# Patient Record
Sex: MALE | Race: WHITE | NOT HISPANIC OR LATINO | Employment: PART TIME | URBAN - METROPOLITAN AREA
[De-identification: names, ages, dates, MRNs, and addresses within clinical notes are randomized per-mention and may not be internally consistent; named-entity substitution may affect disease eponyms.]

---

## 2017-06-20 ENCOUNTER — HOSPITAL ENCOUNTER (OUTPATIENT)
Dept: OTHER | Facility: HOSPITAL | Age: 62
Setting detail: SPECIMEN
Discharge: HOME | End: 2017-06-20
Attending: FAMILY MEDICINE | Admitting: FAMILY MEDICINE

## 2017-06-20 LAB
ALBUMIN SERPL-MCNC: 3.7 G/DL (ref 3.5–4.8)
ALBUMIN/GLOB SERPL: 0.9 {RATIO} (ref 1–1.7)
ALP SERPL-CCNC: 59 IU/L (ref 32–91)
ALT SERPL-CCNC: 29 IU/L (ref 17–63)
ANION GAP SERPL CALC-SCNC: 15.7 MMOL/L (ref 10–20)
AST SERPL-CCNC: 48 IU/L (ref 15–41)
BASOPHILS # BLD AUTO: 0.1 10*3/UL (ref 0–0.2)
BASOPHILS NFR BLD AUTO: 1 % (ref 0–2)
BILIRUB SERPL-MCNC: 0.6 MG/DL (ref 0.3–1.2)
BUN SERPL-MCNC: 11 MG/DL (ref 8–20)
BUN/CREAT SERPL: 9.2 (ref 6.2–20.3)
CALCIUM SERPL-MCNC: 9.4 MG/DL (ref 8.9–10.3)
CHLORIDE SERPL-SCNC: 109 MMOL/L (ref 101–111)
CHOLEST SERPL-MCNC: 179 MG/DL
CHOLEST/HDLC SERPL: 5.3 {RATIO}
CONV CO2: 21 MMOL/L (ref 22–32)
CONV LDL CHOLESTEROL DIRECT: 114 MG/DL (ref 0–100)
CONV TOTAL PROTEIN: 7.8 G/DL (ref 6.1–7.9)
CREAT UR-MCNC: 1.2 MG/DL (ref 0.7–1.2)
DIFFERENTIAL METHOD BLD: (no result)
EOSINOPHIL # BLD AUTO: 0.2 10*3/UL (ref 0–0.3)
EOSINOPHIL # BLD AUTO: 2 % (ref 0–3)
ERYTHROCYTE [DISTWIDTH] IN BLOOD BY AUTOMATED COUNT: 14.5 % (ref 11.5–14.5)
GLOBULIN UR ELPH-MCNC: 4.1 G/DL (ref 2.5–3.8)
GLUCOSE SERPL-MCNC: 113 MG/DL (ref 65–99)
HCT VFR BLD AUTO: 41.9 % (ref 40–54)
HDLC SERPL-MCNC: 34 MG/DL
HGB BLD-MCNC: 14.2 G/DL (ref 14–18)
LDLC/HDLC SERPL: 3.4 {RATIO}
LIPID INTERPRETATION: ABNORMAL
LYMPHOCYTES # BLD AUTO: 1.3 10*3/UL (ref 0.8–4.8)
LYMPHOCYTES NFR BLD AUTO: 13 % (ref 18–42)
MCH RBC QN AUTO: 32.6 PG (ref 26–32)
MCHC RBC AUTO-ENTMCNC: 33.8 G/DL (ref 32–36)
MCV RBC AUTO: 96.4 FL (ref 80–94)
MONOCYTES # BLD AUTO: 1.2 10*3/UL (ref 0.1–1.3)
MONOCYTES NFR BLD AUTO: 12 % (ref 2–11)
NEUTROPHILS # BLD AUTO: 7.1 10*3/UL (ref 2.3–8.6)
NEUTROPHILS NFR BLD AUTO: 72 % (ref 50–75)
NRBC BLD AUTO-RTO: 0 /100{WBCS}
NRBC/RBC NFR BLD MANUAL: 0 10*3/UL
PLATELET # BLD AUTO: 164 10*3/UL (ref 150–450)
PMV BLD AUTO: 9.2 FL (ref 7.4–10.4)
POTASSIUM SERPL-SCNC: 4.7 MMOL/L (ref 3.6–5.1)
PSA SERPL-MCNC: 0.26 NG/ML (ref 0–4)
RBC # BLD AUTO: 4.34 10*6/UL (ref 4.6–6)
SODIUM SERPL-SCNC: 141 MMOL/L (ref 136–144)
T3 SERPL-MCNC: 0.9 NG/ML (ref 0.87–1.78)
T4 SERPL-MCNC: 7.19 UG/DL (ref 6.1–12.2)
TRIGL SERPL-MCNC: 184 MG/DL
TSH SERPL-ACNC: 2.88 UIU/ML (ref 0.34–5.6)
VLDLC SERPL CALC-MCNC: 31.8 MG/DL
WBC # BLD AUTO: 10 10*3/UL (ref 4.5–11.5)

## 2017-07-06 ENCOUNTER — HOSPITAL ENCOUNTER (OUTPATIENT)
Dept: CARDIOLOGY | Facility: HOSPITAL | Age: 62
Discharge: HOME | End: 2017-07-06
Attending: INTERNAL MEDICINE | Admitting: INTERNAL MEDICINE

## 2018-01-23 ENCOUNTER — HOSPITAL ENCOUNTER (OUTPATIENT)
Dept: OTHER | Facility: HOSPITAL | Age: 63
Setting detail: SPECIMEN
Discharge: HOME | End: 2018-01-23
Attending: FAMILY MEDICINE | Admitting: FAMILY MEDICINE

## 2018-01-23 LAB
ANION GAP SERPL CALC-SCNC: 13.9 MMOL/L (ref 10–20)
BASOPHILS # BLD AUTO: 0.1 10*3/UL (ref 0–0.2)
BASOPHILS NFR BLD AUTO: 1 % (ref 0–2)
BUN SERPL-MCNC: 15 MG/DL (ref 8–20)
BUN/CREAT SERPL: 13.6 (ref 6.2–20.3)
CALCIUM SERPL-MCNC: 10.4 MG/DL (ref 8.9–10.3)
CHLORIDE SERPL-SCNC: 106 MMOL/L (ref 101–111)
CONV CO2: 22 MMOL/L (ref 22–32)
CREAT UR-MCNC: 1.1 MG/DL (ref 0.7–1.2)
CRP SERPL-MCNC: 1.79 MG/DL (ref 0–0.7)
DIFFERENTIAL METHOD BLD: (no result)
EOSINOPHIL # BLD AUTO: 0.3 10*3/UL (ref 0–0.3)
EOSINOPHIL # BLD AUTO: 4 % (ref 0–3)
ERYTHROCYTE [DISTWIDTH] IN BLOOD BY AUTOMATED COUNT: 13.8 % (ref 11.5–14.5)
GLUCOSE SERPL-MCNC: 103 MG/DL (ref 65–99)
HCT VFR BLD AUTO: 42.6 % (ref 40–54)
HGB BLD-MCNC: 14.2 G/DL (ref 14–18)
LYMPHOCYTES # BLD AUTO: 1.5 10*3/UL (ref 0.8–4.8)
LYMPHOCYTES NFR BLD AUTO: 18 % (ref 18–42)
MCH RBC QN AUTO: 32 PG (ref 26–32)
MCHC RBC AUTO-ENTMCNC: 33.4 G/DL (ref 32–36)
MCV RBC AUTO: 95.9 FL (ref 80–94)
MONOCYTES # BLD AUTO: 1.4 10*3/UL (ref 0.1–1.3)
MONOCYTES NFR BLD AUTO: 17 % (ref 2–11)
NEUTROPHILS # BLD AUTO: 5.3 10*3/UL (ref 2.3–8.6)
NEUTROPHILS NFR BLD AUTO: 60 % (ref 50–75)
NRBC BLD AUTO-RTO: 0 /100{WBCS}
NRBC/RBC NFR BLD MANUAL: 0 10*3/UL
PLATELET # BLD AUTO: 306 10*3/UL (ref 150–450)
PMV BLD AUTO: 8.1 FL (ref 7.4–10.4)
POTASSIUM SERPL-SCNC: 4.9 MMOL/L (ref 3.6–5.1)
RBC # BLD AUTO: 4.44 10*6/UL (ref 4.6–6)
SODIUM SERPL-SCNC: 137 MMOL/L (ref 136–144)
URATE SERPL-MCNC: 9.5 MG/DL (ref 4.8–8.7)
WBC # BLD AUTO: 8.7 10*3/UL (ref 4.5–11.5)

## 2019-04-03 ENCOUNTER — INPATIENT HOSPITAL (OUTPATIENT)
Dept: URBAN - METROPOLITAN AREA HOSPITAL 84 | Facility: HOSPITAL | Age: 64
End: 2019-04-03

## 2019-04-03 DIAGNOSIS — K74.60 UNSPECIFIED CIRRHOSIS OF LIVER: ICD-10-CM

## 2019-04-03 DIAGNOSIS — I50.9 HEART FAILURE, UNSPECIFIED: ICD-10-CM

## 2019-04-03 DIAGNOSIS — D72.829 ELEVATED WHITE BLOOD CELL COUNT, UNSPECIFIED: ICD-10-CM

## 2019-04-03 DIAGNOSIS — K85.20 ALCOHOL INDUCED ACUTE PANCREATITIS WITHOUT NECROSIS OR INFEC: ICD-10-CM

## 2019-04-03 DIAGNOSIS — I10 ESSENTIAL (PRIMARY) HYPERTENSION: ICD-10-CM

## 2019-04-03 DIAGNOSIS — D69.6 THROMBOCYTOPENIA, UNSPECIFIED: ICD-10-CM

## 2019-04-03 DIAGNOSIS — R10.9 UNSPECIFIED ABDOMINAL PAIN: ICD-10-CM

## 2019-04-03 DIAGNOSIS — F10.10 ALCOHOL ABUSE, UNCOMPLICATED: ICD-10-CM

## 2019-04-03 DIAGNOSIS — R93.3 ABNORMAL FINDINGS ON DIAGNOSTIC IMAGING OF OTHER PARTS OF DI: ICD-10-CM

## 2019-04-03 DIAGNOSIS — R79.89 OTHER SPECIFIED ABNORMAL FINDINGS OF BLOOD CHEMISTRY: ICD-10-CM

## 2019-04-03 PROCEDURE — 99254 IP/OBS CNSLTJ NEW/EST MOD 60: CPT | Performed by: INTERNAL MEDICINE

## 2019-04-04 PROCEDURE — 99232 SBSQ HOSP IP/OBS MODERATE 35: CPT | Performed by: INTERNAL MEDICINE

## 2019-07-10 ENCOUNTER — OFFICE VISIT (OUTPATIENT)
Dept: FAMILY MEDICINE CLINIC | Facility: CLINIC | Age: 64
End: 2019-07-10

## 2019-07-10 VITALS
HEIGHT: 74 IN | WEIGHT: 276 LBS | DIASTOLIC BLOOD PRESSURE: 60 MMHG | HEART RATE: 57 BPM | SYSTOLIC BLOOD PRESSURE: 109 MMHG | BODY MASS INDEX: 35.42 KG/M2 | OXYGEN SATURATION: 95 %

## 2019-07-10 DIAGNOSIS — M24.811 INTERNAL DERANGEMENT OF RIGHT SHOULDER: ICD-10-CM

## 2019-07-10 DIAGNOSIS — D50.0 ANEMIA DUE TO BLOOD LOSS: ICD-10-CM

## 2019-07-10 DIAGNOSIS — I10 ESSENTIAL HYPERTENSION: Primary | ICD-10-CM

## 2019-07-10 DIAGNOSIS — M1A.39X0 CHRONIC GOUT DUE TO RENAL IMPAIRMENT OF MULTIPLE SITES WITHOUT TOPHUS: ICD-10-CM

## 2019-07-10 PROBLEM — R01.1 SYSTOLIC MURMUR: Status: ACTIVE | Noted: 2017-06-20

## 2019-07-10 PROBLEM — R22.0 TONGUE SWELLING: Status: ACTIVE | Noted: 2018-01-23

## 2019-07-10 PROBLEM — Z00.00 ENCOUNTER FOR GENERAL ADULT MEDICAL EXAMINATION WITHOUT ABNORMAL FINDINGS: Status: ACTIVE | Noted: 2017-06-20

## 2019-07-10 PROBLEM — J01.90 ACUTE SINUSITIS: Status: ACTIVE | Noted: 2018-05-07

## 2019-07-10 PROBLEM — K76.9 HEPATIC DISEASE: Status: ACTIVE | Noted: 2019-04-18

## 2019-07-10 PROBLEM — Z87.19 HX OF PANCREATITIS: Status: ACTIVE | Noted: 2019-04-18

## 2019-07-10 PROBLEM — I50.9 CONGESTIVE HEART FAILURE (HCC): Status: ACTIVE | Noted: 2019-02-04

## 2019-07-10 PROBLEM — K83.9 BILIARY DISEASE: Status: ACTIVE | Noted: 2019-04-18

## 2019-07-10 PROBLEM — F10.11 HISTORY OF ALCOHOL ABUSE: Status: ACTIVE | Noted: 2019-04-18

## 2019-07-10 PROBLEM — N40.0 BENIGN PROSTATIC HYPERPLASIA: Status: ACTIVE | Noted: 2017-06-20

## 2019-07-10 LAB
ALBUMIN SERPL-MCNC: 3.1 G/DL (ref 3.5–4.8)
ALBUMIN/GLOB SERPL: 0.7 G/DL (ref 1–1.7)
ALP SERPL-CCNC: 84 U/L (ref 32–91)
ALT SERPL W P-5'-P-CCNC: 19 U/L (ref 17–63)
ANION GAP SERPL CALCULATED.3IONS-SCNC: 12.5 MMOL/L (ref 5–15)
AST SERPL-CCNC: 39 U/L (ref 15–41)
BASOPHILS # BLD AUTO: 0.1 10*3/MM3 (ref 0–0.2)
BASOPHILS NFR BLD AUTO: 0.8 % (ref 0–1.5)
BILIRUB SERPL-MCNC: 1.8 MG/DL (ref 0.3–1.2)
BUN BLD-MCNC: 21 MG/DL (ref 8–20)
BUN/CREAT SERPL: 26.3 (ref 6.2–20.3)
CALCIUM SPEC-SCNC: 9.4 MG/DL (ref 8.9–10.3)
CHLORIDE SERPL-SCNC: 110 MMOL/L (ref 101–111)
CO2 SERPL-SCNC: 21 MMOL/L (ref 22–32)
CREAT BLD-MCNC: 0.8 MG/DL (ref 0.7–1.2)
DEPRECATED RDW RBC AUTO: 49.9 FL (ref 37–54)
EOSINOPHIL # BLD AUTO: 0.4 10*3/MM3 (ref 0–0.4)
EOSINOPHIL NFR BLD AUTO: 4.6 % (ref 0.3–6.2)
ERYTHROCYTE [DISTWIDTH] IN BLOOD BY AUTOMATED COUNT: 14 % (ref 12.3–15.4)
GFR SERPL CREATININE-BSD FRML MDRD: 98 ML/MIN/1.73
GLOBULIN UR ELPH-MCNC: 4.7 GM/DL (ref 2.5–3.8)
GLUCOSE BLD-MCNC: 102 MG/DL (ref 65–99)
HCT VFR BLD AUTO: 38 % (ref 37.5–51)
HGB BLD-MCNC: 12.8 G/DL (ref 13–17.7)
LYMPHOCYTES # BLD AUTO: 1.2 10*3/MM3 (ref 0.7–3.1)
LYMPHOCYTES NFR BLD AUTO: 15 % (ref 19.6–45.3)
MCH RBC QN AUTO: 34.1 PG (ref 26.6–33)
MCHC RBC AUTO-ENTMCNC: 33.8 G/DL (ref 31.5–35.7)
MCV RBC AUTO: 100.9 FL (ref 79–97)
MONOCYTES # BLD AUTO: 1 10*3/MM3 (ref 0.1–0.9)
MONOCYTES NFR BLD AUTO: 12.5 % (ref 5–12)
NEUTROPHILS # BLD AUTO: 5.4 10*3/MM3 (ref 1.7–7)
NEUTROPHILS NFR BLD AUTO: 67.1 % (ref 42.7–76)
NRBC BLD AUTO-RTO: 0.1 /100 WBC (ref 0–0.2)
PLATELET # BLD AUTO: 130 10*3/MM3 (ref 140–450)
PMV BLD AUTO: 9.9 FL (ref 6–12)
POTASSIUM BLD-SCNC: 4.5 MMOL/L (ref 3.6–5.1)
PROT SERPL-MCNC: 7.8 G/DL (ref 6.1–7.9)
RBC # BLD AUTO: 3.76 10*6/MM3 (ref 4.14–5.8)
SODIUM BLD-SCNC: 139 MMOL/L (ref 136–144)
WBC NRBC COR # BLD: 8.1 10*3/MM3 (ref 3.4–10.8)

## 2019-07-10 PROCEDURE — 80053 COMPREHEN METABOLIC PANEL: CPT | Performed by: FAMILY MEDICINE

## 2019-07-10 PROCEDURE — 85025 COMPLETE CBC W/AUTO DIFF WBC: CPT | Performed by: FAMILY MEDICINE

## 2019-07-10 PROCEDURE — 99213 OFFICE O/P EST LOW 20 MIN: CPT | Performed by: FAMILY MEDICINE

## 2019-07-10 RX ORDER — PANTOPRAZOLE SODIUM 40 MG/1
40 TABLET, DELAYED RELEASE ORAL DAILY
COMMUNITY
Start: 2019-04-29 | End: 2019-10-14

## 2019-07-10 RX ORDER — ZINC GLUCONATE 50 MG
50 TABLET ORAL DAILY
Refills: 0 | COMMUNITY
Start: 2019-04-05 | End: 2019-11-25 | Stop reason: SDUPTHER

## 2019-07-10 RX ORDER — CARVEDILOL 3.12 MG/1
TABLET ORAL
COMMUNITY
Start: 2019-07-08 | End: 2019-10-07 | Stop reason: SDUPTHER

## 2019-07-10 RX ORDER — ALLOPURINOL 300 MG/1
TABLET ORAL
COMMUNITY
Start: 2019-05-08 | End: 2019-09-16 | Stop reason: SDUPTHER

## 2019-07-10 RX ORDER — ALBUTEROL SULFATE 2.5 MG/3ML
SOLUTION RESPIRATORY (INHALATION)
COMMUNITY
Start: 2018-12-24 | End: 2019-10-14

## 2019-07-10 RX ORDER — LISINOPRIL AND HYDROCHLOROTHIAZIDE 12.5; 1 MG/1; MG/1
0.5 TABLET ORAL DAILY
COMMUNITY
Start: 2019-04-20 | End: 2020-05-07 | Stop reason: SDUPTHER

## 2019-07-10 RX ORDER — LISINOPRIL 10 MG/1
10 TABLET ORAL DAILY
Refills: 0 | COMMUNITY
Start: 2019-04-05 | End: 2019-10-14

## 2019-07-10 RX ORDER — BUDESONIDE AND FORMOTEROL FUMARATE DIHYDRATE 160; 4.5 UG/1; UG/1
2 AEROSOL RESPIRATORY (INHALATION)
Qty: 1 INHALER | Refills: 5 | Status: SHIPPED | OUTPATIENT
Start: 2019-07-10 | End: 2020-05-28 | Stop reason: SDUPTHER

## 2019-07-10 RX ORDER — BUDESONIDE AND FORMOTEROL FUMARATE DIHYDRATE 160; 4.5 UG/1; UG/1
AEROSOL RESPIRATORY (INHALATION)
COMMUNITY
Start: 2019-04-16 | End: 2019-07-10 | Stop reason: SDUPTHER

## 2019-07-10 RX ORDER — ALBUTEROL SULFATE 90 UG/1
AEROSOL, METERED RESPIRATORY (INHALATION)
COMMUNITY
Start: 2019-04-20 | End: 2019-07-10 | Stop reason: SDUPTHER

## 2019-07-10 RX ORDER — ALBUTEROL SULFATE 90 UG/1
2 AEROSOL, METERED RESPIRATORY (INHALATION) EVERY 6 HOURS PRN
Qty: 1 INHALER | Refills: 5 | Status: SHIPPED | OUTPATIENT
Start: 2019-07-10 | End: 2020-04-27 | Stop reason: SDUPTHER

## 2019-07-10 RX ORDER — HYDROCHLOROTHIAZIDE 25 MG/1
TABLET ORAL
COMMUNITY
Start: 2019-04-29 | End: 2019-10-14

## 2019-07-10 RX ORDER — FUROSEMIDE 20 MG/1
10 TABLET ORAL DAILY
COMMUNITY
Start: 2019-02-04 | End: 2020-05-21 | Stop reason: SDUPTHER

## 2019-07-10 RX ORDER — URSODIOL 500 MG/1
500 TABLET, FILM COATED ORAL 2 TIMES DAILY
COMMUNITY
Start: 2019-07-08 | End: 2019-11-18 | Stop reason: SDUPTHER

## 2019-07-10 NOTE — PATIENT INSTRUCTIONS
Ck labs as noted w F/U as indicated.  Meds reviewed w Adjust prn.  Healthy Heart Diet w Exercise and Wt Mgmt as noted.  Will schedule MRI of R Shoulder w Ortho F/U as indicated.

## 2019-07-10 NOTE — PROGRESS NOTES
"Subjective   Leonid Diehl is a 64 y.o. male.     Pt in for F/U HBP/HLD w Asthma/COPD.  Also recent Hosp w severe Anemia and Transfusion w GI and [place] Blood Loss.  Feeling gradually stronger.  Also having increasing problems w R Shoulder pain and stiffness.  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    /60   Pulse 57   Ht 188 cm (74.02\")   Wt 125 kg (276 lb)   SpO2 95%   BMI 35.42 kg/m²      The following portions of the patient's history were reviewed and updated as appropriate: allergies, current medications, past medical history, past social history and problem list.    Review of Systems   Constitutional: Negative.    Respiratory: Positive for cough, shortness of breath and wheezing.         Improved w Symbicort.   Cardiovascular: Negative.  Negative for chest pain and palpitations.   Gastrointestinal: Negative.    Endocrine: Negative.    Allergic/Immunologic: Positive for environmental allergies.   Neurological: Negative.    Hematological:        Hx of Anemia.   Psychiatric/Behavioral: Negative.         Hx of Depression.       Objective   Physical Exam   Constitutional: He is oriented to person, place, and time. He appears well-developed and well-nourished. No distress.   Neck: No thyromegaly present.   Cardiovascular: Normal rate, regular rhythm, normal heart sounds and intact distal pulses. Exam reveals no gallop.   No murmur heard.  Pulmonary/Chest: Effort normal. He has wheezes. He has no rales.   Scattered wheezes.   Abdominal: Soft. There is no tenderness.   Musculoskeletal:   R Shoulder w ROM severely limited by pain.  Mild-mod clicking present. Neurovascular intact.   Lymphadenopathy:     He has no cervical adenopathy.   Neurological: He is alert and oriented to person, place, and time. No cranial nerve deficit or sensory deficit.   Skin: Skin is warm and dry. No rash noted.   Psychiatric: He has a normal mood and affect. His behavior is normal. Judgment and thought content normal. "       Assessment/Plan   Leonid was seen today for hypertension and shoulder pain.    Diagnoses and all orders for this visit:    Essential hypertension  -     CBC & Differential  -     Comprehensive Metabolic Panel  -     CBC Auto Differential    Anemia due to blood loss  -     CBC & Differential  -     Cancel: Iron and TIBC; Future  -     CBC Auto Differential  -     Iron and TIBC    Chronic gout due to renal impairment of multiple sites without tophus  -     Cancel: Uric acid; Future  -     Uric acid    Internal derangement of right shoulder  -     MRI Shoulder Right Without Contrast; Future    Other orders  -     albuterol sulfate  (90 Base) MCG/ACT inhaler; Inhale 2 puffs Every 6 (Six) Hours As Needed for Wheezing.  -     SYMBICORT 160-4.5 MCG/ACT inhaler; Inhale 2 puffs 2 (Two) Times a Day.

## 2019-07-10 NOTE — PROGRESS NOTES
Subjective   Leonid Diehl is a 64 y.o. male.     Pt in for F/U HBP and HLD w recent Anemia from GI and Donor Blood Loss.         {Common H&P Review Areas:16431}    Review of Systems    Objective   Physical Exam    Assessment/Plan   {Assess/PlanSmartLinks:92530}

## 2019-08-22 DIAGNOSIS — M24.811 INTERNAL DERANGEMENT OF RIGHT SHOULDER: ICD-10-CM

## 2019-08-29 ENCOUNTER — TELEPHONE (OUTPATIENT)
Dept: FAMILY MEDICINE CLINIC | Facility: CLINIC | Age: 64
End: 2019-08-29

## 2019-08-29 DIAGNOSIS — M24.9 DERANGEMENT OF RIGHT SHOULDER JOINT: Primary | ICD-10-CM

## 2019-08-29 NOTE — TELEPHONE ENCOUNTER
MRI reviewed and discussed w Pt. Consultation w Ortho ([facility]) recommended. Order entered. Thanks.

## 2019-09-17 RX ORDER — ALLOPURINOL 300 MG/1
TABLET ORAL
Qty: 30 TABLET | Refills: 1 | Status: SHIPPED | OUTPATIENT
Start: 2019-09-17 | End: 2019-10-14

## 2019-10-02 ENCOUNTER — OFFICE VISIT (OUTPATIENT)
Dept: ORTHOPEDIC SURGERY | Facility: CLINIC | Age: 64
End: 2019-10-02

## 2019-10-02 VITALS
BODY MASS INDEX: 35.29 KG/M2 | SYSTOLIC BLOOD PRESSURE: 148 MMHG | WEIGHT: 275 LBS | HEART RATE: 65 BPM | HEIGHT: 74 IN | DIASTOLIC BLOOD PRESSURE: 69 MMHG

## 2019-10-02 DIAGNOSIS — M19.011 OSTEOARTHRITIS OF RIGHT GLENOHUMERAL JOINT: ICD-10-CM

## 2019-10-02 DIAGNOSIS — M25.511 ACUTE PAIN OF RIGHT SHOULDER: Primary | ICD-10-CM

## 2019-10-02 PROCEDURE — 99243 OFF/OP CNSLTJ NEW/EST LOW 30: CPT | Performed by: ORTHOPAEDIC SURGERY

## 2019-10-02 NOTE — PROGRESS NOTES
"     Patient ID: Leonid Diehl is a 64 y.o. male.    Chief Complaint:    Chief Complaint   Patient presents with   • Right Shoulder - Consult   • Consult     right shoulder pain       HPI:  Leonid is a 64-year-old right-hand-dominant gentleman here in consultation for long-standing right shoulder pain from Dr. Case.  There is no injury.  He has significant grinding and pain deep in the shoulder.  He cannot lift his arm overhead.  Pain is sharp and a 7/10 and no better with activity modification, ibuprofen, or rest.  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  Past Medical History:   Diagnosis Date   • Asthma    • Hypertension        History reviewed. No pertinent surgical history.    Family History   Problem Relation Age of Onset   • Cancer Father    • Heart disease Sister    • Hypertension Brother           Social History     Occupational History   • Not on file   Tobacco Use   • Smoking status: Never Smoker   Substance and Sexual Activity   • Alcohol use: No     Frequency: Never   • Drug use: Not on file   • Sexual activity: Not on file      Review of Systems   Cardiovascular: Negative for chest pain.   Musculoskeletal: Positive for arthralgias.       Objective:    /69   Pulse 65   Ht 188 cm (74.02\")   Wt 125 kg (275 lb)   BMI 35.29 kg/m²     Physical Examination:  He is a pleasant male in no distress. He is alert and oriented x3 and appears his stated age.  Shoulder demonstrates no scars with supraspinatus atrophy.  There is pain over the bicep groove.  Passive elevation is 120 degrees abduction 110 degrees external rotation 20 degrees internal rotation is S1.  He has pain weakness on Speed, [place], and supraspinatus testing.  Belly press and liftoff are 4/5.Sensory and motor exam are intact all distributions. Radial pulse is palpable and capillary refill is less than two seconds to all digits    Imaging:  X-rays demonstrate end-stage degenerative joint disease with " slight subluxation and mild biconcave wear, MRI demonstrates posterior subluxation with mild posterior retroversion and widespread partial cuff tearing    Assessment:  Right shoulder degenerative joint disease    Plan: Treatment options discussed.  At this point he is leaning towards sitting with reverse shoulder arthroplasty.Discussed the risks including bleeding, scar, infection, stiffness, nerve artery vein tendon damage, instability, fracture dvt, loss of life or limb. Issues of scapular notching and component revision were discussed. Questions were answered and addressed

## 2019-10-02 NOTE — H&P (VIEW-ONLY)
"     Patient ID: Leonid Diehl is a 64 y.o. male.    Chief Complaint:    Chief Complaint   Patient presents with   • Right Shoulder - Consult   • Consult     right shoulder pain       HPI:  Leonid is a 64-year-old right-hand-dominant gentleman here in consultation for long-standing right shoulder pain from Dr. Case.  There is no injury.  He has significant grinding and pain deep in the shoulder.  He cannot lift his arm overhead.  Pain is sharp and a 7/10 and no better with activity modification, ibuprofen, or rest.  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  Past Medical History:   Diagnosis Date   • Asthma    • Hypertension        History reviewed. No pertinent surgical history.    Family History   Problem Relation Age of Onset   • Cancer Father    • Heart disease Sister    • Hypertension Brother           Social History     Occupational History   • Not on file   Tobacco Use   • Smoking status: Never Smoker   Substance and Sexual Activity   • Alcohol use: No     Frequency: Never   • Drug use: Not on file   • Sexual activity: Not on file      Review of Systems   Cardiovascular: Negative for chest pain.   Musculoskeletal: Positive for arthralgias.       Objective:    /69   Pulse 65   Ht 188 cm (74.02\")   Wt 125 kg (275 lb)   BMI 35.29 kg/m²     Physical Examination:  He is a pleasant male in no distress. He is alert and oriented x3 and appears his stated age.  Shoulder demonstrates no scars with supraspinatus atrophy.  There is pain over the bicep groove.  Passive elevation is 120 degrees abduction 110 degrees external rotation 20 degrees internal rotation is S1.  He has pain weakness on Speed, [place], and supraspinatus testing.  Belly press and liftoff are 4/5.Sensory and motor exam are intact all distributions. Radial pulse is palpable and capillary refill is less than two seconds to all digits    Imaging:  X-rays demonstrate end-stage degenerative joint disease with " slight subluxation and mild biconcave wear, MRI demonstrates posterior subluxation with mild posterior retroversion and widespread partial cuff tearing    Assessment:  Right shoulder degenerative joint disease    Plan: Treatment options discussed.  At this point he is leaning towards sitting with reverse shoulder arthroplasty.Discussed the risks including bleeding, scar, infection, stiffness, nerve artery vein tendon damage, instability, fracture dvt, loss of life or limb. Issues of scapular notching and component revision were discussed. Questions were answered and addressed

## 2019-10-07 RX ORDER — CARVEDILOL 3.12 MG/1
TABLET ORAL
Qty: 30 TABLET | Refills: 4 | Status: SHIPPED | OUTPATIENT
Start: 2019-10-07 | End: 2019-10-14

## 2019-10-09 ENCOUNTER — PREP FOR SURGERY (OUTPATIENT)
Dept: OTHER | Facility: HOSPITAL | Age: 64
End: 2019-10-09

## 2019-10-09 DIAGNOSIS — M19.011 OSTEOARTHRITIS OF RIGHT GLENOHUMERAL JOINT: Primary | ICD-10-CM

## 2019-10-09 RX ORDER — OXYCODONE HCL 10 MG/1
10 TABLET, FILM COATED, EXTENDED RELEASE ORAL ONCE
Status: CANCELLED | OUTPATIENT
Start: 2019-10-09 | End: 2019-10-09

## 2019-10-09 RX ORDER — PREGABALIN 75 MG/1
150 CAPSULE ORAL ONCE
Status: CANCELLED | OUTPATIENT
Start: 2019-10-09 | End: 2019-10-09

## 2019-10-09 RX ORDER — MELOXICAM 15 MG/1
15 TABLET ORAL ONCE
Status: CANCELLED | OUTPATIENT
Start: 2019-10-09 | End: 2019-10-09

## 2019-10-14 ENCOUNTER — APPOINTMENT (OUTPATIENT)
Dept: PREADMISSION TESTING | Facility: HOSPITAL | Age: 64
End: 2019-10-14

## 2019-10-14 ENCOUNTER — HOSPITAL ENCOUNTER (OUTPATIENT)
Dept: GENERAL RADIOLOGY | Facility: HOSPITAL | Age: 64
Discharge: HOME | End: 2019-10-14
Admitting: ORTHOPAEDIC SURGERY

## 2019-10-14 VITALS
HEART RATE: 61 BPM | DIASTOLIC BLOOD PRESSURE: 66 MMHG | BODY MASS INDEX: 35.78 KG/M2 | WEIGHT: 278.8 LBS | SYSTOLIC BLOOD PRESSURE: 126 MMHG | HEIGHT: 74 IN | OXYGEN SATURATION: 98 %

## 2019-10-14 DIAGNOSIS — M19.011 OSTEOARTHRITIS OF RIGHT GLENOHUMERAL JOINT: ICD-10-CM

## 2019-10-14 LAB
ABO GROUP BLD: NORMAL
ANION GAP SERPL CALCULATED.3IONS-SCNC: 9.3 MMOL/L (ref 5–15)
APTT PPP: 28.4 SECONDS (ref 24–31)
BASOPHILS # BLD AUTO: 0.1 10*3/MM3 (ref 0–0.2)
BASOPHILS NFR BLD AUTO: 0.7 % (ref 0–1.5)
BLD GP AB SCN SERPL QL: NEGATIVE
BUN BLD-MCNC: 20 MG/DL (ref 8–20)
BUN/CREAT SERPL: 20 (ref 6.2–20.3)
CALCIUM SPEC-SCNC: 9.3 MG/DL (ref 8.9–10.3)
CHLORIDE SERPL-SCNC: 108 MMOL/L (ref 101–111)
CO2 SERPL-SCNC: 24 MMOL/L (ref 22–32)
CREAT BLD-MCNC: 1 MG/DL (ref 0.7–1.2)
DEPRECATED RDW RBC AUTO: 50.8 FL (ref 37–54)
EOSINOPHIL # BLD AUTO: 0.5 10*3/MM3 (ref 0–0.4)
EOSINOPHIL NFR BLD AUTO: 5.8 % (ref 0.3–6.2)
ERYTHROCYTE [DISTWIDTH] IN BLOOD BY AUTOMATED COUNT: 14.5 % (ref 12.3–15.4)
GFR SERPL CREATININE-BSD FRML MDRD: 75 ML/MIN/1.73
GLUCOSE BLD-MCNC: 104 MG/DL (ref 65–99)
HBA1C MFR BLD: 4.9 % (ref 3.5–5.6)
HCT VFR BLD AUTO: 37.4 % (ref 37.5–51)
HGB BLD-MCNC: 12.9 G/DL (ref 13–17.7)
INR PPP: 1.16 (ref 0.9–1.1)
LYMPHOCYTES # BLD AUTO: 1.7 10*3/MM3 (ref 0.7–3.1)
LYMPHOCYTES NFR BLD AUTO: 19.6 % (ref 19.6–45.3)
MCH RBC QN AUTO: 34.6 PG (ref 26.6–33)
MCHC RBC AUTO-ENTMCNC: 34.5 G/DL (ref 31.5–35.7)
MCV RBC AUTO: 100.4 FL (ref 79–97)
MONOCYTES # BLD AUTO: 1.1 10*3/MM3 (ref 0.1–0.9)
MONOCYTES NFR BLD AUTO: 13.3 % (ref 5–12)
NEUTROPHILS # BLD AUTO: 5.2 10*3/MM3 (ref 1.7–7)
NEUTROPHILS NFR BLD AUTO: 60.6 % (ref 42.7–76)
NRBC BLD AUTO-RTO: 0.1 /100 WBC (ref 0–0.2)
PLATELET # BLD AUTO: 121 10*3/MM3 (ref 140–450)
PMV BLD AUTO: 8.7 FL (ref 6–12)
POTASSIUM BLD-SCNC: 4.3 MMOL/L (ref 3.6–5.1)
PROTHROMBIN TIME: 11.9 SECONDS (ref 9.6–11.7)
RBC # BLD AUTO: 3.73 10*6/MM3 (ref 4.14–5.8)
RH BLD: POSITIVE
SODIUM BLD-SCNC: 137 MMOL/L (ref 136–144)
T&S EXPIRATION DATE: NORMAL
WBC NRBC COR # BLD: 8.6 10*3/MM3 (ref 3.4–10.8)

## 2019-10-14 PROCEDURE — 85025 COMPLETE CBC W/AUTO DIFF WBC: CPT | Performed by: ORTHOPAEDIC SURGERY

## 2019-10-14 PROCEDURE — 86850 RBC ANTIBODY SCREEN: CPT | Performed by: ORTHOPAEDIC SURGERY

## 2019-10-14 PROCEDURE — 80048 BASIC METABOLIC PNL TOTAL CA: CPT | Performed by: ORTHOPAEDIC SURGERY

## 2019-10-14 PROCEDURE — 71046 X-RAY EXAM CHEST 2 VIEWS: CPT

## 2019-10-14 PROCEDURE — 86901 BLOOD TYPING SEROLOGIC RH(D): CPT

## 2019-10-14 PROCEDURE — 83036 HEMOGLOBIN GLYCOSYLATED A1C: CPT | Performed by: ORTHOPAEDIC SURGERY

## 2019-10-14 PROCEDURE — 36415 COLL VENOUS BLD VENIPUNCTURE: CPT

## 2019-10-14 PROCEDURE — 93005 ELECTROCARDIOGRAM TRACING: CPT

## 2019-10-14 PROCEDURE — 86901 BLOOD TYPING SEROLOGIC RH(D): CPT | Performed by: ORTHOPAEDIC SURGERY

## 2019-10-14 PROCEDURE — 86900 BLOOD TYPING SEROLOGIC ABO: CPT

## 2019-10-14 PROCEDURE — 85610 PROTHROMBIN TIME: CPT | Performed by: ORTHOPAEDIC SURGERY

## 2019-10-14 PROCEDURE — 87081 CULTURE SCREEN ONLY: CPT | Performed by: ORTHOPAEDIC SURGERY

## 2019-10-14 PROCEDURE — 86900 BLOOD TYPING SEROLOGIC ABO: CPT | Performed by: ORTHOPAEDIC SURGERY

## 2019-10-14 PROCEDURE — 85730 THROMBOPLASTIN TIME PARTIAL: CPT | Performed by: ORTHOPAEDIC SURGERY

## 2019-10-14 RX ORDER — FEXOFENADINE HCL AND PSEUDOEPHEDRINE HCI 180; 240 MG/1; MG/1
1 TABLET, EXTENDED RELEASE ORAL DAILY
COMMUNITY
End: 2022-08-22

## 2019-10-14 RX ORDER — ALLOPURINOL 300 MG/1
300 TABLET ORAL DAILY
COMMUNITY
End: 2020-05-28

## 2019-10-14 RX ORDER — CARVEDILOL 3.12 MG/1
3.12 TABLET ORAL DAILY
COMMUNITY
End: 2020-03-10

## 2019-10-14 RX ORDER — GUAIFENESIN 600 MG/1
1200 TABLET, EXTENDED RELEASE ORAL 2 TIMES DAILY
COMMUNITY
End: 2023-04-04 | Stop reason: ALTCHOICE

## 2019-10-15 LAB — MRSA SPEC QL CULT: NORMAL

## 2019-10-15 PROCEDURE — 93010 ELECTROCARDIOGRAM REPORT: CPT | Performed by: INTERNAL MEDICINE

## 2019-10-16 ENCOUNTER — APPOINTMENT (OUTPATIENT)
Dept: PREADMISSION TESTING | Facility: HOSPITAL | Age: 64
End: 2019-10-16

## 2019-10-21 ENCOUNTER — ANESTHESIA EVENT (OUTPATIENT)
Dept: PERIOP | Facility: HOSPITAL | Age: 64
End: 2019-10-21

## 2019-10-22 ENCOUNTER — APPOINTMENT (OUTPATIENT)
Dept: GENERAL RADIOLOGY | Facility: HOSPITAL | Age: 64
End: 2019-10-22

## 2019-10-22 ENCOUNTER — HOSPITAL ENCOUNTER (INPATIENT)
Facility: HOSPITAL | Age: 64
LOS: 1 days | Discharge: HOME | End: 2019-10-23
Attending: ORTHOPAEDIC SURGERY | Admitting: ORTHOPAEDIC SURGERY

## 2019-10-22 ENCOUNTER — ANESTHESIA (OUTPATIENT)
Dept: PERIOP | Facility: HOSPITAL | Age: 64
End: 2019-10-22

## 2019-10-22 DIAGNOSIS — M19.011 OSTEOARTHRITIS OF RIGHT GLENOHUMERAL JOINT: ICD-10-CM

## 2019-10-22 PROBLEM — M19.019 DJD OF SHOULDER: Status: ACTIVE | Noted: 2019-10-22

## 2019-10-22 PROCEDURE — L3670 SO ACRO/CLAV CAN WEB PRE OTS: HCPCS | Performed by: ORTHOPAEDIC SURGERY

## 2019-10-22 PROCEDURE — C1776 JOINT DEVICE (IMPLANTABLE): HCPCS | Performed by: ORTHOPAEDIC SURGERY

## 2019-10-22 PROCEDURE — 23430 REPAIR BICEPS TENDON: CPT | Performed by: ORTHOPAEDIC SURGERY

## 2019-10-22 PROCEDURE — 25010000002 PROPOFOL 10 MG/ML EMULSION: Performed by: ANESTHESIOLOGY

## 2019-10-22 PROCEDURE — 25010000002 DEXAMETHASONE PER 1 MG: Performed by: ANESTHESIOLOGY

## 2019-10-22 PROCEDURE — 25010000002 VANCOMYCIN 1 G RECONSTITUTED SOLUTION 1 EACH VIAL: Performed by: ORTHOPAEDIC SURGERY

## 2019-10-22 PROCEDURE — 0RRJ00Z REPLACEMENT OF RIGHT SHOULDER JOINT WITH REVERSE BALL AND SOCKET SYNTHETIC SUBSTITUTE, OPEN APPROACH: ICD-10-PCS | Performed by: ORTHOPAEDIC SURGERY

## 2019-10-22 PROCEDURE — 25010000003 LIDOCAINE 1 % SOLUTION 50 ML VIAL: Performed by: ORTHOPAEDIC SURGERY

## 2019-10-22 PROCEDURE — 0LS30ZZ REPOSITION RIGHT UPPER ARM TENDON, OPEN APPROACH: ICD-10-PCS | Performed by: ORTHOPAEDIC SURGERY

## 2019-10-22 PROCEDURE — 25010000002 CEFAZOLIN PER 500 MG: Performed by: ORTHOPAEDIC SURGERY

## 2019-10-22 PROCEDURE — 25010000003 BUPIVACAINE LIPOSOME 1.3 % SUSPENSION: Performed by: ANESTHESIOLOGY

## 2019-10-22 PROCEDURE — 73030 X-RAY EXAM OF SHOULDER: CPT

## 2019-10-22 PROCEDURE — 25010000002 ONDANSETRON PER 1 MG: Performed by: ANESTHESIOLOGY

## 2019-10-22 PROCEDURE — C1713 ANCHOR/SCREW BN/BN,TIS/BN: HCPCS | Performed by: ORTHOPAEDIC SURGERY

## 2019-10-22 PROCEDURE — 25010000002 MIDAZOLAM PER 1 MG: Performed by: ANESTHESIOLOGY

## 2019-10-22 PROCEDURE — 25010000002 KETOROLAC TROMETHAMINE PER 15 MG: Performed by: ANESTHESIOLOGY

## 2019-10-22 PROCEDURE — C9290 INJ, BUPIVACAINE LIPOSOME: HCPCS | Performed by: ANESTHESIOLOGY

## 2019-10-22 PROCEDURE — 23472 RECONSTRUCT SHOULDER JOINT: CPT | Performed by: ORTHOPAEDIC SURGERY

## 2019-10-22 PROCEDURE — 25010000002 CEFTRIAXONE PER 250 MG: Performed by: ORTHOPAEDIC SURGERY

## 2019-10-22 DEVICE — INVERSE/REVERSE SCREW SYSTEM, 4.5-33
Type: IMPLANTABLE DEVICE | Site: SHOULDER | Status: FUNCTIONAL
Brand: INVERSE/REVERSE

## 2019-10-22 DEVICE — SPACR HUM TRABECULAR REV PLS12MM: Type: IMPLANTABLE DEVICE | Site: SHOULDER | Status: FUNCTIONAL

## 2019-10-22 DEVICE — LINER HUM/SHLDR TRABECULARMETAL REV POLY 60DEG 40MM PLS3: Type: IMPLANTABLE DEVICE | Site: SHOULDER | Status: FUNCTIONAL

## 2019-10-22 DEVICE — KT SHLDR DRL PIN SPG: Type: IMPLANTABLE DEVICE | Site: SHOULDER | Status: FUNCTIONAL

## 2019-10-22 DEVICE — GLENSPHR TRABECULARMETAL REV 40MM: Type: IMPLANTABLE DEVICE | Site: SHOULDER | Status: FUNCTIONAL

## 2019-10-22 DEVICE — TOTL SHLDER REV: Type: IMPLANTABLE DEVICE | Site: SHOULDER | Status: FUNCTIONAL

## 2019-10-22 DEVICE — STEM HUM/SHLDR TRABECULARMETAL REV 16X130MM: Type: IMPLANTABLE DEVICE | Site: SHOULDER | Status: FUNCTIONAL

## 2019-10-22 DEVICE — BASEPLT GLEN TRABECULARMETAL REV 15MM: Type: IMPLANTABLE DEVICE | Site: SHOULDER | Status: FUNCTIONAL

## 2019-10-22 DEVICE — SUT NONABS MAXBRAID/PE NMBR2 HC5 38IN BLU 900334: Type: IMPLANTABLE DEVICE | Site: SHOULDER | Status: FUNCTIONAL

## 2019-10-22 RX ORDER — PROMETHAZINE HYDROCHLORIDE 25 MG/ML
12.5 INJECTION, SOLUTION INTRAMUSCULAR; INTRAVENOUS ONCE AS NEEDED
Status: DISCONTINUED | OUTPATIENT
Start: 2019-10-22 | End: 2019-10-22 | Stop reason: HOSPADM

## 2019-10-22 RX ORDER — DIPHENHYDRAMINE HCL 25 MG
25 TABLET ORAL NIGHTLY PRN
Status: DISCONTINUED | OUTPATIENT
Start: 2019-10-22 | End: 2019-10-23 | Stop reason: HOSPADM

## 2019-10-22 RX ORDER — BISACODYL 10 MG
10 SUPPOSITORY, RECTAL RECTAL DAILY PRN
Status: DISCONTINUED | OUTPATIENT
Start: 2019-10-22 | End: 2019-10-23 | Stop reason: HOSPADM

## 2019-10-22 RX ORDER — PROMETHAZINE HYDROCHLORIDE 25 MG/1
25 SUPPOSITORY RECTAL ONCE AS NEEDED
Status: DISCONTINUED | OUTPATIENT
Start: 2019-10-22 | End: 2019-10-22 | Stop reason: HOSPADM

## 2019-10-22 RX ORDER — HYDROMORPHONE HCL 110MG/55ML
0.25 PATIENT CONTROLLED ANALGESIA SYRINGE INTRAVENOUS
Status: DISCONTINUED | OUTPATIENT
Start: 2019-10-22 | End: 2019-10-22 | Stop reason: HOSPADM

## 2019-10-22 RX ORDER — FERROUS SULFATE TAB EC 324 MG (65 MG FE EQUIVALENT) 324 (65 FE) MG
324 TABLET DELAYED RESPONSE ORAL
Status: DISCONTINUED | OUTPATIENT
Start: 2019-10-22 | End: 2019-10-23 | Stop reason: HOSPADM

## 2019-10-22 RX ORDER — ALBUTEROL SULFATE 90 UG/1
2 AEROSOL, METERED RESPIRATORY (INHALATION) EVERY 6 HOURS PRN
Status: DISCONTINUED | OUTPATIENT
Start: 2019-10-22 | End: 2019-10-23 | Stop reason: HOSPADM

## 2019-10-22 RX ORDER — ROCURONIUM BROMIDE 10 MG/ML
INJECTION, SOLUTION INTRAVENOUS AS NEEDED
Status: DISCONTINUED | OUTPATIENT
Start: 2019-10-22 | End: 2019-10-22 | Stop reason: SURG

## 2019-10-22 RX ORDER — NALOXONE HCL 0.4 MG/ML
0.4 VIAL (ML) INJECTION AS NEEDED
Status: DISCONTINUED | OUTPATIENT
Start: 2019-10-22 | End: 2019-10-22 | Stop reason: HOSPADM

## 2019-10-22 RX ORDER — EPHEDRINE SULFATE/0.9% NACL/PF 25 MG/5 ML
SYRINGE (ML) INTRAVENOUS AS NEEDED
Status: DISCONTINUED | OUTPATIENT
Start: 2019-10-22 | End: 2019-10-22 | Stop reason: SURG

## 2019-10-22 RX ORDER — ACETAMINOPHEN 325 MG/1
650 TABLET ORAL EVERY 4 HOURS PRN
Status: DISCONTINUED | OUTPATIENT
Start: 2019-10-22 | End: 2019-10-23 | Stop reason: HOSPADM

## 2019-10-22 RX ORDER — PROPOFOL 10 MG/ML
VIAL (ML) INTRAVENOUS AS NEEDED
Status: DISCONTINUED | OUTPATIENT
Start: 2019-10-22 | End: 2019-10-22 | Stop reason: SURG

## 2019-10-22 RX ORDER — PREGABALIN 75 MG/1
150 CAPSULE ORAL ONCE
Status: COMPLETED | OUTPATIENT
Start: 2019-10-22 | End: 2019-10-22

## 2019-10-22 RX ORDER — ASPIRIN 325 MG
325 TABLET, DELAYED RELEASE (ENTERIC COATED) ORAL EVERY 12 HOURS SCHEDULED
Status: DISCONTINUED | OUTPATIENT
Start: 2019-10-22 | End: 2019-10-23 | Stop reason: HOSPADM

## 2019-10-22 RX ORDER — OXYCODONE HCL 10 MG/1
10 TABLET, FILM COATED, EXTENDED RELEASE ORAL ONCE
Status: COMPLETED | OUTPATIENT
Start: 2019-10-22 | End: 2019-10-22

## 2019-10-22 RX ORDER — DIPHENHYDRAMINE HYDROCHLORIDE 50 MG/ML
25 INJECTION INTRAMUSCULAR; INTRAVENOUS NIGHTLY PRN
Status: DISCONTINUED | OUTPATIENT
Start: 2019-10-22 | End: 2019-10-23 | Stop reason: HOSPADM

## 2019-10-22 RX ORDER — ACETAMINOPHEN 500 MG
TABLET ORAL AS NEEDED
Status: DISCONTINUED | OUTPATIENT
Start: 2019-10-22 | End: 2019-10-22 | Stop reason: SURG

## 2019-10-22 RX ORDER — TRAMADOL HYDROCHLORIDE 50 MG/1
50 TABLET ORAL EVERY 6 HOURS PRN
Status: DISCONTINUED | OUTPATIENT
Start: 2019-10-22 | End: 2019-10-23 | Stop reason: HOSPADM

## 2019-10-22 RX ORDER — ONDANSETRON 2 MG/ML
INJECTION INTRAMUSCULAR; INTRAVENOUS AS NEEDED
Status: DISCONTINUED | OUTPATIENT
Start: 2019-10-22 | End: 2019-10-22 | Stop reason: SURG

## 2019-10-22 RX ORDER — SODIUM CHLORIDE, SODIUM LACTATE, POTASSIUM CHLORIDE, CALCIUM CHLORIDE 600; 310; 30; 20 MG/100ML; MG/100ML; MG/100ML; MG/100ML
9 INJECTION, SOLUTION INTRAVENOUS CONTINUOUS PRN
Status: DISCONTINUED | OUTPATIENT
Start: 2019-10-22 | End: 2019-10-22 | Stop reason: HOSPADM

## 2019-10-22 RX ORDER — OXYCODONE HYDROCHLORIDE 5 MG/1
10 TABLET ORAL EVERY 4 HOURS PRN
Status: DISCONTINUED | OUTPATIENT
Start: 2019-10-22 | End: 2019-10-23 | Stop reason: HOSPADM

## 2019-10-22 RX ORDER — MORPHINE SULFATE 4 MG/ML
2 INJECTION, SOLUTION INTRAMUSCULAR; INTRAVENOUS EVERY 4 HOURS PRN
Status: DISCONTINUED | OUTPATIENT
Start: 2019-10-22 | End: 2019-10-23 | Stop reason: HOSPADM

## 2019-10-22 RX ORDER — BUDESONIDE AND FORMOTEROL FUMARATE DIHYDRATE 160; 4.5 UG/1; UG/1
2 AEROSOL RESPIRATORY (INHALATION)
Status: DISCONTINUED | OUTPATIENT
Start: 2019-10-22 | End: 2019-10-23 | Stop reason: HOSPADM

## 2019-10-22 RX ORDER — CETIRIZINE HYDROCHLORIDE, PSEUDOEPHEDRINE HYDROCHLORIDE 5; 120 MG/1; MG/1
1 TABLET, FILM COATED, EXTENDED RELEASE ORAL 2 TIMES DAILY
Status: DISCONTINUED | OUTPATIENT
Start: 2019-10-22 | End: 2019-10-23 | Stop reason: HOSPADM

## 2019-10-22 RX ORDER — MELOXICAM 15 MG/1
15 TABLET ORAL ONCE
Status: COMPLETED | OUTPATIENT
Start: 2019-10-22 | End: 2019-10-22

## 2019-10-22 RX ORDER — OXYCODONE HCL 20 MG/1
20 TABLET, FILM COATED, EXTENDED RELEASE ORAL EVERY 12 HOURS SCHEDULED
Status: DISCONTINUED | OUTPATIENT
Start: 2019-10-22 | End: 2019-10-23 | Stop reason: HOSPADM

## 2019-10-22 RX ORDER — IPRATROPIUM BROMIDE AND ALBUTEROL SULFATE 2.5; .5 MG/3ML; MG/3ML
3 SOLUTION RESPIRATORY (INHALATION) ONCE AS NEEDED
Status: DISCONTINUED | OUTPATIENT
Start: 2019-10-22 | End: 2019-10-22 | Stop reason: HOSPADM

## 2019-10-22 RX ORDER — DIPHENHYDRAMINE HCL 25 MG
25 TABLET ORAL EVERY 6 HOURS PRN
Status: DISCONTINUED | OUTPATIENT
Start: 2019-10-22 | End: 2019-10-23 | Stop reason: HOSPADM

## 2019-10-22 RX ORDER — ACETAMINOPHEN 650 MG/1
650 SUPPOSITORY RECTAL EVERY 4 HOURS PRN
Status: DISCONTINUED | OUTPATIENT
Start: 2019-10-22 | End: 2019-10-23 | Stop reason: HOSPADM

## 2019-10-22 RX ORDER — ALLOPURINOL 300 MG/1
300 TABLET ORAL DAILY
Status: DISCONTINUED | OUTPATIENT
Start: 2019-10-23 | End: 2019-10-23 | Stop reason: HOSPADM

## 2019-10-22 RX ORDER — MEPERIDINE HYDROCHLORIDE 25 MG/ML
12.5 INJECTION INTRAMUSCULAR; INTRAVENOUS; SUBCUTANEOUS
Status: DISCONTINUED | OUTPATIENT
Start: 2019-10-22 | End: 2019-10-22 | Stop reason: HOSPADM

## 2019-10-22 RX ORDER — PHENYLEPHRINE HCL IN 0.9% NACL 0.5 MG/5ML
SYRINGE (ML) INTRAVENOUS AS NEEDED
Status: DISCONTINUED | OUTPATIENT
Start: 2019-10-22 | End: 2019-10-22 | Stop reason: SURG

## 2019-10-22 RX ORDER — ACETAMINOPHEN 160 MG/5ML
650 SOLUTION ORAL EVERY 4 HOURS PRN
Status: DISCONTINUED | OUTPATIENT
Start: 2019-10-22 | End: 2019-10-23 | Stop reason: HOSPADM

## 2019-10-22 RX ORDER — CARVEDILOL 3.12 MG/1
3.12 TABLET ORAL DAILY
Status: DISCONTINUED | OUTPATIENT
Start: 2019-10-23 | End: 2019-10-23 | Stop reason: HOSPADM

## 2019-10-22 RX ORDER — PROMETHAZINE HYDROCHLORIDE 25 MG/1
25 TABLET ORAL ONCE AS NEEDED
Status: DISCONTINUED | OUTPATIENT
Start: 2019-10-22 | End: 2019-10-22 | Stop reason: HOSPADM

## 2019-10-22 RX ORDER — LIDOCAINE HYDROCHLORIDE 10 MG/ML
INJECTION, SOLUTION EPIDURAL; INFILTRATION; INTRACAUDAL; PERINEURAL AS NEEDED
Status: DISCONTINUED | OUTPATIENT
Start: 2019-10-22 | End: 2019-10-22 | Stop reason: SURG

## 2019-10-22 RX ORDER — OXYCODONE HYDROCHLORIDE 5 MG/1
10 TABLET ORAL ONCE AS NEEDED
Status: DISCONTINUED | OUTPATIENT
Start: 2019-10-22 | End: 2019-10-22 | Stop reason: HOSPADM

## 2019-10-22 RX ORDER — CEFAZOLIN SODIUM IN 0.9 % NACL 3 G/100 ML
3 INTRAVENOUS SOLUTION, PIGGYBACK (ML) INTRAVENOUS ONCE
Status: COMPLETED | OUTPATIENT
Start: 2019-10-22 | End: 2019-10-22

## 2019-10-22 RX ORDER — LABETALOL HYDROCHLORIDE 5 MG/ML
5 INJECTION, SOLUTION INTRAVENOUS
Status: DISCONTINUED | OUTPATIENT
Start: 2019-10-22 | End: 2019-10-22 | Stop reason: HOSPADM

## 2019-10-22 RX ORDER — FUROSEMIDE 20 MG/1
20 TABLET ORAL DAILY
Status: DISCONTINUED | OUTPATIENT
Start: 2019-10-23 | End: 2019-10-23 | Stop reason: HOSPADM

## 2019-10-22 RX ORDER — KETOROLAC TROMETHAMINE 30 MG/ML
INJECTION, SOLUTION INTRAMUSCULAR; INTRAVENOUS AS NEEDED
Status: DISCONTINUED | OUTPATIENT
Start: 2019-10-22 | End: 2019-10-22 | Stop reason: SURG

## 2019-10-22 RX ORDER — MELOXICAM 15 MG/1
15 TABLET ORAL DAILY
Status: DISCONTINUED | OUTPATIENT
Start: 2019-10-23 | End: 2019-10-23 | Stop reason: HOSPADM

## 2019-10-22 RX ORDER — HYDRALAZINE HYDROCHLORIDE 20 MG/ML
5 INJECTION INTRAMUSCULAR; INTRAVENOUS
Status: DISCONTINUED | OUTPATIENT
Start: 2019-10-22 | End: 2019-10-22 | Stop reason: HOSPADM

## 2019-10-22 RX ORDER — DEXAMETHASONE SODIUM PHOSPHATE 4 MG/ML
INJECTION, SOLUTION INTRA-ARTICULAR; INTRALESIONAL; INTRAMUSCULAR; INTRAVENOUS; SOFT TISSUE AS NEEDED
Status: DISCONTINUED | OUTPATIENT
Start: 2019-10-22 | End: 2019-10-22 | Stop reason: SURG

## 2019-10-22 RX ORDER — ONDANSETRON 2 MG/ML
4 INJECTION INTRAMUSCULAR; INTRAVENOUS EVERY 6 HOURS PRN
Status: DISCONTINUED | OUTPATIENT
Start: 2019-10-22 | End: 2019-10-23 | Stop reason: HOSPADM

## 2019-10-22 RX ORDER — GLYCOPYRROLATE 1 MG/5 ML
SYRINGE (ML) INTRAVENOUS AS NEEDED
Status: DISCONTINUED | OUTPATIENT
Start: 2019-10-22 | End: 2019-10-22 | Stop reason: SURG

## 2019-10-22 RX ORDER — ONDANSETRON 4 MG/1
4 TABLET, FILM COATED ORAL EVERY 6 HOURS PRN
Status: DISCONTINUED | OUTPATIENT
Start: 2019-10-22 | End: 2019-10-23 | Stop reason: HOSPADM

## 2019-10-22 RX ORDER — SODIUM CHLORIDE 9 MG/ML
75 INJECTION, SOLUTION INTRAVENOUS CONTINUOUS
Status: DISCONTINUED | OUTPATIENT
Start: 2019-10-22 | End: 2019-10-23 | Stop reason: HOSPADM

## 2019-10-22 RX ORDER — URSODIOL 500 MG/1
500 TABLET, FILM COATED ORAL 2 TIMES DAILY
Status: DISCONTINUED | OUTPATIENT
Start: 2019-10-22 | End: 2019-10-23 | Stop reason: HOSPADM

## 2019-10-22 RX ORDER — FLUMAZENIL 0.1 MG/ML
0.1 INJECTION INTRAVENOUS AS NEEDED
Status: DISCONTINUED | OUTPATIENT
Start: 2019-10-22 | End: 2019-10-22 | Stop reason: HOSPADM

## 2019-10-22 RX ORDER — SODIUM CHLORIDE 0.9 % (FLUSH) 0.9 %
3 SYRINGE (ML) INJECTION EVERY 12 HOURS SCHEDULED
Status: DISCONTINUED | OUTPATIENT
Start: 2019-10-22 | End: 2019-10-22 | Stop reason: HOSPADM

## 2019-10-22 RX ORDER — SODIUM CHLORIDE 0.9 % (FLUSH) 0.9 %
3-10 SYRINGE (ML) INJECTION AS NEEDED
Status: DISCONTINUED | OUTPATIENT
Start: 2019-10-22 | End: 2019-10-22 | Stop reason: HOSPADM

## 2019-10-22 RX ORDER — MIDAZOLAM HYDROCHLORIDE 1 MG/ML
1 INJECTION INTRAMUSCULAR; INTRAVENOUS
Status: DISCONTINUED | OUTPATIENT
Start: 2019-10-22 | End: 2019-10-22 | Stop reason: HOSPADM

## 2019-10-22 RX ORDER — ONDANSETRON 2 MG/ML
4 INJECTION INTRAMUSCULAR; INTRAVENOUS ONCE AS NEEDED
Status: DISCONTINUED | OUTPATIENT
Start: 2019-10-22 | End: 2019-10-22 | Stop reason: HOSPADM

## 2019-10-22 RX ORDER — NEOSTIGMINE METHYLSULFATE 5 MG/5 ML
SYRINGE (ML) INTRAVENOUS AS NEEDED
Status: DISCONTINUED | OUTPATIENT
Start: 2019-10-22 | End: 2019-10-22 | Stop reason: SURG

## 2019-10-22 RX ORDER — HYDROMORPHONE HCL 110MG/55ML
0.5 PATIENT CONTROLLED ANALGESIA SYRINGE INTRAVENOUS
Status: DISCONTINUED | OUTPATIENT
Start: 2019-10-22 | End: 2019-10-22 | Stop reason: HOSPADM

## 2019-10-22 RX ORDER — NALOXONE HCL 0.4 MG/ML
0.4 VIAL (ML) INJECTION
Status: DISCONTINUED | OUTPATIENT
Start: 2019-10-22 | End: 2019-10-23 | Stop reason: HOSPADM

## 2019-10-22 RX ORDER — BUPIVACAINE HYDROCHLORIDE 5 MG/ML
INJECTION, SOLUTION EPIDURAL; INTRACAUDAL
Status: COMPLETED | OUTPATIENT
Start: 2019-10-22 | End: 2019-10-22

## 2019-10-22 RX ORDER — GUAIFENESIN 600 MG/1
1200 TABLET, EXTENDED RELEASE ORAL EVERY 12 HOURS SCHEDULED
Status: DISCONTINUED | OUTPATIENT
Start: 2019-10-22 | End: 2019-10-23 | Stop reason: HOSPADM

## 2019-10-22 RX ORDER — ZINC GLUCONATE 50 MG
50 TABLET ORAL DAILY
Status: DISCONTINUED | OUTPATIENT
Start: 2019-10-23 | End: 2019-10-23 | Stop reason: HOSPADM

## 2019-10-22 RX ORDER — MIDAZOLAM HYDROCHLORIDE 1 MG/ML
INJECTION INTRAMUSCULAR; INTRAVENOUS
Status: COMPLETED | OUTPATIENT
Start: 2019-10-22 | End: 2019-10-22

## 2019-10-22 RX ADMIN — PHENYLEPHRINE HYDROCHLORIDE 100 MCG: 10 INJECTION INTRAVENOUS at 10:58

## 2019-10-22 RX ADMIN — PHENYLEPHRINE HYDROCHLORIDE 100 MCG: 10 INJECTION INTRAVENOUS at 11:10

## 2019-10-22 RX ADMIN — PHENYLEPHRINE HYDROCHLORIDE 100 MCG: 10 INJECTION INTRAVENOUS at 10:38

## 2019-10-22 RX ADMIN — BUPIVACAINE HYDROCHLORIDE 10 ML: 5 INJECTION, SOLUTION EPIDURAL; INTRACAUDAL; PERINEURAL at 08:45

## 2019-10-22 RX ADMIN — SODIUM CHLORIDE, SODIUM LACTATE, POTASSIUM CHLORIDE, AND CALCIUM CHLORIDE 9 ML/HR: 600; 310; 30; 20 INJECTION, SOLUTION INTRAVENOUS at 08:12

## 2019-10-22 RX ADMIN — PHENYLEPHRINE HYDROCHLORIDE 100 MCG: 10 INJECTION INTRAVENOUS at 10:43

## 2019-10-22 RX ADMIN — PREGABALIN 150 MG: 75 CAPSULE ORAL at 08:12

## 2019-10-22 RX ADMIN — GUAIFENESIN 1200 MG: 600 TABLET, EXTENDED RELEASE ORAL at 20:36

## 2019-10-22 RX ADMIN — PHENYLEPHRINE HYDROCHLORIDE 200 MCG: 10 INJECTION INTRAVENOUS at 10:35

## 2019-10-22 RX ADMIN — ONDANSETRON 4 MG: 2 INJECTION INTRAMUSCULAR; INTRAVENOUS at 11:48

## 2019-10-22 RX ADMIN — KETOROLAC TROMETHAMINE 30 MG: 30 INJECTION, SOLUTION INTRAMUSCULAR at 11:48

## 2019-10-22 RX ADMIN — Medication 5 MG: at 10:50

## 2019-10-22 RX ADMIN — MIDAZOLAM 2 MG: 1 INJECTION INTRAMUSCULAR; INTRAVENOUS at 08:45

## 2019-10-22 RX ADMIN — PHENYLEPHRINE HYDROCHLORIDE 100 MCG: 10 INJECTION INTRAVENOUS at 10:27

## 2019-10-22 RX ADMIN — PHENYLEPHRINE HYDROCHLORIDE 100 MCG: 10 INJECTION INTRAVENOUS at 10:32

## 2019-10-22 RX ADMIN — OXYCODONE HYDROCHLORIDE 10 MG: 10 TABLET, FILM COATED, EXTENDED RELEASE ORAL at 08:12

## 2019-10-22 RX ADMIN — CEFAZOLIN 3 G: 1 INJECTION, POWDER, FOR SOLUTION INTRAMUSCULAR; INTRAVENOUS; PARENTERAL at 10:13

## 2019-10-22 RX ADMIN — PHENYLEPHRINE HYDROCHLORIDE 100 MCG: 10 INJECTION INTRAVENOUS at 10:30

## 2019-10-22 RX ADMIN — ROCURONIUM BROMIDE 50 MG: 10 INJECTION, SOLUTION INTRAVENOUS at 10:13

## 2019-10-22 RX ADMIN — ACETAMINOPHEN 1000 MG: 500 TABLET, FILM COATED ORAL at 10:00

## 2019-10-22 RX ADMIN — PHENYLEPHRINE HYDROCHLORIDE 100 MCG: 10 INJECTION INTRAVENOUS at 10:50

## 2019-10-22 RX ADMIN — PROPOFOL 200 MG: 10 INJECTION, EMULSION INTRAVENOUS at 10:13

## 2019-10-22 RX ADMIN — Medication 10 MG: at 10:38

## 2019-10-22 RX ADMIN — Medication 0.2 MG: at 10:38

## 2019-10-22 RX ADMIN — OXYCODONE HYDROCHLORIDE 20 MG: 20 TABLET, FILM COATED, EXTENDED RELEASE ORAL at 20:36

## 2019-10-22 RX ADMIN — BUPIVACAINE 10 ML: 13.3 INJECTION, SUSPENSION, LIPOSOMAL INFILTRATION at 08:45

## 2019-10-22 RX ADMIN — Medication 0.6 MG: at 11:50

## 2019-10-22 RX ADMIN — Medication 10 MG: at 10:43

## 2019-10-22 RX ADMIN — Medication 4 MG: at 11:50

## 2019-10-22 RX ADMIN — FERROUS SULFATE TAB EC 324 MG (65 MG FE EQUIVALENT) 324 MG: 324 (65 FE) TABLET DELAYED RESPONSE at 20:37

## 2019-10-22 RX ADMIN — ASPIRIN 325 MG: 325 TABLET, DELAYED RELEASE ORAL at 23:36

## 2019-10-22 RX ADMIN — CEFAZOLIN SODIUM 2 G: 10 INJECTION, POWDER, FOR SOLUTION INTRAVENOUS at 20:37

## 2019-10-22 RX ADMIN — URSODIOL 500 MG: 500 TABLET ORAL at 20:37

## 2019-10-22 RX ADMIN — LIDOCAINE HYDROCHLORIDE 50 MG: 10 INJECTION, SOLUTION EPIDURAL; INFILTRATION; INTRACAUDAL; PERINEURAL at 10:13

## 2019-10-22 RX ADMIN — CETIRIZINE HCL, PSEUDOEPHEDRINE HCL 1 TABLET: 5; 120 TABLET, EXTENDED RELEASE ORAL at 23:37

## 2019-10-22 RX ADMIN — MELOXICAM 15 MG: 15 TABLET ORAL at 08:12

## 2019-10-22 RX ADMIN — DEXAMETHASONE SODIUM PHOSPHATE 4 MG: 4 INJECTION, SOLUTION INTRAMUSCULAR; INTRAVENOUS at 10:25

## 2019-10-22 NOTE — ANESTHESIA POSTPROCEDURE EVALUATION
Patient: Leonid Diehl    Procedure Summary     Date:  10/22/19 Room / Location:  [facility] OR [address] [place] [address] MAIN OR    Anesthesia Start:  1007 Anesthesia Stop:  1212    Procedure:  TOTAL SHOULDER REVERSE ARTHROPLASTY with Biceps Tenodesis (Right Shoulder) Diagnosis:       Osteoarthritis of right glenohumeral joint      (Osteoarthritis of right glenohumeral joint [M19.011])    Surgeon:  Chris Lafleur MD Provider:  Sky Varghese MD    Anesthesia Type:  general, general with block ASA Status:  3          Anesthesia Type: general, general with block  Last vitals  BP   139/72 (10/22/19 1255)   Temp   96.6 °F (35.9 °C) (10/22/19 1210)   Pulse   57 (10/22/19 1255)   Resp   11 (10/22/19 1255)     SpO2   94 % (10/22/19 1255)     Post Anesthesia Care and Evaluation    Patient location during evaluation: PACU  Patient participation: complete - patient participated  Level of consciousness: awake  Pain scale: See nurse's notes for pain score.  Pain management: adequate  Airway patency: patent  Anesthetic complications: No anesthetic complications  PONV Status: none  Cardiovascular status: acceptable  Respiratory status: acceptable  Hydration status: acceptable    Comments: Patient seen and examined postoperatively; vital signs stable; SpO2 greater than or equal to 90%; cardiopulmonary status stable; nausea/vomiting adequately controlled; pain adequately controlled; no apparent anesthesia complications; patient discharged from anesthesia care when discharge criteria were met

## 2019-10-22 NOTE — ANESTHESIA PROCEDURE NOTES
Peripheral Block    Pre-sedation assessment completed: 10/22/2019 8:30 AM    Patient location during procedure: pre-op  Start time: 10/22/2019 8:30 AM  Reason for block: at surgeon's request and post-op pain management  Performed by  Anesthesiologist: Kar Lawton MD  Preanesthetic Checklist  Completed: patient identified, site marked, surgical consent, pre-op evaluation, timeout performed, IV checked, risks and benefits discussed and monitors and equipment checked  Prep:  Pt Position: supine  Sterile barriers:cap, gloves, mask and sterile barriers  Prep: ChloraPrep  Patient monitoring: blood pressure monitoring, continuous pulse oximetry and EKG  Procedure  Sedation:yes    Guidance:ultrasound guided  ULTRASOUND INTERPRETATION. Using ultrasound guidance a 20 G gauge needle was placed in close proximity to the nerve, at which point, under ultrasound guidance anesthetic was injected in the area of the nerve and spread of the anesthesia was seen on ultrasound in close proximity thereto.  There were no abnormalities seen on ultrasound; a digital image was taken; and the patient tolerated the procedure with no complications.   Laterality:right  Block Type:interscalene  Injection Technique:single-shot  Needle Type:echogenic  Needle Gauge:20 G  Resistance on Injection: none  Sedation medications used: midazolam (VERSED) injection, 2 mg  Medications Used: bupivacaine liposome (EXPAREL) injection 1.3%, 10 mL  bupivacaine PF (MARCAINE) injection 0.5%, 10 mL  Med admintered at 10/22/2019 8:45 AM

## 2019-10-22 NOTE — ANESTHESIA PROCEDURE NOTES
Airway  Urgency: elective    Date/Time: 10/22/2019 10:16 AM  Airway not difficult    General Information and Staff    Patient location during procedure: OR  Anesthesiologist: Sky Varghese MD    Indications and Patient Condition  Indications for airway management: airway protection    Preoxygenated: yes  MILS not maintained throughout  Mask difficulty assessment: 1 - vent by mask    Final Airway Details  Final airway type: endotracheal airway      Successful airway: ETT  Cuffed: yes   Successful intubation technique: direct laryngoscopy  Endotracheal tube insertion site: oral  Blade: [place]  Blade size: 4  ETT size (mm): 7.5  Cormack-Lehane Classification: grade III - view of epiglottis only  Placement verified by: capnometry and palpation of cuff   Measured from: lips  ETT/EBT  to lips (cm): 23  Number of attempts at approach: 1  Assessment: lips, teeth, and gum same as pre-op and atraumatic intubation    Additional Comments  ASA monitors applied; preoxygenated with 100% FiO2 via anesthesia face mask; induction of general anesthesia; bag-mask ventilation; patient's position optimized; cuffed ETT placed; cuff inflated to seal; atraumatic/dentition in preoperative condition; ETT secured in place; correct placement confirmed by bilateral chest rise, tube condensation, and return of EtCO2 > 30 mmHg x3

## 2019-10-22 NOTE — OP NOTE
TOTAL SHOULDER REVERSE ARTHROPLASTY  Procedure Report    Patient Name:  Leonid Diehl  YOB: 1955    Date of Surgery:  10/22/2019     Indications: This is a 64 y.o. male with right shoulder pain.  Imaging demonstrated degenerative joint disease with cuff tearing.  Treatment options were discussed.  They desired to proceed with reverse shoulder arthroplasty after discussing the risks including bleeding, scarring, infection, stiffness, nerve damage, tendon damage, artery damage, continued  pain, DVT, loss of life or limb, fracture, dislocation, and a need for further surgery.      Pre-op Diagnosis:   Osteoarthritis of right glenohumeral joint [M19.011]       Post-Op Diagnosis Codes:     * Osteoarthritis of right glenohumeral joint [M19.011]    Procedure/CPT® Codes: 80306 63361    Procedure(s):  TOTAL SHOULDER REVERSE ARTHROPLASTY with Biceps Tenodesis    Staff: Cj Lockett certified first assist    Anesthesia: General with Block    Estimated Blood Loss: 150    Implants:    Implant Name Type Inv. Item Serial No.  Lot No. LRB No. Used   SUT MAXBRAID 2 HC5 38IN WHT/MENA [id number] - [id number] Implant SUT MAXBRAID 2 HC5 38IN WHT/MENA [id number]  [employer] . Right 3   GLENOSPHERE TRABECULAR REV MTL 40MM STRL - [id number] Implant GLENOSPHERE TRABECULAR REV MTL 40MM STRL  [employer] [id number] Right 1   BASEPLT TRABECULAR REV MTL 15MM - [id number] Implant BASEPLT TRABECULAR REV MTL 15MM  [employer] [id number] Right 1   SCRW CANC INV/REV 4.5X33MM - [id number] Implant SCRW CANC INV/REV 4.5X33MM  [employer] [id number] Right 1   SCRW CANC INV/REV 4.5X33MM - [id number] Implant SCRW CANC INV/REV 4.5X33MM  [employer] [id number] Right 1   STEM HUM TRABECULAR REV [id number] - [id number] Implant STEM HUM TRABECULAR REV [id number]  [employer] [id number] Right 1   LINER HUM TRABECULAR REV MTL 7D 40 PLS3 - [id number] Implant LINER HUM TRABECULAR REV MTL 7D 40 PLS3  [employer] [id number] Right 1   SPACR HUM  TRABECULAR REV [id number] - [id number] Implant SPACR HUM TRABECULAR REV [id number]  [employer] [facility] [id number] Right 1       IVF: see anesthesia      Complications: None    Description of Procedure: The patient's operative site was marked in the  preoperative holding area.  They received regional anesthesia and were brought to  the operating room and placed supine on a well-padded operating room table.  General anesthesia was administered.  Antibiotics were dosed.  A timeout was  taken, confirming the correct operative site and procedure.  They were placed in  the beach chair position.  The right shoulder was prepped and draped in the  standard surgical fashion.  SCDs were placed. A deltopectoral incision was marked and injected with local anesthetic.  The skin was opened.  Flaps were raised.  The interval was opened and the cephalic vein was protected.  Adhesions were released from the deltoid.  The circumflex vessels were identified and ligated with suture and cautery.  The rotator interval was opened and a retractor was placed.  The subscapularis was  Tenotomized and the capsule was released down to the 6 o'clock position on the  humeral head protecting the axillary nerve.  A Fukuda retractor was placed and an inferior and anterior capsular release was performed palpating and protecting the axillary  nerve with my finger at all times.  The shoulder was dislocated.  The biceps  was tenodesed to the upper biceps groove and the circumferential osteophytes  were removed to identify the native head-neck junction.  Supraspinatus demonstrate high-grade anterior tearing with tendinopathy of the infraspinatus.  reaming was started  behind the biceps groove up to a size 16.  The cut was made in 20  degrees of retroversion and the final two reamers were used to prepare the  proximal humerus.  A trial was placed.  The glenoid was exposed after placing retractors.  The soft tissue was removed circumferentially.  The cyst was located in  the posterior superior quadrant and was curetted to healthy bone.  There is a well-circumscribed defect here which was bone grafted.   the center point was marked and the glenoid was prepared in standard fashion.  The base plate was placed with good press-fit.  Two screws were placed with good purchase.  The locking caps were  placed.  The glenosphere was placed with good purchase.  The shoulder was  dislocated and the trial was removed from the canal and irrigated.  The true  stem was placed with good press-fit and trialing demonstrated 15 thickness to offer the best restoration of joint stability, muscle tension and range of motion.  The true polyethylene was placed with similar range of motion and stability.  A 3-minute Betadine wash performed.  The wound was closed in layers with absorbable suture and skin glue.  A sterile dressing and sling were applied.  They were awakened and taken to the recovery room.  There were no complications.  I was present for all portions.  All counts were correct.   Good capillary refill was noted to the hand.      Chris Lafleur MD     Date: 10/22/2019  Time: 11:48 AM

## 2019-10-22 NOTE — PLAN OF CARE
Problem: Patient Care Overview  Goal: Plan of Care Review  Outcome: Ongoing (interventions implemented as appropriate)    Goal: Individualization and Mutuality  Outcome: Ongoing (interventions implemented as appropriate)    Goal: Discharge Needs Assessment  Outcome: Ongoing (interventions implemented as appropriate)    Goal: Interprofessional Rounds/Family Conf  Outcome: Ongoing (interventions implemented as appropriate)      Problem: Pain, Acute (Adult)  Goal: Acceptable Pain Control/Comfort Level  Outcome: Ongoing (interventions implemented as appropriate)      Problem: Shoulder Arthroplasty (Adult)  Goal: Anesthesia/Sedation Recovery  Outcome: Ongoing (interventions implemented as appropriate)   10/22/19 0656   Goal/Outcome Evaluation   Anesthesia/Sedation Recovery criteria met for transfer

## 2019-10-22 NOTE — ANESTHESIA PREPROCEDURE EVALUATION
Anesthesia Evaluation     Patient summary reviewed and Nursing notes reviewed   no history of anesthetic complications:  NPO Solid Status: > 8 hours  NPO Liquid Status: > 8 hours           Airway   Dental      Pulmonary    (+) asthma,   Cardiovascular     ECG reviewed  Patient on routine beta blocker    (+) hypertension, CHF,       Neuro/Psych  GI/Hepatic/Renal/Endo    (+) obesity,       Musculoskeletal     Abdominal    Substance History      OB/GYN          Other   (+) arthritis     ROS/Med Hx Other: Echo - EF 55-60%, normal LVSF, mild MR/TR    Stress - EF 58%, no ischemia    Anemia, BPH, allergies, h/o alcohol abuse, h/o pancreatitis, hypoxemia, hyperglycemia, tongue swelling, biliary dz, hepatic dz                Anesthesia Plan    ASA 3     general and general with block   (Patient identified; pre-operative vital signs, all relevant labs/studies, complete medical/surgical/anesthetic history, full medication list, full allergy list, and NPO status obtained/reviewed; physical assessment performed; anesthetic options, side effects, potential complications, risks, and benefits discussed; questions answered; written anesthesia consent obtained; patient cleared for procedure; anesthesia machine and equipment checked and functioning)  intravenous induction   Anesthetic plan, all risks, benefits, and alternatives have been provided, discussed and informed consent has been obtained with: patient.

## 2019-10-23 VITALS
DIASTOLIC BLOOD PRESSURE: 68 MMHG | WEIGHT: 274 LBS | HEART RATE: 54 BPM | BODY MASS INDEX: 35.16 KG/M2 | HEIGHT: 74 IN | OXYGEN SATURATION: 98 % | SYSTOLIC BLOOD PRESSURE: 123 MMHG | TEMPERATURE: 97.5 F | RESPIRATION RATE: 18 BRPM

## 2019-10-23 LAB
ANION GAP SERPL CALCULATED.3IONS-SCNC: 9 MMOL/L (ref 5–15)
BUN BLD-MCNC: 25 MG/DL (ref 8–23)
BUN/CREAT SERPL: 24.5 (ref 7–25)
CALCIUM SPEC-SCNC: 8.4 MG/DL (ref 8.6–10.5)
CHLORIDE SERPL-SCNC: 106 MMOL/L (ref 98–107)
CO2 SERPL-SCNC: 18 MMOL/L (ref 22–29)
CREAT BLD-MCNC: 1.02 MG/DL (ref 0.76–1.27)
GFR SERPL CREATININE-BSD FRML MDRD: 74 ML/MIN/1.73
GLUCOSE BLD-MCNC: 158 MG/DL (ref 65–99)
HCT VFR BLD AUTO: 33.9 % (ref 37.5–51)
HGB BLD-MCNC: 11.3 G/DL (ref 13–17.7)
POTASSIUM BLD-SCNC: 4.8 MMOL/L (ref 3.5–5.2)
SODIUM BLD-SCNC: 133 MMOL/L (ref 136–145)

## 2019-10-23 PROCEDURE — 25010000002 CEFAZOLIN PER 500 MG: Performed by: ORTHOPAEDIC SURGERY

## 2019-10-23 PROCEDURE — 25010000002 INFLUENZA VAC SPLIT QUAD 0.5 ML SUSPENSION PREFILLED SYRINGE: Performed by: ORTHOPAEDIC SURGERY

## 2019-10-23 PROCEDURE — 97162 PT EVAL MOD COMPLEX 30 MIN: CPT

## 2019-10-23 PROCEDURE — 85014 HEMATOCRIT: CPT | Performed by: ORTHOPAEDIC SURGERY

## 2019-10-23 PROCEDURE — G0008 ADMIN INFLUENZA VIRUS VAC: HCPCS | Performed by: ORTHOPAEDIC SURGERY

## 2019-10-23 PROCEDURE — 97530 THERAPEUTIC ACTIVITIES: CPT

## 2019-10-23 PROCEDURE — 97116 GAIT TRAINING THERAPY: CPT

## 2019-10-23 PROCEDURE — 94799 UNLISTED PULMONARY SVC/PX: CPT

## 2019-10-23 PROCEDURE — 97110 THERAPEUTIC EXERCISES: CPT

## 2019-10-23 PROCEDURE — 80048 BASIC METABOLIC PNL TOTAL CA: CPT | Performed by: ORTHOPAEDIC SURGERY

## 2019-10-23 PROCEDURE — 85018 HEMOGLOBIN: CPT | Performed by: ORTHOPAEDIC SURGERY

## 2019-10-23 PROCEDURE — 97165 OT EVAL LOW COMPLEX 30 MIN: CPT

## 2019-10-23 PROCEDURE — 90686 IIV4 VACC NO PRSV 0.5 ML IM: CPT | Performed by: ORTHOPAEDIC SURGERY

## 2019-10-23 PROCEDURE — 97535 SELF CARE MNGMENT TRAINING: CPT

## 2019-10-23 RX ORDER — HYDROCHLOROTHIAZIDE 12.5 MG/1
12.5 TABLET ORAL DAILY
Status: DISCONTINUED | OUTPATIENT
Start: 2019-10-23 | End: 2019-10-23 | Stop reason: HOSPADM

## 2019-10-23 RX ORDER — OXYCODONE AND ACETAMINOPHEN 10; 325 MG/1; MG/1
1 TABLET ORAL EVERY 6 HOURS PRN
Qty: 28 TABLET | Refills: 0 | Status: SHIPPED | OUTPATIENT
Start: 2019-10-23 | End: 2020-08-27

## 2019-10-23 RX ORDER — LISINOPRIL 5 MG/1
10 TABLET ORAL
Status: DISCONTINUED | OUTPATIENT
Start: 2019-10-23 | End: 2019-10-23 | Stop reason: HOSPADM

## 2019-10-23 RX ADMIN — INFLUENZA A VIRUS A/BRISBANE/02/2018 IVR-190 (H1N1) ANTIGEN (PROPIOLACTONE INACTIVATED), INFLUENZA A VIRUS A/KANSAS/14/2017 X-327 (H3N2) ANTIGEN (PROPIOLACTONE INACTIVATED), INFLUENZA B VIRUS B/MARYLAND/15/2016 ANTIGEN (PROPIOLACTONE INACTIVATED), INFLUENZA B VIRUS B/PHUKET/3073/2013 BVR-1B ANTIGEN (PROPIOLACTONE INACTIVATED) 0.5 ML: 15; 15; 15; 15 INJECTION, SUSPENSION INTRAMUSCULAR at 13:39

## 2019-10-23 RX ADMIN — CEFAZOLIN SODIUM 2 G: 10 INJECTION, POWDER, FOR SOLUTION INTRAVENOUS at 03:00

## 2019-10-23 RX ADMIN — OXYCODONE HYDROCHLORIDE 20 MG: 20 TABLET, FILM COATED, EXTENDED RELEASE ORAL at 08:39

## 2019-10-23 RX ADMIN — URSODIOL 500 MG: 500 TABLET ORAL at 08:39

## 2019-10-23 RX ADMIN — CARVEDILOL 3.12 MG: 3.12 TABLET, FILM COATED ORAL at 08:39

## 2019-10-23 RX ADMIN — MELOXICAM 15 MG: 15 TABLET ORAL at 08:39

## 2019-10-23 RX ADMIN — FUROSEMIDE 20 MG: 20 TABLET ORAL at 08:39

## 2019-10-23 RX ADMIN — FERROUS SULFATE TAB EC 324 MG (65 MG FE EQUIVALENT) 324 MG: 324 (65 FE) TABLET DELAYED RESPONSE at 08:39

## 2019-10-23 RX ADMIN — LISINOPRIL 10 MG: 5 TABLET ORAL at 08:49

## 2019-10-23 RX ADMIN — ALLOPURINOL 300 MG: 300 TABLET ORAL at 08:39

## 2019-10-23 RX ADMIN — OXYCODONE HYDROCHLORIDE 10 MG: 5 TABLET ORAL at 13:38

## 2019-10-23 RX ADMIN — HYDROCHLOROTHIAZIDE 12.5 MG: 12.5 TABLET ORAL at 08:49

## 2019-10-23 RX ADMIN — BUDESONIDE AND FORMOTEROL FUMARATE DIHYDRATE 2 PUFF: 160; 4.5 AEROSOL RESPIRATORY (INHALATION) at 07:52

## 2019-10-23 RX ADMIN — FERROUS SULFATE TAB EC 324 MG (65 MG FE EQUIVALENT) 324 MG: 324 (65 FE) TABLET DELAYED RESPONSE at 11:15

## 2019-10-23 RX ADMIN — Medication 50 MG: at 08:39

## 2019-10-23 RX ADMIN — ASPIRIN 325 MG: 325 TABLET, DELAYED RELEASE ORAL at 08:39

## 2019-10-23 RX ADMIN — CEFAZOLIN SODIUM 2 G: 10 INJECTION, POWDER, FOR SOLUTION INTRAVENOUS at 11:14

## 2019-10-23 RX ADMIN — GUAIFENESIN 1200 MG: 600 TABLET, EXTENDED RELEASE ORAL at 08:39

## 2019-10-23 NOTE — PROGRESS NOTES
Continued Stay Note  RENEA Pichardo     Patient Name: Leonid Diehl  MRN: [id number]  Today's Date: 10/23/2019    Admit Date: 10/22/2019    Discharge Plan     Row Name 10/23/19 1227       Plan    Plan  [facility] outpatient PT at [facility]    Patient/Family in Agreement with Plan  yes    Plan Comments  see assessment     Expected Discharge Date and Time     Expected Discharge Date Expected Discharge Time    Oct 23, 2019             Marely Biggs RN

## 2019-10-23 NOTE — THERAPY EVALUATION
Acute Care - Occupational Therapy Initial Evaluation   [place]     Patient Name: Leonid Diehl  : 1955  MRN: [id number]  Today's Date: 10/23/2019             Admit Date: 10/22/2019       ICD-10-CM ICD-9-CM   1. Osteoarthritis of right glenohumeral joint M19.011 715.91     Patient Active Problem List   Diagnosis   • Acute sinusitis   • Anemia due to blood loss   • Asthma   • Benign prostatic hyperplasia   • Congestive heart failure (CMS/HCC)   • Encounter for general adult medical examination without abnormal findings   • Hay fever   • Biliary disease   • Hepatic disease   • History of alcohol abuse   • Hx of pancreatitis   • Hyperglycemia   • Hypertension   • Hypoxemia   • Systolic murmur   • Tongue swelling   • Osteoarthritis of right glenohumeral joint   • DJD of shoulder     Past Medical History:   Diagnosis Date   • Asthma    • Hypertension      Past Surgical History:   Procedure Laterality Date   • CATARACT EXTRACTION, BILATERAL     • TOTAL SHOULDER ARTHROPLASTY W/ DISTAL CLAVICLE EXCISION Right 10/22/2019    Procedure: TOTAL SHOULDER REVERSE ARTHROPLASTY with Biceps Tenodesis;  Surgeon: Chris Lafleur MD;  Location: [facility] OR;  Service: Orthopedics          OT ASSESSMENT FLOWSHEET (last 12 hours)      Occupational Therapy Evaluation     Row Name 10/23/19 1036 10/23/19 0900                OT Evaluation Time/Intention    Subjective Information  no complaints  -SS (r) RM (t) SS (c)  no complaints  -SR       Document Type  evaluation  -SS (r) RM (t) SS (c)  evaluation  -SR       Mode of Treatment  physical therapy  -SS (r) RM (t) SS (c)  individual therapy;occupational therapy  -SR          General Information    General Observations of Patient  Pt sitting in bedside chair with wife in room  -SS (r) RM (t) SS (c)  Supine, polar pack, IV  -SR       Prior Level of Function  independent:;all household mobility;community mobility;gait;transfer;driving;home management;yard work  -SS (r) RM (t) SS  (c)  independent:  -SR       Equipment Currently Used at Home  none  -SS (r) RM (t) SS (c)  none  -SR       Pertinent History of Current Functional Problem  --  Pt admitted   -SR       Existing Precautions/Restrictions  shoulder  -SS (r) RM (t) SS (c)  shoulder  -SR          Relationship/Environment    Lives With  --  spouse  -SR       Family Caregiver if Needed  --  spouse  -SR          Resource/Environmental Concerns    Current Living Arrangements  home/apartment/condo  -SS (r) RM (t) SS (c)  home/apartment/condo  -SR       Resource/Environmental Concerns  none  -SS (r) RM (t) SS (c)  none  -SR          Home Main Entrance    Number of Stairs, Main Entrance  one  -SS (r) RM (t) SS (c)  one  -SR          Cognitive Assessment/Intervention- PT/OT    Orientation Status (Cognition)  oriented x 4  -SS (r) RM (t) SS (c)  oriented x 4  -SR          Bed Mobility Assessment/Treatment    Bed Mobility Assessment/Treatment  --  bed mobility (all) activities  -SR       [place] Level (Bed Mobility)  --  independent  -SR          Transfer Assessment/Treatment    Transfer Assessment/Treatment  --  sit-stand transfer  -SR          Sit-Stand Transfer    Sit-Stand [place] (Transfers)  --  independent  -SR          ADL Assessment/Intervention    BADL Assessment/Intervention  --  lower body dressing;upper body dressing  -SR          Upper Body Dressing Assessment/Training    Comment (Upper Body Dressing)  --  Discussed technique for donning shirt and following precautions for shoulder.  He and wife verbalize understanding.   -SR          Lower Body Dressing Assessment/Training    Lower Body Dressing [place] Level  --  don;pants/bottoms;independent  -SR          General ROM    GENERAL ROM COMMENTS  --  SHRUTHI LAMARL.  R shoulder PROM flexion ~90 deg and ER to neutral.   -SR          MMT (Manual Muscle Testing)    General MMT Comments  --  SHRUTHI WEBERE impaired due to nerve block.   -SR          Motor Assessment/Interventions     Additional Documentation  --  Balance (Group)  -SR          Balance    Balance  --  static sitting balance;static standing balance;dynamic sitting balance;dynamic standing balance  -SR          Static Sitting Balance    Level of [place] (Unsupported Sitting, Static Balance)  --  independent  -SR          Dynamic Sitting Balance    Level of [place], Reaches Outside Midline (Sitting, Dynamic Balance)  --  independent  -SR          Static Standing Balance    Level of [place] (Supported Standing, Static Balance)  --  independent  -SR          Dynamic Standing Balance    Level of [place], Reaches Outside Midline (Standing, Dynamic Balance)  --  independent  -SR          Positioning and Restraints    Pre-Treatment Position  --  in bed  -SR       Post Treatment Position  --  chair  -SR       In Chair  --  sitting;call light within reach;with family/caregiver  -SR          Wound 10/22/19 0941 Right shoulder Incision    Wound - Properties Group Date first assessed: 10/22/19  -DF Time first assessed: 0941  -DF Side: Right  -DF Location: shoulder  -DF Primary Wound Type: Incision  -DF       Plan of Care Review    Plan of Care Reviewed With  --  patient;spouse  -SR          Clinical Impression (OT)    Criteria for Skilled Therapeutic Interventions Met (OT Eval)  --  yes;treatment indicated  -SR       Rehab Potential (OT Eval)  --  good, to achieve stated therapy goals  -SR       Therapy Frequency (OT Eval)  --  5 times/wk  -SR       Anticipated Discharge Disposition (OT)  --  home with home health;home with assist  -SR          Planned OT Interventions    Planned Therapy Interventions (OT Eval)  --  occupation/activity based interventions;passive ROM/stretching;ROM/therapeutic exercise  -SR          OT Goals    Dressing Goal Selection (OT)  --  dressing, OT goal 1  -SR       Problem Specific Goal Selection (OT)  --  problem specific goal 1, OT  -SR       Additional Documentation  --  Problem Specific Goal  Selection (OT) (Row)  -SR          Dressing Goal 1 (OT)    Activity/Assistive Device (Dressing Goal 1, OT)  --  dressing skills, all  -SR       Andrew/Cues Needed (Dressing Goal 1, OT)  --  independent  -SR       Time Frame (Dressing Goal 1, OT)  --  2 weeks  -SR          Problem Specific Goal 1 (OT)    Problem Specific Goal 1 (OT)  --  Pt will be independent with HEP   -SR       Time Frame (Problem Specific Goal 1, OT)  --  2 weeks  -SR          Living Environment    Home Accessibility  stairs to enter home  -SS (r) RM (t) SS (c)  stairs to enter home  -SR         User Key  (r) = Recorded By, (t) = Taken By, (c) = Cosigned By    Initials Name Effective Dates    DF Abelino Wick, RN 05/15/17 -     SS Jing Conner, PT 06/19/19 -     SR Patience Stubbs OT 03/01/19 -     RM Odilon Goel, PT Student 08/22/19 -          Occupational Therapy Education     Title: PT OT SLP Therapies (Done)     Topic: Occupational Therapy (Done)     Point: ADL training (Done)     Description: Instruct learner(s) on proper safety adaptation and remediation techniques during self care or transfers.   Instruct in proper use of assistive devices.    Learning Progress Summary           Patient Acceptance, E,TB, VU by SR at 10/23/2019  2:04 PM                   Point: Home exercise program (Done)     Description: Instruct learner(s) on appropriate technique for monitoring, assisting and/or progressing therapeutic exercises/activities.    Learning Progress Summary           Patient Acceptance, E,TB, VU by SR at 10/23/2019  2:04 PM                   Point: Precautions (Done)     Description: Instruct learner(s) on prescribed precautions during self-care and functional transfers.    Learning Progress Summary           Patient Acceptance, E,TB, VU by SR at 10/23/2019  2:04 PM                   Point: Body mechanics (Done)     Description: Instruct learner(s) on proper positioning and spine alignment during self-care,  functional mobility activities and/or exercises.    Learning Progress Summary           Patient Acceptance, E,TB, VU by  at 10/23/2019  2:04 PM                               User Key     Initials Effective Dates Name Provider Type [facility] 03/01/19 -  Patience Stubbs OT Occupational Therapist OT                  OT Recommendation and Plan  Outcome Summary/Treatment Plan (OT)  Anticipated Discharge Disposition (OT): home with home health, home with assist  Planned Therapy Interventions (OT Eval): occupation/activity based interventions, passive ROM/stretching, ROM/therapeutic exercise  Therapy Frequency (OT Eval): 5 times/wk  Plan of Care Review  Plan of Care Reviewed With: patient  Plan of Care Reviewed With: patient  Outcome Summary: Pt presesnts POD#1 of R rTSA.  He is progressing well.  Continues to have numbness due to nerve block.  Independent with mobility and lower body dressing.  Continues to require assist with sling and ice pack, which wife reports she will be able to assist with.  He will require HH therapy services at discharge.         Time Calculation:   Time Calculation- OT     Row Name 10/23/19 1406 10/23/19 1404          Time Calculation- OT    OT Start Time  --  0928  -SR     OT Stop Time  --  1000  -SR     OT Time Calculation (min)  --  32 min  -SR     Total Timed Code Minutes- OT  25 minute(s)  -SR  20 minute(s)  -SR     OT Non-Billable Time (min)  7 min  -SR  12 min  -SR     OT Received On  --  10/23/19  -SR     OT - Next Appointment  --  10/24/19  -SR     OT Goal Re-Cert Due Date  --  11/06/19  -SR       User Key  (r) = Recorded By, (t) = Taken By, (c) = Cosigned By    Initials Name Provider Type     Patience Stubbs OT Occupational Therapist        Therapy Charges for Today     Code Description Service Date Service Provider Modifiers Qty    29388994809 HC OT EVAL LOW COMPLEXITY 4 10/23/2019 Patience Stubbs OT GO 1    94771919915  OT THER PROC EA 15 MIN  10/23/2019 Patience Stubbs, OT GO 1    46860642662 HC OT SELF CARE/MGMT/TRAIN EA 15 MIN 10/23/2019 Patience Stubbs, OT GO 1    45489285858 HC OT THERAPEUTIC ACT EA 15 MIN 10/23/2019 Patience Stubbs, OT GO 1               Patience Stubbs OT  10/23/2019

## 2019-10-23 NOTE — DISCHARGE SUMMARY
Date of Discharge:  10/23/2019    Discharge Diagnosis: Right shoulder degenerative joint disease    Presenting Problem/History of Present Illness  Active Hospital Problems    Diagnosis  POA   • **Osteoarthritis of right glenohumeral joint [M19.011]  Unknown   • DJD of shoulder [M19.019]  Yes      Resolved Hospital Problems   No resolved problems to display.        Hospital Course  Patient is a 64 y.o. male presented with right shoulder degenerative joint disease.  He underwent reverse total shoulder arthroplasty and postop day 1 was tolerating diet and oral medication.      Procedures Performed: Right reverse total shoulder arthroplasty      Consults:   Consults     Date and Time Order Name Status Description    10/22/2019 1755 Inpatient Hospitalist Consult      10/22/2019 1251 Inpatient Consult to Hospitalist              Condition on Discharge:  Improved    Vital Signs  Vitals:    10/22/19 2223 10/23/19 0300 10/23/19 0635 10/23/19 0752   BP: 121/63 124/57 137/72    BP Location: Left arm      Patient Position: Lying      Pulse: 67 64 62 63   Resp: 16 10 16 18   Temp: 97.5 °F (36.4 °C) 97.9 °F (36.6 °C) 97.3 °F (36.3 °C)    TempSrc: Oral      SpO2: 94% 94% 99% 99%   Weight:       Height:           Physical Exam:  Dry dressing, Sensory and motor exam are intact all distributions. Radial pulse is palpable and capillary refill is less than two seconds to all digits       Discharge Disposition  Home    Discharge Medications     Discharge Medications      New Medications      Instructions Start Date   oxyCODONE-acetaminophen  MG per tablet  Commonly known as:  PERCOCET   1 tablet, Oral, Every 6 Hours PRN         Continue These Medications      Instructions Start Date   albuterol sulfate  (90 Base) MCG/ACT inhaler  Commonly known as:  PROVENTIL HFA;VENTOLIN HFA;PROAIR HFA   2 puffs, Inhalation, Every 6 Hours PRN      allopurinol 300 MG tablet  Commonly known as:  ZYLOPRIM   300 mg, Oral, Daily       carvedilol 3.125 MG tablet  Commonly known as:  COREG   3.125 mg, Oral, Daily      fexofenadine-pseudoephedrine 180-240 MG per 24 hr tablet  Commonly known as:  ALLEGRA-D 24   1 tablet, Oral, Daily      furosemide 20 MG tablet  Commonly known as:  LASIX   10 mg, Oral, Daily      guaiFENesin 600 MG 12 hr tablet  Commonly known as:  MUCINEX   1,200 mg, Oral, Daily      Iron (Ferrous Gluconate) 256 (28 Fe) MG tablet   325 mg, Oral, Daily      lisinopril-hydrochlorothiazide 10-12.5 MG per tablet  Commonly known as:  PRINZIDE,ZESTORETIC   0.5 tablets, Oral, Daily      RA ZINC 50 MG tablet  Generic drug:  zinc gluconate   50 mg, Oral, Daily      SYMBICORT 160-4.5 MCG/ACT inhaler  Generic drug:  budesonide-formoterol   2 puffs, Inhalation, 2 Times Daily - RT      ursodiol 500 MG tablet  Commonly known as:  ACTIGALL   500 mg, Oral, 2 Times Daily               Discharge instructions:  Continue sling.  Keep dressing on.  Okay to shower and perform home exercises.  Continue polar care.    Follow-up Appointments  Future Appointments   Date Time Provider Department Center   11/6/2019  8:30 AM Chris Lafleur MD [facility] None   2/7/2020  9:00 AM Brice Galan MD [facility]              Chris Lafleur MD  10/23/19  8:59 AM

## 2019-10-23 NOTE — PLAN OF CARE
Problem: Patient Care Overview  Goal: Plan of Care Review  Outcome: Ongoing (interventions implemented as appropriate)   10/23/19 0223   Plan of Care Review   Progress improving   Coping/Psychosocial   Plan of Care Reviewed With patient;spouse       Problem: Pain, Acute (Adult)  Goal: Identify Related Risk Factors and Signs and Symptoms  Outcome: Ongoing (interventions implemented as appropriate)   10/23/19 0223   Pain, Acute (Adult)   Related Risk Factors (Acute Pain) surgery   Signs and Symptoms (Acute Pain) verbalization of pain descriptors     Goal: Acceptable Pain Control/Comfort Level  Outcome: Ongoing (interventions implemented as appropriate)   10/23/19 0223   Pain, Acute (Adult)   Acceptable Pain Control/Comfort Level making progress toward outcome       Problem: Shoulder Arthroplasty (Adult)  Goal: Signs and Symptoms of Listed Potential Problems Will be Absent, Minimized or Managed (Shoulder Arthroplasty)  Outcome: Ongoing (interventions implemented as appropriate)   10/23/19 0223   Goal/Outcome Evaluation   Problems Assessed (Shoulder Arthro) all   Problems Present (Shoulder Arthro) pain

## 2019-10-23 NOTE — THERAPY EVALUATION
Acute Care - Physical Therapy Initial Evaluation   [place]     Patient Name: Leonid Diehl  : 1955  MRN: [id number]  Today's Date: 10/23/2019                Admit Date: 10/22/2019    Visit Dx:     ICD-10-CM ICD-9-CM   1. Osteoarthritis of right glenohumeral joint M19.011 715.91     Patient Active Problem List   Diagnosis   • Acute sinusitis   • Anemia due to blood loss   • Asthma   • Benign prostatic hyperplasia   • Congestive heart failure (CMS/HCC)   • Encounter for general adult medical examination without abnormal findings   • Hay fever   • Biliary disease   • Hepatic disease   • History of alcohol abuse   • Hx of pancreatitis   • Hyperglycemia   • Hypertension   • Hypoxemia   • Systolic murmur   • Tongue swelling   • Osteoarthritis of right glenohumeral joint   • DJD of shoulder     Past Medical History:   Diagnosis Date   • Asthma    • Hypertension      Past Surgical History:   Procedure Laterality Date   • CATARACT EXTRACTION, BILATERAL     • TOTAL SHOULDER ARTHROPLASTY W/ DISTAL CLAVICLE EXCISION Right 10/22/2019    Procedure: TOTAL SHOULDER REVERSE ARTHROPLASTY with Biceps Tenodesis;  Surgeon: Chris Lafleur MD;  Location: [facility];  Service: Orthopedics        PT ASSESSMENT (last 12 hours)      Physical Therapy Evaluation     Row Name 10/23/19 1036          General Information    Pertinent History of Current Functional Problem  Pt is a 65 y/o M POD 1 s/p R rTSA per Dr. Lafleur.  -SS (r) RM (t) SS (c)     Row Name 10/23/19 1036          Bed Mobility Assessment/Treatment    Bed Mobility Assessment/Treatment  bed mobility (all) activities  -SS (r) RM (t) SS (c)     [place] Level (Bed Mobility)  independent  -SS (r) RM (t) SS (c)     Row Name 10/23/19 1036          Transfer Assessment/Treatment    Transfer Assessment/Treatment  sit-stand transfer;stand-sit transfer;stand pivot/stand step transfer  -SS (r) RM (t) SS (c)     Sit-Stand [place] (Transfers)  independent  -SS (r) RM  (t) SS (c)     Stand-Sit [place] (Transfers)  independent  -SS (r) RM (t) SS (c)     Row Name 10/23/19 1036          Stand Pivot/Stand Step Transfer    Stand Pivot/Stand Step [place]  independent  -SS (r) RM (t) SS (c)     Row Name 10/23/19 1036          Gait/Stairs Assessment/Training    Gait/Stairs Assessment/Training  gait/ambulation independence  -SS (r) RM (t) SS (c)     [place] Level (Gait)  independent  -SS (r) RM (t) SS (c)     Distance in Feet (Gait)  200'  -SS (r) RM (t) SS (c)     Row Name 10/23/19 1036          General ROM    GENERAL ROM COMMENTS  BLE WFL  -SS (r) RM (t) SS (c)     Row Name 10/23/19 1036          MMT (Manual Muscle Testing)    General MMT Comments  BLE WFL  -SS (r) RM (t) SS (c)     Row Name             Wound 10/22/19 0941 Right shoulder Incision    Wound - Properties Group Date first assessed: 10/22/19  -DF Time first assessed: 0941  -DF Side: Right  -DF Location: shoulder  -DF Primary Wound Type: Incision  -DF    Row Name 10/23/19 1036          Plan of Care Review    Plan of Care Reviewed With  patient;spouse  -SS (r) RM (t) SS (c)     Row Name 10/23/19 1036          Physical Therapy Clinical Impression    Patient/Family Goals Statement (PT Clinical Impression)  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 Pt is a 63 y/o M POD 1 s/p R rTSA per Dr. Lafleur. Upon assessment, pt able to complete all mobility independently. Pt is able to ambulate 200' independently. Pt has no deficits that require skilled PT and is safe to return to prior arrangements upon d/c.  -SS (r) RM (t) SS (c)     Criteria for Skilled Interventions Met (PT Clinical Impression)  no;no problems identified which require skilled intervention  -SS (r) RM (t) SS (c)     Row Name 10/23/19 1036          Positioning and Restraints    Pre-Treatment Position  sitting in chair/recliner  -SS (r) RM (t) SS (c)     Post Treatment Position  chair  -SS (r) RM (t) SS (c)     In Chair  sitting;call light within reach;encouraged  to call for assist;with family/caregiver  -SS (r) RM (t) SS (c)       User Key  (r) = Recorded By, (t) = Taken By, (c) = Cosigned By    Initials Name Provider Type    DF Abelino Wick, RN Registered Nurse    SS Jing Conner, PT Physical Therapist    Odilon Martin, PT Student PT Student        Physical Therapy Education     Title: PT OT SLP Therapies (Resolved)     Topic: Physical Therapy (Resolved)     Point: Mobility training (Resolved)     Learning Progress Summary           Patient Acceptance, E, VU by  at 10/23/2019 12:38 PM   Family Acceptance, E, VU by  at 10/23/2019 12:38 PM                   Point: Home exercise program (Resolved)     Learning Progress Summary           Patient Acceptance, E, VU by  at 10/23/2019 12:38 PM   Family Acceptance, E, VU by  at 10/23/2019 12:38 PM                   Point: Body mechanics (Resolved)     Learning Progress Summary           Patient Acceptance, E, VU by  at 10/23/2019 12:38 PM   Family Acceptance, E, VU by  at 10/23/2019 12:38 PM                   Point: Precautions (Resolved)     Learning Progress Summary           Patient Acceptance, E, VU by  at 10/23/2019 12:38 PM   Family Acceptance, E, VU by  at 10/23/2019 12:38 PM                               User Key     Initials Effective Dates Name Provider Type Discipline     08/22/19 -  Odilon Goel, PT Student PT Student PT              PT Recommendation and Plan  Anticipated Discharge Disposition (PT): home with home health, home with assist  Therapy Frequency (PT Clinical Impression): evaluation only  Outcome Summary/Treatment Plan (PT)  Anticipated Discharge Disposition (PT): home with home health, home with assist  Plan of Care Reviewed With: patient, spouse     Time Calculation:   PT Charges     Row Name 10/23/19 5032             Time Calculation    Start Time  1026  -SS (r) RM (t) SS (c)      Stop Time  1052  -SS (r) RM (t) SS (c)      Time Calculation (min)  26 min  -SS (r) RM (t)       PT Received On  10/23/19  -SS (r) RM (t) SS (c)         Time Calculation- PT    Total Timed Code Minutes- PT  0 minute(s)  -SS (r) RM (t) SS (c)        User Key  (r) = Recorded By, (t) = Taken By, (c) = Cosigned By    Initials Name Provider Type     Jing Conner, PT Physical Therapist     Odilon Goel, PT Student PT Student        Therapy Charges for Today     Code Description Service Date Service Provider Modifiers Qty    28748756139  PT EVAL MOD COMPLEXITY 4 10/23/2019 Odilon Goel, PT Student GP 1                 Odilon Goel PT Student  10/23/2019

## 2019-10-23 NOTE — DISCHARGE PLACEMENT REQUEST
"Clarke Diehl (64 y.o. Male)     Date of Birth Social Security Number Address Home Phone MRN    1955  [id number] [place] [address] [phone] [id number]    [place] Marital Status          Yazidi        Admission Date Admission Type Admitting Provider Attending Provider Department, Room/Bed    10/22/19 Elective Chris Lafleur MD Ab[place], Kristopher Todd, MD [facility] SURGICAL INPATIENT, 4127/1    Discharge Date Discharge Disposition Discharge Destination         Home or Self Care              Attending Provider:  Chris Lafleur MD    Allergies:  No Known Allergies    Isolation:  None   Infection:  None   Code Status:  CPR    Ht:  188 cm (74\")   Wt:  124 kg (274 lb)    Admission Cmt:  None   Principal Problem:  Osteoarthritis of right glenohumeral joint [M19.011] More...                 Active Insurance as of 10/22/2019     Primary Coverage     Payor Plan Insurance Group Employer/Plan Group    [employer] [id number]     Payor Plan Address Payor Plan Phone Number Payor Plan Fax Number Effective Dates    [address] [phone]  1/1/2017 - None Entered    [facility] [id number]       Subscriber Name Subscriber Birth Date Member ID       CLARKE DIEHL 1955 [id number]                 Emergency Contacts      (Rel.) Home Phone Work Phone Mobile Phone    Dorita Diehl (Spouse) -- -- [phone]              "

## 2019-10-23 NOTE — PROGRESS NOTES
Discharge Planning Assessment  [facility]     Patient Name: Leonid Diehl  MRN: [id number]  Today's Date: 10/23/2019    Admit Date: 10/22/2019    Discharge Needs Assessment     Row Name 10/23/19 1223       Living Environment    Lives With  spouse    Current Living Arrangements  home/apartment/condo    Primary Care Provided by  self    Provides Primary Care For  no one    Family Caregiver if Needed  spouse    Quality of Family Relationships  helpful    Able to Return to Prior Arrangements  yes       Resource/Environmental Concerns    Resource/Environmental Concerns  none    Transportation Concerns  car, none       Transition Planning    Patient/Family Anticipates Transition to  home with family    Patient/Family Anticipated Services at Transition  none    Transportation Anticipated  family or friend will provide       Discharge Needs Assessment    Readmission Within the Last 30 Days  no previous admission in last 30 days    Concerns to be Addressed  discharge planning    Equipment Currently Used at Home  none    Anticipated Changes Related to Illness  none    Equipment Needed After Discharge  none    Outpatient/Agency/Support Group Needs  outpatient therapy    Patient's Choice of Community Agency(s)  Merged with [facility] outpatient PT [place]     Discharge Coordination/Progress  Spoke with patient and wife at bedside. Unable to find homehealth that will take his insurance. he is agreeable to go to outpatient PT at [facility]. Phone number and address given to patient. Wife states she will call to schedule his appointment. referral made per dcp sheet sent, left voice mail at PT facility and orders in           Therapy      Service Provider Request Status Selected Services Address Phone Number Fax Number    [facility] N[facility]A [address] [facility] [place] [address] [phone]       Marely Biggs RN 10/23/2019 1222    Patients wife will call for appt.                    Expected  Discharge Date and Time     Expected Discharge Date Expected Discharge Time    Oct 23, 2019         Demographic Summary     Row Name 10/23/19 1222       General Information    Admission Type  inpatient    Arrived From  operating room    Referral Source  admission list    Reason for Consult  discharge planning    Preferred Language  English     Used During This Interaction  no        Functional Status     Row Name 10/23/19 1222       Functional Status, IADL    Medications  independent    Meal Preparation  independent    Housekeeping  independent    Laundry  independent    Shopping  independent       Mental Status    General Appearance WDL  WDL       Mental Status Summary    Recent Changes in Mental Status/Cognitive Functioning  no changes          Marely Biggs RN

## 2019-10-23 NOTE — PLAN OF CARE
Problem: Patient Care Overview  Goal: Plan of Care Review  Outcome: Ongoing (interventions implemented as appropriate)   10/23/19 1401   Coping/Psychosocial   Plan of Care Reviewed With patient   OTHER   Outcome Summary Pt presesnts POD#1 of R rTSA. He is progressing well. Continues to have numbness due to nerve block. Independent with mobility and lower body dressing. Continues to require assist with sling and ice pack, which wife reports she will be able to assist with. He will require HH therapy services at discharge.

## 2019-10-24 ENCOUNTER — READMISSION MANAGEMENT (OUTPATIENT)
Dept: CALL CENTER | Facility: HOSPITAL | Age: 64
End: 2019-10-24

## 2019-10-24 NOTE — OUTREACH NOTE
Prep Survey      Responses   Facility patient discharged from?  [facility]   Is patient eligible?  No [RT SHOULDER ARTHROPLASTY]   What are the reasons patient is not eligible?  Other   Does the patient have one of the following disease processes/diagnoses(primary or secondary)?  Other   Prep survey completed?  Yes          Nikki Conner LPN

## 2019-10-24 NOTE — PROGRESS NOTES
Case Management Discharge Note    Final Note: DC to home              Final Discharge Disposition Code: 01 - home or self-care

## 2019-10-24 NOTE — PAYOR COMM NOTE
"DC Notification for Clarke Diehl  1955  Ref #[id number]     DC to home 10/23/2019.  Patient to f/u with Outpatient PT services.    Clarke Diehl (64 y.o. Male)     Date of Birth Social Security Number Address Home Phone MRN    1955  [address]  [facility] [address] [phone] [id number]    Jew Marital Status          Mormonism        Admission Date Admission Type Admitting Provider Attending Provider Department, Room/Bed    10/22/19 Elective Chris Lafleur MD  [facility] SURGICAL INPATIENT,     Discharge Date Discharge Disposition Discharge Destination        10/23/2019 Home or Self Care              Attending Provider:  (none)   Allergies:  No Known Allergies    Isolation:  None   Infection:  None   Code Status:  Prior    Ht:  188 cm (74\")   Wt:  124 kg (274 lb)    Admission Cmt:  None   Principal Problem:  Osteoarthritis of right glenohumeral joint [M19.011] More...                 Active Insurance as of 10/22/2019     Primary Coverage     Payor Plan Insurance Group Employer/Plan Group    [employer]CIAL AETNA [id number]     Payor Plan Address Payor Plan Phone Number Payor Plan Fax Number Effective Dates    [address] [phone]  2017 - None Entered    [facility] [id number]       Subscriber Name Subscriber Birth Date Member ID       CLARKE DIEHL 1955 [id number]                 Emergency Contacts      (Rel.) Home Phone Work Phone Mobile Phone    Dorita Diehl (Spouse) -- -- [phone]               Discharge Summary      Chris Lafleur MD at 10/23/19 0858            Date of Discharge:  10/23/2019    Discharge Diagnosis: Right shoulder degenerative joint disease    Presenting Problem/History of Present Illness  Active Hospital Problems    Diagnosis  POA   • **Osteoarthritis of right glenohumeral joint [M19.011]  Unknown   • DJD of shoulder [M19.019]  Yes      Resolved Hospital Problems   No resolved " problems to display.        Hospital Course  Patient is a 64 y.o. male presented with right shoulder degenerative joint disease.  He underwent reverse total shoulder arthroplasty and postop day 1 was tolerating diet and oral medication.      Procedures Performed: Right reverse total shoulder arthroplasty      Consults:   Consults     Date and Time Order Name Status Description    10/22/2019 1755 Inpatient Hospitalist Consult      10/22/2019 1251 Inpatient Consult to Hospitalist              Condition on Discharge:  Improved    Vital Signs  Vitals:    10/22/19 2223 10/23/19 0300 10/23/19 0635 10/23/19 0752   BP: 121/63 124/57 137/72    BP Location: Left arm      Patient Position: Lying      Pulse: 67 64 62 63   Resp: 16 10 16 18   Temp: 97.5 °F (36.4 °C) 97.9 °F (36.6 °C) 97.3 °F (36.3 °C)    TempSrc: Oral      SpO2: 94% 94% 99% 99%   Weight:       Height:           Physical Exam:  Dry dressing, Sensory and motor exam are intact all distributions. Radial pulse is palpable and capillary refill is less than two seconds to all digits       Discharge Disposition  Home    Discharge Medications     Discharge Medications      New Medications      Instructions Start Date   oxyCODONE-acetaminophen  MG per tablet  Commonly known as:  PERCOCET   1 tablet, Oral, Every 6 Hours PRN         Continue These Medications      Instructions Start Date   albuterol sulfate  (90 Base) MCG/ACT inhaler  Commonly known as:  PROVENTIL HFA;VENTOLIN HFA;PROAIR HFA   2 puffs, Inhalation, Every 6 Hours PRN      allopurinol 300 MG tablet  Commonly known as:  ZYLOPRIM   300 mg, Oral, Daily      carvedilol 3.125 MG tablet  Commonly known as:  COREG   3.125 mg, Oral, Daily      fexofenadine-pseudoephedrine 180-240 MG per 24 hr tablet  Commonly known as:  ALLEGRA-D 24   1 tablet, Oral, Daily      furosemide 20 MG tablet  Commonly known as:  LASIX   10 mg, Oral, Daily      guaiFENesin 600 MG 12 hr tablet  Commonly known as:  MUCINEX   1,200  mg, Oral, Daily      Iron (Ferrous Gluconate) 256 (28 Fe) MG tablet   325 mg, Oral, Daily      lisinopril-hydrochlorothiazide 10-12.5 MG per tablet  Commonly known as:  PRINZIDE,ZESTORETIC   0.5 tablets, Oral, Daily      RA ZINC 50 MG tablet  Generic drug:  zinc gluconate   50 mg, Oral, Daily      SYMBICORT 160-4.5 MCG/ACT inhaler  Generic drug:  budesonide-formoterol   2 puffs, Inhalation, 2 Times Daily - RT      ursodiol 500 MG tablet  Commonly known as:  ACTIGALL   500 mg, Oral, 2 Times Daily               Discharge instructions:  Continue sling.  Keep dressing on.  Okay to shower and perform home exercises.  Continue polar care.    Follow-up Appointments  Future Appointments   Date Time Provider Department Center   11/6/2019  8:30 AM Chris Lafleur MD [facility] None   2/7/2020  9:00 AM Brice Galan MD [facility]              Chris Lafleur MD  10/23/19  8:59 AM            Electronically signed by Chris Lafleur MD at 10/23/19 8820

## 2019-10-28 ENCOUNTER — TREATMENT (OUTPATIENT)
Dept: PHYSICAL THERAPY | Facility: CLINIC | Age: 64
End: 2019-10-28

## 2019-10-28 DIAGNOSIS — M19.011 OSTEOARTHRITIS OF RIGHT GLENOHUMERAL JOINT: Primary | ICD-10-CM

## 2019-10-28 PROCEDURE — 97140 MANUAL THERAPY 1/> REGIONS: CPT | Performed by: PHYSICAL THERAPIST

## 2019-10-28 PROCEDURE — 97161 PT EVAL LOW COMPLEX 20 MIN: CPT | Performed by: PHYSICAL THERAPIST

## 2019-10-29 ENCOUNTER — TELEPHONE (OUTPATIENT)
Dept: SURGERY | Facility: HOSPITAL | Age: 64
End: 2019-10-29

## 2019-10-29 NOTE — PROGRESS NOTES
Physical Therapy Initial Evaluation and Plan of Care        Patient: Leonid Diehl   : 1955  Diagnosis/ICD-10 Code:  Osteoarthritis of right glenohumeral joint [M19.011]  Referring practitioner: Chris Lafleur, *  Date of Initial Visit: 10/28/2019  Today's Date: 10/28/2019  Patient seen for 1 sessions           Subjective Questionnaire: QuickDASH: 56.8% scoring      Subjective Evaluation    History of Present Illness  Date of onset: 10/22/2019  Date of surgery: 10/22/2019  Mechanism of injury: Surgery- Physical therapy Protocol present    Subjective comment: 64 YOM presents s/p right reverse total shoulder replacement on  by Dr. Dandy Lafleur.  He has been in a sling since surgery applying ice and performing pendulums and distal extremity ROM/gripping per report.  He reports doing well today with no concerns at this time. He is a barr and trains/raises horses.    Patient Occupation: retired-[employer] now   Precautions and Work Restrictions: see protocolQuality of life: excellent    Pain  Current pain ratin  At best pain ratin  At worst pain ratin  Location: Right shoulder  Quality: dull ache  Relieving factors: change in position and ice  Aggravating factors: movement  Progression: improved    Social Support  Lives with: spouse    Hand dominance: right    Treatments  Previous treatment: immobilization  Current treatment: immobilization  Patient Goals  Patient goals for therapy: decreased pain, increased motion, decreased edema, increased strength, return to work, return to sport/leisure activities and independence with ADLs/IADLs             Objective       Observations     Additional Observation Details  Dressing in place.  No evidence of infection or DVT.  Healing well.      Neurological Testing     Sensation     Shoulder     Right Shoulder   Intact: light touch    Passive Range of Motion     Right Shoulder   Flexion: 90 degrees   Extension: 0 degrees   Abduction: 60 degrees    External rotation 45°: 0 degrees     Additional Passive Range of Motion Details  IR to stomach           Assessment & Plan     Assessment  Assessment details: 64 YOM presents 6 days s/p right TSA.  He is in a sling.  Limited eval today due to recent surgery and protocol guidelines.  Pt. Has limited ROM of his right shoulder.  Strength not tested but likely decreased given recent surgery.  He has difficulty donning and doffing his sling, dressing himself, driving, sleeping, and performing self care ADL.    Barriers to therapy: none  Prognosis: good    Goals  Plan Goals: STG x4 weeks:  -Pt. Will be I with HEP  -Pt. Will have full right shoulder PROM all planes  -Pt. Will have 2/10 pain or less with ADL.  LTG x4qgvtx:  -Pt. Will have 4/5 right shoulder gross strength.  -Pt. Will have full AROM right shoulder all planes.  -Pt. Will report 20% QuickDASH impairment or less    Plan  Therapy options: will be seen for skilled physical therapy services  Planned modality interventions: cryotherapy  Planned therapy interventions: joint mobilization, therapeutic activities, stretching, strengthening, spinal/joint mobilization, soft tissue mobilization, neuromuscular re-education, manual therapy, functional ROM exercises, flexibility, ADL retraining and home exercise program  Frequency: 2x week  Duration in visits: 16  Treatment plan discussed with: patient        Manual Therapy:    10     mins  39786;  Therapeutic Exercise:         mins  24098;     Neuromuscular Alejandrina:        mins  61897;    Therapeutic Activity:          mins  97433;     Gait Training:           mins  42816;     Ultrasound:          mins  50052;    Electrical Stimulation:         mins  72469 (MC );  Dry Needling          mins self-pay    Timed Treatment:   40   mins   Total Treatment:     40   mins    PT SIGNATURE: Daniel Alvarez, PT   DATE TREATMENT INITIATED: 10/29/2019    Initial Certification  Certification Period: 1/27/2020  I certify that the  therapy services are furnished while this patient is under my care.  The services outlined above are required by this patient, and will be reviewed every 90 days.     PHYSICIAN: Chris Lafleur MD      DATE:     Please sign and return via fax to [phone].. Thank you, [facility].

## 2019-10-30 ENCOUNTER — TREATMENT (OUTPATIENT)
Dept: PHYSICAL THERAPY | Facility: CLINIC | Age: 64
End: 2019-10-30

## 2019-10-30 DIAGNOSIS — M19.011 OSTEOARTHRITIS OF RIGHT SHOULDER, UNSPECIFIED OSTEOARTHRITIS TYPE: Primary | ICD-10-CM

## 2019-10-30 PROCEDURE — 97110 THERAPEUTIC EXERCISES: CPT | Performed by: PHYSICAL THERAPIST

## 2019-10-30 PROCEDURE — 97140 MANUAL THERAPY 1/> REGIONS: CPT | Performed by: PHYSICAL THERAPIST

## 2019-10-30 NOTE — PROGRESS NOTES
Physical Therapy Daily Progress Note        Patient: Leonid Diehl   : 1955  Diagnosis/ICD-10 Code:  Osteoarthritis of right shoulder, unspecified osteoarthritis type [M19.011]  Referring practitioner: Chris Lafleur, *  Date of Initial Visit: Type: THERAPY  Noted: 10/28/2019  Today's Date: 10/30/2019  Patient seen for 2 sessions         Leonid Diehl reports: shoulder is more stiff when in the sling.  Otherwise doing well.          Subjective     Objective   See Exercise, Manual, and Modality Logs for complete treatment.       Assessment/Plan  Continue PT per protocol by Dr. Lafleur.  Progress per Plan of Care           Manual Therapy:    15     mins  91774;  Therapeutic Exercise:    10     mins  28410;     Neuromuscular Alejandrina:        mins  96844;    Therapeutic Activity:          mins  65779;     Gait Training:           mins  03958;     Ultrasound:          mins  97237;    Electrical Stimulation:         mins  33824 ( );  Dry Needling          mins self-pay    Timed Treatment:   25   mins   Total Treatment:     25   mins    Daniel Alvarez, PT  Physical Therapist

## 2019-11-01 NOTE — PROGRESS NOTES
Physical Therapy Daily Progress Note    VISIT#: 3    Subjective   Leonid Diehl reports he has no pain if he uses the arm. Pt states it's still hard to lift things with his arm, even a fork. Pt states he uses the ball from the brace to toss for his dog. Pt is eager to have the bandaging removed on Wednesday.  Pain Rating (0-10): 0    Objective PROM (in degrees): R shld flex 145, ER 25, IR to body, elbow .     See Exercise, Manual, and Modality Logs for complete treatment.     Patient Education: cues for therex and during manual to relax R UE; reiterated need to follow protocol and continue use of brace at ALL times except when in PT or when doing exs - discussed that pt shouldn't be lifting, throwing or actively moving the R shoulder; reviewed proper; reviewed proper technique for donning/doffing a jacket and how to get up off the couch/bed without pushing off with his R UE.    Assessment/Plan Pt came into session without brace on. When asked where it was, pt noted he hasn't put it on yet today. Pt is reporting he's been using the arm as he thought it would get him better quicker. Reviewed protocol and precautions with pt who seemed to have an understanding at end of session. Will continue to encourage compliance with protocol. Good tolerance to manual, but has a tendency to want to help PT hold the arm up, so required cues to relax. Therex was fairly well tolerated as well, but required cues for form & slowing down with holds at end of motion. Pt reported he was just a little sore at end of session, but declined modalities.     Progress per Plan of Care and Progress strengthening /stabilization /functional activity per protocol        Timed:         Manual Therapy:   15      mins  82190;     Therapeutic Exercise:    27     mins  01160;     Neuromuscular Alejandrina:        mins  02317;    Therapeutic Activity:          mins  29906;     Gait Training:           mins  20655;     Ultrasound:         mins  29580;    Ionto                                    mins   77145  Self Care                            mins   92692  Canalith Repos                   mins  99208    Un-Timed:  Electrical Stimulation:         mins  18999 ( );  Dry Needling          mins self-pay  Traction          mins 96892  Low Eval          Mins  84414  Mod Eval          Mins  74052  High Eval                            Mins  73069  Re-Eval                               mins  58358    Timed Treatment:  42   mins   Total Treatment:     42   mins    Rhiannon Clifton PT  [place] License # [id number]  Physical Therapist

## 2019-11-04 ENCOUNTER — TREATMENT (OUTPATIENT)
Dept: PHYSICAL THERAPY | Facility: CLINIC | Age: 64
End: 2019-11-04

## 2019-11-04 DIAGNOSIS — M19.011 OSTEOARTHRITIS OF RIGHT SHOULDER, UNSPECIFIED OSTEOARTHRITIS TYPE: Primary | ICD-10-CM

## 2019-11-04 DIAGNOSIS — M19.011 OSTEOARTHRITIS OF RIGHT GLENOHUMERAL JOINT: ICD-10-CM

## 2019-11-04 PROCEDURE — 97140 MANUAL THERAPY 1/> REGIONS: CPT | Performed by: PHYSICAL THERAPIST

## 2019-11-04 PROCEDURE — 97110 THERAPEUTIC EXERCISES: CPT | Performed by: PHYSICAL THERAPIST

## 2019-11-06 ENCOUNTER — OFFICE VISIT (OUTPATIENT)
Dept: ORTHOPEDIC SURGERY | Facility: CLINIC | Age: 64
End: 2019-11-06

## 2019-11-06 VITALS
HEIGHT: 74 IN | SYSTOLIC BLOOD PRESSURE: 97 MMHG | HEART RATE: 69 BPM | BODY MASS INDEX: 35.16 KG/M2 | DIASTOLIC BLOOD PRESSURE: 59 MMHG | WEIGHT: 274 LBS

## 2019-11-06 DIAGNOSIS — M19.011 OSTEOARTHRITIS OF RIGHT GLENOHUMERAL JOINT: ICD-10-CM

## 2019-11-06 DIAGNOSIS — Z47.89 ORTHOPEDIC AFTERCARE: ICD-10-CM

## 2019-11-06 DIAGNOSIS — M25.511 ACUTE PAIN OF RIGHT SHOULDER: Primary | ICD-10-CM

## 2019-11-06 PROCEDURE — 99024 POSTOP FOLLOW-UP VISIT: CPT | Performed by: ORTHOPAEDIC SURGERY

## 2019-11-06 NOTE — PROGRESS NOTES
"     Patient ID: Leonid Diehl is a 64 y.o. male.  10/22/19 right reverse total shoulder  Pain is controlled      Objective:    BP 97/59   Pulse 69   Ht 188 cm (74\")   Wt 124 kg (274 lb)   BMI 35.18 kg/m²     Physical Examination:    Right shoulder demonstrates a healed incision.  There is a mild amount of swelling.  No sign of infection.Sensory and motor exam are intact all distributions. Radial pulse is palpable and capillary refill is less than two seconds to all digits    Imaging:  Reverse total shoulder in position    Assessment:  Doing well after reverse total shoulder    Plan:  Continue therapy with restrictions discussed, see me in a month  "

## 2019-11-11 ENCOUNTER — TREATMENT (OUTPATIENT)
Dept: PHYSICAL THERAPY | Facility: CLINIC | Age: 64
End: 2019-11-11

## 2019-11-11 DIAGNOSIS — M19.011 OSTEOARTHRITIS OF RIGHT SHOULDER, UNSPECIFIED OSTEOARTHRITIS TYPE: Primary | ICD-10-CM

## 2019-11-11 DIAGNOSIS — M19.011 OSTEOARTHRITIS OF RIGHT GLENOHUMERAL JOINT: ICD-10-CM

## 2019-11-11 PROCEDURE — 97140 MANUAL THERAPY 1/> REGIONS: CPT | Performed by: PHYSICAL THERAPIST

## 2019-11-11 PROCEDURE — 97110 THERAPEUTIC EXERCISES: CPT | Performed by: PHYSICAL THERAPIST

## 2019-11-11 NOTE — PROGRESS NOTES
Physical Therapy Daily Progress Note    VISIT#: 4    Subjective   Leonid Diehl reports he was really sore after they took the Tegaderm and bandaging off the other day. Pt notes they took xrays and everything was looking good. MD told him to try to go without the brace most of the time unless out in public and to use the arm for activities below shoulder height. Pt was cautioned not to go behind his back with the arm.    Pain Rating (0-10): 0    Objective Incision appears intact with glue, but does have small areas on the shoulder where skin looks like it was pulled off with the Tegaderm.     See Exercise, Manual, and Modality Logs for complete treatment.     Patient Education: cues for therex    Assessment/Plan Pt tolerated progression of therex fairly well today. Pt is doing hand strengthening with the stress ball at home. Pain was 3-4/10 at end of session. Pt will ice at home prn.    Progress per Plan of Care and Progress strengthening /stabilization /functional activity          Timed:         Manual Therapy:   15      mins  82342;     Therapeutic Exercise:   25      mins  85251;     Neuromuscular Alejandrina:        mins  26407;    Therapeutic Activity:          mins  16862;     Gait Training:           mins  60683;     Ultrasound:         mins  73330;    Ionto                                   mins   18226  Self Care                            mins   97831  Canalith Repos                   mins  89514    Un-Timed:  Electrical Stimulation:        mins  74868 ( );  Dry Needling          mins self-pay  Traction          mins 43894  Low Eval          Mins  56972  Mod Eval          Mins  41049  High Eval                            Mins  92522  Re-Eval                              mins  71934    Timed Treatment:  40    mins   Total Treatment:     40   mins    Rhiannon Clifton, PT  [place] License # [id number]  Physical Therapist

## 2019-11-13 ENCOUNTER — TREATMENT (OUTPATIENT)
Dept: PHYSICAL THERAPY | Facility: CLINIC | Age: 64
End: 2019-11-13

## 2019-11-13 DIAGNOSIS — M19.011 OSTEOARTHRITIS OF RIGHT SHOULDER, UNSPECIFIED OSTEOARTHRITIS TYPE: Primary | ICD-10-CM

## 2019-11-13 DIAGNOSIS — M19.011 OSTEOARTHRITIS OF RIGHT GLENOHUMERAL JOINT: ICD-10-CM

## 2019-11-13 PROCEDURE — 97140 MANUAL THERAPY 1/> REGIONS: CPT | Performed by: PHYSICAL THERAPIST

## 2019-11-13 PROCEDURE — 97110 THERAPEUTIC EXERCISES: CPT | Performed by: PHYSICAL THERAPIST

## 2019-11-13 NOTE — PROGRESS NOTES
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   Pain Rating (0-10): 4    Objective PROM (in degrees): R shoulder flex/scapt 150, ER 50, IR to body    See Exercise & Manual Logs for complete treatment. Reviewed for accuracy.    Patient Education: cues for therex; discussed going easy since he's more sore today    Assessment/Plan  Pt tolerated manual fairly well with some discomfort and was able to complete all exs given, but reported 7-8/10 pain by end of session. Pt declined modalities and will ice at home. Pt is demonstrating gradual improvements in mobility and is progressing toward treatment goals.     Progress per Plan of Care and Progress strengthening /stabilization /functional activity        Timed:         Manual Therapy:    15     mins  05002;     Therapeutic Exercise:    12     mins  64645;     Neuromuscular Alejandrina:        mins  02456;    Therapeutic Activity:          mins  26770;     Gait Training:           mins  84250;     Ultrasound:          mins  55097;    Ionto                                   mins   23902  Self Care                            mins   92437  Canalith Repos                   mins  39073    Un-Timed:  Electrical Stimulation:         mins  83324 ( );  Dry Needling          mins self-pay  Traction          mins 19494  Low Eval          Mins  16899  Mod Eval         Mins  87319  High Eval                            Mins  70248  Re-Eval                               mins  81021    Timed Treatment:   27   mins   Total Treatment:     27   mins    Rhiannon Clifton, PT  [place] License # [id number]  Physical Therapist

## 2019-11-15 RX ORDER — ALLOPURINOL 300 MG/1
TABLET ORAL
Qty: 30 TABLET | Refills: 0 | Status: SHIPPED | OUTPATIENT
Start: 2019-11-15 | End: 2019-12-17 | Stop reason: SDUPTHER

## 2019-11-18 RX ORDER — URSODIOL 500 MG/1
500 TABLET, FILM COATED ORAL 2 TIMES DAILY
Qty: 60 TABLET | Refills: 1 | Status: SHIPPED | OUTPATIENT
Start: 2019-11-18 | End: 2020-01-24

## 2019-11-19 ENCOUNTER — TREATMENT (OUTPATIENT)
Dept: PHYSICAL THERAPY | Facility: CLINIC | Age: 64
End: 2019-11-19

## 2019-11-19 DIAGNOSIS — M19.011 OSTEOARTHRITIS OF RIGHT GLENOHUMERAL JOINT: ICD-10-CM

## 2019-11-19 DIAGNOSIS — M19.011 OSTEOARTHRITIS OF RIGHT SHOULDER, UNSPECIFIED OSTEOARTHRITIS TYPE: Primary | ICD-10-CM

## 2019-11-19 PROCEDURE — 97140 MANUAL THERAPY 1/> REGIONS: CPT | Performed by: PHYSICAL THERAPIST

## 2019-11-19 PROCEDURE — 97110 THERAPEUTIC EXERCISES: CPT | Performed by: PHYSICAL THERAPIST

## 2019-11-19 NOTE — PROGRESS NOTES
Physical Therapy Daily Progress Note      Patient: Leonid Diehl   : 1955  Diagnosis/ICD-10 Code:  Osteoarthritis of right shoulder, unspecified osteoarthritis type [M19.011]  Referring practitioner: Chris Lafleur, *  Date of Initial Visit: Type: THERAPY  Noted: 10/28/2019  Today's Date: 2019  Patient seen for 6 sessions             Subjective Pt says that his most restricted motion is trying to reach behind his back as he can't put his belt on without threading it through the loops with his pants off.    Objective   See Exercise, Manual, and Modality Logs for complete treatment.       Assessment/Plan  Pt needs a few verbal and tactile cues for technique correction.  Overall, he had good tolerance with exercises and with manual techniques.  PROM progressing well.    Progress per Plan of Care           Timed:         Manual Therapy:    15     mins  77260;     Therapeutic Exercise:    30    mins  23760;     Neuromuscular Alejandrina:       mins  02073;    Therapeutic Activity:          mins  80995;     Gait Training:           mins  12380;     Ultrasound:          mins  93717;    Ionto                                   mins   69529  Self Care                            mins   18151      Un-Timed:  Electrical Stimulation:         mins  45358 ( );  Dry Needling          mins self-pay  Traction          mins 02402      Timed Treatment:   45   mins   Total Treatment:     45   mins    Steve Boyd PTA  Physical Therapist

## 2019-11-21 ENCOUNTER — TREATMENT (OUTPATIENT)
Dept: PHYSICAL THERAPY | Facility: CLINIC | Age: 64
End: 2019-11-21

## 2019-11-21 DIAGNOSIS — M19.011 OSTEOARTHRITIS OF RIGHT GLENOHUMERAL JOINT: ICD-10-CM

## 2019-11-21 DIAGNOSIS — M19.011 OSTEOARTHRITIS OF RIGHT SHOULDER, UNSPECIFIED OSTEOARTHRITIS TYPE: Primary | ICD-10-CM

## 2019-11-21 PROCEDURE — 97140 MANUAL THERAPY 1/> REGIONS: CPT | Performed by: PHYSICAL THERAPIST

## 2019-11-21 PROCEDURE — 97110 THERAPEUTIC EXERCISES: CPT | Performed by: PHYSICAL THERAPIST

## 2019-11-21 NOTE — PROGRESS NOTES
Physical Therapy Daily Progress Note      Patient: Leonid Diehl   : 1955  Diagnosis/ICD-10 Code:  Osteoarthritis of right shoulder, unspecified osteoarthritis type [M19.011]  Referring practitioner: Chris Lafleur, *  Date of Initial Visit: Type: THERAPY  Noted: 10/28/2019  Today's Date: 2019  Patient seen for 7 sessions             Subjective Pt does not have anything new to report.  Doing well overall.    Objective   See Exercise, Manual, and Modality Logs for complete treatment.       Assessment/Plan  Good tolerance with PROM and exercises today.    Progress per Plan of Care           Timed:         Manual Therapy:    15     mins  84943;     Therapeutic Exercise:    15     mins  99849;     Neuromuscular Alejandrina:     mins  81930;    Therapeutic Activity:          mins  04975;     Gait Training:           mins  49078;     Ultrasound:          mins  49506;    Ionto                                   mins   12147  Self Care                            mins   50131      Un-Timed:  Electrical Stimulation:         mins  82480 (MC );  Dry Needling          mins self-pay  Traction          mins 57052      Timed Treatment:   30   mins   Total Treatment:     30   mins    Steve Boyd PTA  Physical Therapist Assistant

## 2019-11-25 RX ORDER — ZINC GLUCONATE 50 MG
50 TABLET ORAL DAILY
Qty: 30 TABLET | Refills: 0 | Status: SHIPPED | OUTPATIENT
Start: 2019-11-25 | End: 2019-12-23

## 2019-12-04 ENCOUNTER — OFFICE VISIT (OUTPATIENT)
Dept: ORTHOPEDIC SURGERY | Facility: CLINIC | Age: 64
End: 2019-12-04

## 2019-12-04 VITALS
SYSTOLIC BLOOD PRESSURE: 111 MMHG | HEIGHT: 74 IN | WEIGHT: 279 LBS | HEART RATE: 62 BPM | BODY MASS INDEX: 35.81 KG/M2 | DIASTOLIC BLOOD PRESSURE: 61 MMHG

## 2019-12-04 DIAGNOSIS — Z47.89 ORTHOPEDIC AFTERCARE: Primary | ICD-10-CM

## 2019-12-04 PROCEDURE — 99024 POSTOP FOLLOW-UP VISIT: CPT | Performed by: ORTHOPAEDIC SURGERY

## 2019-12-04 RX ORDER — AMOXICILLIN AND CLAVULANATE POTASSIUM 875; 125 MG/1; MG/1
TABLET, FILM COATED ORAL
COMMUNITY
Start: 2019-11-30 | End: 2020-05-28

## 2019-12-04 NOTE — PROGRESS NOTES
"     Patient ID: Leonid Diehl is a 64 y.o. male.    10/22/19 right reverse total shoulder  Pain is controlled    Objective:    /61   Pulse 62   Ht 188 cm (74\")   Wt 127 kg (279 lb)   BMI 35.82 kg/m²     Physical Examination:  Incision is healed.  Passive elevation 160 degrees abduction 100 degrees external rotation 30 degrees      Imaging:      Assessment:  Doing well after reverse total shoulder    Plan:  Continue postop protocol, discontinue sling.  See me with x-ray in about 3 months  "

## 2019-12-16 ENCOUNTER — DOCUMENTATION (OUTPATIENT)
Dept: PHYSICAL THERAPY | Facility: CLINIC | Age: 64
End: 2019-12-16

## 2019-12-16 NOTE — PROGRESS NOTES
Discharge Summary  Discharge Summary from Physical Therapy Report      Dates  PT visit: 10/28/19-11/21/19  Number of Visits: 7    Discharge Status of Patient: Pt was last seen 11/21/19 and when contacted 12/4/19 pt stated he would call back to schedule. Pt has not yet rescheduled and is therefore discharged.     Goals: undetermined as pt did not return to PT after his 7th visit.    Discharge Plan: no specific D/C instructions were given as pt did not return to PT after the 7th visit.     Date of Discharge: 12/16/19    Rhiannon Clifton, PT  Physical Therapist  [place] License# [id number]

## 2019-12-18 RX ORDER — ALLOPURINOL 300 MG/1
TABLET ORAL
Qty: 30 TABLET | Refills: 2 | Status: SHIPPED | OUTPATIENT
Start: 2019-12-18 | End: 2020-05-28

## 2019-12-23 RX ORDER — ZINC GLUCONATE 50 MG
TABLET ORAL
Qty: 30 TABLET | Refills: 0 | Status: SHIPPED | OUTPATIENT
Start: 2019-12-23 | End: 2020-01-24

## 2020-01-24 RX ORDER — URSODIOL 500 MG/1
TABLET, FILM COATED ORAL
Qty: 60 TABLET | Refills: 0 | Status: SHIPPED | OUTPATIENT
Start: 2020-01-24 | End: 2020-03-02

## 2020-01-24 RX ORDER — ZINC GLUCONATE 50 MG
TABLET ORAL
Qty: 30 TABLET | Refills: 0 | Status: ON HOLD | OUTPATIENT
Start: 2020-01-24 | End: 2022-04-05

## 2020-01-29 DIAGNOSIS — I50.9 CONGESTIVE HEART FAILURE, UNSPECIFIED HF CHRONICITY, UNSPECIFIED HEART FAILURE TYPE (HCC): ICD-10-CM

## 2020-01-29 DIAGNOSIS — D50.0 ANEMIA DUE TO BLOOD LOSS: Primary | ICD-10-CM

## 2020-02-03 ENCOUNTER — TELEPHONE (OUTPATIENT)
Dept: CARDIOLOGY | Facility: CLINIC | Age: 65
End: 2020-02-03

## 2020-03-02 RX ORDER — URSODIOL 500 MG/1
TABLET, FILM COATED ORAL
Qty: 60 TABLET | Refills: 0 | Status: SHIPPED | OUTPATIENT
Start: 2020-03-02 | End: 2020-05-07

## 2020-03-04 ENCOUNTER — OFFICE VISIT (OUTPATIENT)
Dept: ORTHOPEDIC SURGERY | Facility: CLINIC | Age: 65
End: 2020-03-04

## 2020-03-04 DIAGNOSIS — Z47.89 ORTHOPEDIC AFTERCARE: Primary | ICD-10-CM

## 2020-03-04 DIAGNOSIS — M19.012 OSTEOARTHRITIS OF LEFT GLENOHUMERAL JOINT: ICD-10-CM

## 2020-03-04 DIAGNOSIS — M25.512 ACUTE PAIN OF LEFT SHOULDER: ICD-10-CM

## 2020-03-04 DIAGNOSIS — M19.011 OSTEOARTHRITIS OF RIGHT GLENOHUMERAL JOINT: ICD-10-CM

## 2020-03-04 PROCEDURE — 99213 OFFICE O/P EST LOW 20 MIN: CPT | Performed by: ORTHOPAEDIC SURGERY

## 2020-03-04 NOTE — PROGRESS NOTES
Patient ID: Leonid Diehl is a 65 y.o. male.  Bilateral shoulder pain  10/22/19 right reverse total shoulder  Right side has no pain.  Left side having some increasing pain in the impingement area with weakness on overhead activity and at night.  CBD oil has helped significantly though no history of injury to the left side, no history of surgery.      Review of Systems:  Bilateral shoulder pain  Denies chest pain      Objective:    There were no vitals taken for this visit.    Physical Examination:   He is a pleasant male in no distress. He is alert and oriented x3 and appears his stated age.  Right shoulder demonstrates a healed incision with no bony tenderness.  Active elevation 180 degrees abduction 130 degrees internal rotation 40 degrees, internal rotation is S1.Sensory and motor exam are intact all distributions. Radial pulse is palpable and capillary refill is less than two seconds to all digits  Left shoulder demonstrates no scars and supraspinatus atrophy.  There is pain in the impingement area.  Passive elevation 160 degrees abduction 130 degrees external rotation 30 degrees internal rotation is S1.  He has pain weakness on Speed, Rabun, and supraspinatus testing.  Belly press and liftoff are 5/5 and 4/5.Sensory and motor exam are intact all distributions. Radial pulse is palpable and capillary refill is less than two seconds to all digits    Imaging:   X-rays of the right shoulder demonstrate reverse total shoulder in position, left shoulder demonstrates moderate to severe degenerative joint disease    Assessment:    Doing well after right reverse total shoulder  Left shoulder degenerative joint disease    Plan:  Continue activity as tolerated for the right shoulder and see me with x-ray in 6 months.  For the left shoulder treatment options were discussed involving further conservative versus surgical management.  At this point he is well controlled in conservative manner and we will continue that.   See me as needed          Disclaimer: Please note that areas of this note were completed with computer voice recognition software.  Quite often unanticipated grammatical, syntax, homophones, and other interpretive errors are inadvertently transcribed by the computer software. Please excuse any errors that have escaped final proofreading.

## 2020-03-10 RX ORDER — CARVEDILOL 3.12 MG/1
TABLET ORAL
Qty: 90 TABLET | Refills: 0 | Status: SHIPPED | OUTPATIENT
Start: 2020-03-10 | End: 2020-05-28 | Stop reason: SDUPTHER

## 2020-03-24 RX ORDER — ALLOPURINOL 300 MG/1
TABLET ORAL
Qty: 30 TABLET | Refills: 1 | Status: SHIPPED | OUTPATIENT
Start: 2020-03-24 | End: 2020-05-28

## 2020-04-29 RX ORDER — ALBUTEROL SULFATE 90 UG/1
2 AEROSOL, METERED RESPIRATORY (INHALATION) EVERY 6 HOURS PRN
Qty: 1 INHALER | Refills: 0 | Status: SHIPPED | OUTPATIENT
Start: 2020-04-29 | End: 2020-07-27 | Stop reason: SDUPTHER

## 2020-05-07 RX ORDER — URSODIOL 500 MG/1
TABLET, FILM COATED ORAL
Qty: 60 TABLET | Refills: 2 | Status: SHIPPED | OUTPATIENT
Start: 2020-05-07 | End: 2020-08-27

## 2020-05-11 RX ORDER — LISINOPRIL AND HYDROCHLOROTHIAZIDE 12.5; 1 MG/1; MG/1
0.5 TABLET ORAL DAILY
Qty: 30 TABLET | Refills: 1 | Status: SHIPPED | OUTPATIENT
Start: 2020-05-11 | End: 2020-10-05

## 2020-05-27 RX ORDER — FUROSEMIDE 20 MG/1
10 TABLET ORAL DAILY
Qty: 30 TABLET | Refills: 1 | Status: SHIPPED | OUTPATIENT
Start: 2020-05-27 | End: 2020-07-10 | Stop reason: SDUPTHER

## 2020-05-28 ENCOUNTER — TELEMEDICINE (OUTPATIENT)
Dept: FAMILY MEDICINE CLINIC | Facility: CLINIC | Age: 65
End: 2020-05-28

## 2020-05-28 ENCOUNTER — TELEPHONE (OUTPATIENT)
Dept: FAMILY MEDICINE CLINIC | Facility: CLINIC | Age: 65
End: 2020-05-28

## 2020-05-28 DIAGNOSIS — Z12.5 PROSTATE CANCER SCREENING: ICD-10-CM

## 2020-05-28 DIAGNOSIS — M1A.39X0 CHRONIC GOUT DUE TO RENAL IMPAIRMENT OF MULTIPLE SITES WITHOUT TOPHUS: ICD-10-CM

## 2020-05-28 DIAGNOSIS — I10 ESSENTIAL HYPERTENSION: Primary | ICD-10-CM

## 2020-05-28 DIAGNOSIS — J44.9 CHRONIC OBSTRUCTIVE PULMONARY DISEASE, UNSPECIFIED COPD TYPE (HCC): ICD-10-CM

## 2020-05-28 PROCEDURE — 99214 OFFICE O/P EST MOD 30 MIN: CPT | Performed by: NURSE PRACTITIONER

## 2020-05-28 RX ORDER — CARVEDILOL 3.12 MG/1
3.12 TABLET ORAL DAILY
Qty: 90 TABLET | Refills: 1 | Status: SHIPPED | OUTPATIENT
Start: 2020-05-28 | End: 2021-02-01

## 2020-05-28 RX ORDER — BUDESONIDE AND FORMOTEROL FUMARATE DIHYDRATE 160; 4.5 UG/1; UG/1
2 AEROSOL RESPIRATORY (INHALATION)
Qty: 1 INHALER | Refills: 5 | Status: SHIPPED | OUTPATIENT
Start: 2020-05-28 | End: 2021-02-08 | Stop reason: SDUPTHER

## 2020-05-28 RX ORDER — ALLOPURINOL 300 MG/1
300 TABLET ORAL DAILY
Qty: 90 TABLET | Refills: 1 | Status: SHIPPED | OUTPATIENT
Start: 2020-05-28 | End: 2021-02-01

## 2020-05-28 NOTE — PROGRESS NOTES
Chief Complaint   Patient presents with   • Establish Care     htn, gout, med refills.        You have chosen to receive care through a telemedicine visit. Do you consent to use a telemedicine visit for your medical care today? Yes      HPI    HTN, follows with Dr. Blue, stable on meds and takes as directed, denies chest pain, headache, shortness of air, palpitations and swelling of extremities. bp this am 130/65, HR 60    COPD: The patient has been previously diagnosed with COPD. Symptoms include dyspnea, non-productive cough and wheezing, now controlled on Symbicort, using albuterol only as needed, has symptoms only with moderate exertion.      Gout, stable on allopurinol, no recent flares, needs medication refill      Past Medical History:   Diagnosis Date   • Allergic    • Asthma    • Hypertension      Past Surgical History:   Procedure Laterality Date   • CATARACT EXTRACTION, BILATERAL     • TOTAL SHOULDER ARTHROPLASTY W/ DISTAL CLAVICLE EXCISION Right 10/22/2019    Procedure: TOTAL SHOULDER REVERSE ARTHROPLASTY with Biceps Tenodesis;  Surgeon: Chris Lafleur MD;  Location: [facility] OR;  Service: Orthopedics     Family History   Problem Relation Age of Onset   • Cancer Father    • Heart disease Sister    • Hypertension Brother      Social History     Tobacco Use   • Smoking status: Never Smoker   • Smokeless tobacco: Never Used   Substance Use Topics   • Alcohol use: No     Frequency: Never         Current Outpatient Medications:   •  albuterol sulfate  (90 Base) MCG/ACT inhaler, Inhale 2 puffs Every 6 (Six) Hours As Needed for Wheezing or Shortness of Air., Disp: 1 inhaler, Rfl: 0  •  allopurinol (ZYLOPRIM) 300 MG tablet, Take 1 tablet by mouth Daily., Disp: 90 tablet, Rfl: 1  •  carvedilol (COREG) 3.125 MG tablet, Take 1 tablet by mouth Daily., Disp: 90 tablet, Rfl: 1  •  fexofenadine-pseudoephedrine (ALLEGRA-D 24) 180-240 MG per 24 hr tablet, Take 1 tablet by mouth Daily., Disp: , Rfl:   •   furosemide (LASIX) 20 MG tablet, Take 0.5 tablets by mouth Daily., Disp: 30 tablet, Rfl: 1  •  guaiFENesin (MUCINEX) 600 MG 12 hr tablet, Take 1,200 mg by mouth Daily., Disp: , Rfl:   •  Iron, Ferrous Gluconate, 256 (28 Fe) MG tablet, Take 325 mg by mouth Daily., Disp: , Rfl:   •  lisinopril-hydrochlorothiazide (PRINZIDE,ZESTORETIC) 10-12.5 MG per tablet, Take 0.5 tablets by mouth Daily., Disp: 30 tablet, Rfl: 1  •  oxyCODONE-acetaminophen (PERCOCET)  MG per tablet, Take 1 tablet by mouth Every 6 (Six) Hours As Needed for Moderate Pain ., Disp: 28 tablet, Rfl: 0  •  SYMBICORT 160-4.5 MCG/ACT inhaler, Inhale 2 puffs 2 (Two) Times a Day., Disp: 1 inhaler, Rfl: 5  •  ursodiol (ACTIGALL) 500 MG tablet, TAKE ONE TABLET BY MOUTH TWICE A DAY, Disp: 60 tablet, Rfl: 2  •  zinc gluconate 50 MG tablet, TAKE ONE TABLET BY MOUTH DAILY, Disp: 30 tablet, Rfl: 0      The following portions of the patient's history were reviewed and updated as appropriate: allergies, current medications, past family history, past medical history, past social history, past surgical history and problem list.    Review of Systems   Constitutional: Negative for chills, fatigue and fever.   HENT: Negative for dental problem, ear pain, sinus pressure and sore throat.    Eyes: Negative for visual disturbance.   Respiratory: Negative for cough, shortness of breath and wheezing.    Cardiovascular: Negative for chest pain, palpitations and leg swelling.   Gastrointestinal: Negative for abdominal pain, blood in stool, constipation, diarrhea, nausea, vomiting and GERD.   Genitourinary: Negative for difficulty urinating, frequency, urgency and urinary incontinence.   Musculoskeletal: Negative for arthralgias, back pain, gait problem, joint swelling, myalgias and neck pain.   Skin: Negative for dry skin, pallor and rash.   Neurological: Negative for dizziness, seizures, speech difficulty and weakness.   Hematological: Negative for adenopathy.    Psychiatric/Behavioral: Negative for sleep disturbance, depressed mood and stress. The patient is not nervous/anxious.          There were no vitals filed for this visit.  There is no height or weight on file to calculate BMI.    Physical Exam   Constitutional: He is oriented to person, place, and time. No distress.   Exam otherwise deferred through video visit   Pulmonary/Chest: Effort normal. No respiratory distress.   Musculoskeletal: Normal range of motion.   Neurological: He is alert and oriented to person, place, and time.   Psychiatric: He has a normal mood and affect. His behavior is normal. Judgment and thought content normal.       No visits with results within 7 Day(s) from this visit.   Latest known visit with results is:   Admission on 10/22/2019, Discharged on 10/23/2019   Component Date Value Ref Range Status   • Glucose 10/23/2019 158* 65 - 99 mg/dL Final   • BUN 10/23/2019 25* 8 - 23 mg/dL Final   • Creatinine 10/23/2019 1.02  0.76 - 1.27 mg/dL Final   • Sodium 10/23/2019 133* 136 - 145 mmol/L Final   • Potassium 10/23/2019 4.8  3.5 - 5.2 mmol/L Final   • Chloride 10/23/2019 106  98 - 107 mmol/L Final   • CO2 10/23/2019 18.0* 22.0 - 29.0 mmol/L Final   • Calcium 10/23/2019 8.4* 8.6 - 10.5 mg/dL Final   • eGFR Non  Amer 10/23/2019 74  >60 mL/min/1.73 Final   • BUN/Creatinine Ratio 10/23/2019 24.5  7.0 - 25.0 Final   • Anion Gap 10/23/2019 9.0  5.0 - 15.0 mmol/L Final   • Hemoglobin 10/23/2019 11.3* 13.0 - 17.7 g/dL Final   • Hematocrit 10/23/2019 33.9* 37.5 - 51.0 % Final       Diagnoses and all orders for this visit:    1. Essential hypertension (Primary)  -     Lipid Panel; Future  -     Comprehensive Metabolic Panel; Future  -     CBC (No Diff); Future  -     TSH; Future    2. Prostate cancer screening  -     PSA Screen; Future    3. Chronic gout due to renal impairment of multiple sites without tophus    4. Chronic obstructive pulmonary disease, unspecified COPD type (CMS/HCC)    Other  orders  -     allopurinol (ZYLOPRIM) 300 MG tablet; Take 1 tablet by mouth Daily.  Dispense: 90 tablet; Refill: 1  -     carvedilol (COREG) 3.125 MG tablet; Take 1 tablet by mouth Daily.  Dispense: 90 tablet; Refill: 1  -     SYMBICORT 160-4.5 MCG/ACT inhaler; Inhale 2 puffs 2 (Two) Times a Day.  Dispense: 1 inhaler; Refill: 5    Conditions are stable, refill medications above  We will plan to see patient in the office in 3 months with preventative labs 1 week prior to the visit  Return to office earlier if needed    This was an audio and video enabled telemedicine encounter.  Total time of discussion was 25 minutes

## 2020-07-13 RX ORDER — FUROSEMIDE 20 MG/1
10 TABLET ORAL DAILY
Qty: 30 TABLET | Refills: 1 | Status: SHIPPED | OUTPATIENT
Start: 2020-07-13 | End: 2020-07-23 | Stop reason: SDUPTHER

## 2020-07-13 NOTE — TELEPHONE ENCOUNTER
Pt contacted this morning about medication.  Medication refilled and is now waiting for confirmation about dose change.

## 2020-07-23 ENCOUNTER — TELEPHONE (OUTPATIENT)
Dept: FAMILY MEDICINE CLINIC | Facility: CLINIC | Age: 65
End: 2020-07-23

## 2020-07-23 RX ORDER — FUROSEMIDE 20 MG/1
20 TABLET ORAL DAILY PRN
Qty: 30 TABLET | Refills: 1 | Status: SHIPPED | OUTPATIENT
Start: 2020-07-23 | End: 2020-11-09

## 2020-07-23 RX ORDER — POTASSIUM CHLORIDE 750 MG/1
10 TABLET, FILM COATED, EXTENDED RELEASE ORAL DAILY PRN
Qty: 30 TABLET | Refills: 1 | Status: SHIPPED | OUTPATIENT
Start: 2020-07-23 | End: 2020-09-21

## 2020-07-23 NOTE — TELEPHONE ENCOUNTER
Pls schedule appt for next week. I'll send in lasix to use for 5 days with potassium and then only as needed for swelling.

## 2020-07-23 NOTE — TELEPHONE ENCOUNTER
Pt's wife called and stated patient is holding water and needs dosage increased the med is lasiks.  Patient wanted to know if you need to approve and he is swollen n the ankles and feet and short of breath and his ears are fluid in he inner ear.  Renetta can you contact wife and speak with her.  Dorita- [phone]

## 2020-07-27 RX ORDER — ALBUTEROL SULFATE 90 UG/1
2 AEROSOL, METERED RESPIRATORY (INHALATION) EVERY 6 HOURS PRN
Qty: 8 G | Refills: 3 | Status: SHIPPED | OUTPATIENT
Start: 2020-07-27 | End: 2020-11-25

## 2020-08-17 ENCOUNTER — CLINICAL SUPPORT (OUTPATIENT)
Dept: FAMILY MEDICINE CLINIC | Facility: CLINIC | Age: 65
End: 2020-08-17

## 2020-08-17 DIAGNOSIS — I10 ESSENTIAL HYPERTENSION: ICD-10-CM

## 2020-08-17 DIAGNOSIS — Z12.5 PROSTATE CANCER SCREENING: ICD-10-CM

## 2020-08-17 PROCEDURE — 85027 COMPLETE CBC AUTOMATED: CPT | Performed by: NURSE PRACTITIONER

## 2020-08-17 PROCEDURE — 84443 ASSAY THYROID STIM HORMONE: CPT | Performed by: NURSE PRACTITIONER

## 2020-08-17 PROCEDURE — 80061 LIPID PANEL: CPT | Performed by: NURSE PRACTITIONER

## 2020-08-17 PROCEDURE — 80053 COMPREHEN METABOLIC PANEL: CPT | Performed by: NURSE PRACTITIONER

## 2020-08-17 PROCEDURE — 36415 COLL VENOUS BLD VENIPUNCTURE: CPT | Performed by: NURSE PRACTITIONER

## 2020-08-17 PROCEDURE — G0103 PSA SCREENING: HCPCS | Performed by: NURSE PRACTITIONER

## 2020-08-18 LAB
ALBUMIN SERPL-MCNC: 3.7 G/DL (ref 3.5–5.2)
ALBUMIN/GLOB SERPL: 0.9 G/DL
ALP SERPL-CCNC: 88 U/L (ref 39–117)
ALT SERPL W P-5'-P-CCNC: 18 U/L (ref 1–41)
ANION GAP SERPL CALCULATED.3IONS-SCNC: 9.7 MMOL/L (ref 5–15)
AST SERPL-CCNC: 29 U/L (ref 1–40)
BILIRUB SERPL-MCNC: 0.8 MG/DL (ref 0–1.2)
BUN SERPL-MCNC: 34 MG/DL (ref 8–23)
BUN/CREAT SERPL: 30.1 (ref 7–25)
CALCIUM SPEC-SCNC: 9.1 MG/DL (ref 8.6–10.5)
CHLORIDE SERPL-SCNC: 108 MMOL/L (ref 98–107)
CHOLEST SERPL-MCNC: 137 MG/DL (ref 0–200)
CO2 SERPL-SCNC: 19.3 MMOL/L (ref 22–29)
CREAT SERPL-MCNC: 1.13 MG/DL (ref 0.76–1.27)
DEPRECATED RDW RBC AUTO: 46.4 FL (ref 37–54)
ERYTHROCYTE [DISTWIDTH] IN BLOOD BY AUTOMATED COUNT: 13.1 % (ref 12.3–15.4)
GFR SERPL CREATININE-BSD FRML MDRD: 65 ML/MIN/1.73
GLOBULIN UR ELPH-MCNC: 3.9 GM/DL
GLUCOSE SERPL-MCNC: 100 MG/DL (ref 65–99)
HCT VFR BLD AUTO: 38.3 % (ref 37.5–51)
HDLC SERPL-MCNC: 49 MG/DL (ref 40–60)
HGB BLD-MCNC: 13.2 G/DL (ref 13–17.7)
LDLC SERPL CALC-MCNC: 68 MG/DL (ref 0–100)
LDLC/HDLC SERPL: 1.38 {RATIO}
MCH RBC QN AUTO: 33.8 PG (ref 26.6–33)
MCHC RBC AUTO-ENTMCNC: 34.5 G/DL (ref 31.5–35.7)
MCV RBC AUTO: 98.2 FL (ref 79–97)
PLATELET # BLD AUTO: 111 10*3/MM3 (ref 140–450)
PMV BLD AUTO: 12.1 FL (ref 6–12)
POTASSIUM SERPL-SCNC: 4.8 MMOL/L (ref 3.5–5.2)
PROT SERPL-MCNC: 7.6 G/DL (ref 6–8.5)
PSA SERPL-MCNC: 0.14 NG/ML (ref 0–4)
RBC # BLD AUTO: 3.9 10*6/MM3 (ref 4.14–5.8)
SODIUM SERPL-SCNC: 137 MMOL/L (ref 136–145)
TRIGL SERPL-MCNC: 101 MG/DL (ref 0–150)
TSH SERPL DL<=0.05 MIU/L-ACNC: 1.48 UIU/ML (ref 0.27–4.2)
VLDLC SERPL-MCNC: 20.2 MG/DL (ref 5–40)
WBC # BLD AUTO: 8.17 10*3/MM3 (ref 3.4–10.8)

## 2020-08-27 ENCOUNTER — OFFICE VISIT (OUTPATIENT)
Dept: FAMILY MEDICINE CLINIC | Facility: CLINIC | Age: 65
End: 2020-08-27

## 2020-08-27 VITALS
OXYGEN SATURATION: 97 % | WEIGHT: 276.2 LBS | HEART RATE: 60 BPM | BODY MASS INDEX: 35.45 KG/M2 | SYSTOLIC BLOOD PRESSURE: 119 MMHG | HEIGHT: 74 IN | TEMPERATURE: 98.2 F | DIASTOLIC BLOOD PRESSURE: 67 MMHG

## 2020-08-27 DIAGNOSIS — I10 ESSENTIAL HYPERTENSION: Primary | ICD-10-CM

## 2020-08-27 DIAGNOSIS — M1A.39X0 CHRONIC GOUT DUE TO RENAL IMPAIRMENT OF MULTIPLE SITES WITHOUT TOPHUS: ICD-10-CM

## 2020-08-27 DIAGNOSIS — D69.6 THROMBOCYTOPENIA (HCC): ICD-10-CM

## 2020-08-27 DIAGNOSIS — J44.9 CHRONIC OBSTRUCTIVE PULMONARY DISEASE, UNSPECIFIED COPD TYPE (HCC): ICD-10-CM

## 2020-08-27 PROCEDURE — 99214 OFFICE O/P EST MOD 30 MIN: CPT | Performed by: NURSE PRACTITIONER

## 2020-08-27 NOTE — PROGRESS NOTES
Chief Complaint   Patient presents with   • Hypertension   • Follow-up     lov 5/28/20   • Gout       HPI     HTN, follows with Dr. Blue, stable on meds and takes as directed, denies chest pain, headache, shortness of air, palpitations and swelling of extremities. pt wishes to review labs today.      COPD: The patient has been previously diagnosed with COPD. Symptoms include dyspnea, non-productive cough and wheezing, now controlled on Symbicort, using albuterol only as needed, has symptoms only with moderate exertion.       Gout, stable on allopurinol, no recent flares.     The following portions of the patient's history were reviewed and updated as appropriate: allergies, current medications, past family history, past medical history, past social history, past surgical history and problem list.    Past Medical History:   Diagnosis Date   • Allergic    • Asthma    • Hypertension      Past Surgical History:   Procedure Laterality Date   • CATARACT EXTRACTION, BILATERAL     • TOTAL SHOULDER ARTHROPLASTY W/ DISTAL CLAVICLE EXCISION Right 10/22/2019    Procedure: TOTAL SHOULDER REVERSE ARTHROPLASTY with Biceps Tenodesis;  Surgeon: Chris Lafleur MD;  Location: [facility] OR;  Service: Orthopedics     Family History   Problem Relation Age of Onset   • Cancer Father    • Heart disease Sister    • Hypertension Brother      Social History     Tobacco Use   • Smoking status: Never Smoker   • Smokeless tobacco: Never Used   Substance Use Topics   • Alcohol use: No     Frequency: Never         Current Outpatient Medications:   •  albuterol sulfate  (90 Base) MCG/ACT inhaler, Inhale 2 puffs Every 6 (Six) Hours As Needed for Wheezing or Shortness of Air., Disp: 8 g, Rfl: 3  •  allopurinol (ZYLOPRIM) 300 MG tablet, Take 1 tablet by mouth Daily., Disp: 90 tablet, Rfl: 1  •  carvedilol (COREG) 3.125 MG tablet, Take 1 tablet by mouth Daily., Disp: 90 tablet, Rfl: 1  •  fexofenadine-pseudoephedrine (ALLEGRA-D 24)  "180-240 MG per 24 hr tablet, Take 1 tablet by mouth Daily., Disp: , Rfl:   •  furosemide (LASIX) 20 MG tablet, Take 1 tablet by mouth Daily As Needed (swelling)., Disp: 30 tablet, Rfl: 1  •  guaiFENesin (MUCINEX) 600 MG 12 hr tablet, Take 1,200 mg by mouth Daily., Disp: , Rfl:   •  Iron, Ferrous Gluconate, 256 (28 Fe) MG tablet, Take 325 mg by mouth Daily., Disp: , Rfl:   •  lisinopril-hydrochlorothiazide (PRINZIDE,ZESTORETIC) 10-12.5 MG per tablet, Take 0.5 tablets by mouth Daily., Disp: 30 tablet, Rfl: 1  •  potassium chloride (KLOR-CON) 10 MEQ CR tablet, Take 1 tablet by mouth Daily As Needed (if takes lasix)., Disp: 30 tablet, Rfl: 1  •  SYMBICORT 160-4.5 MCG/ACT inhaler, Inhale 2 puffs 2 (Two) Times a Day., Disp: 1 inhaler, Rfl: 5  •  zinc gluconate 50 MG tablet, TAKE ONE TABLET BY MOUTH DAILY, Disp: 30 tablet, Rfl: 0      Review of Systems       Obtained as mentioned in HPI, otherwise negative.       Vitals:    08/27/20 0938   BP: 119/67   BP Location: Left arm   Patient Position: Sitting   Cuff Size: Adult   Pulse: 60   Temp: 98.2 °F (36.8 °C)   TempSrc: Skin   SpO2: 97%   Weight: 125 kg (276 lb 3.2 oz)   Height: 188 cm (74.02\")     Body mass index is 35.45 kg/m².    Physical Exam   Constitutional: He is oriented to person, place, and time. He appears well-developed and well-nourished. No distress.   HENT:   Head: Normocephalic and atraumatic.   Eyes: Pupils are equal, round, and reactive to light.   Neck: Normal range of motion. No thyromegaly present.   Cardiovascular: Normal rate, regular rhythm and intact distal pulses.   Murmur heard.  Pulmonary/Chest: Effort normal and breath sounds normal. No respiratory distress.   Abdominal: Soft. Bowel sounds are normal. He exhibits no distension. There is no tenderness.   Musculoskeletal: Normal range of motion.   Neurological: He is alert and oriented to person, place, and time.   Skin: Skin is warm and dry. He is not diaphoretic. No erythema.   Psychiatric: He " has a normal mood and affect. His behavior is normal. Judgment and thought content normal.   Nursing note and vitals reviewed.      No visits with results within 7 Day(s) from this visit.   Latest known visit with results is:   Clinical Support on 08/17/2020   Component Date Value Ref Range Status   • TSH 08/17/2020 1.480  0.270 - 4.200 uIU/mL Final   • WBC 08/17/2020 8.17  3.40 - 10.80 10*3/mm3 Final   • RBC 08/17/2020 3.90* 4.14 - 5.80 10*6/mm3 Final   • Hemoglobin 08/17/2020 13.2  13.0 - 17.7 g/dL Final   • Hematocrit 08/17/2020 38.3  37.5 - 51.0 % Final   • MCV 08/17/2020 98.2* 79.0 - 97.0 fL Final   • MCH 08/17/2020 33.8* 26.6 - 33.0 pg Final   • MCHC 08/17/2020 34.5  31.5 - 35.7 g/dL Final   • RDW 08/17/2020 13.1  12.3 - 15.4 % Final   • RDW-SD 08/17/2020 46.4  37.0 - 54.0 fl Final   • MPV 08/17/2020 12.1* 6.0 - 12.0 fL Final   • Platelets 08/17/2020 111* 140 - 450 10*3/mm3 Final   • Glucose 08/17/2020 100* 65 - 99 mg/dL Final   • BUN 08/17/2020 34* 8 - 23 mg/dL Final   • Creatinine 08/17/2020 1.13  0.76 - 1.27 mg/dL Final   • Sodium 08/17/2020 137  136 - 145 mmol/L Final   • Potassium 08/17/2020 4.8  3.5 - 5.2 mmol/L Final   • Chloride 08/17/2020 108* 98 - 107 mmol/L Final   • CO2 08/17/2020 19.3* 22.0 - 29.0 mmol/L Final   • Calcium 08/17/2020 9.1  8.6 - 10.5 mg/dL Final   • Total Protein 08/17/2020 7.6  6.0 - 8.5 g/dL Final   • Albumin 08/17/2020 3.70  3.50 - 5.20 g/dL Final   • ALT (SGPT) 08/17/2020 18  1 - 41 U/L Final   • AST (SGOT) 08/17/2020 29  1 - 40 U/L Final   • Alkaline Phosphatase 08/17/2020 88  39 - 117 U/L Final   • Total Bilirubin 08/17/2020 0.8  0.0 - 1.2 mg/dL Final   • eGFR Non African Amer 08/17/2020 65  >60 mL/min/1.73 Final   • Globulin 08/17/2020 3.9  gm/dL Final   • A/G Ratio 08/17/2020 0.9  g/dL Final   • BUN/Creatinine Ratio 08/17/2020 30.1* 7.0 - 25.0 Final   • Anion Gap 08/17/2020 9.7  5.0 - 15.0 mmol/L Final   • Total Cholesterol 08/17/2020 137  0 - 200 mg/dL Final   •  Triglycerides 08/17/2020 101  0 - 150 mg/dL Final   • HDL Cholesterol 08/17/2020 49  40 - 60 mg/dL Final   • LDL Cholesterol  08/17/2020 68  0 - 100 mg/dL Final   • VLDL Cholesterol 08/17/2020 20.2  5 - 40 mg/dL Final   • LDL/HDL Ratio 08/17/2020 1.38   Final   • PSA 08/17/2020 0.143  0.000 - 4.000 ng/mL Final       Diagnoses and all orders for this visit:    1. Essential hypertension (Primary)  Comments:  bp stable, cont meds, rf when needed.   Orders:  -     Lipid Panel; Future  -     Comprehensive Metabolic Panel; Future  -     CBC (No Diff); Future  -     TSH; Future    2. Chronic gout due to renal impairment of multiple sites without tophus  Comments:  stable, reviewed labs and stable on allopurinol, rf when needed.   Orders:  -     Uric Acid; Future    3. Chronic obstructive pulmonary disease, unspecified COPD type (CMS/HCC)  Comments:  stable, cont symbicort and albuterol    4. Thrombocytopenia (CMS/HCC)  Comments:  chronic/stable        rtc in 6mo or prn, will rf meds when due.   Check HTN panel and uric acid 1 week prior to f/u appt

## 2020-09-02 ENCOUNTER — OFFICE VISIT (OUTPATIENT)
Dept: ORTHOPEDIC SURGERY | Facility: CLINIC | Age: 65
End: 2020-09-02

## 2020-09-02 VITALS
BODY MASS INDEX: 35.42 KG/M2 | WEIGHT: 276 LBS | DIASTOLIC BLOOD PRESSURE: 81 MMHG | HEIGHT: 74 IN | SYSTOLIC BLOOD PRESSURE: 160 MMHG | HEART RATE: 71 BPM

## 2020-09-02 DIAGNOSIS — Z47.89 ORTHOPEDIC AFTERCARE: Primary | ICD-10-CM

## 2020-09-02 DIAGNOSIS — M19.011 OSTEOARTHRITIS OF RIGHT GLENOHUMERAL JOINT: ICD-10-CM

## 2020-09-02 PROCEDURE — 99212 OFFICE O/P EST SF 10 MIN: CPT | Performed by: ORTHOPAEDIC SURGERY

## 2020-09-02 NOTE — PROGRESS NOTES
"     Patient ID: Leonid Diehl is a 65 y.o. male.  Right shoulder pain  10/22/19 right reverse total shoulder  Denies shoulder pain other than after occasional heavy lifting    Review of Systems:    Right shoulder pain resolved    Objective:    /81   Pulse 71   Ht 188 cm (74.02\")   Wt 125 kg (276 lb)   BMI 35.42 kg/m²     Physical Examination:   He is a pleasant male in no distress. He is alert and oriented x3 and appears his stated age.  Right shoulder demonstrates a healed incision without bony tenderness, active elevation is 180 degrees abduction 130 degrees external rotation 40 degrees, internal rotation S1.Sensory and motor exam are intact all distributions. Radial pulse is palpable and capillary refill is less than two seconds to all digits      Imaging:   X-rays demonstrate reverse total shoulder in unchanged position    Assessment:    Doing well after reverse total shoulder    Plan:  Activity as tolerated and see me as needed          Disclaimer: Please note that areas of this note were completed with computer voice recognition software.  Quite often unanticipated grammatical, syntax, homophones, and other interpretive errors are inadvertently transcribed by the computer software. Please excuse any errors that have escaped final proofreading.  "

## 2020-09-21 RX ORDER — POTASSIUM CHLORIDE 750 MG/1
TABLET, FILM COATED, EXTENDED RELEASE ORAL
Qty: 30 TABLET | Refills: 0 | Status: SHIPPED | OUTPATIENT
Start: 2020-09-21 | End: 2020-10-23

## 2020-10-05 RX ORDER — LISINOPRIL AND HYDROCHLOROTHIAZIDE 12.5; 1 MG/1; MG/1
TABLET ORAL
Qty: 15 TABLET | Refills: 4 | Status: SHIPPED | OUTPATIENT
Start: 2020-10-05 | End: 2021-02-08 | Stop reason: SDUPTHER

## 2020-10-23 RX ORDER — POTASSIUM CHLORIDE 750 MG/1
TABLET, FILM COATED, EXTENDED RELEASE ORAL
Qty: 30 TABLET | Refills: 0 | Status: SHIPPED | OUTPATIENT
Start: 2020-10-23 | End: 2020-12-16

## 2020-11-09 RX ORDER — FUROSEMIDE 20 MG/1
TABLET ORAL
Qty: 30 TABLET | Refills: 1 | Status: SHIPPED | OUTPATIENT
Start: 2020-11-09 | End: 2021-03-23

## 2020-11-25 RX ORDER — ALBUTEROL SULFATE 90 UG/1
AEROSOL, METERED RESPIRATORY (INHALATION)
Qty: 8.5 G | Refills: 2 | Status: SHIPPED | OUTPATIENT
Start: 2020-11-25 | End: 2021-02-08 | Stop reason: SDUPTHER

## 2020-12-02 RX ORDER — URSODIOL 500 MG/1
TABLET, FILM COATED ORAL
Qty: 60 TABLET | Refills: 1 | OUTPATIENT
Start: 2020-12-02

## 2020-12-16 RX ORDER — POTASSIUM CHLORIDE 750 MG/1
TABLET, FILM COATED, EXTENDED RELEASE ORAL
Qty: 30 TABLET | Refills: 1 | Status: SHIPPED | OUTPATIENT
Start: 2020-12-16 | End: 2021-03-08

## 2021-02-01 RX ORDER — CARVEDILOL 3.12 MG/1
TABLET ORAL
Qty: 30 TABLET | Refills: 0 | Status: SHIPPED | OUTPATIENT
Start: 2021-02-01 | End: 2021-02-08 | Stop reason: SDUPTHER

## 2021-02-01 RX ORDER — ALLOPURINOL 300 MG/1
TABLET ORAL
Qty: 30 TABLET | Refills: 0 | Status: SHIPPED | OUTPATIENT
Start: 2021-02-01 | End: 2021-02-08 | Stop reason: SDUPTHER

## 2021-02-08 ENCOUNTER — OFFICE VISIT (OUTPATIENT)
Dept: FAMILY MEDICINE CLINIC | Facility: CLINIC | Age: 66
End: 2021-02-08

## 2021-02-08 VITALS
SYSTOLIC BLOOD PRESSURE: 174 MMHG | BODY MASS INDEX: 36.45 KG/M2 | HEART RATE: 65 BPM | HEIGHT: 74 IN | WEIGHT: 284 LBS | TEMPERATURE: 98.4 F | OXYGEN SATURATION: 96 % | DIASTOLIC BLOOD PRESSURE: 78 MMHG

## 2021-02-08 DIAGNOSIS — J44.9 CHRONIC OBSTRUCTIVE PULMONARY DISEASE, UNSPECIFIED COPD TYPE (HCC): ICD-10-CM

## 2021-02-08 DIAGNOSIS — M17.10 ARTHRITIS OF KNEE: ICD-10-CM

## 2021-02-08 DIAGNOSIS — I10 ESSENTIAL HYPERTENSION: Primary | ICD-10-CM

## 2021-02-08 DIAGNOSIS — M1A.39X0 CHRONIC GOUT DUE TO RENAL IMPAIRMENT OF MULTIPLE SITES WITHOUT TOPHUS: ICD-10-CM

## 2021-02-08 PROCEDURE — 99214 OFFICE O/P EST MOD 30 MIN: CPT | Performed by: NURSE PRACTITIONER

## 2021-02-08 PROCEDURE — 85027 COMPLETE CBC AUTOMATED: CPT | Performed by: NURSE PRACTITIONER

## 2021-02-08 PROCEDURE — 84550 ASSAY OF BLOOD/URIC ACID: CPT | Performed by: NURSE PRACTITIONER

## 2021-02-08 PROCEDURE — 36415 COLL VENOUS BLD VENIPUNCTURE: CPT | Performed by: NURSE PRACTITIONER

## 2021-02-08 PROCEDURE — 80061 LIPID PANEL: CPT | Performed by: NURSE PRACTITIONER

## 2021-02-08 PROCEDURE — 84443 ASSAY THYROID STIM HORMONE: CPT | Performed by: NURSE PRACTITIONER

## 2021-02-08 PROCEDURE — 80053 COMPREHEN METABOLIC PANEL: CPT | Performed by: NURSE PRACTITIONER

## 2021-02-08 RX ORDER — CARVEDILOL 3.12 MG/1
3.12 TABLET ORAL DAILY
Qty: 90 TABLET | Refills: 1 | Status: SHIPPED | OUTPATIENT
Start: 2021-02-08 | End: 2021-08-10 | Stop reason: SDUPTHER

## 2021-02-08 RX ORDER — ALBUTEROL SULFATE 90 UG/1
2 AEROSOL, METERED RESPIRATORY (INHALATION) EVERY 6 HOURS PRN
Qty: 8.5 G | Refills: 2 | Status: SHIPPED | OUTPATIENT
Start: 2021-02-08 | End: 2021-08-10 | Stop reason: SDUPTHER

## 2021-02-08 RX ORDER — ALLOPURINOL 300 MG/1
300 TABLET ORAL DAILY
Qty: 90 TABLET | Refills: 1 | Status: SHIPPED | OUTPATIENT
Start: 2021-02-08 | End: 2021-08-10 | Stop reason: SDUPTHER

## 2021-02-08 RX ORDER — BUDESONIDE AND FORMOTEROL FUMARATE DIHYDRATE 160; 4.5 UG/1; UG/1
2 AEROSOL RESPIRATORY (INHALATION)
Qty: 10.2 G | Refills: 11 | Status: SHIPPED | OUTPATIENT
Start: 2021-02-08 | End: 2022-10-24 | Stop reason: SDUPTHER

## 2021-02-08 RX ORDER — MELOXICAM 7.5 MG/1
7.5 TABLET ORAL DAILY
Qty: 90 TABLET | Refills: 0 | Status: SHIPPED | OUTPATIENT
Start: 2021-02-08 | End: 2021-05-07

## 2021-02-08 RX ORDER — LISINOPRIL AND HYDROCHLOROTHIAZIDE 12.5; 1 MG/1; MG/1
1 TABLET ORAL DAILY
Qty: 90 TABLET | Refills: 1 | Status: SHIPPED | OUTPATIENT
Start: 2021-02-08 | End: 2021-08-05

## 2021-02-08 NOTE — PROGRESS NOTES
Chief Complaint  Follow-up (Follow up on HTN)    Subjective          Leonid Diehl presents to [facility] for   History of Present Illness     HTN, follows with Dr. Blue, elevated today, takes as directed, denies chest pain, headache, shortness of air, palpitations and swelling of extremities.       COPD: The patient has been previously diagnosed with COPD. Symptoms include dyspnea, non-productive cough and wheezing, controlled on Symbicort, using albuterol only as needed, has symptoms only with moderate exertion.       B knee pain/gout, worse intermittently, working daily on horse farm, stable on allopurinol and meloxicam, no recent flares.         The following portions of the patient's history were reviewed and updated as appropriate: allergies, current medications, past family history, past medical history, past social history, past surgical history and problem list.    Past Medical History:   Diagnosis Date   • Allergic    • Asthma    • Hypertension      Past Surgical History:   Procedure Laterality Date   • CATARACT EXTRACTION, BILATERAL     • TOTAL SHOULDER ARTHROPLASTY W/ DISTAL CLAVICLE EXCISION Right 10/22/2019    Procedure: TOTAL SHOULDER REVERSE ARTHROPLASTY with Biceps Tenodesis;  Surgeon: Chris Lafleur MD;  Location: [facility];  Service: Orthopedics     Family History   Problem Relation Age of Onset   • Cancer Father    • Heart disease Sister    • Hypertension Brother      Social History     Tobacco Use   • Smoking status: Never Smoker   • Smokeless tobacco: Never Used   Substance Use Topics   • Alcohol use: No     Frequency: Never       Current Outpatient Medications:   •  albuterol sulfate  (90 Base) MCG/ACT inhaler, Inhale 2 puffs Every 6 (Six) Hours As Needed for Wheezing or Shortness of Air., Disp: 8.5 g, Rfl: 2  •  allopurinol (ZYLOPRIM) 300 MG tablet, Take 1 tablet by mouth Daily., Disp: 90 tablet, Rfl: 1  •  carvedilol (COREG) 3.125 MG tablet,  "Take 1 tablet by mouth Daily., Disp: 90 tablet, Rfl: 1  •  fexofenadine-pseudoephedrine (ALLEGRA-D 24) 180-240 MG per 24 hr tablet, Take 1 tablet by mouth Daily., Disp: , Rfl:   •  furosemide (LASIX) 20 MG tablet, TAKE ONE TABLET BY MOUTH DAILY AS NEEDED FOR SWELLING, Disp: 30 tablet, Rfl: 1  •  guaiFENesin (MUCINEX) 600 MG 12 hr tablet, Take 1,200 mg by mouth Daily., Disp: , Rfl:   •  Iron, Ferrous Gluconate, 256 (28 Fe) MG tablet, Take 325 mg by mouth Daily., Disp: , Rfl:   •  lisinopril-hydrochlorothiazide (PRINZIDE,ZESTORETIC) 10-12.5 MG per tablet, Take 1 tablet by mouth Daily., Disp: 90 tablet, Rfl: 1  •  potassium chloride 10 MEQ CR tablet, TAKE ONE TABLET BY MOUTH DAILY AS NEEDED IF TAKING LASIX, Disp: 30 tablet, Rfl: 1  •  Symbicort 160-4.5 MCG/ACT inhaler, Inhale 2 puffs 2 (Two) Times a Day., Disp: 10.2 g, Rfl: 11  •  zinc gluconate 50 MG tablet, TAKE ONE TABLET BY MOUTH DAILY, Disp: 30 tablet, Rfl: 0  •  meloxicam (Mobic) 7.5 MG tablet, Take 1 tablet by mouth Daily. For knee pain, Disp: 90 tablet, Rfl: 0    Objective   Vital Signs:   /78 (BP Location: Left arm, Patient Position: Sitting, Cuff Size: Adult)   Pulse 65   Temp 98.4 °F (36.9 °C) (Skin)   Ht 188 cm (74\")   Wt 129 kg (284 lb)   SpO2 96%   BMI 36.46 kg/m²       Physical Exam  Vitals signs and nursing note reviewed.   Constitutional:       General: He is not in acute distress.     Appearance: He is well-developed. He is not diaphoretic.   HENT:      Head: Normocephalic and atraumatic.   Eyes:      Pupils: Pupils are equal, round, and reactive to light.   Neck:      Musculoskeletal: Normal range of motion.      Thyroid: No thyromegaly.   Cardiovascular:      Rate and Rhythm: Normal rate and regular rhythm.      Heart sounds: Normal heart sounds. No murmur.   Pulmonary:      Effort: Pulmonary effort is normal. No respiratory distress.      Breath sounds: Normal breath sounds.   Abdominal:      General: Bowel sounds are normal. There is " no distension.      Palpations: Abdomen is soft.      Tenderness: There is no abdominal tenderness.   Musculoskeletal: Normal range of motion.         General: Tenderness (B knees crepitus, mild baird rom) present.   Skin:     General: Skin is warm and dry.      Findings: No erythema.   Neurological:      Mental Status: He is alert and oriented to person, place, and time.   Psychiatric:         Behavior: Behavior normal.         Thought Content: Thought content normal.         Judgment: Judgment normal.          Result Review :     Office Visit on 02/08/2021   Component Date Value Ref Range Status   • Total Cholesterol 02/08/2021 136  0 - 200 mg/dL Final   • Triglycerides 02/08/2021 62  0 - 150 mg/dL Final   • HDL Cholesterol 02/08/2021 59  40 - 60 mg/dL Final   • LDL Cholesterol  02/08/2021 64  0 - 100 mg/dL Final   • VLDL Cholesterol 02/08/2021 13  5 - 40 mg/dL Final   • LDL/HDL Ratio 02/08/2021 1.09   Final   • Glucose 02/08/2021 95  65 - 99 mg/dL Final   • BUN 02/08/2021 14  8 - 23 mg/dL Final   • Creatinine 02/08/2021 0.84  0.76 - 1.27 mg/dL Final   • Sodium 02/08/2021 140  136 - 145 mmol/L Final   • Potassium 02/08/2021 4.7  3.5 - 5.2 mmol/L Final    Slight hemolysis detected by analyzer. Results may be affected.   • Chloride 02/08/2021 107  98 - 107 mmol/L Final   • CO2 02/08/2021 22.6  22.0 - 29.0 mmol/L Final   • Calcium 02/08/2021 9.5  8.6 - 10.5 mg/dL Final   • Total Protein 02/08/2021 7.3  6.0 - 8.5 g/dL Final   • Albumin 02/08/2021 3.60  3.50 - 5.20 g/dL Final   • ALT (SGPT) 02/08/2021 25  1 - 41 U/L Final   • AST (SGOT) 02/08/2021 46* 1 - 40 U/L Final    Slight hemolysis detected by analyzer. Results may be affected.   • Alkaline Phosphatase 02/08/2021 108  39 - 117 U/L Final   • Total Bilirubin 02/08/2021 0.7  0.0 - 1.2 mg/dL Final   • eGFR Non  Amer 02/08/2021 92  >60 mL/min/1.73 Final   • Globulin 02/08/2021 3.7  gm/dL Final   • A/G Ratio 02/08/2021 1.0  g/dL Final   • BUN/Creatinine Ratio  02/08/2021 16.7  7.0 - 25.0 Final   • Anion Gap 02/08/2021 10.4  5.0 - 15.0 mmol/L Final   • WBC 02/08/2021 7.00  3.40 - 10.80 10*3/mm3 Final   • RBC 02/08/2021 4.30  4.14 - 5.80 10*6/mm3 Final   • Hemoglobin 02/08/2021 13.4  13.0 - 17.7 g/dL Final   • Hematocrit 02/08/2021 40.2  37.5 - 51.0 % Final   • MCV 02/08/2021 93.5  79.0 - 97.0 fL Final   • MCH 02/08/2021 31.2  26.6 - 33.0 pg Final   • MCHC 02/08/2021 33.3  31.5 - 35.7 g/dL Final   • RDW 02/08/2021 13.0  12.3 - 15.4 % Final   • RDW-SD 02/08/2021 44.3  37.0 - 54.0 fl Final   • MPV 02/08/2021 11.6  6.0 - 12.0 fL Final   • Platelets 02/08/2021 115* 140 - 450 10*3/mm3 Final   • TSH 02/08/2021 1.230  0.270 - 4.200 uIU/mL Final   • Uric Acid 02/08/2021 5.7  3.4 - 7.0 mg/dL Final                       Assessment and Plan    Diagnoses and all orders for this visit:    1. Essential hypertension (Primary)  Comments:  bp elevated, inc lis/hctz back to a full tablet, cont carvedilol. follow up cardiology as directed.   Orders:  -     carvedilol (COREG) 3.125 MG tablet; Take 1 tablet by mouth Daily.  Dispense: 90 tablet; Refill: 1  -     lisinopril-hydrochlorothiazide (PRINZIDE,ZESTORETIC) 10-12.5 MG per tablet; Take 1 tablet by mouth Daily.  Dispense: 90 tablet; Refill: 1  -     Lipid Panel  -     Comprehensive Metabolic Panel  -     CBC (No Diff)  -     TSH    2. Chronic gout due to renal impairment of multiple sites without tophus  Comments:  stable, refill allopurinol. check uric acid today  Orders:  -     allopurinol (ZYLOPRIM) 300 MG tablet; Take 1 tablet by mouth Daily.  Dispense: 90 tablet; Refill: 1  -     Uric Acid    3. Chronic obstructive pulmonary disease, unspecified COPD type (CMS/[facility])  Comments:  stable, rf symbicort, cont albuterol prn.   Orders:  -     albuterol sulfate  (90 Base) MCG/ACT inhaler; Inhale 2 puffs Every 6 (Six) Hours As Needed for Wheezing or Shortness of Air.  Dispense: 8.5 g; Refill: 2  -     Symbicort 160-4.5 MCG/ACT inhaler;  Inhale 2 puffs 2 (Two) Times a Day.  Dispense: 10.2 g; Refill: 11    4. Arthritis of knee  Comments:  both knees, referral to Dr. Russo. pt has seen Dr. Lafleur for R shoulder. trial meloxicam, monitor RFT's.   Orders:  -     Ambulatory Referral to Orthopedic Surgery  -     meloxicam (Mobic) 7.5 MG tablet; Take 1 tablet by mouth Daily. For knee pain  Dispense: 90 tablet; Refill: 0        Labs today, will call results  cologuard due 2022  immuns UTD.       I spent 25 minutes caring for Leonid on this date of service. This time includes time spent by me in the following activities:preparing for the visit, reviewing tests, counseling and educating the patient/family/caregiver, ordering medications, tests, or procedures, referring and communicating with other health care professionals  and documenting information in the medical record    Follow Up   Return in about 6 months (around 8/8/2021) for htn, knee pain, COPD.  Patient was given instructions and counseling regarding his condition or for health maintenance advice. Please see specific information pulled into the AVS if appropriate.

## 2021-02-09 LAB
ALBUMIN SERPL-MCNC: 3.6 G/DL (ref 3.5–5.2)
ALBUMIN/GLOB SERPL: 1 G/DL
ALP SERPL-CCNC: 108 U/L (ref 39–117)
ALT SERPL W P-5'-P-CCNC: 25 U/L (ref 1–41)
ANION GAP SERPL CALCULATED.3IONS-SCNC: 10.4 MMOL/L (ref 5–15)
AST SERPL-CCNC: 46 U/L (ref 1–40)
BILIRUB SERPL-MCNC: 0.7 MG/DL (ref 0–1.2)
BUN SERPL-MCNC: 14 MG/DL (ref 8–23)
BUN/CREAT SERPL: 16.7 (ref 7–25)
CALCIUM SPEC-SCNC: 9.5 MG/DL (ref 8.6–10.5)
CHLORIDE SERPL-SCNC: 107 MMOL/L (ref 98–107)
CHOLEST SERPL-MCNC: 136 MG/DL (ref 0–200)
CO2 SERPL-SCNC: 22.6 MMOL/L (ref 22–29)
CREAT SERPL-MCNC: 0.84 MG/DL (ref 0.76–1.27)
DEPRECATED RDW RBC AUTO: 44.3 FL (ref 37–54)
ERYTHROCYTE [DISTWIDTH] IN BLOOD BY AUTOMATED COUNT: 13 % (ref 12.3–15.4)
GFR SERPL CREATININE-BSD FRML MDRD: 92 ML/MIN/1.73
GLOBULIN UR ELPH-MCNC: 3.7 GM/DL
GLUCOSE SERPL-MCNC: 95 MG/DL (ref 65–99)
HCT VFR BLD AUTO: 40.2 % (ref 37.5–51)
HDLC SERPL-MCNC: 59 MG/DL (ref 40–60)
HGB BLD-MCNC: 13.4 G/DL (ref 13–17.7)
LDLC SERPL CALC-MCNC: 64 MG/DL (ref 0–100)
LDLC/HDLC SERPL: 1.09 {RATIO}
MCH RBC QN AUTO: 31.2 PG (ref 26.6–33)
MCHC RBC AUTO-ENTMCNC: 33.3 G/DL (ref 31.5–35.7)
MCV RBC AUTO: 93.5 FL (ref 79–97)
PLATELET # BLD AUTO: 115 10*3/MM3 (ref 140–450)
PMV BLD AUTO: 11.6 FL (ref 6–12)
POTASSIUM SERPL-SCNC: 4.7 MMOL/L (ref 3.5–5.2)
PROT SERPL-MCNC: 7.3 G/DL (ref 6–8.5)
RBC # BLD AUTO: 4.3 10*6/MM3 (ref 4.14–5.8)
SODIUM SERPL-SCNC: 140 MMOL/L (ref 136–145)
TRIGL SERPL-MCNC: 62 MG/DL (ref 0–150)
TSH SERPL DL<=0.05 MIU/L-ACNC: 1.23 UIU/ML (ref 0.27–4.2)
URATE SERPL-MCNC: 5.7 MG/DL (ref 3.4–7)
VLDLC SERPL-MCNC: 13 MG/DL (ref 5–40)
WBC # BLD AUTO: 7 10*3/MM3 (ref 3.4–10.8)

## 2021-02-10 ENCOUNTER — OFFICE VISIT (OUTPATIENT)
Dept: ORTHOPEDIC SURGERY | Facility: CLINIC | Age: 66
End: 2021-02-10

## 2021-02-10 VITALS
HEART RATE: 61 BPM | SYSTOLIC BLOOD PRESSURE: 153 MMHG | HEIGHT: 74 IN | BODY MASS INDEX: 36.7 KG/M2 | WEIGHT: 286 LBS | DIASTOLIC BLOOD PRESSURE: 66 MMHG

## 2021-02-10 DIAGNOSIS — M25.561 CHRONIC PAIN OF BOTH KNEES: Primary | ICD-10-CM

## 2021-02-10 DIAGNOSIS — M17.0 BILATERAL PRIMARY OSTEOARTHRITIS OF KNEE: ICD-10-CM

## 2021-02-10 DIAGNOSIS — G89.29 CHRONIC PAIN OF BOTH KNEES: Primary | ICD-10-CM

## 2021-02-10 DIAGNOSIS — M25.562 CHRONIC PAIN OF BOTH KNEES: Primary | ICD-10-CM

## 2021-02-10 PROCEDURE — 99213 OFFICE O/P EST LOW 20 MIN: CPT | Performed by: ORTHOPAEDIC SURGERY

## 2021-02-10 NOTE — PROGRESS NOTES
"     Patient ID: Leonid Diehl is a 65 y.o. male.    Chief Complaint:    Chief Complaint   Patient presents with   • Left Knee - Pain   • Right Knee - Pain       HPI:  Rigoberto is a 65-year-old gentleman here with progressive bilateral knee pain especially on the medial compartments.  No significant recent injury.  He takes ibuprofen as needed.  Pain is sharp and a 6/10.  They pop and grind and there is swelling  Past Medical History:   Diagnosis Date   • Allergic    • Asthma    • Hypertension        Past Surgical History:   Procedure Laterality Date   • CATARACT EXTRACTION, BILATERAL     • TOTAL SHOULDER ARTHROPLASTY W/ DISTAL CLAVICLE EXCISION Right 10/22/2019    Procedure: TOTAL SHOULDER REVERSE ARTHROPLASTY with Biceps Tenodesis;  Surgeon: Chris Lafleur MD;  [facility] OR;  Service: Orthopedics       Family History   Problem Relation Age of Onset   • Cancer Father    • Heart disease Sister    • Hypertension Brother           Social History     Occupational History   • Not on file   Tobacco Use   • Smoking status: Never Smoker   • Smokeless tobacco: Never Used   Substance and Sexual Activity   • Alcohol use: No     Frequency: Never   • Drug use: No   • Sexual activity: Defer      Review of Systems   Cardiovascular: Negative for chest pain.   Musculoskeletal: Positive for arthralgias.       Objective:    /66   Pulse 61   Ht 188 cm (74\")   Wt 130 kg (286 lb)   BMI 36.72 kg/m²     Physical Examination:  Both knees demonstrate no scars.  There is mild varus alignment with medial joint line tenderness and mild effusions, range of motion is 2 to 120 degrees with no instability and mild pseudolaxity.    Imaging:  bilateral Knee X-Ray  Indication: Chronic bilateral knee pain with medial joint line tenderness  AP, Lateral views, Cle[place]  Findings: End-stage degenerative joint disease especially of the medial compartment of both knees  no bony lesion  Soft tissues normal  decreased joint " spaces  Hardware appropriately positioned not applicable      no prior studies available for comparison    Assessment:  Bilateral knee degenerative disease    Plan:  Treatment options involving conservative versus surgical management were discussed.  He would like to think about his options and let me know how to proceed      Procedures         Disclaimer: Please note that areas of this note were completed with computer voice recognition software.  Quite often unanticipated grammatical, syntax, homophones, and other interpretive errors are inadvertently transcribed by the computer software. Please excuse any errors that have escaped final proofreading.

## 2021-03-01 ENCOUNTER — TELEPHONE (OUTPATIENT)
Dept: FAMILY MEDICINE CLINIC | Facility: CLINIC | Age: 66
End: 2021-03-01

## 2021-03-01 NOTE — TELEPHONE ENCOUNTER
Caller: Leonid Deihl    Relationship: Self    Best call back number: [phone]    What orders are you requesting (i.e. lab or imaging): TO HAVE HIS FEET LOOKED AT BY DR. CORADO  BEFORE HIS HIP REPLACEMENTS.    In what timeframe would the patient need to come in: ASAP

## 2021-03-02 NOTE — TELEPHONE ENCOUNTER
Dr. Russo is also with Dr. Lafleur in the same group and does podiatry/ortho, he should call and see if he can see him, I can refer if needed.

## 2021-03-03 NOTE — TELEPHONE ENCOUNTER
Patient notified and indicated understanding.  I told patient to try contacting Dr Ford because when referrals for podiatry are sent to Dr Russo, their office sends us a message asking to resend for Dr Ford.

## 2021-03-08 RX ORDER — POTASSIUM CHLORIDE 750 MG/1
TABLET, FILM COATED, EXTENDED RELEASE ORAL
Qty: 30 TABLET | Refills: 0 | Status: SHIPPED | OUTPATIENT
Start: 2021-03-08 | End: 2021-04-06

## 2021-03-23 RX ORDER — FUROSEMIDE 20 MG/1
TABLET ORAL
Qty: 30 TABLET | Refills: 1 | Status: SHIPPED | OUTPATIENT
Start: 2021-03-23 | End: 2021-06-04

## 2021-04-06 RX ORDER — POTASSIUM CHLORIDE 750 MG/1
TABLET, FILM COATED, EXTENDED RELEASE ORAL
Qty: 90 TABLET | Refills: 1 | Status: SHIPPED | OUTPATIENT
Start: 2021-04-06 | End: 2021-08-10 | Stop reason: SDUPTHER

## 2021-05-07 DIAGNOSIS — M17.10 ARTHRITIS OF KNEE: ICD-10-CM

## 2021-05-07 RX ORDER — MELOXICAM 7.5 MG/1
7.5 TABLET ORAL DAILY
Qty: 90 TABLET | Refills: 0 | Status: SHIPPED | OUTPATIENT
Start: 2021-05-07 | End: 2021-08-09

## 2021-06-04 RX ORDER — FUROSEMIDE 20 MG/1
TABLET ORAL
Qty: 30 TABLET | Refills: 0 | Status: SHIPPED | OUTPATIENT
Start: 2021-06-04 | End: 2021-07-06

## 2021-07-06 RX ORDER — FUROSEMIDE 20 MG/1
TABLET ORAL
Qty: 30 TABLET | Refills: 0 | Status: SHIPPED | OUTPATIENT
Start: 2021-07-06 | End: 2021-08-02

## 2021-07-22 NOTE — PATIENT INSTRUCTIONS
Shoulder Arthroplasty: Post- Operative Visit Objectives    1) Review the operative findings, procedures and photos.  2) Make sure medications are effective and not causing problems.  a) Pain: Oxycodone or hydrocodone is for pain. You may take 1 tablet every 6 hours as necessary.  Some patients don’t require the use of these…in those circumstances just use Tylenol extra-strength 1 or 2 tablets every 4-6 hours.   b) NSAIDs. For pain and inflammation.  You can take an over the counter anti-inflammatory of choice such as Aleve, Ibuprofen, Motrin or Advil during this time.  If you have had any problems stop taking these medicines and please tell us!  3) Wound Care:  a) Today we will remove your dressings.  There may be some residual glue on your incision.  It is now ok to shower without covering it. Please avoid submerging the incision for another two weeks.  Continue ice pack as needed.  4) Exercises and Physical Therapy   Continue your physical therapy and daily home exercises focusing especially on overhead motion.  Continue the sling and remove for activities such as showering and bringing you hand to your face or hair, no motion behind your back for the next 4 weeks.  Some shoulder replacements with a rotator cuff repair will have more restrictions and we will go over those.   5) Follow Up appointments Schedule Follow up visits as directed usually in 4 weeks..  6) Notes  Make sure you have all necessary notes and documentation for school or work.  7) Issues: Remember our goal is to make this process smooth and easy if there is any thing you need please ask us or call back [phone]  8)   
175.9

## 2021-08-02 RX ORDER — FUROSEMIDE 20 MG/1
TABLET ORAL
Qty: 30 TABLET | Refills: 0 | Status: SHIPPED | OUTPATIENT
Start: 2021-08-02 | End: 2021-08-10 | Stop reason: SDUPTHER

## 2021-08-05 DIAGNOSIS — I10 ESSENTIAL HYPERTENSION: ICD-10-CM

## 2021-08-05 RX ORDER — LISINOPRIL AND HYDROCHLOROTHIAZIDE 12.5; 1 MG/1; MG/1
TABLET ORAL
Qty: 90 TABLET | Refills: 0 | Status: SHIPPED | OUTPATIENT
Start: 2021-08-05 | End: 2021-08-10 | Stop reason: SDUPTHER

## 2021-08-08 DIAGNOSIS — M17.10 ARTHRITIS OF KNEE: ICD-10-CM

## 2021-08-09 RX ORDER — MELOXICAM 7.5 MG/1
TABLET ORAL
Qty: 90 TABLET | Refills: 0 | Status: SHIPPED | OUTPATIENT
Start: 2021-08-09 | End: 2021-08-10 | Stop reason: SDUPTHER

## 2021-08-10 ENCOUNTER — OFFICE VISIT (OUTPATIENT)
Dept: FAMILY MEDICINE CLINIC | Facility: CLINIC | Age: 66
End: 2021-08-10

## 2021-08-10 VITALS
BODY MASS INDEX: 37.42 KG/M2 | TEMPERATURE: 98.4 F | HEIGHT: 74 IN | WEIGHT: 291.6 LBS | HEART RATE: 64 BPM | OXYGEN SATURATION: 96 % | SYSTOLIC BLOOD PRESSURE: 146 MMHG | DIASTOLIC BLOOD PRESSURE: 82 MMHG

## 2021-08-10 DIAGNOSIS — J30.89 SEASONAL ALLERGIC RHINITIS DUE TO OTHER ALLERGIC TRIGGER: ICD-10-CM

## 2021-08-10 DIAGNOSIS — J44.9 CHRONIC OBSTRUCTIVE PULMONARY DISEASE, UNSPECIFIED COPD TYPE (HCC): ICD-10-CM

## 2021-08-10 DIAGNOSIS — M17.10 ARTHRITIS OF KNEE: ICD-10-CM

## 2021-08-10 DIAGNOSIS — G47.09 OTHER INSOMNIA: ICD-10-CM

## 2021-08-10 DIAGNOSIS — M1A.39X0 CHRONIC GOUT DUE TO RENAL IMPAIRMENT OF MULTIPLE SITES WITHOUT TOPHUS: ICD-10-CM

## 2021-08-10 DIAGNOSIS — I10 ESSENTIAL HYPERTENSION: Primary | ICD-10-CM

## 2021-08-10 PROCEDURE — 36415 COLL VENOUS BLD VENIPUNCTURE: CPT | Performed by: NURSE PRACTITIONER

## 2021-08-10 PROCEDURE — 80061 LIPID PANEL: CPT | Performed by: NURSE PRACTITIONER

## 2021-08-10 PROCEDURE — 80053 COMPREHEN METABOLIC PANEL: CPT | Performed by: NURSE PRACTITIONER

## 2021-08-10 PROCEDURE — 99214 OFFICE O/P EST MOD 30 MIN: CPT | Performed by: NURSE PRACTITIONER

## 2021-08-10 PROCEDURE — 84443 ASSAY THYROID STIM HORMONE: CPT | Performed by: NURSE PRACTITIONER

## 2021-08-10 PROCEDURE — 84550 ASSAY OF BLOOD/URIC ACID: CPT | Performed by: NURSE PRACTITIONER

## 2021-08-10 PROCEDURE — 85027 COMPLETE CBC AUTOMATED: CPT | Performed by: NURSE PRACTITIONER

## 2021-08-10 RX ORDER — LISINOPRIL AND HYDROCHLOROTHIAZIDE 12.5; 1 MG/1; MG/1
1 TABLET ORAL DAILY
Qty: 90 TABLET | Refills: 1 | Status: SHIPPED | OUTPATIENT
Start: 2021-08-10 | End: 2022-08-22 | Stop reason: SDUPTHER

## 2021-08-10 RX ORDER — POTASSIUM CHLORIDE 750 MG/1
10 TABLET, FILM COATED, EXTENDED RELEASE ORAL DAILY PRN
Qty: 90 TABLET | Refills: 0 | Status: SHIPPED | OUTPATIENT
Start: 2021-08-10 | End: 2021-11-08

## 2021-08-10 RX ORDER — ALBUTEROL SULFATE 90 UG/1
2 AEROSOL, METERED RESPIRATORY (INHALATION) EVERY 6 HOURS PRN
Qty: 8.5 G | Refills: 2 | Status: SHIPPED | OUTPATIENT
Start: 2021-08-10 | End: 2022-02-21 | Stop reason: SDUPTHER

## 2021-08-10 RX ORDER — MELOXICAM 7.5 MG/1
7.5 TABLET ORAL DAILY
Qty: 90 TABLET | Refills: 1 | Status: SHIPPED | OUTPATIENT
Start: 2021-08-10 | End: 2022-02-21 | Stop reason: SDUPTHER

## 2021-08-10 RX ORDER — TRAZODONE HYDROCHLORIDE 50 MG/1
50 TABLET ORAL NIGHTLY PRN
Qty: 90 TABLET | Refills: 0 | Status: SHIPPED | OUTPATIENT
Start: 2021-08-10 | End: 2021-11-08

## 2021-08-10 RX ORDER — FUROSEMIDE 20 MG/1
20 TABLET ORAL DAILY PRN
Qty: 90 TABLET | Refills: 0 | Status: SHIPPED | OUTPATIENT
Start: 2021-08-10 | End: 2021-08-31

## 2021-08-10 RX ORDER — ALLOPURINOL 300 MG/1
300 TABLET ORAL DAILY
Qty: 90 TABLET | Refills: 1 | Status: SHIPPED | OUTPATIENT
Start: 2021-08-10 | End: 2022-02-21 | Stop reason: SDUPTHER

## 2021-08-10 RX ORDER — CARVEDILOL 3.12 MG/1
3.12 TABLET ORAL DAILY
Qty: 90 TABLET | Refills: 1 | Status: SHIPPED | OUTPATIENT
Start: 2021-08-10 | End: 2022-02-21 | Stop reason: SDUPTHER

## 2021-08-10 NOTE — PROGRESS NOTES
Chief Complaint  Hypertension, COPD, and Follow-up (6 mo)    Subjective          Leonid Diehl presents to [facility] for   History of Present Illness     HTN, stable on meds and takes as directed, denies chest pain, headache, shortness of air, palpitations and swelling of extremities.     COPD: The patient has been previously diagnosed with COPD. Symptoms include dyspnea, non-productive cough and wheezing, especially with exertion.   Symptoms have been unchanged since onset.  The pt is using inhalers as directed.     Gout, stable on allopurinol    Knee pain, follows with ortho, cons mgmt. Stays active on farm, bails hay.     Insomnia, intermittent, mind racing regarding situational issues.    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    Past Medical History:   Diagnosis Date   • Allergic    • Asthma    • Hypertension      Past Surgical History:   Procedure Laterality Date   • CATARACT EXTRACTION, BILATERAL     • TOTAL SHOULDER ARTHROPLASTY W/ DISTAL CLAVICLE EXCISION Right 10/22/2019    Procedure: TOTAL SHOULDER REVERSE ARTHROPLASTY with Biceps Tenodesis;  Surgeon: Chris Lafleur MD;  Location: [facility] OR;  Service: Orthopedics     Family History   Problem Relation Age of Onset   • Cancer Father    • Heart disease Sister    • Hypertension Brother      Social History     Tobacco Use   • Smoking status: Never Smoker   • Smokeless tobacco: Never Used   Substance Use Topics   • Alcohol use: No       Current Outpatient Medications:   •  albuterol sulfate  (90 Base) MCG/ACT inhaler, Inhale 2 puffs Every 6 (Six) Hours As Needed for Wheezing or Shortness of Air., Disp: 8.5 g, Rfl: 2  •  allopurinol (ZYLOPRIM)  "300 MG tablet, Take 1 tablet by mouth Daily., Disp: 90 tablet, Rfl: 1  •  carvedilol (COREG) 3.125 MG tablet, Take 1 tablet by mouth Daily., Disp: 90 tablet, Rfl: 1  •  fexofenadine-pseudoephedrine (ALLEGRA-D 24) 180-240 MG per 24 hr tablet, Take 1 tablet by mouth Daily., Disp: , Rfl:   •  furosemide (LASIX) 20 MG tablet, Take 1 tablet by mouth Daily As Needed (swelling). for swelling., Disp: 90 tablet, Rfl: 0  •  guaiFENesin (MUCINEX) 600 MG 12 hr tablet, Take 1,200 mg by mouth Daily., Disp: , Rfl:   •  Iron, Ferrous Gluconate, 256 (28 Fe) MG tablet, Take 325 mg by mouth Daily., Disp: , Rfl:   •  lisinopril-hydrochlorothiazide (PRINZIDE,ZESTORETIC) 10-12.5 MG per tablet, Take 1 tablet by mouth Daily., Disp: 90 tablet, Rfl: 1  •  meloxicam (MOBIC) 7.5 MG tablet, Take 1 tablet by mouth Daily., Disp: 90 tablet, Rfl: 1  •  potassium chloride 10 MEQ CR tablet, Take 1 tablet by mouth Daily As Needed (swelling)., Disp: 90 tablet, Rfl: 0  •  Symbicort 160-4.5 MCG/ACT inhaler, Inhale 2 puffs 2 (Two) Times a Day., Disp: 10.2 g, Rfl: 11  •  zinc gluconate 50 MG tablet, TAKE ONE TABLET BY MOUTH DAILY, Disp: 30 tablet, Rfl: 0  •  traZODone (DESYREL) 50 MG tablet, Take 1 tablet by mouth At Night As Needed for Sleep., Disp: 90 tablet, Rfl: 0    Objective   Vital Signs:   /82 (BP Location: Left arm, Patient Position: Sitting, Cuff Size: Large Adult)   Pulse 64   Temp 98.4 °F (36.9 °C) (Infrared)   Ht 188 cm (74.02\")   Wt 132 kg (291 lb 9.6 oz)   SpO2 96%   BMI 37.42 kg/m²       Physical Exam  Vitals and nursing note reviewed.   Constitutional:       General: He is not in acute distress.     Appearance: He is well-developed. He is not diaphoretic.   HENT:      Head: Normocephalic and atraumatic.   Eyes:      Pupils: Pupils are equal, round, and reactive to light.   Neck:      Thyroid: No thyromegaly.   Cardiovascular:      Rate and Rhythm: Normal rate and regular rhythm.      Heart sounds: Murmur heard.     Pulmonary: "      Effort: Pulmonary effort is normal. No respiratory distress.      Breath sounds: Normal breath sounds.   Abdominal:      General: Bowel sounds are normal. There is no distension.      Palpations: Abdomen is soft.      Tenderness: There is no abdominal tenderness.   Musculoskeletal:         General: Tenderness (B knees, degenerative, mod baird rom) present. Normal range of motion.      Cervical back: Normal range of motion.   Skin:     General: Skin is warm and dry.      Findings: No erythema.   Neurological:      Mental Status: He is alert and oriented to person, place, and time.   Psychiatric:         Behavior: Behavior normal.         Thought Content: Thought content normal.         Judgment: Judgment normal.          Result Review :     Office Visit on 08/10/2021   Component Date Value Ref Range Status   • Total Cholesterol 08/10/2021 130  0 - 200 mg/dL Final   • Triglycerides 08/10/2021 68  0 - 150 mg/dL Final   • HDL Cholesterol 08/10/2021 65* 40 - 60 mg/dL Final   • LDL Cholesterol  08/10/2021 51  0 - 100 mg/dL Final   • VLDL Cholesterol 08/10/2021 14  5 - 40 mg/dL Final   • LDL/HDL Ratio 08/10/2021 0.79   Final   • Glucose 08/10/2021 107* 65 - 99 mg/dL Final   • BUN 08/10/2021 13  8 - 23 mg/dL Final   • Creatinine 08/10/2021 0.77  0.76 - 1.27 mg/dL Final   • Sodium 08/10/2021 139  136 - 145 mmol/L Final   • Potassium 08/10/2021 4.4  3.5 - 5.2 mmol/L Final   • Chloride 08/10/2021 109* 98 - 107 mmol/L Final   • CO2 08/10/2021 21.4* 22.0 - 29.0 mmol/L Final   • Calcium 08/10/2021 8.5* 8.6 - 10.5 mg/dL Final   • Total Protein 08/10/2021 7.2  6.0 - 8.5 g/dL Final   • Albumin 08/10/2021 3.30* 3.50 - 5.20 g/dL Final   • ALT (SGPT) 08/10/2021 21  1 - 41 U/L Final   • AST (SGOT) 08/10/2021 37  1 - 40 U/L Final   • Alkaline Phosphatase 08/10/2021 85  39 - 117 U/L Final   • Total Bilirubin 08/10/2021 0.5  0.0 - 1.2 mg/dL Final   • eGFR Non African Amer 08/10/2021 101  >60 mL/min/1.73 Final   • Globulin 08/10/2021 3.9   gm/dL Final   • A/G Ratio 08/10/2021 0.8  g/dL Final   • BUN/Creatinine Ratio 08/10/2021 16.9  7.0 - 25.0 Final   • Anion Gap 08/10/2021 8.6  5.0 - 15.0 mmol/L Final   • WBC 08/10/2021 6.83  3.40 - 10.80 10*3/mm3 Final   • RBC 08/10/2021 3.95* 4.14 - 5.80 10*6/mm3 Final   • Hemoglobin 08/10/2021 13.1  13.0 - 17.7 g/dL Final   • Hematocrit 08/10/2021 39.9  37.5 - 51.0 % Final   • MCV 08/10/2021 101.0* 79.0 - 97.0 fL Final   • MCH 08/10/2021 33.2* 26.6 - 33.0 pg Final   • MCHC 08/10/2021 32.8  31.5 - 35.7 g/dL Final   • RDW 08/10/2021 12.8  12.3 - 15.4 % Final   • RDW-SD 08/10/2021 48.2  37.0 - 54.0 fl Final   • MPV 08/10/2021 11.5  6.0 - 12.0 fL Final   • Platelets 08/10/2021 105* 140 - 450 10*3/mm3 Final   • TSH 08/10/2021 1.590  0.270 - 4.200 uIU/mL Final   • Uric Acid 08/10/2021 3.5  3.4 - 7.0 mg/dL Final                       Assessment and Plan    Diagnoses and all orders for this visit:    1. Essential hypertension (Primary)  Comments:  bp improved, cont lis/hctz, cont carvedilol. Follow up cardiology as directed.   Orders:  -     carvedilol (COREG) 3.125 MG tablet; Take 1 tablet by mouth Daily.  Dispense: 90 tablet; Refill: 1  -     lisinopril-hydrochlorothiazide (PRINZIDE,ZESTORETIC) 10-12.5 MG per tablet; Take 1 tablet by mouth Daily.  Dispense: 90 tablet; Refill: 1  -     Lipid Panel  -     Comprehensive Metabolic Panel  -     CBC (No Diff)  -     TSH    2. Other insomnia  Comments:  tried and failed melatonin, rec trial of trazodone prn    3. Chronic gout due to renal impairment of multiple sites without tophus  Comments:  stable, refill allopurinol. check uric acid today  Orders:  -     allopurinol (ZYLOPRIM) 300 MG tablet; Take 1 tablet by mouth Daily.  Dispense: 90 tablet; Refill: 1  -     Uric Acid    4. Chronic obstructive pulmonary disease, unspecified COPD type (CMS/[facility])  Comments:  stable, cont symbicort, cont/rf albuterol prn.   Orders:  -     albuterol sulfate  (90 Base) MCG/ACT inhaler;  Inhale 2 puffs Every 6 (Six) Hours As Needed for Wheezing or Shortness of Air.  Dispense: 8.5 g; Refill: 2    5. Arthritis of knee  Comments:  both knees, pt saw Dr. Lafleur for knees, rec's cons mgmt, cont with Dr. Lafleur also for R shoulder. meloxicam effective, cont/rf, bmp today   Orders:  -     meloxicam (MOBIC) 7.5 MG tablet; Take 1 tablet by mouth Daily.  Dispense: 90 tablet; Refill: 1    6. Seasonal allergic rhinitis due to other allergic trigger    Other orders  -     traZODone (DESYREL) 50 MG tablet; Take 1 tablet by mouth At Night As Needed for Sleep.  Dispense: 90 tablet; Refill: 0  -     furosemide (LASIX) 20 MG tablet; Take 1 tablet by mouth Daily As Needed (swelling). for swelling.  Dispense: 90 tablet; Refill: 0  -     potassium chloride 10 MEQ CR tablet; Take 1 tablet by mouth Daily As Needed (swelling).  Dispense: 90 tablet; Refill: 0      Adult Transthoracic Echo Complete W/ Cont if Necessary Per Protocol (01/03/2019 11:34)        I spent 30 minutes caring for Leonid Diehl on this date of service. This time includes time spent by me in the following activities: preparing for the visit, reviewing tests, performing a medically appropriate examination and/or evaluation , counseling and educating the patient/family/caregiver, ordering medications, tests, or procedures and documenting information in the medical record        Follow Up     Return in about 6 months (around 2/10/2022) for HTN, gout, knee pain.  Patient was given instructions and counseling regarding his condition or for health maintenance advice. Please see specific information pulled into the AVS if appropriate.      EMR Dragon transcription disclaimer:  Some of this encounter note is an electronic transcription translation of spoken language to printed text. The electronic translation of spoken language may permit erroneous, or at times, nonsensical words or phrases to be inadvertently transcribed; Although I have reviewed the note for  such errors some may still exist.

## 2021-08-11 LAB
ALBUMIN SERPL-MCNC: 3.3 G/DL (ref 3.5–5.2)
ALBUMIN/GLOB SERPL: 0.8 G/DL
ALP SERPL-CCNC: 85 U/L (ref 39–117)
ALT SERPL W P-5'-P-CCNC: 21 U/L (ref 1–41)
ANION GAP SERPL CALCULATED.3IONS-SCNC: 8.6 MMOL/L (ref 5–15)
AST SERPL-CCNC: 37 U/L (ref 1–40)
BILIRUB SERPL-MCNC: 0.5 MG/DL (ref 0–1.2)
BUN SERPL-MCNC: 13 MG/DL (ref 8–23)
BUN/CREAT SERPL: 16.9 (ref 7–25)
CALCIUM SPEC-SCNC: 8.5 MG/DL (ref 8.6–10.5)
CHLORIDE SERPL-SCNC: 109 MMOL/L (ref 98–107)
CHOLEST SERPL-MCNC: 130 MG/DL (ref 0–200)
CO2 SERPL-SCNC: 21.4 MMOL/L (ref 22–29)
CREAT SERPL-MCNC: 0.77 MG/DL (ref 0.76–1.27)
DEPRECATED RDW RBC AUTO: 48.2 FL (ref 37–54)
ERYTHROCYTE [DISTWIDTH] IN BLOOD BY AUTOMATED COUNT: 12.8 % (ref 12.3–15.4)
GFR SERPL CREATININE-BSD FRML MDRD: 101 ML/MIN/1.73
GLOBULIN UR ELPH-MCNC: 3.9 GM/DL
GLUCOSE SERPL-MCNC: 107 MG/DL (ref 65–99)
HCT VFR BLD AUTO: 39.9 % (ref 37.5–51)
HDLC SERPL-MCNC: 65 MG/DL (ref 40–60)
HGB BLD-MCNC: 13.1 G/DL (ref 13–17.7)
LDLC SERPL CALC-MCNC: 51 MG/DL (ref 0–100)
LDLC/HDLC SERPL: 0.79 {RATIO}
MCH RBC QN AUTO: 33.2 PG (ref 26.6–33)
MCHC RBC AUTO-ENTMCNC: 32.8 G/DL (ref 31.5–35.7)
MCV RBC AUTO: 101 FL (ref 79–97)
PLATELET # BLD AUTO: 105 10*3/MM3 (ref 140–450)
PMV BLD AUTO: 11.5 FL (ref 6–12)
POTASSIUM SERPL-SCNC: 4.4 MMOL/L (ref 3.5–5.2)
PROT SERPL-MCNC: 7.2 G/DL (ref 6–8.5)
RBC # BLD AUTO: 3.95 10*6/MM3 (ref 4.14–5.8)
SODIUM SERPL-SCNC: 139 MMOL/L (ref 136–145)
TRIGL SERPL-MCNC: 68 MG/DL (ref 0–150)
TSH SERPL DL<=0.05 MIU/L-ACNC: 1.59 UIU/ML (ref 0.27–4.2)
URATE SERPL-MCNC: 3.5 MG/DL (ref 3.4–7)
VLDLC SERPL-MCNC: 14 MG/DL (ref 5–40)
WBC # BLD AUTO: 6.83 10*3/MM3 (ref 3.4–10.8)

## 2021-08-31 RX ORDER — FUROSEMIDE 20 MG/1
TABLET ORAL
Qty: 90 TABLET | Refills: 1 | Status: SHIPPED | OUTPATIENT
Start: 2021-08-31 | End: 2022-02-21 | Stop reason: SDUPTHER

## 2021-11-08 RX ORDER — POTASSIUM CHLORIDE 750 MG/1
TABLET, FILM COATED, EXTENDED RELEASE ORAL
Qty: 90 TABLET | Refills: 0 | Status: SHIPPED | OUTPATIENT
Start: 2021-11-08 | End: 2022-06-27

## 2021-11-08 RX ORDER — TRAZODONE HYDROCHLORIDE 50 MG/1
TABLET ORAL
Qty: 90 TABLET | Refills: 0 | Status: SHIPPED | OUTPATIENT
Start: 2021-11-08 | End: 2022-02-07

## 2021-11-08 NOTE — TELEPHONE ENCOUNTER
Rx Refill Note  Requested Prescriptions     Pending Prescriptions Disp Refills   • traZODone (DESYREL) 50 MG tablet [Pharmacy Med Name: traZODone 50 MG TABLET] 90 tablet 0     Sig: TAKE 1 TABLET BY MOUTH EVERY NIGHT AT BEDTIME AS NEEDED SLEEP      Last office visit with prescribing clinician: 8/10/2021      Next office visit with prescribing clinician: 11/7/2021            Renetta Leal MA  11/08/21, 10:19 EST

## 2022-02-03 ENCOUNTER — OFFICE VISIT (OUTPATIENT)
Dept: ORTHOPEDIC SURGERY | Facility: CLINIC | Age: 67
End: 2022-02-03

## 2022-02-03 VITALS
HEIGHT: 74 IN | WEIGHT: 291 LBS | DIASTOLIC BLOOD PRESSURE: 69 MMHG | SYSTOLIC BLOOD PRESSURE: 130 MMHG | HEART RATE: 60 BPM | BODY MASS INDEX: 37.35 KG/M2

## 2022-02-03 DIAGNOSIS — M17.0 BILATERAL PRIMARY OSTEOARTHRITIS OF KNEE: Primary | ICD-10-CM

## 2022-02-03 PROCEDURE — 99213 OFFICE O/P EST LOW 20 MIN: CPT | Performed by: ORTHOPAEDIC SURGERY

## 2022-02-07 RX ORDER — TRAZODONE HYDROCHLORIDE 50 MG/1
TABLET ORAL
Qty: 90 TABLET | Refills: 0 | Status: SHIPPED | OUTPATIENT
Start: 2022-02-07 | End: 2022-05-06

## 2022-02-21 ENCOUNTER — OFFICE VISIT (OUTPATIENT)
Dept: FAMILY MEDICINE CLINIC | Facility: CLINIC | Age: 67
End: 2022-02-21

## 2022-02-21 VITALS
OXYGEN SATURATION: 96 % | BODY MASS INDEX: 36.24 KG/M2 | HEART RATE: 57 BPM | HEIGHT: 74 IN | SYSTOLIC BLOOD PRESSURE: 118 MMHG | DIASTOLIC BLOOD PRESSURE: 73 MMHG | WEIGHT: 282.4 LBS

## 2022-02-21 DIAGNOSIS — J44.9 CHRONIC OBSTRUCTIVE PULMONARY DISEASE, UNSPECIFIED COPD TYPE: ICD-10-CM

## 2022-02-21 DIAGNOSIS — M79.671 PAIN OF BOTH HEELS: ICD-10-CM

## 2022-02-21 DIAGNOSIS — R73.09 ABNORMAL GLUCOSE: ICD-10-CM

## 2022-02-21 DIAGNOSIS — Z12.5 PROSTATE CANCER SCREENING: ICD-10-CM

## 2022-02-21 DIAGNOSIS — M79.672 PAIN OF BOTH HEELS: ICD-10-CM

## 2022-02-21 DIAGNOSIS — M17.10 ARTHRITIS OF KNEE: ICD-10-CM

## 2022-02-21 DIAGNOSIS — M1A.39X0 CHRONIC GOUT DUE TO RENAL IMPAIRMENT OF MULTIPLE SITES WITHOUT TOPHUS: ICD-10-CM

## 2022-02-21 DIAGNOSIS — J30.89 SEASONAL ALLERGIC RHINITIS DUE TO OTHER ALLERGIC TRIGGER: Primary | ICD-10-CM

## 2022-02-21 DIAGNOSIS — I10 ESSENTIAL HYPERTENSION: ICD-10-CM

## 2022-02-21 LAB — HBA1C MFR BLD: 5.4 % (ref 3.5–5.6)

## 2022-02-21 PROCEDURE — 84443 ASSAY THYROID STIM HORMONE: CPT | Performed by: NURSE PRACTITIONER

## 2022-02-21 PROCEDURE — 80061 LIPID PANEL: CPT | Performed by: NURSE PRACTITIONER

## 2022-02-21 PROCEDURE — 83036 HEMOGLOBIN GLYCOSYLATED A1C: CPT | Performed by: NURSE PRACTITIONER

## 2022-02-21 PROCEDURE — 85027 COMPLETE CBC AUTOMATED: CPT | Performed by: NURSE PRACTITIONER

## 2022-02-21 PROCEDURE — 99214 OFFICE O/P EST MOD 30 MIN: CPT | Performed by: NURSE PRACTITIONER

## 2022-02-21 PROCEDURE — 84550 ASSAY OF BLOOD/URIC ACID: CPT | Performed by: NURSE PRACTITIONER

## 2022-02-21 PROCEDURE — G0103 PSA SCREENING: HCPCS | Performed by: NURSE PRACTITIONER

## 2022-02-21 PROCEDURE — 80053 COMPREHEN METABOLIC PANEL: CPT | Performed by: NURSE PRACTITIONER

## 2022-02-21 PROCEDURE — 36415 COLL VENOUS BLD VENIPUNCTURE: CPT | Performed by: NURSE PRACTITIONER

## 2022-02-21 RX ORDER — CARVEDILOL 3.12 MG/1
3.12 TABLET ORAL DAILY
Qty: 90 TABLET | Refills: 1 | Status: SHIPPED | OUTPATIENT
Start: 2022-02-21 | End: 2022-08-22 | Stop reason: SDUPTHER

## 2022-02-21 RX ORDER — MELOXICAM 7.5 MG/1
7.5 TABLET ORAL DAILY
Qty: 90 TABLET | Refills: 1 | Status: SHIPPED | OUTPATIENT
Start: 2022-02-21 | End: 2022-07-27

## 2022-02-21 RX ORDER — ALBUTEROL SULFATE 90 UG/1
2 AEROSOL, METERED RESPIRATORY (INHALATION) EVERY 6 HOURS PRN
Qty: 8.5 G | Refills: 2 | Status: SHIPPED | OUTPATIENT
Start: 2022-02-21 | End: 2022-08-22 | Stop reason: SDUPTHER

## 2022-02-21 RX ORDER — FUROSEMIDE 20 MG/1
20 TABLET ORAL DAILY PRN
Qty: 90 TABLET | Refills: 1 | Status: SHIPPED | OUTPATIENT
Start: 2022-02-21 | End: 2022-08-22 | Stop reason: SDUPTHER

## 2022-02-21 RX ORDER — ALLOPURINOL 300 MG/1
300 TABLET ORAL DAILY
Qty: 90 TABLET | Refills: 1 | Status: SHIPPED | OUTPATIENT
Start: 2022-02-21 | End: 2022-08-22 | Stop reason: SDUPTHER

## 2022-02-21 NOTE — PROGRESS NOTES
Chief Complaint  Hypertension, Gout, Knee Pain, and Foreign Body in Nose (pt feels like something is in his left nostril)    Subjective          Leonid Diehl presents to [facility] for   History of Present Illness     HTN, pt is taking 1/2 tab lisinopril d/t lows <100 sbp on full tab. Denies chest pain, headache, shortness of air, palpitations and swelling of extremities.     Gout, stable on allopurinol     Knee pain, follows with Dr. Lafleur for shoulders, now seeing Dr. Celis and considering TKR. He complains of posterior foot pain L>R. Stays active on farm, bails hay.      Insomnia, intermittent, mind racing regarding situational issues.     Allergic rhinitis, patient owns/works on a farm, stable with Allegra-D daily. He feels a left nasal obstruction at times.         The following portions of the patient's history were reviewed and updated as appropriate: allergies, current medications, past family history, past medical history, past social history, past surgical history and problem list.    Past Medical History:   Diagnosis Date   • Allergic    • Asthma    • Hypertension      Past Surgical History:   Procedure Laterality Date   • CATARACT EXTRACTION, BILATERAL     • TOTAL SHOULDER ARTHROPLASTY W/ DISTAL CLAVICLE EXCISION Right 10/22/2019    Procedure: TOTAL SHOULDER REVERSE ARTHROPLASTY with Biceps Tenodesis;  Surgeon: Chris Lafleur MD;  Location: [facility] OR;  Service: Orthopedics     Family History   Problem Relation Age of Onset   • Cancer Father    • Heart disease Sister    • Hypertension Brother      Social History     Tobacco Use   • Smoking status: Never Smoker   • Smokeless tobacco: Never Used   Substance Use Topics   • Alcohol use: No       Current Outpatient Medications:   •  albuterol sulfate  (90 Base) MCG/ACT inhaler, Inhale 2 puffs Every 6 (Six) Hours As Needed for Wheezing or Shortness of Air., Disp: 8.5 g, Rfl: 2  •  allopurinol (ZYLOPRIM) 300  "MG tablet, Take 1 tablet by mouth Daily., Disp: 90 tablet, Rfl: 1  •  carvedilol (COREG) 3.125 MG tablet, Take 1 tablet by mouth Daily., Disp: 90 tablet, Rfl: 1  •  fexofenadine-pseudoephedrine (ALLEGRA-D 24) 180-240 MG per 24 hr tablet, Take 1 tablet by mouth Daily., Disp: , Rfl:   •  furosemide (LASIX) 20 MG tablet, Take 1 tablet by mouth Daily As Needed (swelling). swelling, Disp: 90 tablet, Rfl: 1  •  guaiFENesin (MUCINEX) 600 MG 12 hr tablet, Take 1,200 mg by mouth Daily., Disp: , Rfl:   •  Iron, Ferrous Gluconate, 256 (28 Fe) MG tablet, Take 325 mg by mouth Daily., Disp: , Rfl:   •  lisinopril-hydrochlorothiazide (PRINZIDE,ZESTORETIC) 10-12.5 MG per tablet, Take 1 tablet by mouth Daily., Disp: 90 tablet, Rfl: 1  •  meloxicam (MOBIC) 7.5 MG tablet, Take 1 tablet by mouth Daily., Disp: 90 tablet, Rfl: 1  •  potassium chloride 10 MEQ CR tablet, TAKE 1 TABLET BY MOUTH DAILY AS NEEDED, Disp: 90 tablet, Rfl: 0  •  Symbicort 160-4.5 MCG/ACT inhaler, Inhale 2 puffs 2 (Two) Times a Day., Disp: 10.2 g, Rfl: 11  •  traZODone (DESYREL) 50 MG tablet, TAKE ONE TABLET BY MOUTH EVERY NIGHT AT BEDTIME AS NEEDED FOR SLEEP, Disp: 90 tablet, Rfl: 0  •  zinc gluconate 50 MG tablet, TAKE ONE TABLET BY MOUTH DAILY, Disp: 30 tablet, Rfl: 0    Objective   Vital Signs:   /73 (BP Location: Right arm, Patient Position: Sitting, Cuff Size: Large Adult)   Pulse 57   Ht 188 cm (74.02\")   Wt 128 kg (282 lb 6.4 oz)   SpO2 96%   BMI 36.24 kg/m²       Physical Exam  Vitals and nursing note reviewed.   Constitutional:       General: He is not in acute distress.     Appearance: He is well-developed. He is not diaphoretic.   HENT:      Head: Normocephalic and atraumatic.      Ears:      Comments: L>R TM's scarred, severe [place]     Nose: Congestion present.      Comments: L turbinate obstructing nasal passage.      Mouth/Throat:      Pharynx: Posterior oropharyngeal erythema (cobblestoning posterior OP) present.   Eyes:      Pupils: Pupils " are equal, round, and reactive to light.   Neck:      Thyroid: No thyromegaly.   Cardiovascular:      Rate and Rhythm: Normal rate and regular rhythm.      Heart sounds: Murmur heard.       Pulmonary:      Effort: Pulmonary effort is normal. No respiratory distress.      Breath sounds: Normal breath sounds.   Abdominal:      General: Bowel sounds are normal. There is no distension.      Palpations: Abdomen is soft.      Tenderness: There is no abdominal tenderness.   Musculoskeletal:         General: Tenderness (B knees, degenerative, mod baird rom.  B posterior heels deformity, probable spurring. L>R pain with ambulation. ) present. Normal range of motion.      Cervical back: Normal range of motion.   Skin:     General: Skin is warm and dry.      Findings: No erythema.   Neurological:      Mental Status: He is alert and oriented to person, place, and time.   Psychiatric:         Behavior: Behavior normal.         Thought Content: Thought content normal.         Judgment: Judgment normal.          Result Review :     Office Visit on 02/21/2022   Component Date Value Ref Range Status   • Total Cholesterol 02/21/2022 153  0 - 200 mg/dL Final   • Triglycerides 02/21/2022 80  0 - 150 mg/dL Final   • HDL Cholesterol 02/21/2022 58  40 - 60 mg/dL Final   • LDL Cholesterol  02/21/2022 80  0 - 100 mg/dL Final   • VLDL Cholesterol 02/21/2022 15  5 - 40 mg/dL Final   • LDL/HDL Ratio 02/21/2022 1.36   Final   • Glucose 02/21/2022 91  65 - 99 mg/dL Final   • BUN 02/21/2022 16  8 - 23 mg/dL Final   • Creatinine 02/21/2022 0.81  0.76 - 1.27 mg/dL Final   • Sodium 02/21/2022 142  136 - 145 mmol/L Final   • Potassium 02/21/2022 4.1  3.5 - 5.2 mmol/L Final   • Chloride 02/21/2022 109* 98 - 107 mmol/L Final   • CO2 02/21/2022 22.0  22.0 - 29.0 mmol/L Final   • Calcium 02/21/2022 8.5* 8.6 - 10.5 mg/dL Final   • Total Protein 02/21/2022 7.9  6.0 - 8.5 g/dL Final   • Albumin 02/21/2022 3.50  3.50 - 5.20 g/dL Final   • ALT (SGPT) 02/21/2022  31  1 - 41 U/L Final   • AST (SGOT) 02/21/2022 41* 1 - 40 U/L Final   • Alkaline Phosphatase 02/21/2022 104  39 - 117 U/L Final   • Total Bilirubin 02/21/2022 0.6  0.0 - 1.2 mg/dL Final   • eGFR Non  Amer 02/21/2022 96  >60 mL/min/1.73 Final   • Globulin 02/21/2022 4.4  gm/dL Final   • A/G Ratio 02/21/2022 0.8  g/dL Final   • BUN/Creatinine Ratio 02/21/2022 19.8  7.0 - 25.0 Final   • Anion Gap 02/21/2022 11.0  5.0 - 15.0 mmol/L Final   • WBC 02/21/2022 8.61  3.40 - 10.80 10*3/mm3 Final   • RBC 02/21/2022 4.58  4.14 - 5.80 10*6/mm3 Final   • Hemoglobin 02/21/2022 14.6  13.0 - 17.7 g/dL Final   • Hematocrit 02/21/2022 42.8  37.5 - 51.0 % Final   • MCV 02/21/2022 93.4  79.0 - 97.0 fL Final   • MCH 02/21/2022 31.9  26.6 - 33.0 pg Final   • MCHC 02/21/2022 34.1  31.5 - 35.7 g/dL Final   • RDW 02/21/2022 13.0  12.3 - 15.4 % Final   • RDW-SD 02/21/2022 44.2  37.0 - 54.0 fl Final   • MPV 02/21/2022 11.0  6.0 - 12.0 fL Final   • Platelets 02/21/2022 149  140 - 450 10*3/mm3 Final   • TSH 02/21/2022 0.711  0.270 - 4.200 uIU/mL Final   • Hemoglobin A1C 02/21/2022 5.4  3.5 - 5.6 % Final   • PSA 02/21/2022 0.164  0.000 - 4.000 ng/mL Final   • Uric Acid 02/21/2022 3.4  3.4 - 7.0 mg/dL Final                       Assessment and Plan    Diagnoses and all orders for this visit:    1. Seasonal allergic rhinitis due to other allergic trigger (Primary)  Comments:  rec start flonase d/t turbinate obstruction of L nare. if no improvement will refer to ENT for hearing and obstruction.     2. Arthritis of knee  Comments:  both knees L>R, following now with Dr. Celis for poss TKR. meloxicam effective, cont/rf.   Orders:  -     meloxicam (MOBIC) 7.5 MG tablet; Take 1 tablet by mouth Daily.  Dispense: 90 tablet; Refill: 1    3. Essential hypertension  Comments:  bp stable, cont 1/2 tab lis/hctz, cont/refill carvedilol. Follow up cardiology as directed.   Orders:  -     carvedilol (COREG) 3.125 MG tablet; Take 1 tablet by mouth Daily.   Dispense: 90 tablet; Refill: 1  -     Lipid Panel  -     Comprehensive Metabolic Panel  -     CBC (No Diff)  -     TSH    4. Chronic gout due to renal impairment of multiple sites without tophus  Comments:  stable, refill allopurinol. check uric acid today  Orders:  -     allopurinol (ZYLOPRIM) 300 MG tablet; Take 1 tablet by mouth Daily.  Dispense: 90 tablet; Refill: 1  -     Uric Acid    5. Chronic obstructive pulmonary disease, unspecified COPD type (HCC)  Comments:  stable, cont symbicort, cont/rf albuterol prn.   Orders:  -     albuterol sulfate  (90 Base) MCG/ACT inhaler; Inhale 2 puffs Every 6 (Six) Hours As Needed for Wheezing or Shortness of Air.  Dispense: 8.5 g; Refill: 2    6. Pain of both heels  Comments:  likely spurs of both feet. d/w ortho, may need pod referral.     7. Abnormal glucose  -     Hemoglobin A1c    8. Prostate cancer screening  -     PSA Screen    Other orders  -     furosemide (LASIX) 20 MG tablet; Take 1 tablet by mouth Daily As Needed (swelling). 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   Patient will discuss bilateral heel pain/questionable spurs with Dr. Celis next week.  Will place referral to Dr. Ford if needed.        I spent 30 minutes caring for Leonid Diehl on this date of service. This time includes time spent by me in the following activities: preparing for the visit, reviewing tests, performing a medically appropriate examination and/or evaluation , counseling and educating the patient/family/caregiver, ordering medications, tests, or procedures and documenting information in the medical record        Follow Up     Return in about 6 months (around 8/21/2022) for Recheck, Medicare Wellness.  Patient was given instructions and counseling regarding his condition or for health maintenance advice. Please see specific information pulled into the AVS  if appropriate.        Part of this note may be an electronic transcription/translation of spoken language to printed text using the Dragon Dictation System

## 2022-02-21 NOTE — PROGRESS NOTES
Venipuncture Blood Specimen Collection  Venipuncture performed in rt hand by Renetta Leal MA with good hemostasis. Patient tolerated the procedure well without complications.   02/21/22   Renetta Leal MA

## 2022-02-22 LAB
ALBUMIN SERPL-MCNC: 3.5 G/DL (ref 3.5–5.2)
ALBUMIN/GLOB SERPL: 0.8 G/DL
ALP SERPL-CCNC: 104 U/L (ref 39–117)
ALT SERPL W P-5'-P-CCNC: 31 U/L (ref 1–41)
ANION GAP SERPL CALCULATED.3IONS-SCNC: 11 MMOL/L (ref 5–15)
AST SERPL-CCNC: 41 U/L (ref 1–40)
BILIRUB SERPL-MCNC: 0.6 MG/DL (ref 0–1.2)
BUN SERPL-MCNC: 16 MG/DL (ref 8–23)
BUN/CREAT SERPL: 19.8 (ref 7–25)
CALCIUM SPEC-SCNC: 8.5 MG/DL (ref 8.6–10.5)
CHLORIDE SERPL-SCNC: 109 MMOL/L (ref 98–107)
CHOLEST SERPL-MCNC: 153 MG/DL (ref 0–200)
CO2 SERPL-SCNC: 22 MMOL/L (ref 22–29)
CREAT SERPL-MCNC: 0.81 MG/DL (ref 0.76–1.27)
DEPRECATED RDW RBC AUTO: 44.2 FL (ref 37–54)
ERYTHROCYTE [DISTWIDTH] IN BLOOD BY AUTOMATED COUNT: 13 % (ref 12.3–15.4)
GFR SERPL CREATININE-BSD FRML MDRD: 96 ML/MIN/1.73
GLOBULIN UR ELPH-MCNC: 4.4 GM/DL
GLUCOSE SERPL-MCNC: 91 MG/DL (ref 65–99)
HCT VFR BLD AUTO: 42.8 % (ref 37.5–51)
HDLC SERPL-MCNC: 58 MG/DL (ref 40–60)
HGB BLD-MCNC: 14.6 G/DL (ref 13–17.7)
LDLC SERPL CALC-MCNC: 80 MG/DL (ref 0–100)
LDLC/HDLC SERPL: 1.36 {RATIO}
MCH RBC QN AUTO: 31.9 PG (ref 26.6–33)
MCHC RBC AUTO-ENTMCNC: 34.1 G/DL (ref 31.5–35.7)
MCV RBC AUTO: 93.4 FL (ref 79–97)
PLATELET # BLD AUTO: 149 10*3/MM3 (ref 140–450)
PMV BLD AUTO: 11 FL (ref 6–12)
POTASSIUM SERPL-SCNC: 4.1 MMOL/L (ref 3.5–5.2)
PROT SERPL-MCNC: 7.9 G/DL (ref 6–8.5)
PSA SERPL-MCNC: 0.16 NG/ML (ref 0–4)
RBC # BLD AUTO: 4.58 10*6/MM3 (ref 4.14–5.8)
SODIUM SERPL-SCNC: 142 MMOL/L (ref 136–145)
TRIGL SERPL-MCNC: 80 MG/DL (ref 0–150)
TSH SERPL DL<=0.05 MIU/L-ACNC: 0.71 UIU/ML (ref 0.27–4.2)
URATE SERPL-MCNC: 3.4 MG/DL (ref 3.4–7)
VLDLC SERPL-MCNC: 15 MG/DL (ref 5–40)
WBC NRBC COR # BLD: 8.61 10*3/MM3 (ref 3.4–10.8)

## 2022-03-02 ENCOUNTER — OFFICE VISIT (OUTPATIENT)
Dept: ORTHOPEDIC SURGERY | Facility: CLINIC | Age: 67
End: 2022-03-02

## 2022-03-02 VITALS — WEIGHT: 282 LBS | HEIGHT: 74 IN | BODY MASS INDEX: 36.19 KG/M2

## 2022-03-02 DIAGNOSIS — M17.0 BILATERAL PRIMARY OSTEOARTHRITIS OF KNEE: Primary | ICD-10-CM

## 2022-03-02 PROCEDURE — 99214 OFFICE O/P EST MOD 30 MIN: CPT | Performed by: ORTHOPAEDIC SURGERY

## 2022-03-02 NOTE — PROGRESS NOTES
Chief Complaint  Initial Evaluation of the Left Knee and Initial Evaluation of the Right Knee    Subjective    History of Present Illness      Leonid iDehl is a 67 y.o. male who presents to [facility] for   History of Present Illness     The patient presents today for an evaluation of bilateral knee pain, left worse than right. 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 He has had trouble with his bilateral knees on and off for approximately 10 years. The patient was seen by Dr. Mane Rubio in [place] and was given cortisone injections, which was not helpful. He was not offered gel injections. He uses Mobic for anti-inflammatory medication purposes. His use brace did not seem to be helpful. The patient works 3 days per week at a [employer] store and works for 6 hours per day. The patient has swelling, clicking, popping in his knees, and the left knee has gotten a lot worse in the last 2 years and is now affecting his quality of life. The patient does limp and notes that his heels also hurt. He has difficulty using stairs and squatting on the ground as his left knee cap is wearing thin, according to his x-ray report. There is no doubt in my mind that he should be considered for knee replacement surgery. Both of his knees are in worse condition, but he will be considered for surgery one knee at a time. I will not perform surgery on his other knee until he completely recovers from one knee surgery. We plan to perform his knee replacement surgeries about 3 months apart. The patient has tried different modalities of care and now wants to proceed with left knee arthroplasty. Eventually, he will want his right knee done in a staged fashion as well.    Pain Location:  BILATERAL knee  Radiation: none  Quality: sharp, grinding, popping  Intensity/Severity: moderate to severe  Duration: 10+ years gotten worse over the last 2-3 years  Progression of  "symptoms: yes, progressive worsening  Onset quality: gradual   Timing: constant  Aggravating Factors: going up and down stairs, kneeling, driving, rising after sitting, walking, squatting  Alleviating Factors: NSAIDs, steroid injection (intra-articular), rest  Previous Episodes: yes  Associated Symptoms: pain, clicking/popping, decreased ROM, decreased strength  ADLs Affected: grooming/hygiene/toileting/personal care, dressing, ambulating, work related activities, recreational activities/sports  Previous Treatment: NSAIDs and intra-articular injection and Meloxicam      Objective   Vital Signs:   Ht 188 cm (74\")   Wt 128 kg (282 lb)   BMI 36.21 kg/m²     Physical Exam  Physical Exam  Vitals signs and nursing note reviewed.   Constitutional:       Appearance: Normal appearance.   Pulmonary:      Effort: Pulmonary effort is normal.   Skin:     General: Skin is warm and dry.      Capillary Refill: Capillary refill takes less than 2 seconds.   Neurological:      General: No focal deficit present.      Mental Status: He is alert and oriented to person, place, and time. Mental status is at baseline.   Psychiatric:         Mood and Affect: Mood normal.         Behavior: Behavior normal.         Thought Content: Thought content normal.         Judgment: Judgment normal.     Ortho Exam   Bilateral knee (varus). Patient has crepitus throughout range of motion. Positive patellar grind test. Mild effusion. Lachman is negative. Pivot shift is negative. Anterior and posterior drawer signs are negative. Significant joint line tenderness is noted on the medial aspect of the knee. Patient has a varus orientation of the knee. There is fullness and tenderness in the Popliteal fossa. Mild distention of a Popliteal cyst is noted in this location. Range of motion in flexion is from 0-110 degrees. Neurovascular status is intact.  Dorsalis pedis and posterior tibial artery pulses are palpable. Common peroneal nerve function is well " preserved. Patient's gait is cautious and antalgic. Skin and soft tissues are mildly swollen, consistent with synovitis and effusion. The patient has a significant limp with the first few steps after starting the gait cycle. Getting out of a chair takes a lot of effort due to pain on knee flexion.       Result Review :   The following data was reviewed by: Yash Celis MD on 03/02/2022:  Radiologic studies - see below for interpretation  BILATERAL knee xrays  weightbearing/standing ap/lateral views were performed at [facility] on 03/02/22. Images were independently viewed and interpreted by myself, my impression as follows:    bilateral Knee X-Ray  Indication: Evaluation of pain and discomfort in both knees.  AP, Lateral views  Findings: Advanced degenerative osteoarthritis with narrowing of the medial joint space and osteophyte formation noted.  no bony lesion  Soft tissues within normal limits  decreased joint spaces  Hardware appropriately positioned not applicable      yes prior studies available for comparison.    This patient's x-ray report was graded according to the Kellgren and Leonard classification.  This took into account the joint space narrowing, osteophyte formation, sclerosis of the distal femur/proximal tibia along with deformity of those bones.  The findings were indicative of K L grade 3.    X-RAY was ordered and reviewed by Yash Celis MD        Procedures           Assessment   Assessment and Plan    Diagnoses and all orders for this visit:    1. Bilateral primary osteoarthritis of knee (Primary)  -     XR Knee 3 View Bilateral          Follow Up   · Compression/brace  · Rest, ice, compression, and elevation (RICE) therapy  · Stretching and strengthening exercises  · OTC Alternate Ibuprofen and Tylenol as needed  · Follow up in 6 week(s)  The patient was seen today for preoperative discussion.  The patient has been tried on over-the-counter and prescription NSAID's despite the risks of  anti-inflammatory bleeding, peptic ulcers and erosive gastritis with short term benefit only.  Braces have been prescribed for mechanical support.  Patient has been participating in an exercise program specifically targeting joint pain relief with limited benefit. Intraarticular injections have been used periodically with some but not complete relief of pain.  Ambulation aids have also been utilized.    · The details of the surgical procedure were explained including the location of probable incisions and a description of the likely hardware/grafts to be used. The patient understands the likely convalescence after surgery as well as the rehabilitation required.  Also, we have thoroughly discussed with the patient the risks, benefits and alternatives to surgery.  Risks include but are not limited to the risk of infection, joint stiffness, limited range of motion, wound healing problems, scar tissue build up, myocardial infarction, stroke, blood clots (including DVT and/or pulmonary embolus along with the risk of death) neurologic and/or vascular injury, limb length discrepancy, fracture, dislocation, nonunion, malunion, continued pain and need for further surgery including hardware failure requiring revision.   • Patient was given instructions and counseling regarding his condition or for health maintenance advice. Please see specific information pulled into the AVS if appropriate.   • Use the cane for assistance with ambulation.    Yash Celis MD   Date of Encounter: 3/2/2022        EMR Dragon/Transcription disclaimer:  Much of this encounter note is an electronic transcription/translation of spoken language to printed text. The electronic translation of spoken language may permit erroneous, or at times, nonsensical words or phrases to be inadvertently transcribed; Although I have reviewed the note for such errors, some may still exist.       Transcribed from ambient dictation for Yash Celis MD by NARENDRA  .  03/02/22   15:24 EST    Patient verbalized consent to the visit recording.  I have personally performed the services described in this document as transcribed by the above individual, and it is both accurate and complete.  Yash Celis MD  3/2/2022  16:34 EST

## 2022-03-07 ENCOUNTER — OFFICE VISIT (OUTPATIENT)
Dept: PODIATRY | Facility: CLINIC | Age: 67
End: 2022-03-07

## 2022-03-07 VITALS
HEART RATE: 52 BPM | HEIGHT: 74 IN | BODY MASS INDEX: 36.19 KG/M2 | SYSTOLIC BLOOD PRESSURE: 126 MMHG | WEIGHT: 282 LBS | DIASTOLIC BLOOD PRESSURE: 72 MMHG

## 2022-03-07 DIAGNOSIS — M79.671 BILATERAL FOOT PAIN: Primary | ICD-10-CM

## 2022-03-07 DIAGNOSIS — M77.32 CALCANEAL SPUR OF BOTH FEET: ICD-10-CM

## 2022-03-07 DIAGNOSIS — M19.072 ARTHRITIS OF BOTH FEET: ICD-10-CM

## 2022-03-07 DIAGNOSIS — M76.62 ACHILLES TENDINITIS OF BOTH LOWER EXTREMITIES: ICD-10-CM

## 2022-03-07 DIAGNOSIS — M19.071 ARTHRITIS OF BOTH FEET: ICD-10-CM

## 2022-03-07 DIAGNOSIS — M76.61 ACHILLES TENDINITIS OF BOTH LOWER EXTREMITIES: ICD-10-CM

## 2022-03-07 DIAGNOSIS — M77.31 CALCANEAL SPUR OF BOTH FEET: ICD-10-CM

## 2022-03-07 DIAGNOSIS — M79.672 BILATERAL FOOT PAIN: Primary | ICD-10-CM

## 2022-03-07 DIAGNOSIS — M20.21 HALLUX RIGIDUS, RIGHT FOOT: ICD-10-CM

## 2022-03-07 PROCEDURE — 99203 OFFICE O/P NEW LOW 30 MIN: CPT | Performed by: PODIATRIST

## 2022-03-08 NOTE — PROGRESS NOTES
03/07/2022  Foot and Ankle Surgery - New Patient   Provider: Dr. Mane Ford DPM  Location: [facility]    Subjective:  Leonid Diehl is a 67 y.o. male.     Chief Complaint   Patient presents with   • Right Foot - Pain     Heel pain x years  Last pcp appt 2/28/2022   • Left Foot - Pain     Heel pain x years       HPI: Patient is a 67-year-old male that presents with pain involving his feet for years.  He states that these issues are mostly located to his heel regions and worse with the long periods of standing and walking.  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  He continues to work part-time and states that the symptoms are quite exacerbated with activity and improved with rest.  He is unaware of any recent injury.  He states that he knows that he has a significant amount of arthritis in his feet as well as his knees.  He is anticipating upcoming knee replacement with Dr. Celis.  Patient has also been diagnosed with gout in the past and states that he takes allopurinol.  He has not had any recent redness, swelling, or extreme pain.    No Known Allergies    Past Medical History:   Diagnosis Date   • Allergic    • Asthma    • Hypertension        Past Surgical History:   Procedure Laterality Date   • CATARACT EXTRACTION, BILATERAL     • TOTAL SHOULDER ARTHROPLASTY W/ DISTAL CLAVICLE EXCISION Right 10/22/2019    Procedure: TOTAL SHOULDER REVERSE ARTHROPLASTY with Biceps Tenodesis;  Surgeon: Chris Lafleur MD;  Location: [facility];  Service: Orthopedics       Family History   Problem Relation Age of Onset   • Cancer Father    • Heart disease Sister    • Hypertension Brother        Social History     Socioeconomic History   • Marital status:    Tobacco Use   • Smoking status: Never Smoker   • Smokeless tobacco: Never Used   Vaping Use   • Vaping Use: Never used   Substance and Sexual Activity   • Alcohol use: No   • Drug use: No   • Sexual activity: Defer         Current Outpatient Medications on File Prior to Visit   Medication Sig Dispense Refill   • albuterol sulfate  (90 Base) MCG/ACT inhaler Inhale 2 puffs Every 6 (Six) Hours As Needed for Wheezing or Shortness of Air. 8.5 g 2   • allopurinol (ZYLOPRIM) 300 MG tablet Take 1 tablet by mouth Daily. 90 tablet 1   • carvedilol (COREG) 3.125 MG tablet Take 1 tablet by mouth Daily. 90 tablet 1   • fexofenadine-pseudoephedrine (ALLEGRA-D 24) 180-240 MG per 24 hr tablet Take 1 tablet by mouth Daily.     • furosemide (LASIX) 20 MG tablet Take 1 tablet by mouth Daily As Needed (swelling). swelling 90 tablet 1   • guaiFENesin (MUCINEX) 600 MG 12 hr tablet Take 1,200 mg by mouth Daily.     • Iron, Ferrous Gluconate, 256 (28 Fe) MG tablet Take 325 mg by mouth Daily.     • lisinopril-hydrochlorothiazide (PRINZIDE,ZESTORETIC) 10-12.5 MG per tablet Take 1 tablet by mouth Daily. 90 tablet 1   • meloxicam (MOBIC) 7.5 MG tablet Take 1 tablet by mouth Daily. 90 tablet 1   • mupirocin (BACTROBAN) 2 % ointment Apply a pea-sized amount to each nostril twice daily for 5 days prior to surgery. 22 g 0   • potassium chloride 10 MEQ CR tablet TAKE 1 TABLET BY MOUTH DAILY AS NEEDED 90 tablet 0   • Symbicort 160-4.5 MCG/ACT inhaler Inhale 2 puffs 2 (Two) Times a Day. 10.2 g 11   • traZODone (DESYREL) 50 MG tablet TAKE ONE TABLET BY MOUTH EVERY NIGHT AT BEDTIME AS NEEDED FOR SLEEP 90 tablet 0   • zinc gluconate 50 MG tablet TAKE ONE TABLET BY MOUTH DAILY 30 tablet 0     No current facility-administered medications on file prior to visit.       Review of Systems:  General: Denies fever, chills, fatigue, and weakness.  Eyes: Denies vision loss, blurry vision, and excessive redness.  ENT: Denies hearing issues and difficulty swallowing.  Cardiovascular: Denies palpitations, chest pain, or syncopal episodes.  Respiratory: Denies shortness of breath, wheezing, and coughing.  GI: Denies abdominal pain, nausea, and vomiting.   : Denies  "frequency, hematuria, and urgency.  Musculoskeletal: + Bilateral heel pain  Derm: Denies rash, open wounds, or suspicious lesions.  Neuro: Denies headaches, numbness, loss of coordination, and tremors.  Psych: Denies anxiety and depression.  Endocrine: Denies temperature intolerance and changes in appetite.  Heme: Denies bleeding disorders or abnormal bruising.     Objective   /72   Pulse 52   Ht 188 cm (74\")   Wt 128 kg (282 lb)   BMI 36.21 kg/m²     Foot/Ankle Exam:       General:   Appearance: appears stated age and healthy and obesity    Orientation: AAOx3    Affect: appropriate    Gait: antalgic      VASCULAR      Right Foot Vascularity   Normal vascular exam    Dorsalis pedis:  2+  Posterior tibial:  2+  Skin Temperature: warm    Edema Gradin+  CFT:  < 3 seconds  Pedal Hair Growth:  Absent  Varicosities: moderate varicosities       Left Foot Vascularity   Normal vascular exam    Dorsalis pedis:  2+  Posterior tibial:  2+  Skin Temperature: warm    Edema Gradin+  CFT:  < 3 seconds  Pedal Hair Growth:  Absent  Varicosities: moderate varicosities        NEUROLOGIC     Right Foot Neurologic   Light touch sensation:  Normal  Hot/Cold sensation: normal    Achilles reflex:  2+     Left Foot Neurologic   Light touch sensation:  Normal  Hot/cold sensation: normal    Achilles reflex:  2+     MUSCULOSKELETAL      Right Foot Musculoskeletal   Ecchymosis:  None  Tenderness: great toe metatarsophalangeal joint, posterior heel, arch and dorsal foot    Hallux limitus: Yes       Left Foot Musculoskeletal   Ecchymosis:  None  Tenderness: posterior heel, arch and dorsal foot       MUSCLE STRENGTH     Right Foot Muscle Strength   Normal strength    Foot dorsiflexion:  5  Foot plantar flexion:  5  Foot inversion:  5  Foot eversion:  5     Left Foot Muscle Strength   Normal strength    Foot dorsiflexion:  5  Foot plantar flexion:  5  Foot inversion:  5  Foot eversion:  5     DERMATOLOGIC     Right Foot " Dermatologic   Skin: skin intact       Left Foot Dermatologic   Skin: skin intact       TESTS     Right Foot Tests   Anterior drawer: negative    Varus tilt: negative       Left Foot Tests   Anterior drawer: negative    Varus tilt: negative        Right Foot Additional Comments Prominent soft tissue and osseous rigidity involving the feet.  Moderate equinus contracture with knee extended and flexed.  Point discomfort involving the posterior aspect of both heels as well as the right first metatarsophalangeal joint with significant loss of range of motion and large bony prominence.      Assessment/Plan   Diagnoses and all orders for this visit:    1. Bilateral foot pain (Primary)  -     XR Foot 3+ View Bilateral    2. Achilles tendinitis of both lower extremities    3. Calcaneal spur of both feet    4. Arthritis of both feet    5. Hallux rigidus, right foot      Patient presents with longstanding issues involving both feet.  Majority of his pain is located to the posterior aspect of the heels with activity.  Imaging was obtained independently reviewed today showing significant degenerative changes involving both feet.  Patient does have cavoid foot structure with significant hallux rigidus involving his right first MTPJ.  He also has prominent intratendinous calcification and spurring involving the posterior aspect of the heels.  I did review the diagnoses and treatment options with him at length.  I explained that findings are mostly consistent with wear and tear but could also be related to gouty arthritis given his history.  Patient is currently dealing with knee issues and he states that his knee pain is often worse than his feet.  At this time, I do feel that he should proceed with knee replacement as discussed with Dr. Celis.  I would like him to consider a pair of over-the-counter arch supports.  We did review proper use and effects.  He is to avoid barefoot and unsupported weightbearing.  I would also like him  to start stretching and manual therapy exercises for maintenance care at this time.  Patient understands that foot issues may improve once his knees are addressed.  We will discuss further at follow-up in 2 months.  Greater than 30 minutes was spent before, during, and after evaluation for patient care    Orders Placed This Encounter   Procedures   • XR Foot 3+ View Bilateral     Order Specific Question:   Reason for Exam:     Answer:   yoan heel pain x years. room 15 .     Order Specific Question:   Does this patient have a diabetic monitoring/medication delivering device on?     Answer:   No     Order Specific Question:   Release to patient     Answer:   Immediate        Note is dictated utilizing voice recognition software. Unfortunately this leads to occasional typographical errors. I apologize in advance if the situation occurs. If questions occur please do not hesitate to call our office.

## 2022-03-11 DIAGNOSIS — I10 ESSENTIAL HYPERTENSION: ICD-10-CM

## 2022-03-11 DIAGNOSIS — Z86.79 HISTORY OF ACUTE CONGESTIVE HEART FAILURE: Primary | ICD-10-CM

## 2022-03-11 NOTE — PROGRESS NOTES
Renetta can you print this please? Please also let pt know I have ordered echocardiogram to be completed prior to surgery. Thank you!    Farhana, pt had acute CHF episode in 2019 due to anemia, has not followed regularly with cardiology, but I would recommend an echocardiogram prior to surgery. I have ordered, once reviewed he will likely be cleared. Recent labs were stable. Thank you.

## 2022-03-24 ENCOUNTER — TELEPHONE (OUTPATIENT)
Dept: FAMILY MEDICINE CLINIC | Facility: CLINIC | Age: 67
End: 2022-03-24

## 2022-03-24 DIAGNOSIS — R01.1 SYSTOLIC MURMUR: Primary | ICD-10-CM

## 2022-03-24 NOTE — TELEPHONE ENCOUNTER
After finally getting back in touch with the correct representative, I gave new codes regarding pt's echo order. She said the I50.9 CHF did not work, but the Systolic murmur R01.1 did work. She asked for a new order to be created and submitted. Nicole can be contacted by phone at [phone]

## 2022-03-25 ENCOUNTER — LAB (OUTPATIENT)
Dept: LAB | Facility: HOSPITAL | Age: 67
End: 2022-03-25

## 2022-03-25 ENCOUNTER — HOSPITAL ENCOUNTER (OUTPATIENT)
Dept: CARDIOLOGY | Facility: HOSPITAL | Age: 67
Discharge: HOME | End: 2022-03-25

## 2022-03-25 ENCOUNTER — HOSPITAL ENCOUNTER (OUTPATIENT)
Dept: GENERAL RADIOLOGY | Facility: HOSPITAL | Age: 67
Discharge: HOME | End: 2022-03-25

## 2022-03-25 DIAGNOSIS — Z86.79 HISTORY OF ACUTE CONGESTIVE HEART FAILURE: ICD-10-CM

## 2022-03-25 DIAGNOSIS — M17.0 BILATERAL PRIMARY OSTEOARTHRITIS OF KNEE: ICD-10-CM

## 2022-03-25 DIAGNOSIS — I10 ESSENTIAL HYPERTENSION: ICD-10-CM

## 2022-03-25 LAB
ABO GROUP BLD: NORMAL
ANION GAP SERPL CALCULATED.3IONS-SCNC: 6 MMOL/L (ref 5–15)
BH CV ECHO MEAS - ACS: 1.9 CM
BH CV ECHO MEAS - AO MAX PG: 36.6 MMHG
BH CV ECHO MEAS - AO MEAN PG: 19.8 MMHG
BH CV ECHO MEAS - AO ROOT DIAM: 2.8 CM
BH CV ECHO MEAS - AO V2 MAX: 298.7 CM/SEC
BH CV ECHO MEAS - AO V2 VTI: 71 CM
BH CV ECHO MEAS - AVA(I,D): 1.38 CM2
BH CV ECHO MEAS - EDV(CUBED): 138.7 ML
BH CV ECHO MEAS - EDV(MOD-SP4): 152.7 ML
BH CV ECHO MEAS - EF(MOD-BP): 66 %
BH CV ECHO MEAS - EF(MOD-SP4): 66.3 %
BH CV ECHO MEAS - ESV(CUBED): 66.1 ML
BH CV ECHO MEAS - ESV(MOD-SP4): 51.4 ML
BH CV ECHO MEAS - FS: 21.9 %
BH CV ECHO MEAS - IVS/LVPW: 0.86 CM
BH CV ECHO MEAS - IVSD: 1.16 CM
BH CV ECHO MEAS - LA DIMENSION(2D): 4.6 CM
BH CV ECHO MEAS - LV DIASTOLIC VOL/BSA (35-75): 61.4 CM2
BH CV ECHO MEAS - LV MASS(C)D: 262.7 GRAMS
BH CV ECHO MEAS - LV MAX PG: 7.7 MMHG
BH CV ECHO MEAS - LV MEAN PG: 4 MMHG
BH CV ECHO MEAS - LV SYSTOLIC VOL/BSA (12-30): 20.7 CM2
BH CV ECHO MEAS - LV V1 MAX: 139 CM/SEC
BH CV ECHO MEAS - LV V1 VTI: 36.3 CM
BH CV ECHO MEAS - LVIDD: 5.2 CM
BH CV ECHO MEAS - LVIDS: 4 CM
BH CV ECHO MEAS - LVOT AREA: 2.7 CM2
BH CV ECHO MEAS - LVOT DIAM: 1.86 CM
BH CV ECHO MEAS - LVPWD: 1.35 CM
BH CV ECHO MEAS - MV A MAX VEL: 102.7 CM/SEC
BH CV ECHO MEAS - MV DEC SLOPE: 240.5 CM/SEC2
BH CV ECHO MEAS - MV DEC TIME: 0.36 MSEC
BH CV ECHO MEAS - MV E MAX VEL: 86.9 CM/SEC
BH CV ECHO MEAS - MV E/A: 0.85
BH CV ECHO MEAS - MV MAX PG: 3.8 MMHG
BH CV ECHO MEAS - MV MEAN PG: 1.43 MMHG
BH CV ECHO MEAS - MV V2 VTI: 37.3 CM
BH CV ECHO MEAS - MVA(VTI): 2.6 CM2
BH CV ECHO MEAS - PA V2 MAX: 141.2 CM/SEC
BH CV ECHO MEAS - PULM A REVS DUR: 0.12 SEC
BH CV ECHO MEAS - PULM A REVS VEL: 29.2 CM/SEC
BH CV ECHO MEAS - PULM DIAS VEL: 55 CM/SEC
BH CV ECHO MEAS - PULM SYS VEL: 56.7 CM/SEC
BH CV ECHO MEAS - RV MAX PG: 3.5 MMHG
BH CV ECHO MEAS - RV V1 MAX: 93.1 CM/SEC
BH CV ECHO MEAS - RV V1 VTI: 21.8 CM
BH CV ECHO MEAS - RVDD: 3.1 CM
BH CV ECHO MEAS - RVOT DIAM: 2 CM
BH CV ECHO MEAS - SI(MOD-SP4): 40.7 ML/M2
BH CV ECHO MEAS - SV(LVOT): 98 ML
BH CV ECHO MEAS - SV(MOD-SP4): 101.3 ML
BH CV ECHO MEAS - SV(RVOT): 68.6 ML
BLD GP AB SCN SERPL QL: NEGATIVE
BUN SERPL-MCNC: 12 MG/DL (ref 8–23)
BUN/CREAT SERPL: 14.8 (ref 7–25)
CALCIUM SPEC-SCNC: 9 MG/DL (ref 8.6–10.5)
CHLORIDE SERPL-SCNC: 105 MMOL/L (ref 98–107)
CO2 SERPL-SCNC: 28 MMOL/L (ref 22–29)
CREAT SERPL-MCNC: 0.81 MG/DL (ref 0.76–1.27)
DEPRECATED RDW RBC AUTO: 47.1 FL (ref 37–54)
EGFRCR SERPLBLD CKD-EPI 2021: 97.2 ML/MIN/1.73
ERYTHROCYTE [DISTWIDTH] IN BLOOD BY AUTOMATED COUNT: 13.2 % (ref 12.3–15.4)
GLUCOSE SERPL-MCNC: 95 MG/DL (ref 65–99)
HCT VFR BLD AUTO: 41.8 % (ref 37.5–51)
HGB BLD-MCNC: 14 G/DL (ref 13–17.7)
MAXIMAL PREDICTED HEART RATE: 154 BPM
MCH RBC QN AUTO: 32.1 PG (ref 26.6–33)
MCHC RBC AUTO-ENTMCNC: 33.5 G/DL (ref 31.5–35.7)
MCV RBC AUTO: 95.9 FL (ref 79–97)
MRSA DNA SPEC QL NAA+PROBE: NORMAL
PLATELET # BLD AUTO: 109 10*3/MM3 (ref 140–450)
PMV BLD AUTO: 10.5 FL (ref 6–12)
POTASSIUM SERPL-SCNC: 4.3 MMOL/L (ref 3.5–5.2)
QT INTERVAL: 448 MS
RBC # BLD AUTO: 4.36 10*6/MM3 (ref 4.14–5.8)
RH BLD: POSITIVE
SODIUM SERPL-SCNC: 139 MMOL/L (ref 136–145)
STRESS TARGET HR: 131 BPM
T&S EXPIRATION DATE: NORMAL
WBC NRBC COR # BLD: 7.08 10*3/MM3 (ref 3.4–10.8)

## 2022-03-25 PROCEDURE — 71046 X-RAY EXAM CHEST 2 VIEWS: CPT

## 2022-03-25 PROCEDURE — 85027 COMPLETE CBC AUTOMATED: CPT

## 2022-03-25 PROCEDURE — 80048 BASIC METABOLIC PNL TOTAL CA: CPT

## 2022-03-25 PROCEDURE — 86900 BLOOD TYPING SEROLOGIC ABO: CPT

## 2022-03-25 PROCEDURE — 93306 TTE W/DOPPLER COMPLETE: CPT | Performed by: INTERNAL MEDICINE

## 2022-03-25 PROCEDURE — 93010 ELECTROCARDIOGRAM REPORT: CPT | Performed by: INTERNAL MEDICINE

## 2022-03-25 PROCEDURE — 87641 MR-STAPH DNA AMP PROBE: CPT

## 2022-03-25 PROCEDURE — 86850 RBC ANTIBODY SCREEN: CPT

## 2022-03-25 PROCEDURE — 93005 ELECTROCARDIOGRAM TRACING: CPT | Performed by: ORTHOPAEDIC SURGERY

## 2022-03-25 PROCEDURE — 36415 COLL VENOUS BLD VENIPUNCTURE: CPT

## 2022-03-25 PROCEDURE — 93306 TTE W/DOPPLER COMPLETE: CPT

## 2022-03-25 PROCEDURE — 86901 BLOOD TYPING SEROLOGIC RH(D): CPT

## 2022-03-25 RX ORDER — FERROUS SULFATE TAB EC 324 MG (65 MG FE EQUIVALENT) 324 (65 FE) MG
324 TABLET DELAYED RESPONSE ORAL NIGHTLY
COMMUNITY
End: 2022-08-22 | Stop reason: SDUPTHER

## 2022-04-02 ENCOUNTER — LAB (OUTPATIENT)
Dept: LAB | Facility: HOSPITAL | Age: 67
End: 2022-04-02

## 2022-04-02 DIAGNOSIS — M17.0 BILATERAL PRIMARY OSTEOARTHRITIS OF KNEE: ICD-10-CM

## 2022-04-02 LAB — SARS-COV-2 ORF1AB RESP QL NAA+PROBE: NOT DETECTED

## 2022-04-02 PROCEDURE — U0004 COV-19 TEST NON-CDC HGH THRU: HCPCS

## 2022-04-02 PROCEDURE — C9803 HOPD COVID-19 SPEC COLLECT: HCPCS

## 2022-04-02 PROCEDURE — U0005 INFEC AGEN DETEC AMPLI PROBE: HCPCS

## 2022-04-04 ENCOUNTER — ANESTHESIA EVENT (OUTPATIENT)
Dept: PERIOP | Facility: HOSPITAL | Age: 67
End: 2022-04-04

## 2022-04-05 ENCOUNTER — ANESTHESIA (OUTPATIENT)
Dept: PERIOP | Facility: HOSPITAL | Age: 67
End: 2022-04-05

## 2022-04-05 ENCOUNTER — APPOINTMENT (OUTPATIENT)
Dept: GENERAL RADIOLOGY | Facility: HOSPITAL | Age: 67
End: 2022-04-05

## 2022-04-05 ENCOUNTER — HOSPITAL ENCOUNTER (OUTPATIENT)
Facility: HOSPITAL | Age: 67
Discharge: HOME HEALTH | End: 2022-04-07
Attending: ORTHOPAEDIC SURGERY | Admitting: ORTHOPAEDIC SURGERY

## 2022-04-05 DIAGNOSIS — Z96.652 S/P TKR (TOTAL KNEE REPLACEMENT), LEFT: Primary | ICD-10-CM

## 2022-04-05 DIAGNOSIS — M17.0 BILATERAL PRIMARY OSTEOARTHRITIS OF KNEE: ICD-10-CM

## 2022-04-05 PROBLEM — D50.0 ANEMIA DUE TO BLOOD LOSS: Status: RESOLVED | Noted: 2019-01-11 | Resolved: 2022-04-05

## 2022-04-05 PROBLEM — I50.32 CHRONIC DIASTOLIC CONGESTIVE HEART FAILURE: Chronic | Status: ACTIVE | Noted: 2019-02-04

## 2022-04-05 PROBLEM — R22.0 TONGUE SWELLING: Status: RESOLVED | Noted: 2018-01-23 | Resolved: 2022-04-05

## 2022-04-05 PROBLEM — J01.90 ACUTE SINUSITIS: Status: RESOLVED | Noted: 2018-05-07 | Resolved: 2022-04-05

## 2022-04-05 PROBLEM — M19.011 OSTEOARTHRITIS OF RIGHT GLENOHUMERAL JOINT: Status: RESOLVED | Noted: 2019-10-09 | Resolved: 2022-04-05

## 2022-04-05 PROBLEM — D50.9 IRON DEFICIENCY ANEMIA: Chronic | Status: ACTIVE | Noted: 2019-01-11

## 2022-04-05 PROBLEM — D64.9 ANEMIA: Status: ACTIVE | Noted: 2019-01-11

## 2022-04-05 PROBLEM — I35.0 AORTIC STENOSIS, MODERATE: Chronic | Status: ACTIVE | Noted: 2022-04-05

## 2022-04-05 PROCEDURE — 99024 POSTOP FOLLOW-UP VISIT: CPT | Performed by: ORTHOPAEDIC SURGERY

## 2022-04-05 PROCEDURE — 63710000001 ALLOPURINOL 300 MG TABLET: Performed by: PHYSICIAN ASSISTANT

## 2022-04-05 PROCEDURE — A9270 NON-COVERED ITEM OR SERVICE: HCPCS | Performed by: PHYSICIAN ASSISTANT

## 2022-04-05 PROCEDURE — 63710000001 ACETAMINOPHEN 500 MG TABLET: Performed by: PHYSICIAN ASSISTANT

## 2022-04-05 PROCEDURE — 63710000001 MELOXICAM 15 MG TABLET: Performed by: PHYSICIAN ASSISTANT

## 2022-04-05 PROCEDURE — 63710000001 TRAZODONE 50 MG TABLET: Performed by: PHYSICIAN ASSISTANT

## 2022-04-05 PROCEDURE — A9270 NON-COVERED ITEM OR SERVICE: HCPCS | Performed by: ORTHOPAEDIC SURGERY

## 2022-04-05 PROCEDURE — G0378 HOSPITAL OBSERVATION PER HR: HCPCS

## 2022-04-05 PROCEDURE — 0 BUPIVACAINE LIPOSOME 1.3 % SUSPENSION: Performed by: ORTHOPAEDIC SURGERY

## 2022-04-05 PROCEDURE — 25010000002 PROPOFOL 10 MG/ML EMULSION

## 2022-04-05 PROCEDURE — 97162 PT EVAL MOD COMPLEX 30 MIN: CPT

## 2022-04-05 PROCEDURE — C9290 INJ, BUPIVACAINE LIPOSOME: HCPCS | Performed by: ORTHOPAEDIC SURGERY

## 2022-04-05 PROCEDURE — 20985 CPTR-ASST DIR MS PX: CPT | Performed by: ORTHOPAEDIC SURGERY

## 2022-04-05 PROCEDURE — 25010000002 HYDROMORPHONE 1 MG/ML SOLUTION

## 2022-04-05 PROCEDURE — C1776 JOINT DEVICE (IMPLANTABLE): HCPCS | Performed by: ORTHOPAEDIC SURGERY

## 2022-04-05 PROCEDURE — 97110 THERAPEUTIC EXERCISES: CPT

## 2022-04-05 PROCEDURE — 63710000001 CARVEDILOL 3.125 MG TABLET: Performed by: PHYSICIAN ASSISTANT

## 2022-04-05 PROCEDURE — 63710000001 POVIDONE-IODINE 10 % SOLUTION 118 ML BOTTLE: Performed by: ORTHOPAEDIC SURGERY

## 2022-04-05 PROCEDURE — 25010000002 ROPIVACAINE PER 1 MG: Performed by: ORTHOPAEDIC SURGERY

## 2022-04-05 PROCEDURE — 63710000001 OXYCODONE 10 MG TABLET EXTENDED-RELEASE 12 HOUR: Performed by: PHYSICIAN ASSISTANT

## 2022-04-05 PROCEDURE — 25010000002 DEXAMETHASONE PER 1 MG: Performed by: ANESTHESIOLOGY

## 2022-04-05 PROCEDURE — 73560 X-RAY EXAM OF KNEE 1 OR 2: CPT

## 2022-04-05 PROCEDURE — 25010000002 FENTANYL CITRATE (PF) 100 MCG/2ML SOLUTION

## 2022-04-05 PROCEDURE — 63710000001 GUAIFENESIN 600 MG TABLET SUSTAINED-RELEASE 12 HOUR: Performed by: PHYSICIAN ASSISTANT

## 2022-04-05 PROCEDURE — C1889 IMPLANT/INSERT DEVICE, NOC: HCPCS | Performed by: ORTHOPAEDIC SURGERY

## 2022-04-05 PROCEDURE — 25010000002 CEFAZOLIN PER 500 MG: Performed by: PHYSICIAN ASSISTANT

## 2022-04-05 PROCEDURE — 25010000002 ONDANSETRON PER 1 MG

## 2022-04-05 PROCEDURE — 25010000002 ROPIVACAINE PER 1 MG: Performed by: ANESTHESIOLOGY

## 2022-04-05 PROCEDURE — S0260 H&P FOR SURGERY: HCPCS | Performed by: ORTHOPAEDIC SURGERY

## 2022-04-05 PROCEDURE — 63710000001 MUPIROCIN 2 % OINTMENT 22 G TUBE: Performed by: ORTHOPAEDIC SURGERY

## 2022-04-05 PROCEDURE — 76942 ECHO GUIDE FOR BIOPSY: CPT | Performed by: ORTHOPAEDIC SURGERY

## 2022-04-05 PROCEDURE — 25010000002 CEFAZOLIN PER 500 MG: Performed by: ORTHOPAEDIC SURGERY

## 2022-04-05 PROCEDURE — C1713 ANCHOR/SCREW BN/BN,TIS/BN: HCPCS | Performed by: ORTHOPAEDIC SURGERY

## 2022-04-05 PROCEDURE — 63710000001 CETIRIZINE-PSEUDOEPHEDRINE 5-120 MG TABLET SUSTAINED-RELEASE 12 HOUR: Performed by: PHYSICIAN ASSISTANT

## 2022-04-05 PROCEDURE — 99214 OFFICE O/P EST MOD 30 MIN: CPT | Performed by: NURSE PRACTITIONER

## 2022-04-05 PROCEDURE — 63710000001 GABAPENTIN 300 MG CAPSULE: Performed by: PHYSICIAN ASSISTANT

## 2022-04-05 PROCEDURE — 63710000001 POLYETHYLENE GLYCOL 17 G PACK: Performed by: PHYSICIAN ASSISTANT

## 2022-04-05 PROCEDURE — 63710000001 RIVAROXABAN 10 MG TABLET: Performed by: PHYSICIAN ASSISTANT

## 2022-04-05 PROCEDURE — 27447 TOTAL KNEE ARTHROPLASTY: CPT | Performed by: ORTHOPAEDIC SURGERY

## 2022-04-05 DEVICE — STEM TIB/KN PERSONA CMT 5D SZH LT: Type: IMPLANTABLE DEVICE | Site: KNEE | Status: FUNCTIONAL

## 2022-04-05 DEVICE — DEV WND/CLS CONTRL TISS STRATAFIX SYMM PDS PLS CTX 60CM VIL: Type: IMPLANTABLE DEVICE | Site: KNEE | Status: FUNCTIONAL

## 2022-04-05 DEVICE — CMT BONE REFOBACIN R W/GENT 1X40: Type: IMPLANTABLE DEVICE | Site: KNEE | Status: FUNCTIONAL

## 2022-04-05 DEVICE — CAP TOTL KN CMT PRIMARY: Type: IMPLANTABLE DEVICE | Site: KNEE | Status: FUNCTIONAL

## 2022-04-05 DEVICE — ART/SRF KN PERSONA/VE PS MC GH 8TO11 10MM LT: Type: IMPLANTABLE DEVICE | Site: KNEE | Status: FUNCTIONAL

## 2022-04-05 DEVICE — COMP FEM/KN PERSONA CR CMT COCR STD SZ11 LT: Type: IMPLANTABLE DEVICE | Site: KNEE | Status: FUNCTIONAL

## 2022-04-05 DEVICE — PAT KN PERSONA VE CRS/LNK CMT 10X41MM: Type: IMPLANTABLE DEVICE | Site: KNEE | Status: FUNCTIONAL

## 2022-04-05 DEVICE — CAP EXT STEM KN UPCHRG: Type: IMPLANTABLE DEVICE | Site: KNEE | Status: FUNCTIONAL

## 2022-04-05 DEVICE — SCRW HEX PERSONA 3.5X3.2X33MM: Type: IMPLANTABLE DEVICE | Site: KNEE | Status: FUNCTIONAL

## 2022-04-05 DEVICE — EXT STEM FEM/KN PERSONA TPR 14XPLS30MM: Type: IMPLANTABLE DEVICE | Site: KNEE | Status: FUNCTIONAL

## 2022-04-05 RX ORDER — PHENYLEPHRINE HCL IN 0.9% NACL 1 MG/10 ML
SYRINGE (ML) INTRAVENOUS AS NEEDED
Status: DISCONTINUED | OUTPATIENT
Start: 2022-04-05 | End: 2022-04-05 | Stop reason: SURG

## 2022-04-05 RX ORDER — FENTANYL CITRATE 50 UG/ML
INJECTION, SOLUTION INTRAMUSCULAR; INTRAVENOUS AS NEEDED
Status: DISCONTINUED | OUTPATIENT
Start: 2022-04-05 | End: 2022-04-05 | Stop reason: SURG

## 2022-04-05 RX ORDER — CETIRIZINE HYDROCHLORIDE, PSEUDOEPHEDRINE HYDROCHLORIDE 5; 120 MG/1; MG/1
1 TABLET, FILM COATED, EXTENDED RELEASE ORAL 2 TIMES DAILY
Status: DISCONTINUED | OUTPATIENT
Start: 2022-04-05 | End: 2022-04-07 | Stop reason: HOSPADM

## 2022-04-05 RX ORDER — GABAPENTIN 300 MG/1
300 CAPSULE ORAL NIGHTLY
Status: DISCONTINUED | OUTPATIENT
Start: 2022-04-05 | End: 2022-04-07 | Stop reason: HOSPADM

## 2022-04-05 RX ORDER — ROPIVACAINE HYDROCHLORIDE 5 MG/ML
INJECTION, SOLUTION EPIDURAL; INFILTRATION; PERINEURAL
Status: COMPLETED | OUTPATIENT
Start: 2022-04-05 | End: 2022-04-05

## 2022-04-05 RX ORDER — LISINOPRIL 5 MG/1
5 TABLET ORAL
Status: DISCONTINUED | OUTPATIENT
Start: 2022-04-05 | End: 2022-04-07 | Stop reason: HOSPADM

## 2022-04-05 RX ORDER — TRAZODONE HYDROCHLORIDE 50 MG/1
50 TABLET ORAL NIGHTLY
Status: DISCONTINUED | OUTPATIENT
Start: 2022-04-05 | End: 2022-04-07 | Stop reason: HOSPADM

## 2022-04-05 RX ORDER — LIDOCAINE HYDROCHLORIDE 10 MG/ML
0.5 INJECTION, SOLUTION INFILTRATION; PERINEURAL ONCE AS NEEDED
Status: DISCONTINUED | OUTPATIENT
Start: 2022-04-05 | End: 2022-04-05 | Stop reason: HOSPADM

## 2022-04-05 RX ORDER — LABETALOL HYDROCHLORIDE 5 MG/ML
5 INJECTION, SOLUTION INTRAVENOUS
Status: DISCONTINUED | OUTPATIENT
Start: 2022-04-05 | End: 2022-04-05 | Stop reason: HOSPADM

## 2022-04-05 RX ORDER — ONDANSETRON 2 MG/ML
INJECTION INTRAMUSCULAR; INTRAVENOUS AS NEEDED
Status: DISCONTINUED | OUTPATIENT
Start: 2022-04-05 | End: 2022-04-05 | Stop reason: SURG

## 2022-04-05 RX ORDER — OXYCODONE HYDROCHLORIDE 5 MG/1
10 TABLET ORAL EVERY 4 HOURS PRN
Status: DISCONTINUED | OUTPATIENT
Start: 2022-04-05 | End: 2022-04-05 | Stop reason: HOSPADM

## 2022-04-05 RX ORDER — MEPERIDINE HYDROCHLORIDE 25 MG/ML
12.5 INJECTION INTRAMUSCULAR; INTRAVENOUS; SUBCUTANEOUS
Status: DISCONTINUED | OUTPATIENT
Start: 2022-04-05 | End: 2022-04-05 | Stop reason: HOSPADM

## 2022-04-05 RX ORDER — SODIUM CHLORIDE, SODIUM LACTATE, POTASSIUM CHLORIDE, CALCIUM CHLORIDE 600; 310; 30; 20 MG/100ML; MG/100ML; MG/100ML; MG/100ML
INJECTION, SOLUTION INTRAVENOUS CONTINUOUS PRN
Status: DISCONTINUED | OUTPATIENT
Start: 2022-04-05 | End: 2022-04-05 | Stop reason: SURG

## 2022-04-05 RX ORDER — FUROSEMIDE 20 MG/1
20 TABLET ORAL DAILY PRN
Status: DISCONTINUED | OUTPATIENT
Start: 2022-04-06 | End: 2022-04-07 | Stop reason: HOSPADM

## 2022-04-05 RX ORDER — NALOXONE HCL 0.4 MG/ML
0.4 VIAL (ML) INJECTION
Status: DISCONTINUED | OUTPATIENT
Start: 2022-04-05 | End: 2022-04-07 | Stop reason: HOSPADM

## 2022-04-05 RX ORDER — ALLOPURINOL 300 MG/1
300 TABLET ORAL DAILY
Status: DISCONTINUED | OUTPATIENT
Start: 2022-04-05 | End: 2022-04-07 | Stop reason: HOSPADM

## 2022-04-05 RX ORDER — PROPOFOL 10 MG/ML
VIAL (ML) INTRAVENOUS AS NEEDED
Status: DISCONTINUED | OUTPATIENT
Start: 2022-04-05 | End: 2022-04-05 | Stop reason: SURG

## 2022-04-05 RX ORDER — CEFAZOLIN SODIUM IN 0.9 % NACL 3 G/100 ML
3 INTRAVENOUS SOLUTION, PIGGYBACK (ML) INTRAVENOUS ONCE
Status: COMPLETED | OUTPATIENT
Start: 2022-04-05 | End: 2022-04-05

## 2022-04-05 RX ORDER — TRAMADOL HYDROCHLORIDE 50 MG/1
50 TABLET ORAL EVERY 6 HOURS PRN
Status: DISCONTINUED | OUTPATIENT
Start: 2022-04-05 | End: 2022-04-07 | Stop reason: HOSPADM

## 2022-04-05 RX ORDER — IPRATROPIUM BROMIDE AND ALBUTEROL SULFATE 2.5; .5 MG/3ML; MG/3ML
3 SOLUTION RESPIRATORY (INHALATION) ONCE AS NEEDED
Status: DISCONTINUED | OUTPATIENT
Start: 2022-04-05 | End: 2022-04-05 | Stop reason: HOSPADM

## 2022-04-05 RX ORDER — TRANEXAMIC ACID 10 MG/ML
1000 INJECTION, SOLUTION INTRAVENOUS ONCE
Status: COMPLETED | OUTPATIENT
Start: 2022-04-05 | End: 2022-04-05

## 2022-04-05 RX ORDER — ALBUTEROL SULFATE 90 UG/1
2 AEROSOL, METERED RESPIRATORY (INHALATION) EVERY 6 HOURS PRN
Status: DISCONTINUED | OUTPATIENT
Start: 2022-04-05 | End: 2022-04-07 | Stop reason: HOSPADM

## 2022-04-05 RX ORDER — OXYCODONE HYDROCHLORIDE 5 MG/1
5 TABLET ORAL ONCE AS NEEDED
Status: DISCONTINUED | OUTPATIENT
Start: 2022-04-05 | End: 2022-04-05 | Stop reason: HOSPADM

## 2022-04-05 RX ORDER — FENTANYL CITRATE 50 UG/ML
25 INJECTION, SOLUTION INTRAMUSCULAR; INTRAVENOUS
Status: DISCONTINUED | OUTPATIENT
Start: 2022-04-05 | End: 2022-04-05 | Stop reason: HOSPADM

## 2022-04-05 RX ORDER — GUAIFENESIN 600 MG/1
1200 TABLET, EXTENDED RELEASE ORAL 2 TIMES DAILY
Status: DISCONTINUED | OUTPATIENT
Start: 2022-04-05 | End: 2022-04-07 | Stop reason: HOSPADM

## 2022-04-05 RX ORDER — OXYCODONE HCL 10 MG/1
10 TABLET, FILM COATED, EXTENDED RELEASE ORAL EVERY 12 HOURS SCHEDULED
Status: DISCONTINUED | OUTPATIENT
Start: 2022-04-05 | End: 2022-04-07 | Stop reason: HOSPADM

## 2022-04-05 RX ORDER — CEFAZOLIN SODIUM IN 0.9 % NACL 3 G/100 ML
3 INTRAVENOUS SOLUTION, PIGGYBACK (ML) INTRAVENOUS EVERY 8 HOURS
Status: COMPLETED | OUTPATIENT
Start: 2022-04-05 | End: 2022-04-06

## 2022-04-05 RX ORDER — OXYCODONE HCL 10 MG/1
10 TABLET, FILM COATED, EXTENDED RELEASE ORAL
Status: COMPLETED | OUTPATIENT
Start: 2022-04-05 | End: 2022-04-05

## 2022-04-05 RX ORDER — SODIUM CHLORIDE 0.9 % (FLUSH) 0.9 %
10 SYRINGE (ML) INJECTION AS NEEDED
Status: DISCONTINUED | OUTPATIENT
Start: 2022-04-05 | End: 2022-04-05 | Stop reason: HOSPADM

## 2022-04-05 RX ORDER — ACETAMINOPHEN 500 MG
1000 TABLET ORAL EVERY 8 HOURS
Status: DISCONTINUED | OUTPATIENT
Start: 2022-04-05 | End: 2022-04-07 | Stop reason: HOSPADM

## 2022-04-05 RX ORDER — LIDOCAINE HYDROCHLORIDE 10 MG/ML
INJECTION, SOLUTION EPIDURAL; INFILTRATION; INTRACAUDAL; PERINEURAL AS NEEDED
Status: DISCONTINUED | OUTPATIENT
Start: 2022-04-05 | End: 2022-04-05 | Stop reason: SURG

## 2022-04-05 RX ORDER — ONDANSETRON 4 MG/1
4 TABLET, FILM COATED ORAL EVERY 6 HOURS PRN
Status: DISCONTINUED | OUTPATIENT
Start: 2022-04-05 | End: 2022-04-07 | Stop reason: HOSPADM

## 2022-04-05 RX ORDER — SODIUM CHLORIDE, SODIUM LACTATE, POTASSIUM CHLORIDE, CALCIUM CHLORIDE 600; 310; 30; 20 MG/100ML; MG/100ML; MG/100ML; MG/100ML
1000 INJECTION, SOLUTION INTRAVENOUS CONTINUOUS
Status: DISCONTINUED | OUTPATIENT
Start: 2022-04-05 | End: 2022-04-05

## 2022-04-05 RX ORDER — MORPHINE SULFATE 4 MG/ML
4 INJECTION, SOLUTION INTRAMUSCULAR; INTRAVENOUS
Status: DISCONTINUED | OUTPATIENT
Start: 2022-04-05 | End: 2022-04-07 | Stop reason: HOSPADM

## 2022-04-05 RX ORDER — PROMETHAZINE HYDROCHLORIDE 12.5 MG/1
12.5 TABLET ORAL EVERY 6 HOURS PRN
Status: DISCONTINUED | OUTPATIENT
Start: 2022-04-05 | End: 2022-04-07 | Stop reason: HOSPADM

## 2022-04-05 RX ORDER — MELOXICAM 15 MG/1
15 TABLET ORAL ONCE
Status: COMPLETED | OUTPATIENT
Start: 2022-04-05 | End: 2022-04-05

## 2022-04-05 RX ORDER — CARVEDILOL 3.12 MG/1
3.12 TABLET ORAL DAILY
Status: DISCONTINUED | OUTPATIENT
Start: 2022-04-05 | End: 2022-04-07 | Stop reason: HOSPADM

## 2022-04-05 RX ORDER — HYDROCHLOROTHIAZIDE 12.5 MG/1
6.25 TABLET ORAL DAILY
Status: DISCONTINUED | OUTPATIENT
Start: 2022-04-05 | End: 2022-04-07 | Stop reason: HOSPADM

## 2022-04-05 RX ORDER — NALOXONE HCL 0.4 MG/ML
0.4 VIAL (ML) INJECTION AS NEEDED
Status: DISCONTINUED | OUTPATIENT
Start: 2022-04-05 | End: 2022-04-05 | Stop reason: HOSPADM

## 2022-04-05 RX ORDER — POTASSIUM CHLORIDE 750 MG/1
10 TABLET, FILM COATED, EXTENDED RELEASE ORAL DAILY PRN
Status: DISCONTINUED | OUTPATIENT
Start: 2022-04-06 | End: 2022-04-07 | Stop reason: HOSPADM

## 2022-04-05 RX ORDER — ONDANSETRON 2 MG/ML
4 INJECTION INTRAMUSCULAR; INTRAVENOUS EVERY 6 HOURS PRN
Status: DISCONTINUED | OUTPATIENT
Start: 2022-04-05 | End: 2022-04-07 | Stop reason: HOSPADM

## 2022-04-05 RX ORDER — ACETAMINOPHEN 500 MG
1000 TABLET ORAL ONCE
Status: COMPLETED | OUTPATIENT
Start: 2022-04-05 | End: 2022-04-05

## 2022-04-05 RX ORDER — PROMETHAZINE HYDROCHLORIDE 25 MG/1
25 TABLET ORAL ONCE AS NEEDED
Status: DISCONTINUED | OUTPATIENT
Start: 2022-04-05 | End: 2022-04-05 | Stop reason: HOSPADM

## 2022-04-05 RX ORDER — SODIUM CHLORIDE, SODIUM LACTATE, POTASSIUM CHLORIDE, CALCIUM CHLORIDE 600; 310; 30; 20 MG/100ML; MG/100ML; MG/100ML; MG/100ML
100 INJECTION, SOLUTION INTRAVENOUS CONTINUOUS
Status: DISCONTINUED | OUTPATIENT
Start: 2022-04-05 | End: 2022-04-07 | Stop reason: HOSPADM

## 2022-04-05 RX ORDER — DEXAMETHASONE SODIUM PHOSPHATE 4 MG/ML
INJECTION, SOLUTION INTRA-ARTICULAR; INTRALESIONAL; INTRAMUSCULAR; INTRAVENOUS; SOFT TISSUE
Status: COMPLETED | OUTPATIENT
Start: 2022-04-05 | End: 2022-04-05

## 2022-04-05 RX ORDER — ROPIVACAINE HYDROCHLORIDE 5 MG/ML
INJECTION, SOLUTION EPIDURAL; INFILTRATION; PERINEURAL AS NEEDED
Status: DISCONTINUED | OUTPATIENT
Start: 2022-04-05 | End: 2022-04-05 | Stop reason: HOSPADM

## 2022-04-05 RX ORDER — ACETAMINOPHEN 650 MG
TABLET, EXTENDED RELEASE ORAL AS NEEDED
Status: DISCONTINUED | OUTPATIENT
Start: 2022-04-05 | End: 2022-04-05 | Stop reason: HOSPADM

## 2022-04-05 RX ORDER — MELOXICAM 15 MG/1
15 TABLET ORAL DAILY
Status: DISCONTINUED | OUTPATIENT
Start: 2022-04-06 | End: 2022-04-07 | Stop reason: HOSPADM

## 2022-04-05 RX ORDER — EPHEDRINE SULFATE 5 MG/ML
5 INJECTION INTRAVENOUS ONCE AS NEEDED
Status: DISCONTINUED | OUTPATIENT
Start: 2022-04-05 | End: 2022-04-05 | Stop reason: HOSPADM

## 2022-04-05 RX ORDER — DIPHENHYDRAMINE HYDROCHLORIDE 50 MG/ML
25 INJECTION INTRAMUSCULAR; INTRAVENOUS EVERY 6 HOURS PRN
Status: DISCONTINUED | OUTPATIENT
Start: 2022-04-05 | End: 2022-04-07 | Stop reason: HOSPADM

## 2022-04-05 RX ORDER — GABAPENTIN 300 MG/1
600 CAPSULE ORAL
Status: COMPLETED | OUTPATIENT
Start: 2022-04-05 | End: 2022-04-05

## 2022-04-05 RX ORDER — EPHEDRINE SULFATE 5 MG/ML
INJECTION INTRAVENOUS AS NEEDED
Status: DISCONTINUED | OUTPATIENT
Start: 2022-04-05 | End: 2022-04-05 | Stop reason: SURG

## 2022-04-05 RX ORDER — SODIUM CHLORIDE 9 MG/ML
100 INJECTION, SOLUTION INTRAVENOUS CONTINUOUS
Status: DISCONTINUED | OUTPATIENT
Start: 2022-04-05 | End: 2022-04-07 | Stop reason: HOSPADM

## 2022-04-05 RX ORDER — DIPHENHYDRAMINE HYDROCHLORIDE 50 MG/ML
12.5 INJECTION INTRAMUSCULAR; INTRAVENOUS
Status: DISCONTINUED | OUTPATIENT
Start: 2022-04-05 | End: 2022-04-05 | Stop reason: HOSPADM

## 2022-04-05 RX ORDER — ONDANSETRON 2 MG/ML
4 INJECTION INTRAMUSCULAR; INTRAVENOUS ONCE AS NEEDED
Status: DISCONTINUED | OUTPATIENT
Start: 2022-04-05 | End: 2022-04-05 | Stop reason: HOSPADM

## 2022-04-05 RX ORDER — ZOLPIDEM TARTRATE 5 MG/1
5 TABLET ORAL NIGHTLY PRN
Status: DISCONTINUED | OUTPATIENT
Start: 2022-04-05 | End: 2022-04-07 | Stop reason: HOSPADM

## 2022-04-05 RX ORDER — POLYETHYLENE GLYCOL 3350 17 G/17G
17 POWDER, FOR SOLUTION ORAL DAILY
Status: DISCONTINUED | OUTPATIENT
Start: 2022-04-05 | End: 2022-04-07 | Stop reason: HOSPADM

## 2022-04-05 RX ORDER — FERROUS SULFATE TAB EC 324 MG (65 MG FE EQUIVALENT) 324 (65 FE) MG
324 TABLET DELAYED RESPONSE ORAL
Status: DISCONTINUED | OUTPATIENT
Start: 2022-04-06 | End: 2022-04-07 | Stop reason: HOSPADM

## 2022-04-05 RX ORDER — BUDESONIDE AND FORMOTEROL FUMARATE DIHYDRATE 160; 4.5 UG/1; UG/1
2 AEROSOL RESPIRATORY (INHALATION)
Status: DISCONTINUED | OUTPATIENT
Start: 2022-04-05 | End: 2022-04-07 | Stop reason: HOSPADM

## 2022-04-05 RX ORDER — FLUMAZENIL 0.1 MG/ML
0.2 INJECTION INTRAVENOUS AS NEEDED
Status: DISCONTINUED | OUTPATIENT
Start: 2022-04-05 | End: 2022-04-05 | Stop reason: HOSPADM

## 2022-04-05 RX ORDER — MIDAZOLAM HYDROCHLORIDE 1 MG/ML
1 INJECTION INTRAMUSCULAR; INTRAVENOUS
Status: DISCONTINUED | OUTPATIENT
Start: 2022-04-05 | End: 2022-04-05 | Stop reason: HOSPADM

## 2022-04-05 RX ORDER — OXYCODONE HYDROCHLORIDE 5 MG/1
5 TABLET ORAL EVERY 4 HOURS PRN
Status: DISCONTINUED | OUTPATIENT
Start: 2022-04-05 | End: 2022-04-07 | Stop reason: HOSPADM

## 2022-04-05 RX ORDER — DIPHENHYDRAMINE HCL 25 MG
25 CAPSULE ORAL EVERY 6 HOURS PRN
Status: DISCONTINUED | OUTPATIENT
Start: 2022-04-05 | End: 2022-04-07 | Stop reason: HOSPADM

## 2022-04-05 RX ADMIN — EPHEDRINE SULFATE 10 MG: 5 INJECTION INTRAVENOUS at 10:49

## 2022-04-05 RX ADMIN — TRANEXAMIC ACID 1000 MG: 10 INJECTION, SOLUTION INTRAVENOUS at 10:41

## 2022-04-05 RX ADMIN — ROPIVACAINE HYDROCHLORIDE 30 ML: 5 INJECTION EPIDURAL; INFILTRATION; PERINEURAL at 09:20

## 2022-04-05 RX ADMIN — TRAZODONE HYDROCHLORIDE 50 MG: 50 TABLET ORAL at 21:15

## 2022-04-05 RX ADMIN — FENTANYL CITRATE 50 MCG: 50 INJECTION, SOLUTION INTRAMUSCULAR; INTRAVENOUS at 11:24

## 2022-04-05 RX ADMIN — FENTANYL CITRATE 100 MCG: 50 INJECTION, SOLUTION INTRAMUSCULAR; INTRAVENOUS at 10:38

## 2022-04-05 RX ADMIN — PROPOFOL 200 MG: 10 INJECTION, EMULSION INTRAVENOUS at 10:37

## 2022-04-05 RX ADMIN — POLYETHYLENE GLYCOL 3350 17 G: 17 POWDER, FOR SOLUTION ORAL at 15:36

## 2022-04-05 RX ADMIN — Medication 3 G: at 10:32

## 2022-04-05 RX ADMIN — Medication 50 MCG: at 11:59

## 2022-04-05 RX ADMIN — CETIRIZINE HYDROCHLORIDE, PSEUDOEPHEDRINE HYDROCHLORIDE 1 TABLET: 5; 120 TABLET, FILM COATED, EXTENDED RELEASE ORAL at 21:15

## 2022-04-05 RX ADMIN — OXYCODONE HYDROCHLORIDE 10 MG: 10 TABLET, FILM COATED, EXTENDED RELEASE ORAL at 07:32

## 2022-04-05 RX ADMIN — EPHEDRINE SULFATE 10 MG: 5 INJECTION INTRAVENOUS at 11:59

## 2022-04-05 RX ADMIN — Medication 100 G: at 10:38

## 2022-04-05 RX ADMIN — Medication 200 G: at 10:37

## 2022-04-05 RX ADMIN — CARVEDILOL 3.12 MG: 3.12 TABLET, FILM COATED ORAL at 15:36

## 2022-04-05 RX ADMIN — Medication 50 MCG: at 10:59

## 2022-04-05 RX ADMIN — GABAPENTIN 600 MG: 300 CAPSULE ORAL at 07:32

## 2022-04-05 RX ADMIN — EPHEDRINE SULFATE 10 MG: 5 INJECTION INTRAVENOUS at 10:46

## 2022-04-05 RX ADMIN — ONDANSETRON 4 MG: 2 INJECTION INTRAMUSCULAR; INTRAVENOUS at 11:50

## 2022-04-05 RX ADMIN — EPHEDRINE SULFATE 10 MG: 5 INJECTION INTRAVENOUS at 10:59

## 2022-04-05 RX ADMIN — CEFAZOLIN SODIUM 3 G: 10 INJECTION, POWDER, FOR SOLUTION INTRAVENOUS at 21:16

## 2022-04-05 RX ADMIN — RIVAROXABAN 10 MG: 10 TABLET, FILM COATED ORAL at 17:23

## 2022-04-05 RX ADMIN — MUPIROCIN: 20 OINTMENT TOPICAL at 14:24

## 2022-04-05 RX ADMIN — MELOXICAM 15 MG: 15 TABLET ORAL at 07:32

## 2022-04-05 RX ADMIN — SODIUM CHLORIDE, SODIUM LACTATE, POTASSIUM CHLORIDE, AND CALCIUM CHLORIDE: .6; .31; .03; .02 INJECTION, SOLUTION INTRAVENOUS at 10:25

## 2022-04-05 RX ADMIN — PROPOFOL 150 MCG/KG/MIN: 10 INJECTION, EMULSION INTRAVENOUS at 10:38

## 2022-04-05 RX ADMIN — ACETAMINOPHEN 1000 MG: 500 TABLET, FILM COATED ORAL at 15:36

## 2022-04-05 RX ADMIN — SODIUM CHLORIDE, POTASSIUM CHLORIDE, SODIUM LACTATE AND CALCIUM CHLORIDE 1000 ML: 600; 310; 30; 20 INJECTION, SOLUTION INTRAVENOUS at 07:31

## 2022-04-05 RX ADMIN — LIDOCAINE HYDROCHLORIDE 50 MG: 10 INJECTION, SOLUTION EPIDURAL; INFILTRATION; INTRACAUDAL; PERINEURAL at 10:37

## 2022-04-05 RX ADMIN — HYDROMORPHONE HYDROCHLORIDE 0.5 MG: 1 INJECTION, SOLUTION INTRAMUSCULAR; INTRAVENOUS; SUBCUTANEOUS at 13:24

## 2022-04-05 RX ADMIN — ACETAMINOPHEN 1000 MG: 500 TABLET, FILM COATED ORAL at 07:32

## 2022-04-05 RX ADMIN — ACETAMINOPHEN 1000 MG: 500 TABLET, FILM COATED ORAL at 23:10

## 2022-04-05 RX ADMIN — DEXAMETHASONE SODIUM PHOSPHATE 4 MG: 4 INJECTION, SOLUTION INTRAMUSCULAR; INTRAVENOUS at 09:20

## 2022-04-05 RX ADMIN — MUPIROCIN: 20 OINTMENT TOPICAL at 07:33

## 2022-04-05 RX ADMIN — ALLOPURINOL 300 MG: 300 TABLET ORAL at 15:36

## 2022-04-05 RX ADMIN — GABAPENTIN 300 MG: 300 CAPSULE ORAL at 21:16

## 2022-04-05 RX ADMIN — GUAIFENESIN 1200 MG: 600 TABLET, EXTENDED RELEASE ORAL at 21:15

## 2022-04-05 RX ADMIN — SODIUM CHLORIDE, POTASSIUM CHLORIDE, SODIUM LACTATE AND CALCIUM CHLORIDE 100 ML/HR: 600; 310; 30; 20 INJECTION, SOLUTION INTRAVENOUS at 14:06

## 2022-04-05 RX ADMIN — OXYCODONE HYDROCHLORIDE 10 MG: 10 TABLET, FILM COATED, EXTENDED RELEASE ORAL at 21:15

## 2022-04-05 NOTE — H&P
History & Physical       Patient: Leonid Diehl    Date of Admission: 4/5/2022  6:32 AM    YOB: 1955    Medical Record Number: [id number]    Attending Physician: Yash Celis MD        Chief Complaints: Bilateral primary osteoarthritis of knee [M17.0]      History of Present Illness: 67 y.o. male presents with Bilateral primary osteoarthritis of knee [M17.0]. Onset of symptoms was gradual and slowly progressive over the past 1 year.  Symptoms are associated with pain and discomfort on the medial aspect of the left knee.  Symptoms are aggravated by deep flexion of the knee and going up and down the steps.   Symptoms improve with using a supportive brace. Patient is now being admitted to the services of Yash Celis MD for further evaluation and treatment.      No Known Allergies      Home Medications:  Medications Prior to Admission   Medication Sig Dispense Refill Last Dose   • allopurinol (ZYLOPRIM) 300 MG tablet Take 1 tablet by mouth Daily. 90 tablet 1 4/3/2022   • Calcium Carbonate-Vit D-Min (CALCIUM 1200 PO) Take 1 dose by mouth Every Night.   3/28/2022   • carvedilol (COREG) 3.125 MG tablet Take 1 tablet by mouth Daily. 90 tablet 1 4/3/2022   • ferrous sulfate 324 (65 Fe) MG tablet delayed-release EC tablet Take 324 mg by mouth Every Night.   3/28/2022   • fexofenadine-pseudoephedrine (ALLEGRA-D 24) 180-240 MG per 24 hr tablet Take 1 tablet by mouth Daily.   4/3/2022   • furosemide (LASIX) 20 MG tablet Take 1 tablet by mouth Daily As Needed (swelling). swelling (Patient taking differently: Take 20 mg by mouth Daily. swelling) 90 tablet 1 4/3/2022   • guaiFENesin (MUCINEX) 600 MG 12 hr tablet Take 1,200 mg by mouth 2 (Two) Times a Day.   4/3/2022   • lisinopril-hydrochlorothiazide (PRINZIDE,ZESTORETIC) 10-12.5 MG per tablet Take 1 tablet by mouth Daily. (Patient taking differently: Take 0.5 tablets by mouth Daily.) 90 tablet 1 4/3/2022   • meloxicam (MOBIC) 7.5 MG tablet Take 1 tablet by  mouth Daily. 90 tablet 1 4/3/2022   • mupirocin (BACTROBAN) 2 % ointment Apply a pea-sized amount to each nostril twice daily for 5 days prior to surgery. 22 g 0 4/5/2022   • potassium chloride 10 MEQ CR tablet TAKE 1 TABLET BY MOUTH DAILY AS NEEDED (Patient taking differently: Take 10 mEq by mouth Daily. WITH LASIX) 90 tablet 0 4/3/2022   • traZODone (DESYREL) 50 MG tablet TAKE ONE TABLET BY MOUTH EVERY NIGHT AT BEDTIME AS NEEDED FOR SLEEP (Patient taking differently: Take 50 mg by mouth Every Night.) 90 tablet 0 4/1/2022   • albuterol sulfate  (90 Base) MCG/ACT inhaler Inhale 2 puffs Every 6 (Six) Hours As Needed for Wheezing or Shortness of Air. (Patient taking differently: Inhale 2 puffs Every 6 (Six) Hours As Needed for Wheezing or Shortness of Air. USES SEASONALLY) 8.5 g 2 More than a month at Unknown time   • Iron, Ferrous Gluconate, 256 (28 Fe) MG tablet Take 325 mg by mouth Daily.      • Symbicort 160-4.5 MCG/ACT inhaler Inhale 2 puffs 2 (Two) Times a Day. (Patient taking differently: Inhale 2 puffs 2 (Two) Times a Day. USES SEASONALLLY) 10.2 g 11 More than a month at Unknown time   • zinc gluconate 50 MG tablet TAKE ONE TABLET BY MOUTH DAILY 30 tablet 0        Current Medications:  Scheduled Meds:ceFAZolin, 3 g, Intravenous, Once  mupirocin, , Nasal, BID  tranexamic acid, 1,000 mg, Intravenous, Once      Continuous Infusions:lactated ringers, 1,000 mL, Last Rate: 1,000 mL (04/05/22 0731)      PRN Meds:.lidocaine  •  sodium chloride       Past Medical History:   Diagnosis Date   • Allergic    • Anemia    • Asthma    • Gout    • Hypertension    • Insomnia    • Peripheral edema    • Raynaud's disease         Past Surgical History:   Procedure Laterality Date   • CATARACT EXTRACTION, BILATERAL     • TOTAL SHOULDER ARTHROPLASTY W/ DISTAL CLAVICLE EXCISION Right 10/22/2019    Procedure: TOTAL SHOULDER REVERSE ARTHROPLASTY with Biceps Tenodesis;  Surgeon: Chris Lafleur MD;  Location: [facility]  [facility];  Service: Orthopedics        Social History     Occupational History   • Not on file   Tobacco Use   • Smoking status: Never Smoker   • Smokeless tobacco: Never Used   Vaping Use   • Vaping Use: Never used   Substance and Sexual Activity   • Alcohol use: No   • Drug use: No   • Sexual activity: Defer      Social History     Social History Narrative   • Not on file        Family History   Problem Relation Age of Onset   • Cancer Father    • Heart disease Sister    • Hypertension Brother          Review of Systems:   HEENT: Patient denies any headaches, vision changes, change in hearing, or tinnitus, Patient denies any rhinorrhea,epistaxis, sinus pain, mouth or dental problems, sore throat or hoarseness, or dysphagia  Pulmonary: Patient denies any cough, congestion, SOA, or wheezing  Cardiovascular: Patient denies any chest pain, dyspnea, palpitations, weakness, intolerance of exercise, varicosities, swelling of extremities, known murmur  Gastrointestinal:  Patient denies nausea, vomiting, diarrhea, constipation, loss  of appetite, change in appetite, dysphagia, gas, heartburn, melena, change in bowel habits, use of laxatives or other drugs to alter the function of the gastrointestinal tract.  Genital/Urinary: Patient denies dysuria, change in color of urine, change in frequency of urination, pain with urgency, incontinence, retention, or nocturia.  Musculoskeletal: Patient denies increased warmth; redness; or swelling of joints; limitation of function; deformity; crepitation: pain in a joint or an extremity, the neck, or the back, especially with movement.  Neurological: Patient denies dizziness, tremor, ataxia, difficulty in speaking, change in speech, paresthesia, loss of sensation, seizures, syncope, changes in memory.  Endocrine system: Patient denies tremors, palpitations, intolerance of heat or cold, polyuria, polydipsia, polyphagia, diaphoresis, exophthalmos, or goiter.  Psychological: Patient denies  "thoughts/plans or harming self or other; depression,  insomnia, night terrors, mason, memory loss, disorientation.  Skin: Patient denies any bruising, rashes, discoloration, pruritus, wounds, ulcers, decubiti, changes in the hair or nails  Hematopoietic: Patient denies history of spontaneous or excessive bleeding, epistaxis, hematuria, melena, fatigue, enlarged or tender lymph nodes, pallor, history of anemia.    Physical Exam: 67 y.o. male  Vitals:    03/25/22 1325 04/05/22 0716   BP:  154/69   BP Location:  Right arm   Patient Position:  Lying   Pulse:  66   Resp:  16   Temp:  98.5 °F (36.9 °C)   TempSrc:  Oral   SpO2:  97%   Weight: 122 kg (270 lb) 125 kg (276 lb)   Height: 188 cm (74\") 188 cm (74\")       General Appearance:          Alert, cooperative, in no acute distress                                                 Head:    Normocephalic, without obvious abnormality, atraumatic   Eyes:            Lids and lashes normal, conjunctivae and sclerae normal, no   icterus, no pallor, corneas clear, PERRLA   Ears:    Ears appear intact with no abnormalities noted   Throat:   No oral lesions, no thrush, oral mucosa moist   Neck:   No adenopathy, supple, trachea midline, no thyromegaly, no   carotid bruit, no JVD   Back:     No kyphosis present, no scoliosis present, no skin lesions,      erythema or scars, no tenderness to percussion or                   palpation,   range of motion normal   Lungs:     Clear to auscultation,respirations regular, even and                  unlabored    Heart:    Regular rhythm and normal rate, normal S1 and S2, no            murmur, no gallop, no rub, no click   Chest Wall:    No abnormalities observed   Abdomen:     Normal bowel sounds, no masses, no organomegaly, soft        nontender, nondistended, no guarding, no rebound                tenderness   Rectal:     Deferred   Extremities:   Tenderness over medial aspect of the left knee. Moves all extremities well, no edema,   no " cyanosis, no redness   Pulses:   Pulses palpable and equal bilaterally   Skin:   No bleeding, bruising or rash   Lymph nodes:   No palpable adenopathy   Neurologic:   Cranial nerves 2 - 12 grossly intact, sensation intact, DTR       present and equal bilaterally      Left knee. Patient has crepitus throughout range of motion. Positive patellar grind test. Mild effusion. Lachman is negative. Pivot shift is negative. Anterior and posterior drawer signs are negative. Significant joint line tenderness is noted on the medial aspect of the knee. Patient has a varus orientation of the knee. There is fullness and tenderness in the popliteal fossa. Mild distention of a popliteal cyst is noted in this location. Range of motion in flexion is from 0-100 degrees. Neurovascular status is intact.  Dorsalis pedis and posterior tibial artery pulses are palpable. Common peroneal nerve function is well preserved. Patient's gait is cautious and antalgic. Skin and soft tissues are mildly swollen, consistent with synovitis and effusion. The patient has a significant limp with the first few steps after starting the gait cycle. Getting out of a chair takes a lot of effort due to pain on knee flexion.     Diagnostic Tests:  No visits with results within 2 Day(s) from this visit.   Latest known visit with results is:   Lab on 04/02/2022   Component Date Value Ref Range Status   • COVID19 04/02/2022 Not Detected  Not Detected - Ref. Range Final     No results found.      Assessment:  Patient Active Problem List   Diagnosis   • Acute sinusitis   • Anemia due to blood loss   • Asthma   • Benign prostatic hyperplasia   • Congestive heart failure (HCC)   • Encounter for general adult medical examination without abnormal findings   • Hay fever   • Biliary disease   • Hepatic disease   • History of alcohol abuse   • Hx of pancreatitis   • Hyperglycemia   • Hypertension   • Hypoxemia   • Systolic murmur   • Tongue swelling   • Osteoarthritis of  right glenohumeral joint   • DJD of shoulder   • Bilateral primary osteoarthritis of knee         Plan:  The patient voiced understanding of the risks, benefits, and alternative forms of treatment that were discussed and the patient consents to proceed with left total knee arthroplasty.     The patient was seen today for preoperative discussion.  The patient has been tried on over-the-counter and prescription NSAID's despite the risks of anti-inflammatory bleeding, peptic ulcers and erosive gastritis with short term benefit only.  Braces have been prescribed for mechanical support.  Patient has been participating in an exercise program specifically targeting joint pain relief with limited benefit. Intraarticular injections have been used periodically with some but not complete relief of pain.  Ambulation aids have also been utilized.      The details of the surgical procedure were explained including the location of probable incisions and a description of the likely hardware/grafts to be used. The patient understands the likely convalescence after surgery as well as the rehabilitation required.  Also, we have thoroughly discussed with the patient the risks, benefits and alternatives to surgery.  Risks include but are not limited to the risk of infection, joint stiffness, limited range of motion, wound healing problems, scar tissue build up, myocardial infarction, stroke, blood clots (including DVT and/or pulmonary embolus along with the risk of death) neurologic and/or vascular injury, limb length discrepancy, fracture, dislocation, nonunion, malunion, continued pain and need for further surgery including hardware failure requiring revision.   Discharge Plan: today to home and home health      Date: 4/5/2022    Yash Celis MD      DICTATED UTILIZING DRAGON DICTATION

## 2022-04-05 NOTE — ANESTHESIA PROCEDURE NOTES
Airway  Urgency: elective    Date/Time: 4/5/2022 10:39 AM  Airway not difficult    General Information and Staff    Patient location during procedure: OR  Anesthesiologist: Sky Varghese MD  CRNA: Cyndi Walden CAA    Indications and Patient Condition  Indications for airway management: airway protection    Preoxygenated: yes  Mask difficulty assessment: 1 - vent by mask    Final Airway Details  Final airway type: supraglottic airway      Successful airway: unique  Size 5    Number of attempts at approach: 1  Assessment: lips, teeth, and gum same as pre-op and atraumatic intubation

## 2022-04-05 NOTE — ANESTHESIA PROCEDURE NOTES
Peripheral Block    Pre-sedation assessment completed: 4/5/2022 9:00 AM    Patient reassessed immediately prior to procedure    Patient location during procedure: pre-op  Reason for block: procedure for pain, at surgeon's request, post-op pain management and secondary anesthetic  Performed by  Anesthesiologist: Sky Varghese MD  Preanesthetic Checklist  Completed: patient identified, IV checked, site marked, risks and benefits discussed, surgical consent, monitors and equipment checked, pre-op evaluation and timeout performed  Prep:  Pt Position: sitting  Sterile barriers:cap, gloves, mask and washed/disinfected hands  Prep: ChloraPrep  Patient monitoring: blood pressure monitoring, continuous pulse oximetry and EKG  Procedure    Sedation: no  Performed under: local infiltration  Guidance:ultrasound guided and landmark technique    ULTRASOUND INTERPRETATION.  Using ultrasound guidance a 20 G gauge needle was placed in close proximity to the femoral nerve, at which point, under ultrasound guidance anesthetic was injected in the area of the nerve and spread of the anesthesia was seen on ultrasound in close proximity thereto.  There were no abnormalities seen on ultrasound; a digital image was taken; and the patient tolerated the procedure with no complications. Images:still images obtained, printed/placed on chart    Laterality:left  Block Type:adductor canal block  Injection Technique:single-shot  Needle Type:echogenic  Needle Gauge:20 G  Resistance on Injection: less than 15 psi    Medications Used: dexamethasone (DECADRON) injection, 4 mg  ropivacaine (NAROPIN) 0.5 % injection, 30 mL  Med administered at 4/5/2022 9:20 AM      Post Assessment  Injection Assessment: negative aspiration for heme, no paresthesia on injection and incremental injection  Patient Tolerance:comfortable throughout block  Complications:no  Additional Notes  Pre-procedure:  Peripheral nerve block performed preoperatively prior to the  start of anesthesia time at the request of the patient and the surgeon for the management of postoperative acute surgical pain as well as for a secondary intraoperative anesthetic (general anesthesia is the primary intraoperative anesthetic); patient identified; pre-procedure vital signs, all relevant labs/studies, complete medical/surgical/anesthetic history, full medication list, full allergy list, and NPO status obtained/reviewed; physical assessment performed; anesthetic options, side effects, potential complications, risks, and benefits discussed; questions answered; patient wishes to proceed with the procedure; written anesthesia procedure consent obtained; patient cleared for procedure; time out performed; IV access in situ    Procedure:  ASA monitor placed; supplemental oxygen provided; patient positioned; hand hygiene performed; sterile technique maintained throughout the procedure; sterile prep applied; insertion site determined by anatomical landmarks, palpation, and ultrasound imaging; live ultrasound guidance throughout the procedure; target nerves/landmarks identified on live ultrasound; skin and subcutaneous tissues numbed by injection of 1% lidocaine; 4 inch 20G StimupAutomsoft Ultra 360 Insulated Echogenic Needle used; realtime needle advancement and placement near the target nerves witnessed on live ultrasound; negative aspiration prior to injection; correct needle placement confirmed on live ultrasound by local anesthetic spread around the target nerves; local anesthetic mixture injected with negative aspiration prior to each injection and after each 1-5 mL injected; needle withdrawn; dressing applied; ultrasound image printed and placed in the patient's permanent medical record    Post-procedure:  Peripheral nerve block placed successfully; good block; no apparent complications; minimal estimated blood loss; vital signs stable throughout; see nurse's notes for vitals; transported to the OR, general  anesthesia induced, and surgery started

## 2022-04-05 NOTE — OP NOTE
TOTAL KNEE ARTHROPLASTY OPERATIVE     PATIENT NAME:Leonid Diehl     YOB: 1955     ATTENDING PHYSICIAN: Yash Celis MD     DATE OF PROCEDURE: 4/5/2022     PREOPERATIVE DIAGNOSIS: Severe degenerative osteoarthritis, left knee.    POSTOPERATIVE DIAGNOSIS: Post-Op Diagnosis Codes:     * Bilateral primary osteoarthritis of knee [M17.0]    PRINCIPAL DIAGNOSIS: Severe degenerative osteoarthritis left knee with a varus deformity.    PROCEDURE: Left total knee arthroplasty.    SURGEON:  Yash Celis M.D.    ASSISTANT: first Parvin assistant was responsible for performing the following activities: Retraction, Suction, Irrigation, Suturing, Closing and Placing Dressing and their skilled assistance was necessary for the success of this case.       ANESTHESIOLOGIST: Dr. Sky Varghese MD.    ANESTHESIA: Adductor canal block for postop pain control followed by general anesthesia.    POSITION: Supine on the operating table.    DRAINS: None.    COMPLICATIONS: None.    ESTIMATED BLOOD LOSS: 200ml    IMPLANT USED: Augustin Persona total knee replacement system, #11 standard posterior cruciate retaining femoral component, number H tibia with a 30 mm intramedullary stem, 10 mm thick MC, medially constrained Vitamin E impregnated polyethylene insert, #41 patella anchored into position using Refobicin antibiotic impregnated bone cement.     SPECIMENS: * No orders in the log *        INDICATION: The patient has been having very significant pain and discomfort in the knee.  We had scheduled the patient for total knee replacement after all forms of conservative nonoperative care had failed to provide adequate relief of symptoms.  Patient understood all the risks and benefits which were discussed in great detail including the possibility of death, infection, myocardial infarction, DVT, pulmonary embolism, stiffness of the knee, wound infection and breakdown, possibility of revision surgery, neurovascular compromise,  pneumonia, pulmonary embolism      DETAILS OF PROCEDURE: Surgical timeout was called. Operative extremity was correctly identified in the operating room suite. Patient was placed under appropriate anesthetic.  Patient was administered IV antibiotics per SCIP protocol and hospital policy. The patient’s operative extremity was correctly identified in the operating room suite.A Tourniquet was applied after the leg has been exsanguinated. The correctly identified extremity was prepped and draped in a standard fashion.      An anterior approach was performed and a quad sparing, subvastus approach was carried out. The patellofemoral ligament was resected. Medial soft tissue release was carried out on the medial face of the tibia to balance the soft tissues and to release the contracture of the knee MCL. Tibial Osteophytes were resected. Severe bone-on-bone appearance was noted.  A thorough synovectomy was carried out. The Synovium was thickened and hypertrophic. The PCL and MCL were carefully protected throughout the entire procedure. The distal femur was mapped. A 10 mm thick cut was made off the distal femur. A measuring guide was used and #11 standard posterior cruciate retaining femoral component jig was found to be the best fit. Anterior, posterior and chamfer cuts were created. Care was taken to prevent creating a distal femoral notch. The proximal tibia was then mapped with navigation. 4 mm of the bone was resected off the medial tibial plateau.  An appropriate tibial slope was built into the cut to match the normal anatomy.  A number H tibia was found to be the best fit resting nicely on the cortical margins of the proximally resected tibial metaphysis.  A trial reduction was carried out. Rotation and orientation of the components were carefully marked. Flexion and extension gaps were checked and found to be symmetric. The stability of the components was checked in full extension, mid- flexion and full flexion.The  patella was everted, it was measured and cut. Patellar Tracking was excellent.  A #41 patella was found to be the best fit.  A Lateral release was not deemed necessary. Femoral lug holes were drilled. The Proximal Tibia was die punched. Patellar anchor holes were drilled.  The posterior capsular structures and the subperiosteal ligamentous insertions were infiltrated with Exparel as a local anesthetic.    The cancellous bone was lavaged with a Pulse lavage  and dried.  We also used diluted Betadine as an antiseptic rinse solution.  Cement was mixed on the back table. Components were cemented into position sequentially, starting with the number H tibial component and going to the #11 standard posterior cruciate retaining femur and then the #41 patella. The excess amounts of bone cement were removed from the bone-metal interface. Trial reduction was carried out once the cement was fully cured. A 10 mm tibial polyethylene insert, MC medially constrained design insert, was then locked into position on the tibial tray. Wound was lavaged with antibiotic containing irrigating solution. Tourniquet was deflated, hemostasis achieved. An analgesic cocktail was injected intra-articularly into the joint capsule and ligamentous insertions on bone for post op pain relief. The arthrotomy was repaired in 60 degrees of flexion. Subcutaneous sutures were applied. Occlusive dressing was applied. No complications were encountered. Sponge count and needle count were correct. The patient was reversed from anesthesia and taken from the operating room to the recovery room in stable condition. I discussed the satisfactory performance of this procedure with the patient’s family and answered all questions for them.       Yash Celis MD  4/5/2022  12:14 EDT

## 2022-04-05 NOTE — ANESTHESIA POSTPROCEDURE EVALUATION
Patient: Leonid Oconnellp    Procedure Summary     Date: 04/05/22 Room / Location: [facility] OR  / [facility] MAIN OR    Anesthesia Start: 1025 Anesthesia Stop: 1231    Procedure: TOTAL KNEE ARTHROPLASTY (Left Knee) Diagnosis:       Bilateral primary osteoarthritis of knee      (Bilateral primary osteoarthritis of knee [M17.0])    Surgeons: Yash Celis MD Provider: Sky Varghese MD    Anesthesia Type: general with block, ERAS Protocol ASA Status: 3          Anesthesia Type: general with block, ERAS Protocol    Vitals  Vitals Value Taken Time   /52 04/05/22 1334   Temp 97.3 °F (36.3 °C) 04/05/22 1331   Pulse 61 04/05/22 1337   Resp 10 04/05/22 1331   SpO2 94 % 04/05/22 1337   Vitals shown include unvalidated device data.        Post Anesthesia Care and Evaluation    Patient location during evaluation: PACU  Patient participation: complete - patient participated  Level of consciousness: awake  Pain scale: See nurse's notes for pain score.  Pain management: adequate  Airway patency: patent  Anesthetic complications: No anesthetic complications  PONV Status: none  Cardiovascular status: acceptable  Respiratory status: acceptable  Hydration status: acceptable    Comments: Patient seen and examined postoperatively; vital signs stable; SpO2 greater than or equal to 90%; cardiopulmonary status stable; nausea/vomiting adequately controlled; pain adequately controlled; no apparent anesthesia complications; patient discharged from anesthesia care when discharge criteria were met

## 2022-04-05 NOTE — THERAPY EVALUATION
Patient Name: Leonid Diehl  : 1955    MRN: [id number]                              Today's Date: 2022       Admit Date: 2022    Visit Dx:     ICD-10-CM ICD-9-CM   1. Bilateral primary osteoarthritis of knee  M17.0 715.16     Patient Active Problem List   Diagnosis   • Iron deficiency anemia   • Asthma   • Benign prostatic hyperplasia   • Chronic diastolic congestive heart failure (HCC)   • Encounter for general adult medical examination without abnormal findings   • Hay fever   • Biliary disease   • Hepatic disease   • History of alcohol abuse   • Hx of pancreatitis   • Primary hypertension   • Systolic murmur   • Bilateral primary osteoarthritis of knee   • Aortic stenosis, moderate   • Gout   • Insomnia   • Thrombocytopenia (HCC)     Past Medical History:   Diagnosis Date   • Aortic stenosis, moderate 2022   • Asthma    • Benign prostatic hyperplasia 2017   • Chronic diastolic congestive heart failure (HCC) 2019   • Gout    • Hay fever 2016   • History of alcohol abuse 2019   • Hx of pancreatitis 2019   • Insomnia    • Iron deficiency anemia 2019   • Peripheral edema    • Primary hypertension 2016   • Raynaud's disease    • Thrombocytopenia (HCC)      Past Surgical History:   Procedure Laterality Date   • CATARACT EXTRACTION, BILATERAL     • TOTAL SHOULDER ARTHROPLASTY W/ DISTAL CLAVICLE EXCISION Right 10/22/2019    Procedure: TOTAL SHOULDER REVERSE ARTHROPLASTY with Biceps Tenodesis;  Surgeon: Chris Lafleur MD;  Location: [facility];  Service: Orthopedics      General Information     Row Name 22 1545          Physical Therapy Time and Intention    Document Type evaluation  -BR     Mode of Treatment physical therapy  -BR     Row Name 22 1545          General Information    Patient Profile Reviewed yes  -BR     Prior Level of Function --  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 Pt was independent with  mobility.  -BR     Existing Precautions/Restrictions other (see comments)  Apache  -BR     Row Name 04/05/22 1545          Living Environment    People in Home spouse  -BR     Row Name 04/05/22 1545          Home Main Entrance    Number of Stairs, Main Entrance two  -BR     Row Name 04/05/22 1545          Cognition    Orientation Status (Cognition) oriented x 4  -BR     Row Name 04/05/22 1545          Safety Issues, Functional Mobility    Impairments Affecting Function (Mobility) balance;coordination;endurance/activity tolerance;motor control;range of motion (ROM);strength  -BR           User Key  (r) = Recorded By, (t) = Taken By, (c) = Cosigned By    Initials Name Provider Type    BR Emiliana Huitron PT Physical Therapist               Mobility     Row Name 04/05/22 1547          Bed Mobility    Bed Mobility bed mobility (all) activities  -BR     All Activities, Leslie (Bed Mobility) supervision  -BR     Row Name 04/05/22 1547          Sit-Stand Transfer    Sit-Stand Leslie (Transfers) contact guard  -BR     Assistive Device (Sit-Stand Transfers) walker, front-wheeled  -BR     Row Name 04/05/22 1547          Gait/Stairs (Locomotion)    [place] Level (Gait) contact guard;1 person to manage equipment  -BR     Assistive Device (Gait) walker, front-wheeled  -BR     Distance in Feet (Gait) 100 with cues for improved heel strike and increased knee flexion at swing phase  -BR     Row Name 04/05/22 1547          Mobility    Extremity Weight-bearing Status left lower extremity  -BR     Left Lower Extremity (Weight-bearing Status) weight-bearing as tolerated (WBAT)  -BR           User Key  (r) = Recorded By, (t) = Taken By, (c) = Cosigned By    Initials Name Provider Type    Emiliana Blackwell PT Physical Therapist               Obj/Interventions     Row Name 04/05/22 1548          Range of Motion Comprehensive    Comment, General Range of Motion left knee 0-90*, all other ROM was WFL for RLE and left hip  and ankle  -BR     Row Name 04/05/22 1549          Strength Comprehensive (MMT)    Comment, General Manual Muscle Testing (MMT) Assessment Pt able to perform a SLR LLE;  grossly 3+/5 RLE  -BR     Row Name 04/05/22 1549          Motor Skills    Therapeutic Exercise other (see comments);knee  Left knee TKA protocol exercises x 10 reps; reviewed written exercise handout  -BR     Row Name 04/05/22 1549          Sensory Assessment (Somatosensory)    Sensory Assessment (Somatosensory) LE sensation intact  -BR           User Key  (r) = Recorded By, (t) = Taken By, (c) = Cosigned By    Initials Name Provider Type    BR Emiliana Huitron, PT Physical Therapist               Goals/Plan     Row Name 04/05/22 1600          Bed Mobility Goal 1 (PT)    Activity/Assistive Device (Bed Mobility Goal 1, PT) bed mobility activities, all  -BR     [place] Level/Cues Needed (Bed Mobility Goal 1, PT) independent  -BR     Time Frame (Bed Mobility Goal 1, PT) 2 weeks  -BR     Row Name 04/05/22 1600          Transfer Goal 1 (PT)    Activity/Assistive Device (Transfer Goal 1, PT) transfers, all  -BR     [place] Level/Cues Needed (Transfer Goal 1, PT) independent  -BR     Time Frame (Transfer Goal 1, PT) 2 weeks  -BR     Row Name 04/05/22 1600          Gait Training Goal 1 (PT)    Activity/Assistive Device (Gait Training Goal 1, PT) gait (walking locomotion)  -BR     [place] Level (Gait Training Goal 1, PT) modified independence  -BR     Distance (Gait Training Goal 1, PT) 100  -BR     Time Frame (Gait Training Goal 1, PT) 2 weeks  -BR     Row Name 04/05/22 1600          Stairs Goal 1 (PT)    Activity/Assistive Device (Stairs Goal 1, PT) ascending stairs;descending stairs  -BR     [place] Level/Cues Needed (Stairs Goal 1, PT) contact guard required  -BR     Number of Stairs (Stairs Goal 1, PT) 2  -BR     Time Frame (Stairs Goal 1, PT) 2 weeks  -BR           User Key  (r) = Recorded By, (t) = Taken By, (c) = Cosigned By     Initials Name Provider Type    Emiliana Blackwell PT Physical Therapist               Clinical Impression     Row Name 04/05/22 1552          Pain    Pretreatment Pain Rating 1/10  -BR     Posttreatment Pain Rating 2/10  left knee  -BR     Row Name 04/05/22 1552          Plan of Care Review    Plan of Care Reviewed With patient;spouse  -BR     Outcome Evaluation Pt presents as a 66 y/o M s/p elective left TKA with no complications.  Transfers requiring CGA of 1 and ambulating 100 feet with rolling walker with  CGA of 1. Left knee ROM 0-90*. Pt's wife is very supportive. Pt is a good candidate for OP PT upon D/C from [facility]. PT will follow.  -BR     Row Name 04/05/22 1552          Therapy Assessment/Plan (PT)    Rehab Potential (PT) good, to achieve stated therapy goals  -BR     Criteria for Skilled Interventions Met (PT) yes  -BR     Predicted Duration of Therapy Intervention (PT) until D/C  -BR     Row Name 04/05/22 1552          Vital Signs    Pre SpO2 (%) 99  -BR     Row Name 04/05/22 1552          Positioning and Restraints    Pre-Treatment Position in bed  -BR     Post Treatment Position bed  -BR     In Bed call light within reach;encouraged to call for assist;with family/caregiver;with nsg;side rails up x2;LLE elevated;SCD pump applied  -BR           User Key  (r) = Recorded By, (t) = Taken By, (c) = Cosigned By    Initials Name Provider Type    Emiliana Blackwell PT Physical Therapist               Outcome Measures    No documentation.                              Physical Therapy Education                 Title: PT OT SLP Therapies (In Progress)     Topic: Physical Therapy (Done)     Point: Mobility training (Done)     Learning Progress Summary           Patient Acceptance, E, VU by BR at 4/5/2022 1601                   Point: Home exercise program (Done)     Learning Progress Summary           Patient Acceptance, E, VU by BR at 4/5/2022 1601                   Point: Body mechanics (Done)     Learning  Progress Summary           Patient Acceptance, E, VU by BR at 4/5/2022 1601                   Point: Precautions (Done)     Learning Progress Summary           Patient Acceptance, E, VU by BR at 4/5/2022 1601                               User Key     Initials Effective Dates Name Provider Type Discipline     02/01/22 -  Emiliana Huitron PT Physical Therapist PT              PT Recommendation and Plan     Plan of Care Reviewed With: patient, spouse  Outcome Evaluation: Pt presents as a 66 y/o M s/p elective left TKA with no complications.  Transfers requiring CGA of 1 and ambulating 100 feet with rolling walker with  CGA of 1. Left knee ROM 0-90*. Pt's wife is very supportive. Pt is a good candidate for OP PT upon D/C from [facility]. PT will follow.     Time Calculation:    PT Charges     Row Name 04/05/22 1602             Time Calculation    Start Time 1454  -BR      Stop Time 1603  -BR      Time Calculation (min) 69 min  -BR      PT Received On 04/05/22  -BR      PT - Next Appointment 04/06/22  -BR      PT Goal Re-Cert Due Date 04/19/22  -BR              Time Calculation- PT    Total Timed Code Minutes- PT 20 minute(s)  -BR            User Key  (r) = Recorded By, (t) = Taken By, (c) = Cosigned By    Initials Name Provider Type    BR Emiliana Huitron PT Physical Therapist              Therapy Charges for Today     Code Description Service Date Service Provider Modifiers Qty    55008015971 HC PT THER PROC EA 15 MIN 4/5/2022 Emiliana Huitron, PT GP 1    11564381686 HC PT EVAL MOD COMPLEXITY 3 4/5/2022 Emiliana Huitron, ALICIA GP 1               Emiliana Huitron PT  4/5/2022

## 2022-04-05 NOTE — PLAN OF CARE
Goal Outcome Evaluation:  Plan of Care Reviewed With: patient, spouse            Patient admitted to floor, spouse at bedside, A&Ox3, able to make needs known, Ice pack changed

## 2022-04-05 NOTE — CONSULTS
[facility] Medicine Services - Consult Note    Patient Name: Leonid Diehl  : 1955  MRN: [id number]  Primary Care Physician:  Kerri Cruz APRN  Referring Physician: Yash Celis MD  Date of admission: 2022    Inpatient Hospitalist Consult  Consult performed by: Dee Leach APRN  Consult ordered by: Jaymie Woods PA          Subjective      Reason for Consult/ Chief Complaint: medical management / left knee pain    History of Present Illness: Leonid Diehl is a 67 y.o. male with a history of osteoarthritis who presented to [facility] on 2022 and underwent left total knee arthroplasty by Dr. Celis. The patient was admitted to the Surgical Inpatient Unit postoperatively and the Hospitalist group was consulted for medical management.    The patient is alert and sitting up in bed. He denies any current knee pain or other complaint. I reviewed his recent 2D echocardiogram findings with him and his wife at the bedside. He states he hasn't seen a cardiologist in a couple of years. I advised him to follow up with a cardiologist of his choice for outpatient monitoring of the aortic stenosis.       Review of Systems   Musculoskeletal: Positive for arthritis.   All other systems reviewed and are negative.       Personal History     Past Medical History:   Diagnosis Date   • Aortic stenosis, moderate 2022   • Asthma    • Benign prostatic hyperplasia 2017   • Chronic diastolic congestive heart failure (HCC) 2019   • Gout    • Hay fever 2016   • History of alcohol abuse 2019   • Hx of pancreatitis 2019   • Insomnia    • Iron deficiency anemia 2019   • Peripheral edema    • Primary hypertension 2016   • Raynaud's disease        Past Surgical History:   Procedure Laterality Date   • CATARACT EXTRACTION, BILATERAL     • TOTAL SHOULDER ARTHROPLASTY W/ DISTAL CLAVICLE EXCISION Right 10/22/2019    Procedure: TOTAL SHOULDER REVERSE  ARTHROPLASTY with Biceps Tenodesis;  Surgeon: Chris Lafleur MD;  Location: [facility] MAIN OR;  Service: Orthopedics       Family History: family history includes Cancer in his father; Heart disease in his sister; Hypertension in his brother. Otherwise pertinent FHx was reviewed and not pertinent to current issue.    Social History:  reports that he has never smoked. He has never used smokeless tobacco. He reports that he does not drink alcohol and does not use drugs.    Home Medications:   Calcium Carbonate-Vit D-Min, albuterol sulfate HFA, allopurinol, budesonide-formoterol, carvedilol, ferrous sulfate, fexofenadine-pseudoephedrine, furosemide, guaiFENesin, lisinopril-hydrochlorothiazide, meloxicam, potassium chloride, and traZODone    Allergies:  No Known Allergies      Objective      Vitals:  Temp:  [97.3 °F (36.3 °C)-98.5 °F (36.9 °C)] 97.3 °F (36.3 °C)  Heart Rate:  [55-72] 60  Resp:  [8-16] 12  BP: (100-154)/(45-77) 122/77  Flow (L/min):  [2-6] 6    Physical Exam  Vitals reviewed.   Constitutional:       Appearance: He is obese.   HENT:      Head: Normocephalic.   Eyes:      Extraocular Movements: Extraocular movements intact.      Conjunctiva/sclera: Conjunctivae normal.      Pupils: Pupils are equal, round, and reactive to light.   Cardiovascular:      Rate and Rhythm: Normal rate and regular rhythm.      Pulses: Normal pulses.   Pulmonary:      Effort: Pulmonary effort is normal.      Breath sounds: Normal breath sounds.      Comments: On room air  Abdominal:      General: Bowel sounds are normal. There is no distension.      Palpations: Abdomen is soft.      Tenderness: There is no abdominal tenderness.   Musculoskeletal:      Cervical back: Normal range of motion.      Right lower leg: No edema.      Left lower leg: No edema.   Skin:     General: Skin is warm and dry.      Comments: Left knee surgical incision covered with dry dressing   Neurological:      Mental Status: He is alert and oriented to  person, place, and time.   Psychiatric:         Mood and Affect: Mood normal.         Behavior: Behavior normal.          Result Review    Result Review:  I have personally reviewed the results from the time of this admission to 4/5/2022 14:21 EDT and agree with these findings:  [x]  Laboratory  [x]  Microbiology  [x]  Radiology  [x]  EKG/Telemetry   [x]  Cardiology/Vascular   []  Pathology  [x]  Old records  []  Other:    Most notable findings include:   03/25/22 Plt 109      Assessment/Plan        Active Hospital Problems:  Active Hospital Problems    Diagnosis    • **Bilateral primary osteoarthritis of knee    • Aortic stenosis, moderate    • Gout    • Insomnia    • Thrombocytopenia (HCC)    • Chronic diastolic congestive heart failure (HCC)    • Iron deficiency anemia    • Primary hypertension    • Asthma    • Hay fever      Plan:     Bilateral primary osteoarthritis of knee  -s/p left total knee arthroplasty (04/05/22)  -post-op care and PRN analgesia per orthopedic surgery  -PT/OT to eval and treat  -fall precautions    Aortic stenosis, moderate  -new finding on outpatient 2D echo done  -results discussed with patient and wife  -patient advised to follow up with cardiology as outpatient     Iron deficiency anemia  -on iron supplement   -monitor H&H    Asthma / Hay fever  -stable without exacerbation  -continue albuterol and Symbicort  -Zyrtec-D substituted for Allegra-D    Chronic diastolic congestive heart failure   -appears compensated  -continue diuretics     Primary hypertension, chronic  -continue carvedilol, Lasix, and lisinopril-HCTZ  -monitor BP    Gout  -continue allopurinol     Insomnia  -continue trazodone     Thrombocytopenia, chronic  -appears stable      This patient has been examined wearing appropriate Personal Protective Equipment. 04/05/22      Signature: Electronically signed by BERKLEY Loya, 04/05/22, 4:04 PM EDT.

## 2022-04-05 NOTE — PLAN OF CARE
Goal Outcome Evaluation:  Plan of Care Reviewed With: patient, spouse   Pt presents as a 68 y/o M s/p elective left TKA with no complications.  Transfers requiring CGA of 1 and ambulating 100 feet with rolling walker with  CGA of 1. Left knee ROM 0-90*. Pt's wife is very supportive. Pt is a good candidate for OP PT upon D/C from [facility]. PT will follow.

## 2022-04-05 NOTE — ANESTHESIA PREPROCEDURE EVALUATION
Anesthesia Evaluation     Patient summary reviewed and Nursing notes reviewed   no history of anesthetic complications:  NPO Solid Status: > 8 hours  NPO Liquid Status: > 2 hours           Airway   Dental      Pulmonary    (+) asthma,  Cardiovascular     ECG reviewed  Patient on routine beta blocker    (+) hypertension, valvular problems/murmurs AS, MR and murmur, CHF , hyperlipidemia,       Neuro/Psych  GI/Hepatic/Renal/Endo    (+) obesity,   liver disease,     Musculoskeletal     Abdominal    Substance History      OB/GYN          Other   arthritis,      ROS/Med Hx Other: BPH, allergies, h/o alcohol abuse, h/o pancreatitis, hypoxemia, hyperglycemia, tongue swelling, biliary dz, hepatic dz, edema, gout, Raynaud's    Echocardiogram Findings    Left Ventricle Calculated left ventricular EF = 66% Left ventricular ejection fraction appears to be 61 - 65%.  Right Ventricle The right ventricular cavity is mildly dilated.  Left Atrium The left atrial cavity is moderately dilated.  Right Atrium Normal right atrial cavity size noted.  Aortic Valve The aortic valve is abnormal in structure. There is calcification of the aortic valve. Moderate aortic valve stenosis is present.  Mitral Valve Mild mitral valve regurgitation is present.  Pulmonic Valve The pulmonic valve is not well visualized.  Greater Vessels No dilation of the aortic root is present.  Pericardium There is no evidence of pericardial effusion. .    Stress  No evidence for reversible myocardial ischemia noted.  Normal left ventricular ejection fraction of  58 %.    PSH  CATARACT EXTRACTION, BILATERAL TOTAL SHOULDER ARTHROPLASTY W/ DISTAL CLAVICLE EXCISION                   Anesthesia Plan    ASA 3     general with block and ERAS Protocol   total IV anesthesia(Patient identified; pre-operative vital signs, all relevant labs/studies, complete medical/surgical/anesthetic history, full medication list, full allergy list, and NPO status obtained/reviewed; physical  assessment performed; anesthetic options, side effects, potential complications, risks, and benefits discussed; questions answered; written anesthesia consent obtained; patient cleared for procedure; anesthesia machine and equipment checked and functioning)  intravenous induction     Anesthetic plan, all risks, benefits, and alternatives have been provided, discussed and informed consent has been obtained with: patient.    Plan discussed with CRNA and CAA.        CODE STATUS:

## 2022-04-06 DIAGNOSIS — Z96.652 S/P TKR (TOTAL KNEE REPLACEMENT), LEFT: Primary | ICD-10-CM

## 2022-04-06 LAB
ANION GAP SERPL CALCULATED.3IONS-SCNC: 7 MMOL/L (ref 5–15)
BUN SERPL-MCNC: 16 MG/DL (ref 8–23)
BUN/CREAT SERPL: 16 (ref 7–25)
CALCIUM SPEC-SCNC: 8.4 MG/DL (ref 8.6–10.5)
CHLORIDE SERPL-SCNC: 105 MMOL/L (ref 98–107)
CO2 SERPL-SCNC: 23 MMOL/L (ref 22–29)
CREAT SERPL-MCNC: 1 MG/DL (ref 0.76–1.27)
EGFRCR SERPLBLD CKD-EPI 2021: 83 ML/MIN/1.73
GLUCOSE SERPL-MCNC: 132 MG/DL (ref 65–99)
HCT VFR BLD AUTO: 33.4 % (ref 37.5–51)
HGB BLD-MCNC: 11.5 G/DL (ref 13–17.7)
POTASSIUM SERPL-SCNC: 5 MMOL/L (ref 3.5–5.2)
SODIUM SERPL-SCNC: 135 MMOL/L (ref 136–145)

## 2022-04-06 PROCEDURE — G0378 HOSPITAL OBSERVATION PER HR: HCPCS

## 2022-04-06 PROCEDURE — A9270 NON-COVERED ITEM OR SERVICE: HCPCS | Performed by: PHYSICIAN ASSISTANT

## 2022-04-06 PROCEDURE — 85018 HEMOGLOBIN: CPT | Performed by: PHYSICIAN ASSISTANT

## 2022-04-06 PROCEDURE — 25010000002 CEFAZOLIN PER 500 MG: Performed by: PHYSICIAN ASSISTANT

## 2022-04-06 PROCEDURE — 97116 GAIT TRAINING THERAPY: CPT

## 2022-04-06 PROCEDURE — 63710000001 MELOXICAM 15 MG TABLET: Performed by: PHYSICIAN ASSISTANT

## 2022-04-06 PROCEDURE — 99214 OFFICE O/P EST MOD 30 MIN: CPT | Performed by: INTERNAL MEDICINE

## 2022-04-06 PROCEDURE — 63710000001 ACETAMINOPHEN 500 MG TABLET: Performed by: PHYSICIAN ASSISTANT

## 2022-04-06 PROCEDURE — 63710000001 OXYCODONE 10 MG TABLET EXTENDED-RELEASE 12 HOUR: Performed by: PHYSICIAN ASSISTANT

## 2022-04-06 PROCEDURE — 97150 GROUP THERAPEUTIC PROCEDURES: CPT

## 2022-04-06 PROCEDURE — 63710000001 ALLOPURINOL 300 MG TABLET: Performed by: PHYSICIAN ASSISTANT

## 2022-04-06 PROCEDURE — 63710000001 FERROUS SULFATE 324 (65 FE) MG TABLET DELAYED-RELEASE: Performed by: PHYSICIAN ASSISTANT

## 2022-04-06 PROCEDURE — 63710000001 CETIRIZINE-PSEUDOEPHEDRINE 5-120 MG TABLET SUSTAINED-RELEASE 12 HOUR: Performed by: PHYSICIAN ASSISTANT

## 2022-04-06 PROCEDURE — 63710000001 HYDROCHLOROTHIAZIDE 12.5 MG TABLET: Performed by: PHYSICIAN ASSISTANT

## 2022-04-06 PROCEDURE — 63710000001 CARVEDILOL 3.125 MG TABLET: Performed by: PHYSICIAN ASSISTANT

## 2022-04-06 PROCEDURE — 63710000001 POLYETHYLENE GLYCOL 17 G PACK: Performed by: PHYSICIAN ASSISTANT

## 2022-04-06 PROCEDURE — 85014 HEMATOCRIT: CPT | Performed by: PHYSICIAN ASSISTANT

## 2022-04-06 PROCEDURE — 80048 BASIC METABOLIC PNL TOTAL CA: CPT | Performed by: PHYSICIAN ASSISTANT

## 2022-04-06 PROCEDURE — 63710000001 TRAZODONE 50 MG TABLET: Performed by: PHYSICIAN ASSISTANT

## 2022-04-06 PROCEDURE — 63710000001 GABAPENTIN 300 MG CAPSULE: Performed by: PHYSICIAN ASSISTANT

## 2022-04-06 PROCEDURE — 63710000001 GUAIFENESIN 600 MG TABLET SUSTAINED-RELEASE 12 HOUR: Performed by: PHYSICIAN ASSISTANT

## 2022-04-06 RX ORDER — OXYCODONE AND ACETAMINOPHEN 7.5; 325 MG/1; MG/1
TABLET ORAL
Qty: 40 TABLET | Refills: 0 | Status: SHIPPED | OUTPATIENT
Start: 2022-04-06 | End: 2022-05-11 | Stop reason: ALTCHOICE

## 2022-04-06 RX ADMIN — TRAZODONE HYDROCHLORIDE 50 MG: 50 TABLET ORAL at 21:12

## 2022-04-06 RX ADMIN — OXYCODONE HYDROCHLORIDE 10 MG: 10 TABLET, FILM COATED, EXTENDED RELEASE ORAL at 21:11

## 2022-04-06 RX ADMIN — FERROUS SULFATE TAB EC 324 MG (65 MG FE EQUIVALENT) 324 MG: 324 (65 FE) TABLET DELAYED RESPONSE at 08:33

## 2022-04-06 RX ADMIN — POLYETHYLENE GLYCOL 3350 17 G: 17 POWDER, FOR SOLUTION ORAL at 08:33

## 2022-04-06 RX ADMIN — OXYCODONE HYDROCHLORIDE 10 MG: 10 TABLET, FILM COATED, EXTENDED RELEASE ORAL at 08:33

## 2022-04-06 RX ADMIN — CETIRIZINE HYDROCHLORIDE, PSEUDOEPHEDRINE HYDROCHLORIDE 1 TABLET: 5; 120 TABLET, FILM COATED, EXTENDED RELEASE ORAL at 08:33

## 2022-04-06 RX ADMIN — CETIRIZINE HYDROCHLORIDE, PSEUDOEPHEDRINE HYDROCHLORIDE 1 TABLET: 5; 120 TABLET, FILM COATED, EXTENDED RELEASE ORAL at 21:10

## 2022-04-06 RX ADMIN — ACETAMINOPHEN 1000 MG: 500 TABLET, FILM COATED ORAL at 08:33

## 2022-04-06 RX ADMIN — GUAIFENESIN 1200 MG: 600 TABLET, EXTENDED RELEASE ORAL at 21:12

## 2022-04-06 RX ADMIN — CARVEDILOL 3.12 MG: 3.12 TABLET, FILM COATED ORAL at 08:33

## 2022-04-06 RX ADMIN — ACETAMINOPHEN 1000 MG: 500 TABLET, FILM COATED ORAL at 16:32

## 2022-04-06 RX ADMIN — CEFAZOLIN SODIUM 3 G: 10 INJECTION, POWDER, FOR SOLUTION INTRAVENOUS at 04:32

## 2022-04-06 RX ADMIN — GUAIFENESIN 1200 MG: 600 TABLET, EXTENDED RELEASE ORAL at 08:33

## 2022-04-06 RX ADMIN — SODIUM CHLORIDE 100 ML/HR: 9 INJECTION, SOLUTION INTRAVENOUS at 00:26

## 2022-04-06 RX ADMIN — GABAPENTIN 300 MG: 300 CAPSULE ORAL at 21:11

## 2022-04-06 RX ADMIN — ALLOPURINOL 300 MG: 300 TABLET ORAL at 08:33

## 2022-04-06 RX ADMIN — MELOXICAM 15 MG: 15 TABLET ORAL at 08:33

## 2022-04-06 RX ADMIN — HYDROCHLOROTHIAZIDE 6.25 MG: 12.5 TABLET ORAL at 08:33

## 2022-04-06 NOTE — PLAN OF CARE
Goal Outcome Evaluation:           Progress: improving  Outcome Evaluation: Patient pleasant. Able to make needs known, No complaints. Plans to discharge to home. Call light in reach. plan ongoin.

## 2022-04-06 NOTE — DISCHARGE SUMMARY
Orthopedic Discharge Summary      Patient: Leonid Diehl      YOB: 1955    Medical Record Number: [id number]    Attending Physician: Yash Celis MD  Consulting Physician(s): Hospitalist service  Date of Admission: 4/5/2022  6:32 AM  Date of Discharge: 6 April 2022  The patient is postoperative day #1 status post total knee arthroplasty.  He has done extremely well postoperatively.  He is on Percocet for pain control and Xarelto for DVT prophylaxis.  He is making good progress with physical therapy.  His pain is well controlled at this point.  Plans are for the patient to be discharged today with home health physical therapy helping out.    Patient Active Problem List   Diagnosis   • Iron deficiency anemia   • Asthma   • Benign prostatic hyperplasia   • Chronic diastolic congestive heart failure (HCC)   • Encounter for general adult medical examination without abnormal findings   • Hay fever   • Biliary disease   • Hepatic disease   • History of alcohol abuse   • Hx of pancreatitis   • Primary hypertension   • Systolic murmur   • Bilateral primary osteoarthritis of knee   • Aortic stenosis, moderate   • Gout   • Insomnia   • Thrombocytopenia (HCC)     [place] TOTAL KNEE ARTHROPLASTY [97801] (TOTAL KNEE ARTHROPLASTY)     No Known Allergies    Current Medications:     Discharge Medications      Stop These Medications    CALCIUM 1200 PO        ASK your doctor about these medications      Instructions Start Date   albuterol sulfate  (90 Base) MCG/ACT inhaler  Commonly known as: PROVENTIL HFA;VENTOLIN HFA;PROAIR HFA   2 puffs, Inhalation, Every 6 Hours PRN      allopurinol 300 MG tablet  Commonly known as: ZYLOPRIM   300 mg, Oral, Daily      carvedilol 3.125 MG tablet  Commonly known as: COREG   3.125 mg, Oral, Daily      ferrous sulfate 324 (65 Fe) MG tablet delayed-release EC tablet   324 mg, Oral, Nightly      fexofenadine-pseudoephedrine 180-240 MG per 24 hr tablet  Commonly known as: ALLEGRA-D  24   1 tablet, Oral, Daily      furosemide 20 MG tablet  Commonly known as: LASIX   20 mg, Oral, Daily PRN, swelling      guaiFENesin 600 MG 12 hr tablet  Commonly known as: MUCINEX   1,200 mg, Oral, 2 Times Daily      lisinopril-hydrochlorothiazide 10-12.5 MG per tablet  Commonly known as: PRINZIDE,ZESTORETIC   1 tablet, Oral, Daily      meloxicam 7.5 MG tablet  Commonly known as: MOBIC   7.5 mg, Oral, Daily      potassium chloride 10 MEQ CR tablet   TAKE 1 TABLET BY MOUTH DAILY AS NEEDED      Symbicort 160-4.5 MCG/ACT inhaler  Generic drug: budesonide-formoterol   2 puffs, Inhalation, 2 Times Daily - RT      traZODone 50 MG tablet  Commonly known as: DESYREL   TAKE ONE TABLET BY MOUTH EVERY NIGHT AT BEDTIME AS NEEDED FOR SLEEP                  Past Medical History:   Diagnosis Date   • Aortic stenosis, moderate 04/05/2022   • Asthma    • Benign prostatic hyperplasia 6/20/2017   • Chronic diastolic congestive heart failure (HCC) 02/04/2019   • Gout    • Hay fever 1/28/2016   • History of alcohol abuse 4/18/2019   • Hx of pancreatitis 4/18/2019   • Insomnia    • Iron deficiency anemia 01/11/2019   • Peripheral edema    • Primary hypertension 01/28/2016   • Raynaud's disease    • Thrombocytopenia (HCC)         Past Surgical History:   Procedure Laterality Date   • CATARACT EXTRACTION, BILATERAL     • TOTAL SHOULDER ARTHROPLASTY W/ DISTAL CLAVICLE EXCISION Right 10/22/2019    Procedure: TOTAL SHOULDER REVERSE ARTHROPLASTY with Biceps Tenodesis;  Surgeon: Chris Lafleur MD;  Location: [facility] OR;  Service: Orthopedics        Social History     Occupational History   • Not on file   Tobacco Use   • Smoking status: Never Smoker   • Smokeless tobacco: Never Used   Vaping Use   • Vaping Use: Never used   Substance and Sexual Activity   • Alcohol use: No   • Drug use: No   • Sexual activity: Defer      Social History     Social History Narrative   • Not on file        Family History   Problem Relation Age of  Onset   • Cancer Father    • Heart disease Sister    • Hypertension Brother              Hospital Course:  67 y.o. male admitted to [facility] to services of Yash Celis MD with Bilateral primary osteoarthritis of knee [M17.0] on 4/5/2022 and underwent [place] TOTAL KNEE ARTHROPLASTY [78478] (TOTAL KNEE ARTHROPLASTY) Per Yash Celis MD. Antibiotic and VTE prophylaxis were per SCIP protocols. Post-operatively the patient transferred to the post-operative floor where the patient underwent mobilization therapy that included active as well as passive ROM exercises. Opioids were titrated to achieve appropriate pain management to allow for participation in mobilization exercises. Vital signs are now stable. The incision is intact without signs or symptoms of infection. Operative extremity neurovascular status remains intact.   Appropriate education re: incision care, activity levels, medications, and follow up visits was completed and all questions were answered. The patient is now deemed stable for discharge from hospital.      DIAGNOSTIC TESTS:   Admission on 04/05/2022   Component Date Value Ref Range Status   • Glucose 04/06/2022 132 (A) 65 - 99 mg/dL Final   • BUN 04/06/2022 16  8 - 23 mg/dL Final   • Creatinine 04/06/2022 1.00  0.76 - 1.27 mg/dL Final   • Sodium 04/06/2022 135 (A) 136 - 145 mmol/L Final   • Potassium 04/06/2022 5.0  3.5 - 5.2 mmol/L Final   • Chloride 04/06/2022 105  98 - 107 mmol/L Final   • CO2 04/06/2022 23.0  22.0 - 29.0 mmol/L Final   • Calcium 04/06/2022 8.4 (A) 8.6 - 10.5 mg/dL Final   • BUN/Creatinine Ratio 04/06/2022 16.0  7.0 - 25.0 Final   • Anion Gap 04/06/2022 7.0  5.0 - 15.0 mmol/L Final   • eGFR 04/06/2022 83.0  >60.0 mL/min/1.73 Final    National Kidney Foundation and American Society of Nephrology (ASN) Task Force recommended calculation based on the Chronic Kidney Disease Epidemiology Collaboration (CKD-EPI) equation refit without adjustment for race.   • Hemoglobin  04/06/2022 11.5 (A) 13.0 - 17.7 g/dL Final   • Hematocrit 04/06/2022 33.4 (A) 37.5 - 51.0 % Final     No results found.    Discharge and Follow up Instructions:   Discharge Instructions:       1. Patient is to continue with physical therapy exercises BID and continue working with        the physical therapist as ordered.  2.  Continue to follow precautions as instructed.   3.  Patient may weight bear as tolerated.   4.  Continue AMI hose daily and ice regularly. Patient instructed on frequent calf                  pumping exercises.  May remove compression stockings at night.  5.  Patient also instructed on incentive spirometer during hospitalization and                          encouraged to continue to use at home regularly.   6.  Patient is instructed to continue DVT prophylaxis.  7.  The dressing should be left in place. If waterproof dressing is intact the patient may         shower immediately following discharge. If the dressing becomes disloged or                   saturated it should be changed and patient must wait until POD #5 to shower. If               dressing is changed, apply dry sterile dressing after showering.  8.  Follow up appointment in 2 weeks - patient to call the office at [phone] to schedule.       Date: 4/6/2022    Yash Celis MD      Time: Discharge 20 min     DICTATED UTILIZING DRAGON DICTATION

## 2022-04-06 NOTE — PLAN OF CARE
Goal Outcome Evaluation:  Plan of Care Reviewed With: patient        Progress: improving     Patient up with group therapy, dressing to surgical site assessed and dry dressing applied

## 2022-04-06 NOTE — PLAN OF CARE
Assessment: Leonid Diehl presents with functional mobility impairments which indicate the need for skilled intervention. Tolerating session today without incident. Pt this date demonstrates fair ROM, able to come to full extension while performing seated extension stretch, and able to flex to appox 95*. Flexion somewhat limited by bandages needing to be reinforced. Pt demonstrates decent strength and good tolerance to exercise. Pt requires VC for redirection and attention to task, but demonstrates good technique. Pt then ambulated 100 feet with RWx  requiring MIN VC for step through gait pattern. Will continue to follow and progress as tolerated.

## 2022-04-06 NOTE — CASE MANAGEMENT/SOCIAL WORK
Continued Stay Note  RENEA Pichardo     Patient Name: Leonid Diehl  MRN: [id number]  Today's Date: 4/6/2022    Admit Date: 4/5/2022     Discharge Plan     Row Name 04/06/22 1518       Plan    Plan Outpatient PT at [facility]    Provided Post Acute Provider List? Yes    Post Acute Provider List Outpatient Therapy    Provided Post Acute Provider Quality & Resource List? Yes    Plan Comments Outpatient rehab as above set up by nurse navigator. Patient states he lives with his spouse and has a rolling walker at home that he can use. Spouse will transport home. Patient denies any other dc needs at this time.                   Expected Discharge Date and Time     Expected Discharge Date Expected Discharge Time    Apr 6, 2022         Met with patient in room wearing PPE: mask, face shield/goggles, gloves, gown.      Maintained distance greater than six feet and spent less than 15 minutes in the room.        Marely Biggs RN

## 2022-04-06 NOTE — PROGRESS NOTES
[facility] Medicine Services Daily Progress Note    Patient Name: Leonid Diehl  : 1955  MRN: [id number]  Primary Care Physician:  Kerri Cruz APRN  Date of admission: 2022      Subjective      Chief Complaint: Left knee pain      Patient Reports doing fine.  Had some bleeding earlier left lower extremity.  Discharge delayed.  No bowel movement yet.  Patient states he is ordering high-fiber food including salad and fruit.    ROS negative except as above      Objective      Vitals:   Temp:  [97.3 °F (36.3 °C)-98 °F (36.7 °C)] 97.8 °F (36.6 °C)  Heart Rate:  [58-65] 58  Resp:  [14-16] 15  BP: ()/(59-71) 129/71    Physical Exam  Vitals reviewed.   Constitutional:       Comments: Left lower extremity bandaged postop.  Minimal amount of bleeding seeping through.  Patient sitting in the chair.   HENT:      Head: Normocephalic.      Nose: Nose normal.      Mouth/Throat:      Mouth: Mucous membranes are moist.   Eyes:      Pupils: Pupils are equal, round, and reactive to light.   Cardiovascular:      Rate and Rhythm: Normal rate.      Pulses: Normal pulses.   Pulmonary:      Effort: Pulmonary effort is normal.   Abdominal:      General: Abdomen is flat.   Musculoskeletal:         General: Normal range of motion.      Cervical back: Normal range of motion.   Skin:     General: Skin is warm.      Capillary Refill: Capillary refill takes less than 2 seconds.   Neurological:      General: No focal deficit present.      Mental Status: He is alert and oriented to person, place, and time.   Psychiatric:         Mood and Affect: Mood normal.         Behavior: Behavior normal.             Result Review    Result Review:  I have personally reviewed the results from the time of this admission to 2022 16:17 EDT and agree with these findings:  [x]  Laboratory  []  Microbiology  []  Radiology  []  EKG/Telemetry   []  Cardiology/Vascular   []  Pathology  []  Old records  []  Other:  Most  notable findings include: Lab work stable    Wounds (last 24 hours)     LDA Wound     Row Name 04/06/22 1500 04/06/22 1100 04/06/22 0715       Wound 04/05/22 1153 Left anterior knee Incision    Wound - Properties Group Placement Date: 04/05/22  -TD Placement Time: 1153  -TD Side: Left  -TD Orientation: anterior  -TD Location: knee  -TD Primary Wound Type: Incision  -TD    Dressing Appearance -- -- moist drainage  -SM    Closure JOSE LUIS  -SM JOSE LUIS  -SM JOSE LUIS  -SM    Drainage Characteristics/Odor bleeding controlled;sanguineous  -SM bleeding controlled;sanguineous  -SM bleeding controlled;sanguineous  -SM    Drainage Amount small;moderate  -SM small;moderate  -SM small;moderate  -SM    Care, Wound -- -- pressure applied  -SM    Dressing Care -- -- dressing changed  -SM    Retired Wound - Properties Group Placement Date: 04/05/22  -TD Placement Time: 1153  -TD Side: Left  -TD Orientation: anterior  -TD Location: knee  -TD Primary Wound Type: Incision  -TD    Retired Wound - Properties Group Date first assessed: 04/05/22  -TD Time first assessed: 1153  -TD Side: Left  -TD Location: knee  -TD Primary Wound Type: Incision  -TD    Row Name 04/06/22 0438 04/06/22 0411 04/06/22 0300       Wound 04/05/22 1153 Left anterior knee Incision    Wound - Properties Group Placement Date: 04/05/22  -TD Placement Time: 1153  -TD Side: Left  -TD Orientation: anterior  -TD Location: knee  -TD Primary Wound Type: Incision  -TD    Dressing Appearance moist drainage;intact  -AC moist drainage;intact  -AC --    Closure Approximated;Liquid skin adhesive  -AC JOSE LUIS  -AC JOSE LUIS  -SMA    Drainage Characteristics/Odor bleeding controlled;sanguineous  steady blood draining from distal end incision  -AC bleeding controlled;sanguineous  -AC --    Drainage Amount large  -AC large  -AC --    Care, Wound cleansed with;sterile normal saline;negative pressure wound therapy  -AC -- --    Dressing Care dressing changed;transparent film  seth drsg changed  -AC dressing  reinforced;abdominal pad;elastic bandage  -AC --    Retired Wound - Properties Group Placement Date: 04/05/22  -TD Placement Time: 1153  -TD Side: Left  -TD Orientation: anterior  -TD Location: knee  -TD Primary Wound Type: Incision  -TD    Retired Wound - Properties Group Date first assessed: 04/05/22  -TD Time first assessed: 1153  -TD Side: Left  -TD Location: knee  -TD Primary Wound Type: Incision  -TD    Row Name 04/05/22 1901 04/05/22 1900          Wound 04/05/22 1153 Left anterior knee Incision    Wound - Properties Group Placement Date: 04/05/22  -TD Placement Time: 1153  -TD Side: Left  -TD Orientation: anterior  -TD Location: knee  -TD Primary Wound Type: Incision  -TD     Dressing Appearance dry;intact;no drainage  -SMA dry;intact;no drainage  -SMA     Closure JOSE LUIS  -SMA JOSE LUIS  -SMA     Retired Wound - Properties Group Placement Date: 04/05/22  -TD Placement Time: 1153  -TD Side: Left  -TD Orientation: anterior  -TD Location: knee  -TD Primary Wound Type: Incision  -TD     Retired Wound - Properties Group Date first assessed: 04/05/22  -TD Time first assessed: 1153  -TD Side: Left  -TD Location: knee  -TD Primary Wound Type: Incision  -TD           User Key  (r) = Recorded By, (t) = Taken By, (c) = Cosigned By    Initials Name Provider Type    AC Jaqui Pineda LPN Licensed Nurse    Day Ozuna RN Registered Nurse    Noris Navarro RN Registered Nurse    Kerri Gomez LPN Licensed Nurse                     Assessment/Plan    67-year-old gentleman here for left TKA     Active Hospital Problems:       Active Hospital Problems     Diagnosis     • **Bilateral primary osteoarthritis of knee     • Aortic stenosis, moderate     • Gout     • Insomnia     • Thrombocytopenia (HCC)     • Chronic diastolic congestive heart failure (HCC)     • Iron deficiency anemia     • Primary hypertension     • Asthma     • Hay fever        Plan:      Bilateral primary osteoarthritis of knee  -s/p left total  knee arthroplasty (04/05/22)  -post-op care and PRN analgesia per orthopedic surgery  -PT/OT to eval and treat  -fall precautions  -Monitor for constipation     Aortic stenosis, moderate  -new finding on outpatient 2D echo done  -results discussed with patient and wife  -patient advised to follow up with cardiology as outpatient      Iron deficiency anemia  -on iron supplement   -monitor H&H     Asthma / Hay fever  -stable without exacerbation  -continue albuterol and Symbicort  -Zyrtec-D substituted for Allegra-D     Chronic diastolic congestive heart failure   -appears compensated  -continue diuretics      Primary hypertension, chronic  -continue carvedilol, Lasix, and lisinopril-HCTZ  -monitor BP     Gout  -continue allopurinol      Insomnia  -continue trazodone      Thrombocytopenia, chronic  -appears stable        This patient has been examined wearing appropriate Personal Protective Equipment.   04/06/22      Electronically signed by Guicho Reese MD, 04/06/22, 16:17 EDT.  [facility]ist [facility]

## 2022-04-06 NOTE — THERAPY TREATMENT NOTE
Session completed in group setting,  WB'ing status: Weight Bearing As Tolerated    Therapeutic Exercise:     Completed with L Lower Extremity.    Total Knee Program  Quad sets/Glut sets (15 reps, hold 3 seconds)  Ankle Pumps (15 reps with theraband)  Gastroc stretch (10 reps, 10 sec holds)  AAROM heel slide (10 reps, 10 sec holds)  AROM heel slide (15 reps)  AROM SAQ (15 reps)  AROM Hip abduction (15 reps)  Remove board - SLR (15 reps)  Sit up - LAQ (15 reps)  Scoot out - knee flexion in sitting (10 reps, 10 sec holds)  Extension stretch (10 reps, 10 sec holds)      Gait Training - 100 feet CGA, with RWx      Precautions: None    Subjective: Pt agreeable to therapeutic plan of care.    Objective:   Pain: 5 VAS  Education: Provided education on importance of mobility and skilled verbal / tactile cueing throughout intervention.     Assessment: Leonid Diehl presents with functional mobility impairments which indicate the need for skilled intervention. Tolerating session today without incident. Pt this date demonstrates fair ROM, able to come to full extension while performing seated extension stretch, and able to flex to appox 95*. Flexion somewhat limited by bandages needing to be reinforced. Pt demonstrates decent strength and good tolerance to exercise. Pt requires VC for redirection and attention to task, but demonstrates good technique. Pt then ambulated 100 feet with RWx  requiring MIN VC for step through gait pattern. Will continue to follow and progress as tolerated.     Plan/Recommendations:   Pt would benefit from Home with family assist and Outpatient therapy at discharge from facility and requires rolling walker at discharge.   Pt desires Home with family assist and Outpatient therapy at discharge. Pt cooperative; agreeable to therapeutic recommendations and plan of care.     Basic Mobility 6-click:  Rollin = Total, A lot = 2, A little = 3; 4 = None  Supine>Sit:   1 = Total, A lot = 2, A little  = 3; 4 = None   Sit>Stand with arms:  1 = Total, A lot = 2, A little = 3; 4 = None  Bed>Chair:   1 = Total, A lot = 2, A little = 3; 4 = None  Ambulate in room:  1 = Total, A lot = 2, A little = 3; 4 = None  3-5 Steps with railin = Total, A lot = 2, A little = 3; 4 = None  Score: 23    Modified Toombs: N/A = No pre-op stroke/TIA    Post-Tx Position: Up in Chair and Call light and personal items within reach  PPE: gloves, surgical mask, eyewear protection

## 2022-04-07 ENCOUNTER — READMISSION MANAGEMENT (OUTPATIENT)
Dept: CALL CENTER | Facility: HOSPITAL | Age: 67
End: 2022-04-07

## 2022-04-07 VITALS
SYSTOLIC BLOOD PRESSURE: 129 MMHG | DIASTOLIC BLOOD PRESSURE: 77 MMHG | RESPIRATION RATE: 17 BRPM | HEIGHT: 74 IN | WEIGHT: 295.2 LBS | TEMPERATURE: 96.7 F | OXYGEN SATURATION: 95 % | BODY MASS INDEX: 37.88 KG/M2 | HEART RATE: 80 BPM

## 2022-04-07 PROCEDURE — 63710000001 ALLOPURINOL 300 MG TABLET: Performed by: PHYSICIAN ASSISTANT

## 2022-04-07 PROCEDURE — 63710000001 CARVEDILOL 3.125 MG TABLET: Performed by: PHYSICIAN ASSISTANT

## 2022-04-07 PROCEDURE — A9270 NON-COVERED ITEM OR SERVICE: HCPCS | Performed by: PHYSICIAN ASSISTANT

## 2022-04-07 PROCEDURE — 63710000001 FERROUS SULFATE 324 (65 FE) MG TABLET DELAYED-RELEASE: Performed by: PHYSICIAN ASSISTANT

## 2022-04-07 PROCEDURE — G0378 HOSPITAL OBSERVATION PER HR: HCPCS

## 2022-04-07 PROCEDURE — 63710000001 POLYETHYLENE GLYCOL 17 G PACK: Performed by: PHYSICIAN ASSISTANT

## 2022-04-07 PROCEDURE — 63710000001 HYDROCHLOROTHIAZIDE 12.5 MG TABLET: Performed by: PHYSICIAN ASSISTANT

## 2022-04-07 PROCEDURE — 97530 THERAPEUTIC ACTIVITIES: CPT

## 2022-04-07 PROCEDURE — 63710000001 OXYCODONE 5 MG TABLET: Performed by: PHYSICIAN ASSISTANT

## 2022-04-07 PROCEDURE — 97116 GAIT TRAINING THERAPY: CPT

## 2022-04-07 PROCEDURE — 63710000001 MELOXICAM 15 MG TABLET: Performed by: PHYSICIAN ASSISTANT

## 2022-04-07 PROCEDURE — 63710000001 ACETAMINOPHEN 500 MG TABLET: Performed by: PHYSICIAN ASSISTANT

## 2022-04-07 PROCEDURE — 97110 THERAPEUTIC EXERCISES: CPT

## 2022-04-07 PROCEDURE — 63710000001 LISINOPRIL 5 MG TABLET: Performed by: PHYSICIAN ASSISTANT

## 2022-04-07 PROCEDURE — 63710000001 OXYCODONE 10 MG TABLET EXTENDED-RELEASE 12 HOUR: Performed by: PHYSICIAN ASSISTANT

## 2022-04-07 PROCEDURE — 63710000001 CETIRIZINE-PSEUDOEPHEDRINE 5-120 MG TABLET SUSTAINED-RELEASE 12 HOUR: Performed by: PHYSICIAN ASSISTANT

## 2022-04-07 PROCEDURE — 63710000001 GUAIFENESIN 600 MG TABLET SUSTAINED-RELEASE 12 HOUR: Performed by: PHYSICIAN ASSISTANT

## 2022-04-07 RX ADMIN — ACETAMINOPHEN 1000 MG: 500 TABLET, FILM COATED ORAL at 02:30

## 2022-04-07 RX ADMIN — ALLOPURINOL 300 MG: 300 TABLET ORAL at 07:50

## 2022-04-07 RX ADMIN — CETIRIZINE HYDROCHLORIDE, PSEUDOEPHEDRINE HYDROCHLORIDE 1 TABLET: 5; 120 TABLET, FILM COATED, EXTENDED RELEASE ORAL at 07:49

## 2022-04-07 RX ADMIN — LISINOPRIL 5 MG: 5 TABLET ORAL at 07:51

## 2022-04-07 RX ADMIN — ACETAMINOPHEN 1000 MG: 500 TABLET, FILM COATED ORAL at 07:20

## 2022-04-07 RX ADMIN — OXYCODONE HYDROCHLORIDE 10 MG: 10 TABLET, FILM COATED, EXTENDED RELEASE ORAL at 07:58

## 2022-04-07 RX ADMIN — CARVEDILOL 3.12 MG: 3.12 TABLET, FILM COATED ORAL at 07:52

## 2022-04-07 RX ADMIN — MELOXICAM 15 MG: 15 TABLET ORAL at 07:50

## 2022-04-07 RX ADMIN — POLYETHYLENE GLYCOL 3350 17 G: 17 POWDER, FOR SOLUTION ORAL at 07:48

## 2022-04-07 RX ADMIN — HYDROCHLOROTHIAZIDE 6.25 MG: 12.5 TABLET ORAL at 07:51

## 2022-04-07 RX ADMIN — OXYCODONE 5 MG: 5 TABLET ORAL at 07:20

## 2022-04-07 RX ADMIN — GUAIFENESIN 1200 MG: 600 TABLET, EXTENDED RELEASE ORAL at 07:50

## 2022-04-07 RX ADMIN — FERROUS SULFATE TAB EC 324 MG (65 MG FE EQUIVALENT) 324 MG: 324 (65 FE) TABLET DELAYED RESPONSE at 07:20

## 2022-04-07 NOTE — DISCHARGE INSTR - ACTIVITY
Discharge Activity  Total Knee Replacement Discharge Instructions:  I. ACTIVITIES:  1. Exercises:  Complete exercise program as taught by the hospital physical therapist 2 times per day  Exercise program will be advanced by the physical therapist  During the day be up ambulating every 2 hours (while awake) for short distances  Complete the ankle pump exercises at least 10 times per hour (while awake)  Elevate legs when in bed and for at least 30 minutes during the day.Use cold packs 20-30 minutes  approximately 5 times per day. This should be done before and after completing your exercises and at any  time you are experiencing pain/ stiffness in your operative extremity.  2. Activities of Daily Living:  No tub baths, hot tubs, or swimming pools for 4 weeks  May shower with waterproof dressing in place as long as it is intact. Do not scrub or rub the incision. Let  water run over dressing and pat dry.  II. Restrictions  Continue precautions as taught at the hospital  Your surgeon will discuss with you when you will be able to drive again, typically it is when you have enough  strength in your leg to step hard on the brake and are off the pain medication.  Weight bearing is as tolerated  First week stay inside on even terrain. May go up and down stairs one stair at a time utilizing the hand rail  once cleared by physical therapy to do so.  After one week, you may venture outside (if cleared to do so by physical therapist).  III. Precautions:  Everyone that comes near you should wash their hands  No elective dental, genital-urinary, or colon procedures or surgical procedures for 12 weeks after surgery  unless absolutely necessary.  If dental work or surgical procedure is deemed absolutely necessary, you will need to contact your surgeon  as you will need to take antibiotics 1 hour prior to any dental work (including teeth cleanings).  Please discuss with your surgeon prophylactic antibiotics as the length of time this  intervention will be  necessary for you varies with each patient's health history and situation.  Avoid sick people. If you must be around someone who is ill, they should wear a mask.  Avoid visits to the Emergency Room or Urgent Care unless you are having a life threatening event.  Stockings/ace wraps are to be placed on in the morning and removed at night. Monitor the stockings to  ensure that any swelling is not causing the stockings to become too tight. In this case, remove stockings  immediately.  IV. INCISION CARE:  Wash your hands often.  Notify office if dressing becomes dislodged or drainage noted. Change the dressing as directed by your  physician.  No creams or ointments to the incision  Do not touch or pick at the incision  Check incision every day and notify surgeon immediately if any of the following signs or symptoms are  noted:  Increase in redness  Increase in swelling around the incision/dressing and of the entire extremity  Increase in pain  Drainage oozing touching the edges of the dressing  Pulling apart of the edges of the incision  Increase in overall body temperature (greater than 100.5 degrees)  Your surgeon will instruct you regarding suture or staple removal  V. Medications:  1. Anticoagulants: You will be discharged on an anticoagulant. This is a prophylactic medication that helps  prevent blood clots during your post-operative period. The type and length of dosage varies based on your  individual needs, procedure performed, and surgeon's preference.  While taking the anticoagulant, you should avoid taking any additional aspirin, ibuprofen (Advil or Motrin),  Aleve (Naprosyn) or other non-steroidal anti-inflammatory medications, unless prescribed by your surgeon.  Notify surgeon immediately if any mami bleeding is noted in the urine, stool, emesis, or from the nose or the  incision. Blood in the stool will often appear as black rather than red. Blood in urine may appear as pink.  Blood  in emesis may appear as brown/black like coffee grounds.  You will need to apply pressure for longer periods of time to any cuts or abrasions to stop bleeding  Avoid alcohol while taking anticoagulants  2. Stool Softeners: You will be at greater risk of constipation after surgery due to being less mobile and the  pain medications.  Take stool softeners as instructed by your surgeon while on pain medications. Over the counter Colace 100  mg 1-2 capsules twice daily.  If stools become too loose or too frequent, please decreases the dosage or stop the stool softener.  If constipation occurs despite use of stool softeners, you are to continue the stool softeners and add a  laxative (Milk of Magnesia 1 ounce daily as needed)  Drink plenty of fluids, and eat fruits and vegetables during your recovery time  3. Pain Medications utilized after surgery are narcotics and the law requires that the following information be  given to all patients that are prescribed narcotics:  CLASSIFICATION: Pain medications are called Opioids and are narcotics  LEGALITIES: It is illegal to share narcotics with others and to drive within 24 hours of taking narcotics  POTENTIAL SIDE EFFECTS: Potential side effects of opioids include: nausea, vomiting, itching, dizziness,  drowsiness, dry mouth, constipation, and difficulty urinating.  POTENTIAL ADVERSE EFFECTS:  Opioid tolerance can develop with use of pain medications and this simply means that it requires more and  more of the medication to control pain; however, this is seen more in patients that use opioids for longer  periods of time.  Opioid dependence can develop with use of Opioids and this simply means that to stop the medication can  cause withdrawal symptoms; however, this is seen with patients that use Opioids for longer periods of time.  Opioid addiction can develop with use of Opioids and the incidence of this is very unlikely in patients who  take the medications as ordered and  stop the medications as instructed.  Opioid overdose can be dangerous, but is unlikely when the medication is taken as ordered and stopped  when ordered. It is important not to mix opioids with alcohol or with and type of sedative such as Benadryl  as this can lead to over sedation and respiratory difficulty.  DOSAGE:  Pain medications will need to be taken consistently for the first week to decrease pain and promote adequate  pain relief and participation in physical therapy.  After the initial surgical pain begins to resolve, you may begin to decrease the pain medication. By the end of  6 weeks, you should be off of pain medications.  Refills will not be given by the office during evening hours, on weekends, or after 6 weeks post-op.  To seek refills on pain medications during the initial 6 week post-operative period, you must call the office 48  hours in advance to request the refill. The office will then notify you when to  the prescription. DO  NOT wait until you are out of the medication to request a refill.  V. FOLLOW-UP VISITS:  You will need to follow up in the office with Dr. Yash Celis/ Jaymie Woods PA-C in 1 week. Please call this  number [phone] to schedule this appointment.  You will need to follow up with your primary care physician in 4 weeks.  If you have any concerns or suspected complications prior to your follow up visit, please call your surgeons  office. Do not wait until your appointment time if you suspect complications. These will need to be addressed  in the office promptly.

## 2022-04-07 NOTE — DISCHARGE INSTR - LAB
Out patient Physical Therapy  [facility]  [address] [place] [address] [id number] [phone]

## 2022-04-07 NOTE — THERAPY TREATMENT NOTE
Subjective: Pt agreeable to therapeutic plan of care.    Objective:     Bed mobility - CGA  Sit to supine.  A little assist for LLE into the bed  Transfers - Supervision and with rolling walker  Ambulation - 135 feet Supervision with rolling walker.  Cue for step through to RLE and heel strike on the left.      WB'ing status: L Lower Extremity Weight Bearing As Tolerated    Therapeutic Exercise: 15 Reps L Lower Extremity AAROM Total Knee: Ankle Pumps, Quad Sets, Heel slides, Hip Abduction, LAQ and sitting knee flexion    ROM left knee:  5 degrees from full extension to grossly 95 degrees flexion.      Precautions: None    Pain: 4 VAS left knee  Education: Provided education on importance of mobility and skilled verbal / tactile cueing throughout intervention.     Assessment: Leonid Diehl presents with functional mobility impairments which indicate the need for skilled intervention. Excellent progress toward mobility goals.  Pt has very good potential to recover his mobility.  Tolerating session today without incident. Will continue to follow and progress as tolerated.     Plan/Recommendations:   Pt would benefit from Home with family assist and Outpatient PT at discharge from facility   Pt desires Home with family assist and Outpatient PT at discharge. Pt cooperative; agreeable to therapeutic recommendations and plan of care.     Post-Tx Position: Supine with HOB Elevated and Call light and personal items within reach  PPE: gloves, surgical mask, eyewear protection

## 2022-04-07 NOTE — PLAN OF CARE
Goal Outcome Evaluation:  Plan of Care Reviewed With: patient      Pt doing well. Ambulated in hallway with PT and up in chair for meals. Pain controlled with oral pain medication, VSS, no concerns at this time.

## 2022-04-07 NOTE — PLAN OF CARE
Goal Outcome Evaluation:      Dsg dry and intact to lle. No active drainage noted. Mariano intact and working. Has good sensation to ble. Up with walker to bathroom. No falls. Denies calf pain or tenderness.lle warm and blanchable. No c/o at this time

## 2022-04-07 NOTE — PLAN OF CARE
Goal Outcome Evaluation:     Bed mobility - CGA  Sit to supine.  A little assist for LLE into the bed  Transfers - Supervision and with rolling walker  Ambulation - 135 feet Supervision with rolling walker.  Cue for step through to RLE and heel strike on the left.      WB'ing status: L Lower Extremity Weight Bearing As Tolerated    Therapeutic Exercise: 15 Reps L Lower Extremity AAROM Total Knee: Ankle Pumps, Quad Sets, Heel slides, Hip Abduction, LAQ and sitting knee flexion    Pt would benefit from Home with family assist and Outpatient PT at discharge from facility   Pt desires Home with family assist and Outpatient PT at discharge. Pt cooperative; agreeable to therapeutic recommendations and plan of care.

## 2022-04-07 NOTE — OUTREACH NOTE
Prep Survey    Flowsheet Row Responses   [facility] patient discharged from? [facility]   Is LACE score < 7 ? No   Emergency Room discharge w/ pulse ox? No   Eligibility [facility]   Date of Admission 04/05/22   Date of Discharge 04/07/22   Discharge Disposition Home or Self Care   Discharge diagnosis  TOTAL KNEE ARTHROPLASTY    Does the patient have one of the following disease processes/diagnoses(primary or secondary)? Total Joint Replacement   Does the patient have Home health ordered? No  [Outpatient PT ]   Is there a DME ordered? No   Prep survey completed? Yes          NAUN EMERY - Registered Nurse         ANC increasing - 850 today.  For MRI in am and LP on Tues.  NPO tonight.

## 2022-04-08 ENCOUNTER — TRANSITIONAL CARE MANAGEMENT TELEPHONE ENCOUNTER (OUTPATIENT)
Dept: CALL CENTER | Facility: HOSPITAL | Age: 67
End: 2022-04-08

## 2022-04-08 ENCOUNTER — TELEPHONE (OUTPATIENT)
Dept: ORTHOPEDIC SURGERY | Facility: CLINIC | Age: 67
End: 2022-04-08

## 2022-04-08 NOTE — TELEPHONE ENCOUNTER
Patient's wife called stating that the patient's Mariano bandage is full and the light is blinking that full as well. Asked if they had sent home the extra dressing. States no. Advised she can come to office and I can give her another one to change it out. Advised that is very easy. Okay per patient's wife will be by as soon as can.

## 2022-04-08 NOTE — CASE MANAGEMENT/SOCIAL WORK
Case Management Discharge Note      Final Note: home    Provided Post Acute Provider List?: Yes  Post Acute Provider List: Outpatient Therapy  Provided Post Acute Provider Quality & Resource List?: Yes    Selected Continued Care - Discharged on 4/7/2022 Admission date: 4/5/2022 - [place] [facility] [place]  Private: Car    Final Discharge Disposition Code: 01 - home or self-care

## 2022-04-08 NOTE — OUTREACH NOTE
Call Center TCM Note    Flowsheet Row Responses   [facility] patient discharged from? [facility]   Does the patient have one of the following disease processes/diagnoses(primary or secondary)? Total Joint Replacement   Joint surgery performed? Knee   TCM attempt successful? Yes   Call start time 1034   Call end time 1038   Discharge diagnosis  TOTAL KNEE ARTHROPLASTY    Person spoke with today (if not patient) and relationship Wife   Does the patient have all medications related to this admission filled (includes all antibiotics, pain medications, etc.) Yes   Prescription comments Discussed s/s of bleeding to monitor for  with Xarelto. Wife VU   Is the patient taking all medications as directed (includes completed medication regime)? Yes   Is the patient able to teach back alternate methods of pain control? Ice   Does the patient have a follow up appointment with their surgeon? Yes   Has the patient kept scheduled appointments due by today? N/A   Comments HOSP DC FU appt with PCP 4/19/22 @ 11:15 am.    Has home health visited the patient within 72 hours of discharge? N/A   Home health comments Out PT therapy    Psychosocial issues? No   If the patient has started attending therapy, what post op day did they begin to attend (either in home or as an out patient)?   Started today   2x week   Does the patient have a wound vac in place? Yes   Has the patient fallen since discharge? No   Did the patient receive a copy of their discharge instructions? Yes   What is the patient's perception of their functional status since discharge? Improving   Is the patient able to teach back signs and symptoms of infection? Temp >100.4 for 24h or longer, Incisional drainage, Blisters around incision, Increased swelling or redness around incision (not associated with surgical edema), Severe discomfort or pain, Shortness of breath or chest pain   Is the patient able to teach back how to prevent infection? Eat well-balanced diet, No tub  baths, hot tub or swimming, Shower only as directed by surgeon, Check incision daily, Keep incision covered if drainage   Is the patient able to teach back signs and symptoms of DVT? Redness in calf, Swelling in calf, Severe pain in calf, Area hot to touch, Shortness of breath or chest pain   Is the patient able to teach back home safety measures? Ability to shower   If the patient is a current smoker, are they able to teach back resources for cessation? Not a smoker   Is the patient/caregiver able to teach back the hierarchy of who to call/visit for symptoms/problems? PCP, Specialist, Home health nurse, Urgent Care, ED, 911 Yes   TCM call completed? Yes   Wrap up additional comments Wife reports Pt just finish first therapy appt. Reports Pt doing well at this time.           Lucrecia Burris RN    4/8/2022, 10:39 EDT

## 2022-04-13 ENCOUNTER — OFFICE VISIT (OUTPATIENT)
Dept: ORTHOPEDIC SURGERY | Facility: CLINIC | Age: 67
End: 2022-04-13

## 2022-04-13 VITALS
HEART RATE: 59 BPM | WEIGHT: 295 LBS | DIASTOLIC BLOOD PRESSURE: 65 MMHG | SYSTOLIC BLOOD PRESSURE: 114 MMHG | BODY MASS INDEX: 37.86 KG/M2 | HEIGHT: 74 IN

## 2022-04-13 DIAGNOSIS — Z96.652 HX OF TOTAL KNEE REPLACEMENT, LEFT: Primary | ICD-10-CM

## 2022-04-13 DIAGNOSIS — E66.01 MORBID OBESITY: ICD-10-CM

## 2022-04-13 PROCEDURE — 99024 POSTOP FOLLOW-UP VISIT: CPT | Performed by: PHYSICIAN ASSISTANT

## 2022-04-13 NOTE — PATIENT INSTRUCTIONS
Total Knee Replacement, Care After  After the procedure, it is common to have stiffness and discomfort, or redness, pain, and swelling around your cut from surgery (incision). You may also have a small amount of blood or clear fluid coming from your incision.  Follow these instructions at home:  Your doctor may give you more instructions. If you have problems, contact your doctor.  Medicines  Take over-the-counter and prescription medicines only as told by your doctor.  If you were prescribed a blood thinner, take it as told by your doctor.  If told, take steps to prevent problems with pooping (constipation). You may need to:  Drink enough fluid to keep your pee (urine) pale yellow.  Take medicines. You will be told what medicines to take.  Eat foods that are high in fiber. These include beans, whole grains, and fresh fruits and vegetables.  Limit foods that are high in fat and sugar. These include fried or sweet foods.  Incision care    Follow instructions from your doctor about how to take care of your incision. Make sure you:  Wash your hands with soap and water for at least 20 seconds before and after you change your bandage. If you cannot use soap and water, use hand .  Change your bandage.  Leave stitches, staples, or skin glue in place for at least 2 weeks.  Leave tape strips alone unless you are told to take them off. You may trim the edges of the tape strips if they curl up.  Do not take baths, swim, or use a hot tub until your doctor says it is okay.  Check your incision every day for signs of infection. Check for:  More redness, swelling, or pain.  More fluid or blood.  Warmth.  Pus or a bad smell.    Activity  Rest as told by your doctor.  Get up to take short walks every 1 to 2 hours. Ask for help if you feel weak or unsteady.  Follow instructions from your doctor about:  Using a walker, crutches, or a cane.  How much body weight you may safely support on your affected leg (weight-bearing  restrictions).  How to get out of a bed and chair and how to go up and down stairs. A physical therapist will show you how to do this.  Do exercises as told by your doctor or physical therapist.  Avoid activities that put stress on your knees. These include running, jumping rope, and doing jumping jacks.  Do not play contact sports until your doctor says it is okay.  Return to your normal activities when your doctor says that it is safe.  Managing pain, stiffness, and swelling    If told, put ice on your knee. To do this:  Put ice in a plastic bag or use an icing device (cold flow pad). Follow your doctor's directions about how to use the icing device.  Place a towel between your skin and the bag or between your skin and the icing device.  Leave the ice on for 20 minutes, 2-3 times a day.  Take off the ice if your skin turns bright red. This is very important. If you cannot feel pain, heat, or cold, you have a greater risk of damage to the area.  Move your toes often.  Raise your leg above the level of your heart while you are sitting or lying down.  Use a few pillows to keep your leg straight.  Do not put a pillow just under the knee. If the knee is bent for a long time, this may make the knee stiff.  Wear elastic knee support as told by your doctor.    Safety    To help prevent falls:  Keep floors clear of objects you may trip over.  Place items that you may need within easy reach.  Wear an apron or tool belt with pockets for carrying objects. This leaves your hands free to help with your balance.  Do not drive or use machines until your doctor says it is safe.    General instructions  Wear compression stockings as told by your doctor.  Continue with breathing exercises. This helps prevent lung infection.  Do not smoke or use any products that contain nicotine or tobacco. If you need help quitting, ask your doctor.  If you plan to visit a dentist:  Tell your doctor. Ask about things to do before your teeth are  cleaned.  Tell your dentist about your new joint.  Keep all follow-up visits.  Contact a doctor if:  You have a fever or chills.  You have a cough or feel short of breath.  Your medicine is not controlling your pain.  You have any of these signs of infection around your incision:  More redness, swelling, or pain.  More fluid or blood.  Warmth.  Pus or a bad smell.  You fall.  Get help right away if:  You have very bad pain.  You have trouble breathing.  You have chest pain.  You have pain in your calf or leg.  You have redness, swelling, or warmth in your calf or leg.  Your incision breaks open after the stitches or staples are taken out.  These symptoms may be an emergency. Get help right away. Call your local emergency services (911 in the [place]  Do not wait to see if the symptoms will go away.  Do not drive yourself to the hospital.  Summary  After the procedure, it is common to have stiffness and discomfort, or redness, pain, and swelling around your incision.  Follow instructions from your doctor about how to take care of your incision.  Use crutches, a walker, or a cane as told by your doctor.  If you were prescribed a blood thinner, take it as told by your doctor.  Keep all follow-up visits.  This information is not intended to replace advice given to you by your health care provider. Make sure you discuss any questions you have with your health care provider.  Document Revised: 06/08/2021 Document Reviewed: 06/08/2021  [employer] Patient Education © 2021 [employer]

## 2022-04-13 NOTE — PROGRESS NOTES
ORTHO POSTOP VISIT       Subjective:    HPI:  Leonid Diehl is a 67 y.o. male who presents about 1 week out from having a left total knee replacement.  He reports he is doing well with mild intermittent discomfort.    Medications:    Current Outpatient Medications:   •  albuterol sulfate  (90 Base) MCG/ACT inhaler, Inhale 2 puffs Every 6 (Six) Hours As Needed for Wheezing or Shortness of Air. (Patient taking differently: Inhale 2 puffs Every 6 (Six) Hours As Needed for Wheezing or Shortness of Air. USES SEASONALLY), Disp: 8.5 g, Rfl: 2  •  allopurinol (ZYLOPRIM) 300 MG tablet, Take 1 tablet by mouth Daily., Disp: 90 tablet, Rfl: 1  •  carvedilol (COREG) 3.125 MG tablet, Take 1 tablet by mouth Daily., Disp: 90 tablet, Rfl: 1  •  ferrous sulfate 324 (65 Fe) MG tablet delayed-release EC tablet, Take 324 mg by mouth Every Night., Disp: , Rfl:   •  fexofenadine-pseudoephedrine (ALLEGRA-D 24) 180-240 MG per 24 hr tablet, Take 1 tablet by mouth Daily., Disp: , Rfl:   •  furosemide (LASIX) 20 MG tablet, Take 1 tablet by mouth Daily As Needed (swelling). swelling (Patient taking differently: Take 20 mg by mouth Daily. swelling), Disp: 90 tablet, Rfl: 1  •  guaiFENesin (MUCINEX) 600 MG 12 hr tablet, Take 1,200 mg by mouth 2 (Two) Times a Day., Disp: , Rfl:   •  lisinopril-hydrochlorothiazide (PRINZIDE,ZESTORETIC) 10-12.5 MG per tablet, Take 1 tablet by mouth Daily. (Patient taking differently: Take 0.5 tablets by mouth Daily.), Disp: 90 tablet, Rfl: 1  •  oxyCODONE-acetaminophen (PERCOCET) 7.5-325 MG per tablet, Take 1-2 tablet by mouth every 4-6 hours as needed pain (Patient taking differently: Take 1-2 tablet by mouth every 4-6 hours as needed pain), Disp: 40 tablet, Rfl: 0  •  potassium chloride 10 MEQ CR tablet, TAKE 1 TABLET BY MOUTH DAILY AS NEEDED (Patient taking differently: Take 10 mEq by mouth Daily. WITH LASIX), Disp: 90 tablet, Rfl: 0  •  rivaroxaban (Xarelto) 10 MG tablet, Take 1 tablet by mouth  "Daily., Disp: 10 tablet, Rfl: 0  •  Symbicort 160-4.5 MCG/ACT inhaler, Inhale 2 puffs 2 (Two) Times a Day. (Patient taking differently: Inhale 2 puffs 2 (Two) Times a Day. USES SEASONALLLY), Disp: 10.2 g, Rfl: 11  •  meloxicam (MOBIC) 7.5 MG tablet, Take 1 tablet by mouth Daily., Disp: 90 tablet, Rfl: 1  •  traZODone (DESYREL) 50 MG tablet, TAKE ONE TABLET BY MOUTH EVERY NIGHT AT BEDTIME AS NEEDED FOR SLEEP (Patient taking differently: Take 50 mg by mouth Every Night.), Disp: 90 tablet, Rfl: 0  Current outpatient and discharge medications have been reconciled for the patient.  Reviewed by: TWIN Bailey      Allergies:  No Known Allergies       Objective   Objective:    /65 (BP Location: Left arm, Patient Position: Sitting, Cuff Size: Large Adult)   Pulse 59   Ht 188 cm (74\")   Wt 134 kg (295 lb)   BMI 37.88 kg/m²     Physical Examination:  Alert, oriented, obese individual in no acute distress, ambulating with the assistance of a walker  Left lower extremity shows a well-healing surgical incision with no erythema, drainage, or open skin lesions.  There are varying degrees of ecchymosis present.  There is a mild to moderate amount of swelling. It is grossly well aligned, and the patient is neurovascularly intact distally. There is mild tenderness to palpation and with range of motion.           Imaging:  xrays to be obtained at next visit            Assessment:  1. Hx of total knee replacement, left    2. Morbid obesity (HCC)       About 1 week out from surgery        Plan:  His incision was cleansed thoroughly with chlorhexidine soap and saline solution and redressed with a bordered Mepilex silver dressing.  This dressing should be left in place till patient seen back in 1 week.  He may shower with the dressing in place.  His leg was rewrapped with clean Ace wraps.  We will plan to see him back in 1 week with imaging at that time.  He should call with any questions or concerns.             Jaymie" "TWIN Garvin  04/13/22  10:09 EDT    \"Please note that portions of this note were completed with a voice recognition program\".                   "

## 2022-04-18 ENCOUNTER — READMISSION MANAGEMENT (OUTPATIENT)
Dept: CALL CENTER | Facility: HOSPITAL | Age: 67
End: 2022-04-18

## 2022-04-18 NOTE — OUTREACH NOTE
Total Joint Week 2 Survey    Flowsheet Row Responses   [facility]? [facility]   Does the patient have one of the following disease processes/diagnoses(primary or secondary)? Total Joint Replacement   Joint surgery performed? Knee   Week 2 attempt successful? No   Unsuccessful attempts Attempt 1          DOC WONG - Registered Nurse

## 2022-04-19 ENCOUNTER — OFFICE VISIT (OUTPATIENT)
Dept: FAMILY MEDICINE CLINIC | Facility: CLINIC | Age: 67
End: 2022-04-19

## 2022-04-19 VITALS
WEIGHT: 285.6 LBS | BODY MASS INDEX: 36.65 KG/M2 | DIASTOLIC BLOOD PRESSURE: 62 MMHG | HEIGHT: 74 IN | SYSTOLIC BLOOD PRESSURE: 118 MMHG | HEART RATE: 70 BPM | OXYGEN SATURATION: 98 %

## 2022-04-19 DIAGNOSIS — Z96.652 S/P TKR (TOTAL KNEE REPLACEMENT), LEFT: Primary | ICD-10-CM

## 2022-04-19 PROCEDURE — 99214 OFFICE O/P EST MOD 30 MIN: CPT | Performed by: NURSE PRACTITIONER

## 2022-04-19 NOTE — PROGRESS NOTES
Chief Complaint  Hospital Follow Up Visit (Knee surgery; pt is requesting a cooling machine for his knee pain, he asked if you could order, also asked for a bandage change and if you could order bandages.)    Subjective          Leonid Diehl presents to [facility] for   History of Present Illness    On 4/5/22 underwent left TKR per Dr. Celis. He was kept overnight d/t bleeding from the incision site. The lower portion of the incision has opened but he reports overall healing well and participating in home physical therapy.  He has completed 10 days of Xarelto, last dose was yesterday.  He has a follow-up appointment with Ortho tomorrow. The dressing was replaced last week by Ortho and was to stay in place until his visit tomorrow, however the drainage saturated it and physical therapy replaced it again 2 days ago.  He complains of swelling, tightness but reports the bruising has improved.  He is icing it twice a day but reports the ice packs do not seem to be effective, he is requesting for a continuous ice machine such as the Iceman machine.         The following portions of the patient's history were reviewed and updated as appropriate: allergies, current medications, past family history, past medical history, past social history, past surgical history and problem list.    Past Medical History:   Diagnosis Date   • Aortic stenosis, moderate 04/05/2022   • Asthma    • Benign prostatic hyperplasia 6/20/2017   • Chronic diastolic congestive heart failure (HCC) 02/04/2019   • Gout    • Hay fever 1/28/2016   • History of alcohol abuse 4/18/2019   • Hx of pancreatitis 4/18/2019   • Insomnia    • Iron deficiency anemia 01/11/2019   • Peripheral edema    • Primary hypertension 01/28/2016   • Raynaud's disease    • Thrombocytopenia (HCC)      Past Surgical History:   Procedure Laterality Date   • CATARACT EXTRACTION, BILATERAL     • TOTAL KNEE ARTHROPLASTY Left 4/5/2022    Procedure: TOTAL  KNEE ARTHROPLASTY;  Surgeon: Yash Celis MD;  Location: [facility] MAIN OR;  Service: Orthopedics;  Laterality: Left;   • TOTAL SHOULDER ARTHROPLASTY W/ DISTAL CLAVICLE EXCISION Right 10/22/2019    Procedure: TOTAL SHOULDER REVERSE ARTHROPLASTY with Biceps Tenodesis;  Surgeon: Chris Lafleur MD;  Location: [facility] MAIN OR;  Service: Orthopedics     Family History   Problem Relation Age of Onset   • Cancer Father    • Heart disease Sister    • Hypertension Brother      Social History     Tobacco Use   • Smoking status: Never Smoker   • Smokeless tobacco: Never Used   Substance Use Topics   • Alcohol use: No       Current Outpatient Medications:   •  albuterol sulfate  (90 Base) MCG/ACT inhaler, Inhale 2 puffs Every 6 (Six) Hours As Needed for Wheezing or Shortness of Air. (Patient taking differently: Inhale 2 puffs Every 6 (Six) Hours As Needed for Wheezing or Shortness of Air. USES SEASONALLY), Disp: 8.5 g, Rfl: 2  •  allopurinol (ZYLOPRIM) 300 MG tablet, Take 1 tablet by mouth Daily., Disp: 90 tablet, Rfl: 1  •  carvedilol (COREG) 3.125 MG tablet, Take 1 tablet by mouth Daily., Disp: 90 tablet, Rfl: 1  •  ferrous sulfate 324 (65 Fe) MG tablet delayed-release EC tablet, Take 324 mg by mouth Every Night., Disp: , Rfl:   •  fexofenadine-pseudoephedrine (ALLEGRA-D 24) 180-240 MG per 24 hr tablet, Take 1 tablet by mouth Daily., Disp: , Rfl:   •  furosemide (LASIX) 20 MG tablet, Take 1 tablet by mouth Daily As Needed (swelling). swelling (Patient taking differently: Take 20 mg by mouth Daily. swelling), Disp: 90 tablet, Rfl: 1  •  guaiFENesin (MUCINEX) 600 MG 12 hr tablet, Take 1,200 mg by mouth 2 (Two) Times a Day., Disp: , Rfl:   •  lisinopril-hydrochlorothiazide (PRINZIDE,ZESTORETIC) 10-12.5 MG per tablet, Take 1 tablet by mouth Daily. (Patient taking differently: Take 0.5 tablets by mouth Daily.), Disp: 90 tablet, Rfl: 1  •  meloxicam (MOBIC) 7.5 MG tablet, Take 1 tablet by mouth Daily., Disp: 90  "tablet, Rfl: 1  •  oxyCODONE-acetaminophen (PERCOCET) 7.5-325 MG per tablet, Take 1-2 tablet by mouth every 4-6 hours as needed pain (Patient taking differently: Take 1-2 tablet by mouth every 4-6 hours as needed pain), Disp: 40 tablet, Rfl: 0  •  potassium chloride 10 MEQ CR tablet, TAKE 1 TABLET BY MOUTH DAILY AS NEEDED (Patient taking differently: Take 10 mEq by mouth Daily. WITH LASIX), Disp: 90 tablet, Rfl: 0  •  rivaroxaban (Xarelto) 10 MG tablet, Take 1 tablet by mouth Daily., Disp: 10 tablet, Rfl: 0  •  Symbicort 160-4.5 MCG/ACT inhaler, Inhale 2 puffs 2 (Two) Times a Day. (Patient taking differently: Inhale 2 puffs 2 (Two) Times a Day. USES SEASONALLLY), Disp: 10.2 g, Rfl: 11  •  traZODone (DESYREL) 50 MG tablet, TAKE ONE TABLET BY MOUTH EVERY NIGHT AT BEDTIME AS NEEDED FOR SLEEP (Patient taking differently: Take 50 mg by mouth Every Night.), Disp: 90 tablet, Rfl: 0    Objective   Vital Signs:   /62 (BP Location: Left arm, Patient Position: Sitting, Cuff Size: Adult)   Pulse 70   Ht 188 cm (74.02\")   Wt 130 kg (285 lb 9.6 oz)   SpO2 98%   BMI 36.65 kg/m²       Physical Exam  Constitutional:       General: He is not in acute distress.     Appearance: Normal appearance.   Pulmonary:      Effort: Pulmonary effort is normal.   Musculoskeletal:         General: Swelling (L knee) and tenderness (L TKR incision healing well and approximated except for the distal incsion with 1 cm dehiscence w/ serosanguineous drainage.  Incision cleaned with saline, painted with Betadine and occlusive dressing applied) present.      Cervical back: Normal range of motion.   Skin:     General: Skin is warm and dry.      Findings: Bruising (present upper and lower left leg, mostly calf region) present.   Neurological:      General: No focal deficit present.      Mental Status: He is alert.      Gait: Gait abnormal.   Psychiatric:         Mood and Affect: Mood normal.         Behavior: Behavior normal.         Thought " Content: Thought content normal.         Judgment: Judgment normal.          Result Review :     No visits with results within 7 Day(s) from this visit.   Latest known visit with results is:   Admission on 04/05/2022, Discharged on 04/07/2022   Component Date Value Ref Range Status   • Glucose 04/06/2022 132 (A) 65 - 99 mg/dL Final   • BUN 04/06/2022 16  8 - 23 mg/dL Final   • Creatinine 04/06/2022 1.00  0.76 - 1.27 mg/dL Final   • Sodium 04/06/2022 135 (A) 136 - 145 mmol/L Final   • Potassium 04/06/2022 5.0  3.5 - 5.2 mmol/L Final   • Chloride 04/06/2022 105  98 - 107 mmol/L Final   • CO2 04/06/2022 23.0  22.0 - 29.0 mmol/L Final   • Calcium 04/06/2022 8.4 (A) 8.6 - 10.5 mg/dL Final   • BUN/Creatinine Ratio 04/06/2022 16.0  7.0 - 25.0 Final   • Anion Gap 04/06/2022 7.0  5.0 - 15.0 mmol/L Final   • eGFR 04/06/2022 83.0  >60.0 mL/min/1.73 Final    National Kidney Foundation and American Society of Nephrology (ASN) Task Force recommended calculation based on the Chronic Kidney Disease Epidemiology Collaboration (CKD-EPI) equation refit without adjustment for race.   • Hemoglobin 04/06/2022 11.5 (A) 13.0 - 17.7 g/dL Final   • Hematocrit 04/06/2022 33.4 (A) 37.5 - 51.0 % Final                       Assessment and Plan    Diagnoses and all orders for this visit:    1. S/P TKR (total knee replacement), left (Primary)    1. Patient to discuss with Ortho tomorrow if should continue Xarelto  2.  Continue PT at home as directed  3.  Will order Iceman machine through [facility] on [facility] (this is not available in [place]) paper prescription written and will fax.  I discussed with a team member at [facility] today and they will contact the patient regarding cost etc.  4. Limit activity as directed   5. Ok to use tylenol and oxycodone prn.     I spent 30 minutes caring for Leonid Diehl on this date of service. This time includes time spent by me in the following activities: preparing for the visit, reviewing tests,  performing a medically appropriate examination and/or evaluation , counseling and educating the patient/family/caregiver, ordering medications, tests, or procedures and documenting information in the medical record        Follow Up     Return if symptoms worsen or fail to improve, for Next scheduled follow up.  Patient was given instructions and counseling regarding his condition or for health maintenance advice. Please see specific information pulled into the AVS if appropriate.        Part of this note may be an electronic transcription/translation of spoken language to printed text using the Dragon Dictation System

## 2022-04-20 ENCOUNTER — OFFICE VISIT (OUTPATIENT)
Dept: ORTHOPEDIC SURGERY | Facility: CLINIC | Age: 67
End: 2022-04-20

## 2022-04-20 VITALS
WEIGHT: 285.6 LBS | BODY MASS INDEX: 36.65 KG/M2 | SYSTOLIC BLOOD PRESSURE: 103 MMHG | HEIGHT: 74 IN | DIASTOLIC BLOOD PRESSURE: 60 MMHG | HEART RATE: 69 BPM

## 2022-04-20 DIAGNOSIS — E66.01 MORBID OBESITY: ICD-10-CM

## 2022-04-20 DIAGNOSIS — Z96.652 S/P TKR (TOTAL KNEE REPLACEMENT), LEFT: Primary | ICD-10-CM

## 2022-04-20 PROCEDURE — 99024 POSTOP FOLLOW-UP VISIT: CPT | Performed by: PHYSICIAN ASSISTANT

## 2022-04-22 ENCOUNTER — READMISSION MANAGEMENT (OUTPATIENT)
Dept: CALL CENTER | Facility: HOSPITAL | Age: 67
End: 2022-04-22

## 2022-04-22 NOTE — OUTREACH NOTE
Total Joint Week 2 Survey    Flowsheet Row Responses   [facility] patient discharged from? [facility]   Does the patient have one of the following disease processes/diagnoses(primary or secondary)? Total Joint Replacement   Joint surgery performed? Knee   Week 2 attempt successful? Yes   Call start time 1106   Call end time 1109   Has the patient been back in either the hospital or Emergency Department since discharge? No   Discharge diagnosis  TOTAL KNEE ARTHROPLASTY    Is the patient taking all medications as directed (includes completed medication regime)? Yes   Has the patient kept scheduled appointments due by today? Yes   Home health comments Out PT therapy    Psychosocial issues? No   Has the patient fallen since discharge? No   Comments Steri strips in place and bandage off. Enc good hand cleaning around incision.    What is the patient's perception of their functional status since discharge? Improving   Is the patient able to teach back how to prevent infection? Check incision daily, Wash hands before and after touching incision, Keep incision covered if pets in house   Is the patient/caregiver able to teach back the hierarchy of who to call/visit for symptoms/problems? PCP, Specialist, Home health nurse, Urgent Care, ED, 911 Yes   Additional teach back comments Wife and pt states he is doing well. Going to OTPT PT sessions and doing exercises in the home.    Week 2 call completed? Yes          DEBI CALLE - Registered Nurse

## 2022-05-02 ENCOUNTER — APPOINTMENT (OUTPATIENT)
Dept: GENERAL RADIOLOGY | Facility: HOSPITAL | Age: 67
End: 2022-05-02

## 2022-05-02 ENCOUNTER — HOSPITAL ENCOUNTER (EMERGENCY)
Facility: HOSPITAL | Age: 67
Discharge: HOME | End: 2022-05-02
Attending: EMERGENCY MEDICINE | Admitting: EMERGENCY MEDICINE

## 2022-05-02 VITALS
BODY MASS INDEX: 38.6 KG/M2 | RESPIRATION RATE: 19 BRPM | HEIGHT: 72 IN | OXYGEN SATURATION: 96 % | HEART RATE: 63 BPM | DIASTOLIC BLOOD PRESSURE: 73 MMHG | WEIGHT: 285 LBS | SYSTOLIC BLOOD PRESSURE: 141 MMHG | TEMPERATURE: 98 F

## 2022-05-02 DIAGNOSIS — Z96.652 STATUS POST LEFT KNEE REPLACEMENT: ICD-10-CM

## 2022-05-02 DIAGNOSIS — T81.31XA POSTOPERATIVE WOUND DEHISCENCE, INITIAL ENCOUNTER: Primary | ICD-10-CM

## 2022-05-02 LAB
ANION GAP SERPL CALCULATED.3IONS-SCNC: 12 MMOL/L (ref 5–15)
BASOPHILS # BLD AUTO: 0.1 10*3/MM3 (ref 0–0.2)
BASOPHILS NFR BLD AUTO: 0.9 % (ref 0–1.5)
BUN SERPL-MCNC: 15 MG/DL (ref 8–23)
BUN/CREAT SERPL: 20.5 (ref 7–25)
CALCIUM SPEC-SCNC: 8.7 MG/DL (ref 8.6–10.5)
CHLORIDE SERPL-SCNC: 106 MMOL/L (ref 98–107)
CO2 SERPL-SCNC: 21 MMOL/L (ref 22–29)
CREAT SERPL-MCNC: 0.73 MG/DL (ref 0.76–1.27)
DEPRECATED RDW RBC AUTO: 51.2 FL (ref 37–54)
EGFRCR SERPLBLD CKD-EPI 2021: 100.3 ML/MIN/1.73
EOSINOPHIL # BLD AUTO: 0.2 10*3/MM3 (ref 0–0.4)
EOSINOPHIL NFR BLD AUTO: 2.5 % (ref 0.3–6.2)
ERYTHROCYTE [DISTWIDTH] IN BLOOD BY AUTOMATED COUNT: 14.9 % (ref 12.3–15.4)
GLUCOSE SERPL-MCNC: 87 MG/DL (ref 65–99)
HCT VFR BLD AUTO: 37.7 % (ref 37.5–51)
HGB BLD-MCNC: 12.2 G/DL (ref 13–17.7)
HOLD SPECIMEN: NORMAL
LYMPHOCYTES # BLD AUTO: 0.8 10*3/MM3 (ref 0.7–3.1)
LYMPHOCYTES NFR BLD AUTO: 13.9 % (ref 19.6–45.3)
MCH RBC QN AUTO: 31.8 PG (ref 26.6–33)
MCHC RBC AUTO-ENTMCNC: 32.2 G/DL (ref 31.5–35.7)
MCV RBC AUTO: 98.8 FL (ref 79–97)
MONOCYTES # BLD AUTO: 0.7 10*3/MM3 (ref 0.1–0.9)
MONOCYTES NFR BLD AUTO: 11.6 % (ref 5–12)
NEUTROPHILS NFR BLD AUTO: 4.3 10*3/MM3 (ref 1.7–7)
NEUTROPHILS NFR BLD AUTO: 71.1 % (ref 42.7–76)
NRBC BLD AUTO-RTO: 0.1 /100 WBC (ref 0–0.2)
PLATELET # BLD AUTO: 96 10*3/MM3 (ref 140–450)
PMV BLD AUTO: 8.6 FL (ref 6–12)
POTASSIUM SERPL-SCNC: 4.4 MMOL/L (ref 3.5–5.2)
PROCALCITONIN SERPL-MCNC: 0.13 NG/ML (ref 0–0.25)
RBC # BLD AUTO: 3.82 10*6/MM3 (ref 4.14–5.8)
SODIUM SERPL-SCNC: 139 MMOL/L (ref 136–145)
WBC NRBC COR # BLD: 6.1 10*3/MM3 (ref 3.4–10.8)
WHOLE BLOOD HOLD SPECIMEN: NORMAL

## 2022-05-02 PROCEDURE — 87070 CULTURE OTHR SPECIMN AEROBIC: CPT | Performed by: NURSE PRACTITIONER

## 2022-05-02 PROCEDURE — 99283 EMERGENCY DEPT VISIT LOW MDM: CPT

## 2022-05-02 PROCEDURE — 87147 CULTURE TYPE IMMUNOLOGIC: CPT | Performed by: NURSE PRACTITIONER

## 2022-05-02 PROCEDURE — 87186 SC STD MICRODIL/AGAR DIL: CPT | Performed by: NURSE PRACTITIONER

## 2022-05-02 PROCEDURE — 73562 X-RAY EXAM OF KNEE 3: CPT

## 2022-05-02 PROCEDURE — 85025 COMPLETE CBC W/AUTO DIFF WBC: CPT | Performed by: NURSE PRACTITIONER

## 2022-05-02 PROCEDURE — 84145 PROCALCITONIN (PCT): CPT | Performed by: NURSE PRACTITIONER

## 2022-05-02 PROCEDURE — 87205 SMEAR GRAM STAIN: CPT | Performed by: NURSE PRACTITIONER

## 2022-05-02 PROCEDURE — 80048 BASIC METABOLIC PNL TOTAL CA: CPT | Performed by: NURSE PRACTITIONER

## 2022-05-02 NOTE — DISCHARGE INSTRUCTIONS
Paint with Betadine twice daily.  Okay to be open to air if in clean environment, otherwise cover with clean bandage.  Continue exercise routine per physical therapy.  Wear [employer] hose if not being active.  Schedule follow-up with primary care for recheck.  Keep scheduled follow-up with orthopedist.  Return to the ER for new or worsening symptoms.

## 2022-05-02 NOTE — ED PROVIDER NOTES
Subjective   67-year-old male presents with a complaint of dehiscence of his surgical wound to the left knee status post TKR 4/5/2022 per Dr. Celis.  Wife at bedside reports they were instructed to come in per physical therapist due to foul odor.  He denies associated pain.  Denies erythema, Homans' sign, fever sweats chills.  Denies history of DVT PE.  Patient was on anticoagulation therapy immediately post-op.  Denies chest pain or dyspnea.  Denies history of diabetes.  Denies current antibiotic use.    1. Location: Left knee surgical incision  2. Quality: Denies  3. Severity: 0  4. Worsening factors: Denies  5. Alleviating factors: Denies  6. Onset: Today  7. Radiation: None  8. Frequency: Resolved  9. Co-morbidities: Past Medical History:  04/05/2022: Aortic stenosis, moderate  No date: Asthma  6/20/2017: Benign prostatic hyperplasia  02/04/2019: Chronic diastolic congestive heart failure (HCC)  No date: Gout  1/28/2016: Hay fever  4/18/2019: History of alcohol abuse  4/18/2019: Hx of pancreatitis  No date: Insomnia  01/11/2019: Iron deficiency anemia  No date: Peripheral edema  01/28/2016: Primary hypertension  No date: Raynaud's disease  No date: Thrombocytopenia (HCC)  10. Source: Patient and wife            Review of Systems   Constitutional: Negative for chills, diaphoresis and fever.   Respiratory: Negative for shortness of breath.    Cardiovascular: Negative for chest pain and leg swelling.   Gastrointestinal: Negative for nausea and vomiting.   Musculoskeletal: Negative for arthralgias, gait problem and myalgias.   Skin: Positive for wound. Negative for color change, pallor and rash.   Allergic/Immunologic: Negative for immunocompromised state.   Neurological: Negative for weakness and numbness.   Hematological: Does not bruise/bleed easily.   All other systems reviewed and are negative.      Past Medical History:   Diagnosis Date   • Aortic stenosis, moderate 04/05/2022   • Asthma    • Benign prostatic  hyperplasia 6/20/2017   • Chronic diastolic congestive heart failure (HCC) 02/04/2019   • Gout    • Hay fever 1/28/2016   • History of alcohol abuse 4/18/2019   • Hx of pancreatitis 4/18/2019   • Insomnia    • Iron deficiency anemia 01/11/2019   • Peripheral edema    • Primary hypertension 01/28/2016   • Raynaud's disease    • Thrombocytopenia (HCC)        No Known Allergies    Past Surgical History:   Procedure Laterality Date   • CATARACT EXTRACTION, BILATERAL     • TOTAL KNEE ARTHROPLASTY Left 4/5/2022    Procedure: TOTAL KNEE ARTHROPLASTY;  Surgeon: Yash Celis MD;  Location: [facility] OR;  Service: Orthopedics;  Laterality: Left;   • TOTAL SHOULDER ARTHROPLASTY W/ DISTAL CLAVICLE EXCISION Right 10/22/2019    Procedure: TOTAL SHOULDER REVERSE ARTHROPLASTY with Biceps Tenodesis;  Surgeon: Chris Lafleur MD;  Location: [facility];  Service: Orthopedics       Family History   Problem Relation Age of Onset   • Cancer Father    • Heart disease Sister    • Hypertension Brother        Social History     Socioeconomic History   • Marital status:    Tobacco Use   • Smoking status: Never Smoker   • Smokeless tobacco: Never Used   Vaping Use   • Vaping Use: Never used   Substance and Sexual Activity   • Alcohol use: No   • Drug use: No   • Sexual activity: Defer           Objective   Physical Exam  Vitals and nursing note reviewed.   Constitutional:       General: He is awake. He is not in acute distress.     Appearance: Normal appearance. He is well-developed. He is obese. He is not ill-appearing or diaphoretic.   Cardiovascular:      Rate and Rhythm: Normal rate and regular rhythm.      Pulses: Normal pulses.           Dorsalis pedis pulses are 2+ on the right side and 2+ on the left side.        Posterior tibial pulses are 2+ on the right side and 2+ on the left side.      Heart sounds: Normal heart sounds, S1 normal and S2 normal. Heart sounds not distant. No murmur heard.    No friction rub.  No gallop.   Pulmonary:      Effort: Pulmonary effort is normal.      Breath sounds: Normal breath sounds and air entry.   Musculoskeletal:         General: No swelling, tenderness, deformity or signs of injury. Normal range of motion.      Right knee: Normal.      Left knee: No swelling. Normal range of motion. No tenderness. Normal pulse.        Legs:       Comments: Surgical incision noted with small dehiscence at the distal aspect.  No overlying erythema noted.  There is scant amount of serosanguineous drainage.  Distal pulses strong equal bilaterally 2+.  Cap refill less than 2 seconds.   Skin:     General: Skin is warm and dry.      Capillary Refill: Capillary refill takes less than 2 seconds.      Coloration: Skin is not pale.   Neurological:      Mental Status: He is alert and oriented to person, place, and time.      Sensory: No sensory deficit.      Motor: No abnormal muscle tone.   Psychiatric:         Mood and Affect: Mood normal.         Behavior: Behavior normal. Behavior is cooperative.         Thought Content: Thought content normal.         Judgment: Judgment normal.         Procedures           ED Course    XR Knee 3 View Left    Result Date: 5/2/2022  Soft tissue swelling but no evidence of soft tissue gas.  Small joint effusion.  No evidence of osteomyelitis. No fracture.  Normal appearance of the arthroplasty hardware, unchanged stable appearance    Electronically Signed By-Angel Mckoy On:5/2/2022 2:54 PM This report was finalized on [id number] by  Angel Mckoy, .    Medications - No data to display  Labs Reviewed   BASIC METABOLIC PANEL - Abnormal; Notable for the following components:       Result Value    Creatinine 0.73 (*)     CO2 21.0 (*)     All other components within normal limits    Narrative:     GFR Normal >60  Chronic Kidney Disease <60  Kidney Failure <15     CBC WITH AUTO DIFFERENTIAL - Abnormal; Notable for the following components:    RBC 3.82 (*)     Hemoglobin 12.2 (*)      "MCV 98.8 (*)     Platelets 96 (*)     Lymphocyte % 13.9 (*)     All other components within normal limits   PROCALCITONIN - Normal    Narrative:     As a Marker for Sepsis (Non-Neonates):    1. <0.5 ng/mL represents a low risk of severe sepsis and/or septic shock.  2. >2 ng/mL represents a high risk of severe sepsis and/or septic shock.    As a Marker for Lower Respiratory Tract Infections that require antibiotic therapy:    PCT on Admission    Antibiotic Therapy       6-12 Hrs later    >0.5                Strongly Recommended  >0.25 - <0.5        Recommended   0.1 - 0.25          Discouraged              Remeasure/reassess PCT  <0.1                Strongly Discouraged     Remeasure/reassess PCT    As 28 day mortality risk marker: \"Change in Procalcitonin Result\" (>80% or <=80%) if Day 0 (or Day 1) and Day 4 values are available. Refer to http://www.[employer]-pct-calculator.com    Change in PCT <=80%  A decrease of PCT levels below or equal to 80% defines a positive change in PCT test result representing a higher risk for 28-day all-cause mortality of patients diagnosed with severe sepsis for septic shock.    Change in PCT >80%  A decrease of PCT levels of more than 80% defines a negative change in PCT result representing a lower risk for 28-day all-cause mortality of patients diagnosed with severe sepsis or septic shock.      WOUND CULTURE   CBC AND DIFFERENTIAL    Narrative:     The following orders were created for panel order CBC & Differential.  Procedure                               Abnormality         Status                     ---------                               -----------         ------                     CBC Auto Differential[[id number]]        Abnormal            Final result                 Please view results for these tests on the individual orders.   EXTRA TUBES    Narrative:     The following orders were created for panel order Extra Tubes.  Procedure                               Abnormality      "    Status                     ---------                               -----------         ------                     Gold Top - SST[[id number]]                                   Final result               Light Blue Top[[id number]]                                   Final result                 Please view results for these tests on the individual orders.   GOLD TOP - SST   LIGHT BLUE TOP                                                  [facility]  Number of Diagnoses or Management Options  Postoperative wound dehiscence, initial encounter  Status post left knee replacement  Diagnosis management comments: Chart Review: 4/20/2022 patient was seen by orthopedist for postop follow-up.  Comorbidity: Past Medical History:  04/05/2022: Aortic stenosis, moderate  No date: Asthma  6/20/2017: Benign prostatic hyperplasia  02/04/2019: Chronic diastolic congestive heart failure (HCC)  No date: Gout  1/28/2016: Hay fever  4/18/2019: History of alcohol abuse  4/18/2019: Hx of pancreatitis  No date: Insomnia  01/11/2019: Iron deficiency anemia  No date: Peripheral edema  01/28/2016: Primary hypertension  No date: Raynaud's disease  No date: Thrombocytopenia (HCC)  Imaging: Was interpreted by physician and reviewed by myself: XR Knee 3 View Left   Final Result    Soft tissue swelling but no evidence of soft tissue gas.         Small joint effusion.         No evidence of osteomyelitis. No fracture.         Normal appearance of the arthroplasty hardware, unchanged stable    appearance          Electronically Signed By-Angel Mckoy On:5/2/2022 2:54 PM    This report was finalized on [id number] by  Angel Mckoy, .    Patient undressed and placed in gown for exam.  Appropriate PPE worn during patient exam. Surgical incision noted with small dehiscence at the distal aspect.  No overlying erythema noted.  There is scant amount of serosanguineous drainage.  Distal pulses strong equal bilaterally 2+.  Cap refill less than 2 seconds.  Wound  culture obtained.  Labs were obtained and found to be unremarkable.  The wound was painted with Betadine.  Patient was encouraged to paint at least twice daily.  Encouraged to schedule follow-up with orthopedist for wound check.    Disposition/Treatment: Discussed results with patient, verbalized understanding.  Discussed reasons to return to the ER, patient verbalized understanding.  Agreeable with plan of care.  Patient was stable upon discharge.       Part of this note may be an electronic transcription/translation of spoken language to printed text using the Dragon Dictation System.            Amount and/or Complexity of Data Reviewed  Clinical lab tests: reviewed  Tests in the radiology section of CPT®: reviewed    Patient Progress  Patient progress: stable      Final diagnoses:   Postoperative wound dehiscence, initial encounter   Status post left knee replacement       ED Disposition  ED Disposition     ED Disposition   Discharge    Condition   Stable    Comment   --             Kerri Cruz, APRN  [address]  [place] [address] [phone]    Schedule an appointment as soon as possible for a visit       Yash Celis MD  [address] [place] [address] [phone]    Schedule an appointment as soon as possible for a visit       [facility] EMERGENCY DEPARTMENT  [address] [facility] [address] [phone]  Go to   If symptoms worsen         Medication List      Changed    albuterol sulfate  (90 Base) MCG/ACT inhaler  Commonly known as: PROVENTIL HFA;VENTOLIN HFA;PROAIR HFA  Inhale 2 puffs Every 6 (Six) Hours As Needed for Wheezing or Shortness of Air.  What changed: additional instructions     furosemide 20 MG tablet  Commonly known as: LASIX  Take 1 tablet by mouth Daily As Needed (swelling). swelling  What changed: when to take this     lisinopril-hydrochlorothiazide 10-12.5 MG per tablet  Commonly known as: PRINZIDE,ZESTORETIC  Take 1 tablet by mouth  Daily.  What changed: how much to take     potassium chloride 10 MEQ CR tablet  TAKE 1 TABLET BY MOUTH DAILY AS NEEDED  What changed:   · when to take this  · additional instructions     Symbicort 160-4.5 MCG/ACT inhaler  Generic drug: budesonide-formoterol  Inhale 2 puffs 2 (Two) Times a Day.  What changed: additional instructions     traZODone 50 MG tablet  Commonly known as: DESYREL  TAKE ONE TABLET BY MOUTH EVERY NIGHT AT BEDTIME AS NEEDED FOR SLEEP  What changed: when to take this             Long, Ava LORENZO, BERKLEY  05/02/22 2465

## 2022-05-04 ENCOUNTER — TELEPHONE (OUTPATIENT)
Dept: ORTHOPEDIC SURGERY | Facility: CLINIC | Age: 67
End: 2022-05-04

## 2022-05-04 RX ORDER — SULFAMETHOXAZOLE AND TRIMETHOPRIM 800; 160 MG/1; MG/1
1 TABLET ORAL 2 TIMES DAILY
Qty: 20 TABLET | Refills: 0 | Status: SHIPPED | OUTPATIENT
Start: 2022-05-04 | End: 2022-05-18

## 2022-05-04 RX ORDER — DOXYCYCLINE HYCLATE 100 MG/1
100 CAPSULE ORAL 2 TIMES DAILY
Qty: 20 CAPSULE | Refills: 0 | Status: SHIPPED | OUTPATIENT
Start: 2022-05-04 | End: 2022-05-11 | Stop reason: SDUPTHER

## 2022-05-04 NOTE — TELEPHONE ENCOUNTER
Please call the patient in a prescription of doxycycline 100 mg tab 1 p.o. twice daily and Bactrim DS tab 1 p.o. twice daily for 10 days.  This will serve as the oral antibiotics.  Please also make him an appointment ASAP.  He can either see Dionne in the office this week or me on Wednesday following in 1 week.  I wish the patient will call us rather than going to the emergency room because that really does not help his care in any fashion.  Thank

## 2022-05-04 NOTE — TELEPHONE ENCOUNTER
"[facility] ER CALLED ABOUT THE PATIENT BEING IN THE ER ON 5/2/22. \"dehiscence of his surgical wound to the left knee status post TKR 4/5/2022 per Dr. Celis. Wife states there was a foul odor.\" HE NEEDS TO SCHEDULE AN APPT FOR A FOLLOW UP. I ATTEMPTED TO CALL, LVM TO SCHEDULE AN APPT. HE IS NEEDING ANTIBIOTICS. PLEASE ADVISE.  "

## 2022-05-04 NOTE — PHARMACY RECOMMENDATION
Preliminary wound culture resulted with  Staph aureus and Coagulase negative Staph .  Patient underwent L TKR on 4/5/22 with Dr. Celis. Patient presented with foul odor and dehiscence of surgical site to ER at the recommendation from PT. X-ray obtained showed small joint effusion and soft tissue swelling. Tried to contact the patient to follow-up with their current condition and had to leave voicemail. Contacted Dr. Celis's office to inform them of the culture results. Spoke with Dee and upon discussion with the medical assistant, they will see the patient in office so no antibiotics are currently needed from the ER.   No further interventions will be made.     Microbiology Results (last 10 days)       Procedure Component Value - Date/Time    Wound Culture - Swab, Knee, Left [[id number]]  (Abnormal) Collected: 05/02/22 1420    Lab Status: Preliminary result Specimen: Swab from Knee, Left Updated: 05/04/22 0855     Wound Culture Heavy growth (4+) Staphylococcus aureus      Heavy growth (4+) Staphylococcus, coagulase negative     Gram Stain Few (2+) WBCs seen      Many (4+) Gram positive cocci    Narrative:                    Fernanda Scruggs, PharmD  5/4/2022 13:26 EDT

## 2022-05-04 NOTE — TELEPHONE ENCOUNTER
Medication has been sent in    Dee do you mind to schedule the patient with Jaymie this week or with Dr. Celis next week.

## 2022-05-05 ENCOUNTER — OFFICE VISIT (OUTPATIENT)
Dept: ORTHOPEDIC SURGERY | Facility: CLINIC | Age: 67
End: 2022-05-05

## 2022-05-05 VITALS
BODY MASS INDEX: 38.6 KG/M2 | SYSTOLIC BLOOD PRESSURE: 143 MMHG | WEIGHT: 285 LBS | HEART RATE: 66 BPM | DIASTOLIC BLOOD PRESSURE: 67 MMHG | HEIGHT: 72 IN

## 2022-05-05 DIAGNOSIS — T81.31XA POSTOPERATIVE WOUND DEHISCENCE, INITIAL ENCOUNTER: ICD-10-CM

## 2022-05-05 DIAGNOSIS — Z96.652 HX OF TOTAL KNEE REPLACEMENT, LEFT: Primary | ICD-10-CM

## 2022-05-05 DIAGNOSIS — E66.01 MORBID OBESITY: ICD-10-CM

## 2022-05-05 DIAGNOSIS — T81.41XA INFECTION OF SUPERFICIAL INCISIONAL SURGICAL SITE AFTER PROCEDURE, INITIAL ENCOUNTER: ICD-10-CM

## 2022-05-05 LAB
BACTERIA SPEC AEROBE CULT: ABNORMAL
BACTERIA SPEC AEROBE CULT: ABNORMAL
GRAM STN SPEC: ABNORMAL
GRAM STN SPEC: ABNORMAL

## 2022-05-05 PROCEDURE — 99024 POSTOP FOLLOW-UP VISIT: CPT | Performed by: PHYSICIAN ASSISTANT

## 2022-05-05 NOTE — PROGRESS NOTES
ORTHO POSTOP VISIT       Subjective:    HPI:  Leonid Diehl is a 67 y.o. male who presents about 4 weeks out from having a left total knee replacement.  He reports that approximately 3 days ago a smell developed, as well as increased drainage from the bottom of his incision.  He was seen in the emergency department, where a culture was taken.  An antibiotic was called in yesterday, but he has not started it yet.    Medications:    Current Outpatient Medications:   •  albuterol sulfate  (90 Base) MCG/ACT inhaler, Inhale 2 puffs Every 6 (Six) Hours As Needed for Wheezing or Shortness of Air. (Patient taking differently: Inhale 2 puffs Every 6 (Six) Hours As Needed for Wheezing or Shortness of Air. USES SEASONALLY), Disp: 8.5 g, Rfl: 2  •  allopurinol (ZYLOPRIM) 300 MG tablet, Take 1 tablet by mouth Daily., Disp: 90 tablet, Rfl: 1  •  carvedilol (COREG) 3.125 MG tablet, Take 1 tablet by mouth Daily., Disp: 90 tablet, Rfl: 1  •  ferrous sulfate 324 (65 Fe) MG tablet delayed-release EC tablet, Take 324 mg by mouth Every Night., Disp: , Rfl:   •  fexofenadine-pseudoephedrine (ALLEGRA-D 24) 180-240 MG per 24 hr tablet, Take 1 tablet by mouth Daily., Disp: , Rfl:   •  furosemide (LASIX) 20 MG tablet, Take 1 tablet by mouth Daily As Needed (swelling). swelling (Patient taking differently: Take 20 mg by mouth Daily. swelling), Disp: 90 tablet, Rfl: 1  •  guaiFENesin (MUCINEX) 600 MG 12 hr tablet, Take 1,200 mg by mouth 2 (Two) Times a Day., Disp: , Rfl:   •  lisinopril-hydrochlorothiazide (PRINZIDE,ZESTORETIC) 10-12.5 MG per tablet, Take 1 tablet by mouth Daily. (Patient taking differently: Take 0.5 tablets by mouth Daily.), Disp: 90 tablet, Rfl: 1  •  meloxicam (MOBIC) 7.5 MG tablet, Take 1 tablet by mouth Daily., Disp: 90 tablet, Rfl: 1  •  oxyCODONE-acetaminophen (PERCOCET) 7.5-325 MG per tablet, Take 1-2 tablet by mouth every 4-6 hours as needed pain (Patient taking differently: Take 1-2 tablet by mouth every  "4-6 hours as needed pain), Disp: 40 tablet, Rfl: 0  •  potassium chloride 10 MEQ CR tablet, TAKE 1 TABLET BY MOUTH DAILY AS NEEDED (Patient taking differently: Take 10 mEq by mouth Daily. WITH LASIX), Disp: 90 tablet, Rfl: 0  •  Symbicort 160-4.5 MCG/ACT inhaler, Inhale 2 puffs 2 (Two) Times a Day. (Patient taking differently: Inhale 2 puffs 2 (Two) Times a Day. USES SEASONALLLY), Disp: 10.2 g, Rfl: 11  •  traZODone (DESYREL) 50 MG tablet, TAKE ONE TABLET BY MOUTH EVERY NIGHT AT BEDTIME AS NEEDED FOR SLEEP (Patient taking differently: Take 50 mg by mouth Every Night.), Disp: 90 tablet, Rfl: 0  •  doxycycline (VIBRAMYCIN) 100 MG capsule, Take 1 capsule by mouth 2 (Two) Times a Day., Disp: 20 capsule, Rfl: 0  •  sulfamethoxazole-trimethoprim (Bactrim DS) 800-160 MG per tablet, Take 1 tablet by mouth 2 (Two) Times a Day., Disp: 20 tablet, Rfl: 0  Current outpatient and discharge medications have been reconciled for the patient.  Reviewed by: TWIN Bailey      Allergies:  No Known Allergies       Objective   Objective:    /67 (BP Location: Right arm, Patient Position: Sitting, Cuff Size: Large Adult)   Pulse 66   Ht 182.9 cm (72\")   Wt 129 kg (285 lb)   BMI 38.65 kg/m²     Physical Examination:  Alert, oriented, obese individual in no acute distress, ambulating unassisted  Left lower extremity shows a mostly well-healed surgical incision.  There is an opening towards the distal portion of the incision about 1 x 0.5 cm and about 0.25 cm deep.  There is some mild surrounding erythema.  There is a foul odor.  There is no overt drainage.  There is a mild amount of swelling. It is grossly well aligned, and the patient is neurovascularly intact distally. The knee is stable to varus and valgus stress, there is no patellar maltracking or crepitus noted, and plantar and dorsiflexion is 5/5. There is mild tenderness to palpation and with range of motion.           Imaging:  no diagnostic testing performed this " "visit            Assessment:  1. Hx of total knee replacement, left    2. Postoperative wound dehiscence, initial encounter    3. Infection of superficial incisional surgical site after procedure, initial encounter    4. Morbid obesity (HCC)       About 4 weeks out from surgery        Plan:  The area was cleansed thoroughly with chlorhexidine soap and saline solution and dressed with Optifoam silver and Tegaderm.  The patient should start his antibiotics immediately.  This dressing should left in place until the patient is seen back on Monday.  His culture is growing staph.  Depending on what his wound looks like on Monday, he may need to be taken back to the OR.             TWIN Bailey  05/05/22  11:59 EDT    \"Please note that portions of this note were completed with a voice recognition program\".                   "

## 2022-05-06 ENCOUNTER — READMISSION MANAGEMENT (OUTPATIENT)
Dept: CALL CENTER | Facility: HOSPITAL | Age: 67
End: 2022-05-06

## 2022-05-06 RX ORDER — TRAZODONE HYDROCHLORIDE 50 MG/1
50 TABLET ORAL NIGHTLY
Qty: 90 TABLET | Refills: 0 | Status: SHIPPED | OUTPATIENT
Start: 2022-05-06 | End: 2022-08-01

## 2022-05-06 NOTE — OUTREACH NOTE
Total Joint Month 1 Survey    Flowsheet Row Responses   [facility] patient discharged from? [facility]   Does the patient have one of the following disease processes/diagnoses(primary or secondary)? Total Joint Replacement   Joint surgery performed? Knee   Month 1 attempt successful? Yes   Call start time 1556   Call end time 1602   Has the patient been back in either the hospital or Emergency Department since discharge? No   Discharge diagnosis  TOTAL KNEE ARTHROPLASTY    Person spoke with today (if not patient) and relationship Wife, Dorita Diehl   Is the patient taking all medications as directed (includes completed medication regime)? Yes   Is the patient able to teach back alternate methods of pain control? Ice   Medication comments Patient on 2 new oral antibiotics this week.    Has the patient kept scheduled appointments due by today? Yes   Comments Seen in ER and by ortho this week.    Is the patient still receiving Home Health Services? N/A   Home health comments Out PT therapy    Is the patient still attending therapy sessions(either in the home or as an outpatient)? Yes   Has the patient fallen since discharge? No   What is the patient's perception of their functional status since discharge? Worsening  [Patient with new wound infection this week. ]   Is the patient able to teach back signs and symptoms of infection? Temp >100.4 for 24h or longer, Incisional drainage, Increased swelling or redness around incision (not associated with surgical edema)   If the patient is a current smoker, are they able to teach back resources for cessation? Not a smoker   Is the patient/caregiver able to teach back the hierarchy of who to call/visit for symptoms/problems? PCP, Specialist, Home health nurse, Urgent Care, ED, 911 Yes   Month 1 call completed? Yes   Wrap up additional comments Patient to be seen again on Monday by ortho to evaluate infection.           UBALDO CARRENO - Registered Nurse

## 2022-05-10 NOTE — PROGRESS NOTES
ORTHO POSTOP VISIT       Subjective:    HPI:  Leonid Diehl is a 67 y.o. male who presents about 2 weeks out from having a left total knee. He reports that he is doing well.    Medications:    Current Outpatient Medications:   •  albuterol sulfate  (90 Base) MCG/ACT inhaler, Inhale 2 puffs Every 6 (Six) Hours As Needed for Wheezing or Shortness of Air. (Patient taking differently: Inhale 2 puffs Every 6 (Six) Hours As Needed for Wheezing or Shortness of Air. USES SEASONALLY), Disp: 8.5 g, Rfl: 2  •  allopurinol (ZYLOPRIM) 300 MG tablet, Take 1 tablet by mouth Daily., Disp: 90 tablet, Rfl: 1  •  carvedilol (COREG) 3.125 MG tablet, Take 1 tablet by mouth Daily., Disp: 90 tablet, Rfl: 1  •  ferrous sulfate 324 (65 Fe) MG tablet delayed-release EC tablet, Take 324 mg by mouth Every Night., Disp: , Rfl:   •  fexofenadine-pseudoephedrine (ALLEGRA-D 24) 180-240 MG per 24 hr tablet, Take 1 tablet by mouth Daily., Disp: , Rfl:   •  furosemide (LASIX) 20 MG tablet, Take 1 tablet by mouth Daily As Needed (swelling). swelling (Patient taking differently: Take 20 mg by mouth Daily. swelling), Disp: 90 tablet, Rfl: 1  •  guaiFENesin (MUCINEX) 600 MG 12 hr tablet, Take 1,200 mg by mouth 2 (Two) Times a Day., Disp: , Rfl:   •  lisinopril-hydrochlorothiazide (PRINZIDE,ZESTORETIC) 10-12.5 MG per tablet, Take 1 tablet by mouth Daily. (Patient taking differently: Take 0.5 tablets by mouth Daily.), Disp: 90 tablet, Rfl: 1  •  oxyCODONE-acetaminophen (PERCOCET) 7.5-325 MG per tablet, Take 1-2 tablet by mouth every 4-6 hours as needed pain (Patient taking differently: Take 1-2 tablet by mouth every 4-6 hours as needed pain), Disp: 40 tablet, Rfl: 0  •  potassium chloride 10 MEQ CR tablet, TAKE 1 TABLET BY MOUTH DAILY AS NEEDED (Patient taking differently: Take 10 mEq by mouth Daily. WITH LASIX), Disp: 90 tablet, Rfl: 0  •  Symbicort 160-4.5 MCG/ACT inhaler, Inhale 2 puffs 2 (Two) Times a Day. (Patient taking differently: Inhale  "2 puffs 2 (Two) Times a Day. USES SEASONALLLY), Disp: 10.2 g, Rfl: 11  •  doxycycline (VIBRAMYCIN) 100 MG capsule, Take 1 capsule by mouth 2 (Two) Times a Day., Disp: 20 capsule, Rfl: 0  •  meloxicam (MOBIC) 7.5 MG tablet, Take 1 tablet by mouth Daily., Disp: 90 tablet, Rfl: 1  •  sulfamethoxazole-trimethoprim (Bactrim DS) 800-160 MG per tablet, Take 1 tablet by mouth 2 (Two) Times a Day., Disp: 20 tablet, Rfl: 0  •  traZODone (DESYREL) 50 MG tablet, Take 1 tablet by mouth Every Night., Disp: 90 tablet, Rfl: 0  Current outpatient and discharge medications have been reconciled for the patient.  Reviewed by: TWIN Bailey      Allergies:  No Known Allergies       Objective   Objective:    /60 (BP Location: Left arm, Patient Position: Sitting, Cuff Size: Large Adult)   Pulse 69   Ht 188 cm (74\")   Wt 130 kg (285 lb 9.6 oz)   BMI 36.67 kg/m²     Physical Examination:  Alert, oriented, obese individual in no acute distress, ambulating with the assistance of a walker   Left lower extremity shows a well-healing surgical incision with no erythema, drainage, or open skin lesions. There is a mild amount of swelling. It is grossly well aligned, and the patient is neurovascularly intact distally. The knee is stable to varus and valgus stress, there is no patellar maltracking or crepitus noted, and plantar and dorsiflexion is 5/5. There is no tenderness to palpation or with range of motion, which is about 2=85.           Imaging:  xrays obtained today  See xray report          Assessment:  1. S/P TKR (total knee replacement), left    2. Morbid obesity (HCC)               Plan:  At this time, we will plan to see the patient back in about 6 weeks. The patient should continue PT, WBAT, and call with any problems.               TWIN Bailey  05/10/22  08:58 EDT    \"Please note that portions of this note were completed with a voice recognition program\".                   "

## 2022-05-11 ENCOUNTER — OFFICE VISIT (OUTPATIENT)
Dept: ORTHOPEDIC SURGERY | Facility: CLINIC | Age: 67
End: 2022-05-11

## 2022-05-11 VITALS
SYSTOLIC BLOOD PRESSURE: 106 MMHG | HEIGHT: 72 IN | HEART RATE: 71 BPM | WEIGHT: 285 LBS | DIASTOLIC BLOOD PRESSURE: 58 MMHG | BODY MASS INDEX: 38.6 KG/M2

## 2022-05-11 DIAGNOSIS — T81.31XD POSTOPERATIVE WOUND DEHISCENCE, SUBSEQUENT ENCOUNTER: ICD-10-CM

## 2022-05-11 DIAGNOSIS — T81.41XD INFECTION OF SUPERFICIAL INCISIONAL SURGICAL SITE AFTER PROCEDURE, SUBSEQUENT ENCOUNTER: ICD-10-CM

## 2022-05-11 DIAGNOSIS — E66.01 MORBID OBESITY: ICD-10-CM

## 2022-05-11 DIAGNOSIS — Z96.652 HX OF TOTAL KNEE REPLACEMENT, LEFT: Primary | ICD-10-CM

## 2022-05-11 PROCEDURE — 99024 POSTOP FOLLOW-UP VISIT: CPT | Performed by: PHYSICIAN ASSISTANT

## 2022-05-11 RX ORDER — DOXYCYCLINE HYCLATE 100 MG/1
100 CAPSULE ORAL 2 TIMES DAILY
Qty: 20 CAPSULE | Refills: 0 | Status: SHIPPED | OUTPATIENT
Start: 2022-05-11 | End: 2022-05-31 | Stop reason: SDUPTHER

## 2022-05-11 RX ORDER — HYDROCODONE BITARTRATE AND ACETAMINOPHEN 5; 325 MG/1; MG/1
1 TABLET ORAL EVERY 8 HOURS PRN
Qty: 40 TABLET | Refills: 0 | Status: SHIPPED | OUTPATIENT
Start: 2022-05-11 | End: 2022-06-07 | Stop reason: HOSPADM

## 2022-05-11 NOTE — PROGRESS NOTES
ORTHO POSTOP VISIT       Subjective:    HPI:  Leonid Diehl is a 67 y.o. male who presents about 5 weeks out from having a left total knee replacement.  He presents for wound check.  He is requesting a refill on his pain medication.    Medications:    Current Outpatient Medications:   •  albuterol sulfate  (90 Base) MCG/ACT inhaler, Inhale 2 puffs Every 6 (Six) Hours As Needed for Wheezing or Shortness of Air. (Patient taking differently: Inhale 2 puffs Every 6 (Six) Hours As Needed for Wheezing or Shortness of Air. USES SEASONALLY), Disp: 8.5 g, Rfl: 2  •  allopurinol (ZYLOPRIM) 300 MG tablet, Take 1 tablet by mouth Daily., Disp: 90 tablet, Rfl: 1  •  carvedilol (COREG) 3.125 MG tablet, Take 1 tablet by mouth Daily., Disp: 90 tablet, Rfl: 1  •  doxycycline (VIBRAMYCIN) 100 MG capsule, Take 1 capsule by mouth 2 (Two) Times a Day., Disp: 20 capsule, Rfl: 0  •  ferrous sulfate 324 (65 Fe) MG tablet delayed-release EC tablet, Take 324 mg by mouth Every Night., Disp: , Rfl:   •  fexofenadine-pseudoephedrine (ALLEGRA-D 24) 180-240 MG per 24 hr tablet, Take 1 tablet by mouth Daily., Disp: , Rfl:   •  furosemide (LASIX) 20 MG tablet, Take 1 tablet by mouth Daily As Needed (swelling). swelling (Patient taking differently: Take 20 mg by mouth Daily. swelling), Disp: 90 tablet, Rfl: 1  •  guaiFENesin (MUCINEX) 600 MG 12 hr tablet, Take 1,200 mg by mouth 2 (Two) Times a Day., Disp: , Rfl:   •  lisinopril-hydrochlorothiazide (PRINZIDE,ZESTORETIC) 10-12.5 MG per tablet, Take 1 tablet by mouth Daily. (Patient taking differently: Take 0.5 tablets by mouth Daily.), Disp: 90 tablet, Rfl: 1  •  meloxicam (MOBIC) 7.5 MG tablet, Take 1 tablet by mouth Daily., Disp: 90 tablet, Rfl: 1  •  potassium chloride 10 MEQ CR tablet, TAKE 1 TABLET BY MOUTH DAILY AS NEEDED (Patient taking differently: Take 10 mEq by mouth Daily. WITH LASIX), Disp: 90 tablet, Rfl: 0  •  sulfamethoxazole-trimethoprim (Bactrim DS) 800-160 MG per tablet,  "Take 1 tablet by mouth 2 (Two) Times a Day., Disp: 20 tablet, Rfl: 0  •  Symbicort 160-4.5 MCG/ACT inhaler, Inhale 2 puffs 2 (Two) Times a Day. (Patient taking differently: Inhale 2 puffs 2 (Two) Times a Day. USES SEASONALLLY), Disp: 10.2 g, Rfl: 11  •  traZODone (DESYREL) 50 MG tablet, Take 1 tablet by mouth Every Night., Disp: 90 tablet, Rfl: 0  •  HYDROcodone-acetaminophen (NORCO) 5-325 MG per tablet, Take 1 tablet by mouth Every 8 (Eight) Hours As Needed for Moderate Pain ., Disp: 40 tablet, Rfl: 0  Current outpatient and discharge medications have been reconciled for the patient.  Reviewed by: TWIN Bailey      Allergies:  No Known Allergies       Objective   Objective:    /58 (BP Location: Right arm, Patient Position: Sitting, Cuff Size: Large Adult)   Pulse 71   Ht 182.9 cm (72\")   Wt 129 kg (285 lb)   BMI 38.65 kg/m²     Physical Examination:  Alert, oriented, obese individual in no acute distress, ambulating unassisted  Left lower extremity shows a mostly well-healed surgical incision.  There is an opening towards the distal portion of the incision about 1 x 0.5 cm and about 0.1 cm deep.  Surrounding erythema has significantly improved.  There is no overt drainage.  There is a mild amount of swelling. It is grossly well aligned, and the patient is neurovascularly intact distally. The knee is stable to varus and valgus stress, there is no patellar maltracking or crepitus noted, and plantar and dorsiflexion is 5/5. There is no tenderness to palpation or with range of motion, which is about 0-115.           Imaging:  no diagnostic testing performed this visit            Assessment:  1. Hx of total knee replacement, left    2. Postoperative wound dehiscence, subsequent encounter    3. Infection of superficial incisional surgical site after procedure, subsequent encounter    4. Morbid obesity (HCC)       About 5 weeks out from surgery        Plan:  The area was cleansed thoroughly with " "chlorhexidine soap and saline solution and dressed with Optifoam silver and Tegaderm.   This dressing should left in place until the patient is seen back in 1 week.  I will refill his doxycycline at this time.  I will step him down to Norco, risks and addiction properties were discussed.  I will see him back in 1 week for recheck.  He should call with any questions or concerns.             TWIN Bailey  05/11/22  14:23 EDT    \"Please note that portions of this note were completed with a voice recognition program\".                 "

## 2022-05-18 ENCOUNTER — OFFICE VISIT (OUTPATIENT)
Dept: ORTHOPEDIC SURGERY | Facility: CLINIC | Age: 67
End: 2022-05-18

## 2022-05-18 VITALS
DIASTOLIC BLOOD PRESSURE: 66 MMHG | HEART RATE: 56 BPM | WEIGHT: 285 LBS | SYSTOLIC BLOOD PRESSURE: 131 MMHG | HEIGHT: 72 IN | BODY MASS INDEX: 38.6 KG/M2

## 2022-05-18 DIAGNOSIS — T81.31XD POSTOPERATIVE WOUND DEHISCENCE, SUBSEQUENT ENCOUNTER: ICD-10-CM

## 2022-05-18 DIAGNOSIS — E66.01 MORBID OBESITY: ICD-10-CM

## 2022-05-18 DIAGNOSIS — T81.41XD INFECTION OF SUPERFICIAL INCISIONAL SURGICAL SITE AFTER PROCEDURE, SUBSEQUENT ENCOUNTER: ICD-10-CM

## 2022-05-18 DIAGNOSIS — Z96.652 HX OF TOTAL KNEE REPLACEMENT, LEFT: Primary | ICD-10-CM

## 2022-05-18 PROCEDURE — 99024 POSTOP FOLLOW-UP VISIT: CPT | Performed by: PHYSICIAN ASSISTANT

## 2022-05-18 NOTE — PROGRESS NOTES
ORTHO POSTOP VISIT       Subjective:    HPI:  Leonid Diehl is a 67 y.o. male who presents about 6 weeks out from having a left total knee replacement.  He presents for wound check.    Medications:    Current Outpatient Medications:   •  albuterol sulfate  (90 Base) MCG/ACT inhaler, Inhale 2 puffs Every 6 (Six) Hours As Needed for Wheezing or Shortness of Air. (Patient taking differently: Inhale 2 puffs Every 6 (Six) Hours As Needed for Wheezing or Shortness of Air. USES SEASONALLY), Disp: 8.5 g, Rfl: 2  •  allopurinol (ZYLOPRIM) 300 MG tablet, Take 1 tablet by mouth Daily., Disp: 90 tablet, Rfl: 1  •  carvedilol (COREG) 3.125 MG tablet, Take 1 tablet by mouth Daily., Disp: 90 tablet, Rfl: 1  •  doxycycline (VIBRAMYCIN) 100 MG capsule, Take 1 capsule by mouth 2 (Two) Times a Day., Disp: 20 capsule, Rfl: 0  •  ferrous sulfate 324 (65 Fe) MG tablet delayed-release EC tablet, Take 324 mg by mouth Every Night., Disp: , Rfl:   •  fexofenadine-pseudoephedrine (ALLEGRA-D 24) 180-240 MG per 24 hr tablet, Take 1 tablet by mouth Daily., Disp: , Rfl:   •  furosemide (LASIX) 20 MG tablet, Take 1 tablet by mouth Daily As Needed (swelling). swelling (Patient taking differently: Take 20 mg by mouth Daily. swelling), Disp: 90 tablet, Rfl: 1  •  guaiFENesin (MUCINEX) 600 MG 12 hr tablet, Take 1,200 mg by mouth 2 (Two) Times a Day., Disp: , Rfl:   •  HYDROcodone-acetaminophen (NORCO) 5-325 MG per tablet, Take 1 tablet by mouth Every 8 (Eight) Hours As Needed for Moderate Pain ., Disp: 40 tablet, Rfl: 0  •  lisinopril-hydrochlorothiazide (PRINZIDE,ZESTORETIC) 10-12.5 MG per tablet, Take 1 tablet by mouth Daily. (Patient taking differently: Take 0.5 tablets by mouth Daily.), Disp: 90 tablet, Rfl: 1  •  meloxicam (MOBIC) 7.5 MG tablet, Take 1 tablet by mouth Daily., Disp: 90 tablet, Rfl: 1  •  potassium chloride 10 MEQ CR tablet, TAKE 1 TABLET BY MOUTH DAILY AS NEEDED (Patient taking differently: Take 10 mEq by mouth Daily.  "WITH LASIX), Disp: 90 tablet, Rfl: 0  •  Symbicort 160-4.5 MCG/ACT inhaler, Inhale 2 puffs 2 (Two) Times a Day. (Patient taking differently: Inhale 2 puffs 2 (Two) Times a Day. USES SEASONALLLY), Disp: 10.2 g, Rfl: 11  •  traZODone (DESYREL) 50 MG tablet, Take 1 tablet by mouth Every Night., Disp: 90 tablet, Rfl: 0  Current outpatient and discharge medications have been reconciled for the patient.  Reviewed by: TWIN Bailey      Allergies:  No Known Allergies       Objective   Objective:    /66 (BP Location: Right arm, Patient Position: Sitting, Cuff Size: Large Adult)   Pulse 56   Ht 182.9 cm (72\")   Wt 129 kg (285 lb)   BMI 38.65 kg/m²     Physical Examination:  Alert, oriented, obese individual in no acute distress, ambulating unassisted  Left lower extremity shows a mostly well-healed surgical incision with no erythema. There is an superficial opening towards the distal portion of the incision about 0.75 x 0.25 cm.  There is no overt drainage.  There is a mild amount of swelling. It is grossly well aligned, and the patient is neurovascularly intact distally. The knee is stable to varus and valgus stress, there is no patellar maltracking or crepitus noted, and plantar and dorsiflexion is 5/5. There is no tenderness to palpation or with range of motion, which is about 0-115.           Imaging:  no diagnostic testing performed this visit            Assessment:  1. Hx of total knee replacement, left    2. Postoperative wound dehiscence, subsequent encounter    3. Infection of superficial incisional surgical site after procedure, subsequent encounter    4. Morbid obesity (HCC)       About 6 weeks out from surgery        Plan:  The area was cleansed thoroughly with chlorhexidine soap and saline solution and dressed with a bordered Optifoam silver.   This dressing should left in place until the patient is seen back in 2 weeks.  I will see him back in 2 weeks for recheck.  He should call with any " "questions or concerns.             TWIN Bailey  05/18/22  14:49 EDT    \"Please note that portions of this note were completed with a voice recognition program\".               "

## 2022-05-31 ENCOUNTER — OFFICE VISIT (OUTPATIENT)
Dept: ORTHOPEDIC SURGERY | Facility: CLINIC | Age: 67
End: 2022-05-31

## 2022-05-31 DIAGNOSIS — T81.41XD INFECTION OF SUPERFICIAL INCISIONAL SURGICAL SITE AFTER PROCEDURE, SUBSEQUENT ENCOUNTER: ICD-10-CM

## 2022-05-31 PROCEDURE — 99024 POSTOP FOLLOW-UP VISIT: CPT | Performed by: ORTHOPAEDIC SURGERY

## 2022-05-31 RX ORDER — DOXYCYCLINE HYCLATE 100 MG/1
100 CAPSULE ORAL 2 TIMES DAILY
Qty: 20 CAPSULE | Refills: 0 | Status: SHIPPED | OUTPATIENT
Start: 2022-05-31 | End: 2022-06-15

## 2022-06-01 ENCOUNTER — TELEPHONE (OUTPATIENT)
Dept: ORTHOPEDIC SURGERY | Facility: CLINIC | Age: 67
End: 2022-06-01

## 2022-06-01 NOTE — TELEPHONE ENCOUNTER
Patient was seen yesterday and an I&D was discussed for left knee to be scheduled for Tuesday 6/7. Needing case request and orders entered.

## 2022-06-02 ENCOUNTER — HOSPITAL ENCOUNTER (OUTPATIENT)
Dept: CARDIOLOGY | Facility: HOSPITAL | Age: 67
Discharge: HOME | End: 2022-06-02

## 2022-06-02 ENCOUNTER — PREP FOR SURGERY (OUTPATIENT)
Dept: OTHER | Facility: HOSPITAL | Age: 67
End: 2022-06-02

## 2022-06-02 ENCOUNTER — LAB (OUTPATIENT)
Dept: LAB | Facility: HOSPITAL | Age: 67
End: 2022-06-02

## 2022-06-02 DIAGNOSIS — T81.31XD POSTOPERATIVE WOUND DEHISCENCE, SUBSEQUENT ENCOUNTER: Primary | ICD-10-CM

## 2022-06-02 DIAGNOSIS — T81.41XD INFECTION OF SUPERFICIAL INCISIONAL SURGICAL SITE AFTER PROCEDURE, SUBSEQUENT ENCOUNTER: ICD-10-CM

## 2022-06-02 PROCEDURE — 93010 ELECTROCARDIOGRAM REPORT: CPT | Performed by: INTERNAL MEDICINE

## 2022-06-02 PROCEDURE — 85027 COMPLETE CBC AUTOMATED: CPT

## 2022-06-02 PROCEDURE — 93005 ELECTROCARDIOGRAM TRACING: CPT | Performed by: ORTHOPAEDIC SURGERY

## 2022-06-02 PROCEDURE — 80048 BASIC METABOLIC PNL TOTAL CA: CPT

## 2022-06-02 RX ORDER — CEFAZOLIN SODIUM IN 0.9 % NACL 3 G/100 ML
3 INTRAVENOUS SOLUTION, PIGGYBACK (ML) INTRAVENOUS ONCE
Status: CANCELLED | OUTPATIENT
Start: 2022-06-02 | End: 2022-06-02

## 2022-06-03 LAB
ANION GAP SERPL CALCULATED.3IONS-SCNC: 9 MMOL/L (ref 5–15)
BUN SERPL-MCNC: 20 MG/DL (ref 8–23)
BUN/CREAT SERPL: 25 (ref 7–25)
CALCIUM SPEC-SCNC: 8.8 MG/DL (ref 8.6–10.5)
CHLORIDE SERPL-SCNC: 108 MMOL/L (ref 98–107)
CO2 SERPL-SCNC: 22 MMOL/L (ref 22–29)
CREAT SERPL-MCNC: 0.8 MG/DL (ref 0.76–1.27)
DEPRECATED RDW RBC AUTO: 44.2 FL (ref 37–54)
EGFRCR SERPLBLD CKD-EPI 2021: 97.6 ML/MIN/1.73
ERYTHROCYTE [DISTWIDTH] IN BLOOD BY AUTOMATED COUNT: 12.5 % (ref 12.3–15.4)
GLUCOSE SERPL-MCNC: 89 MG/DL (ref 65–99)
HCT VFR BLD AUTO: 36.4 % (ref 37.5–51)
HGB BLD-MCNC: 12 G/DL (ref 13–17.7)
MCH RBC QN AUTO: 32.1 PG (ref 26.6–33)
MCHC RBC AUTO-ENTMCNC: 33 G/DL (ref 31.5–35.7)
MCV RBC AUTO: 97.3 FL (ref 79–97)
PLATELET # BLD AUTO: 96 10*3/MM3 (ref 140–450)
PMV BLD AUTO: 11.1 FL (ref 6–12)
POTASSIUM SERPL-SCNC: 4 MMOL/L (ref 3.5–5.2)
QT INTERVAL: 423 MS
RBC # BLD AUTO: 3.74 10*6/MM3 (ref 4.14–5.8)
SODIUM SERPL-SCNC: 139 MMOL/L (ref 136–145)
WBC NRBC COR # BLD: 6.56 10*3/MM3 (ref 3.4–10.8)

## 2022-06-03 PROCEDURE — 93005 ELECTROCARDIOGRAM TRACING: CPT | Performed by: ORTHOPAEDIC SURGERY

## 2022-06-04 ENCOUNTER — LAB (OUTPATIENT)
Dept: LAB | Facility: HOSPITAL | Age: 67
End: 2022-06-04

## 2022-06-04 DIAGNOSIS — T81.41XD INFECTION OF SUPERFICIAL INCISIONAL SURGICAL SITE AFTER PROCEDURE, SUBSEQUENT ENCOUNTER: ICD-10-CM

## 2022-06-04 DIAGNOSIS — T81.31XD POSTOPERATIVE WOUND DEHISCENCE, SUBSEQUENT ENCOUNTER: Primary | ICD-10-CM

## 2022-06-04 LAB — SARS-COV-2 ORF1AB RESP QL NAA+PROBE: NOT DETECTED

## 2022-06-04 PROCEDURE — C9803 HOPD COVID-19 SPEC COLLECT: HCPCS

## 2022-06-04 PROCEDURE — U0004 COV-19 TEST NON-CDC HGH THRU: HCPCS

## 2022-06-04 PROCEDURE — U0005 INFEC AGEN DETEC AMPLI PROBE: HCPCS

## 2022-06-06 ENCOUNTER — READMISSION MANAGEMENT (OUTPATIENT)
Dept: CALL CENTER | Facility: HOSPITAL | Age: 67
End: 2022-06-06

## 2022-06-06 NOTE — PAT
Left messages on both patient's home phone and cell phone with time change and arrival at 0730   Eat healthy foods you enjoy. Apixaban/Eliquis DOES NOT have a special diet. Limit your alcohol intake.

## 2022-06-06 NOTE — OUTREACH NOTE
Total Joint Month 2 Survey    Flowsheet Row Responses   [facility] patient discharged from? [facility]   Does the patient have one of the following disease processes/diagnoses(primary or secondary)? Total Joint Replacement   Joint surgery performed? Knee   Month 2 attempt successful? Yes   Call start time 1712   Call end time 1715   Discharge diagnosis  TOTAL KNEE ARTHROPLASTY    Person spoke with today (if not patient) and relationship Wife, Dorita Diehl   Is the patient taking all medications as directed (includes completed medication regime)? Yes   Is the patient able to teach back alternate methods of pain control? Ice, Reposition, Correct alignment, Short, frequent activity   Has the patient kept scheduled appointments due by today? Yes   Is the patient still receiving Home Health Services? N/A   Home health comments Out PT therapy    Is the patient still attending therapy sessions(either in the home or as an outpatient)? Yes   Has the patient fallen since discharge? No   What is the patient's perception of their functional status since discharge? Same   Is the patient able to teach back signs and symptoms of infection? Temp >100.4 for 24h or longer, Incisional drainage   Is the patient/caregiver able to teach back the hierarchy of who to call/visit for symptoms/problems? PCP, Specialist, Home health nurse, Urgent Care, ED, 911 Yes   Additional teach back comments pt has develpoed areas not healing, has knee revision surgery scheduled for tomorrow, 6/7/22   Month 2 Call Completed? Yes          DUARTE GONZALEZ - Registered Nurse

## 2022-06-07 ENCOUNTER — ANESTHESIA EVENT (OUTPATIENT)
Dept: PERIOP | Facility: HOSPITAL | Age: 67
End: 2022-06-07

## 2022-06-07 ENCOUNTER — HOSPITAL ENCOUNTER (OUTPATIENT)
Facility: HOSPITAL | Age: 67
Setting detail: HOSPITAL OUTPATIENT SURGERY
Discharge: HOME | End: 2022-06-07
Attending: ORTHOPAEDIC SURGERY | Admitting: ORTHOPAEDIC SURGERY

## 2022-06-07 ENCOUNTER — ANESTHESIA (OUTPATIENT)
Dept: PERIOP | Facility: HOSPITAL | Age: 67
End: 2022-06-07

## 2022-06-07 VITALS
OXYGEN SATURATION: 96 % | TEMPERATURE: 96.8 F | HEART RATE: 63 BPM | WEIGHT: 280 LBS | SYSTOLIC BLOOD PRESSURE: 140 MMHG | RESPIRATION RATE: 13 BRPM | BODY MASS INDEX: 37.93 KG/M2 | HEIGHT: 72 IN | DIASTOLIC BLOOD PRESSURE: 53 MMHG

## 2022-06-07 DIAGNOSIS — T81.31XD POSTOPERATIVE WOUND DEHISCENCE, SUBSEQUENT ENCOUNTER: ICD-10-CM

## 2022-06-07 DIAGNOSIS — T81.41XD INFECTION OF SUPERFICIAL INCISIONAL SURGICAL SITE AFTER PROCEDURE, SUBSEQUENT ENCOUNTER: ICD-10-CM

## 2022-06-07 PROCEDURE — 12020 TX SUPFC WND DEHSN SMPL CLSR: CPT | Performed by: ORTHOPAEDIC SURGERY

## 2022-06-07 PROCEDURE — 87070 CULTURE OTHR SPECIMN AEROBIC: CPT | Performed by: ORTHOPAEDIC SURGERY

## 2022-06-07 PROCEDURE — 0 BUPIVACAINE LIPOSOME 1.3 % SUSPENSION: Performed by: ORTHOPAEDIC SURGERY

## 2022-06-07 PROCEDURE — 25010000002 CEFAZOLIN PER 500 MG: Performed by: PHYSICIAN ASSISTANT

## 2022-06-07 PROCEDURE — 25010000002 ONDANSETRON PER 1 MG: Performed by: NURSE ANESTHETIST, CERTIFIED REGISTERED

## 2022-06-07 PROCEDURE — 25010000002 PROPOFOL 200 MG/20ML EMULSION: Performed by: NURSE ANESTHETIST, CERTIFIED REGISTERED

## 2022-06-07 PROCEDURE — 25010000002 FENTANYL CITRATE (PF) 100 MCG/2ML SOLUTION: Performed by: NURSE ANESTHETIST, CERTIFIED REGISTERED

## 2022-06-07 PROCEDURE — 25010000002 DEXAMETHASONE PER 1 MG: Performed by: NURSE ANESTHETIST, CERTIFIED REGISTERED

## 2022-06-07 PROCEDURE — C9290 INJ, BUPIVACAINE LIPOSOME: HCPCS | Performed by: ORTHOPAEDIC SURGERY

## 2022-06-07 PROCEDURE — S0260 H&P FOR SURGERY: HCPCS | Performed by: ORTHOPAEDIC SURGERY

## 2022-06-07 PROCEDURE — 87205 SMEAR GRAM STAIN: CPT | Performed by: ORTHOPAEDIC SURGERY

## 2022-06-07 RX ORDER — PROPOFOL 10 MG/ML
INJECTION, EMULSION INTRAVENOUS AS NEEDED
Status: DISCONTINUED | OUTPATIENT
Start: 2022-06-07 | End: 2022-06-07 | Stop reason: SURG

## 2022-06-07 RX ORDER — DEXAMETHASONE SODIUM PHOSPHATE 4 MG/ML
INJECTION, SOLUTION INTRA-ARTICULAR; INTRALESIONAL; INTRAMUSCULAR; INTRAVENOUS; SOFT TISSUE AS NEEDED
Status: DISCONTINUED | OUTPATIENT
Start: 2022-06-07 | End: 2022-06-07 | Stop reason: SURG

## 2022-06-07 RX ORDER — LIDOCAINE HYDROCHLORIDE 10 MG/ML
0.5 INJECTION, SOLUTION INFILTRATION; PERINEURAL ONCE AS NEEDED
Status: DISCONTINUED | OUTPATIENT
Start: 2022-06-07 | End: 2022-06-07 | Stop reason: HOSPADM

## 2022-06-07 RX ORDER — FENTANYL CITRATE 50 UG/ML
INJECTION, SOLUTION INTRAMUSCULAR; INTRAVENOUS AS NEEDED
Status: DISCONTINUED | OUTPATIENT
Start: 2022-06-07 | End: 2022-06-07 | Stop reason: SURG

## 2022-06-07 RX ORDER — SODIUM CHLORIDE, SODIUM LACTATE, POTASSIUM CHLORIDE, CALCIUM CHLORIDE 600; 310; 30; 20 MG/100ML; MG/100ML; MG/100ML; MG/100ML
1000 INJECTION, SOLUTION INTRAVENOUS CONTINUOUS
Status: DISCONTINUED | OUTPATIENT
Start: 2022-06-07 | End: 2022-06-07 | Stop reason: HOSPADM

## 2022-06-07 RX ORDER — SODIUM CHLORIDE 0.9 % (FLUSH) 0.9 %
10 SYRINGE (ML) INJECTION AS NEEDED
Status: DISCONTINUED | OUTPATIENT
Start: 2022-06-07 | End: 2022-06-07 | Stop reason: HOSPADM

## 2022-06-07 RX ORDER — LIDOCAINE HYDROCHLORIDE 10 MG/ML
INJECTION, SOLUTION EPIDURAL; INFILTRATION; INTRACAUDAL; PERINEURAL AS NEEDED
Status: DISCONTINUED | OUTPATIENT
Start: 2022-06-07 | End: 2022-06-07 | Stop reason: SURG

## 2022-06-07 RX ORDER — ONDANSETRON 2 MG/ML
INJECTION INTRAMUSCULAR; INTRAVENOUS AS NEEDED
Status: DISCONTINUED | OUTPATIENT
Start: 2022-06-07 | End: 2022-06-07 | Stop reason: SURG

## 2022-06-07 RX ORDER — EPHEDRINE SULFATE 5 MG/ML
INJECTION INTRAVENOUS AS NEEDED
Status: DISCONTINUED | OUTPATIENT
Start: 2022-06-07 | End: 2022-06-07 | Stop reason: SURG

## 2022-06-07 RX ORDER — MIDAZOLAM HYDROCHLORIDE 1 MG/ML
1 INJECTION INTRAMUSCULAR; INTRAVENOUS
Status: DISCONTINUED | OUTPATIENT
Start: 2022-06-07 | End: 2022-06-07 | Stop reason: HOSPADM

## 2022-06-07 RX ORDER — HYDROCODONE BITARTRATE AND ACETAMINOPHEN 5; 325 MG/1; MG/1
1-2 TABLET ORAL EVERY 6 HOURS PRN
Qty: 40 TABLET | Refills: 0 | Status: SHIPPED | OUTPATIENT
Start: 2022-06-07 | End: 2022-07-27

## 2022-06-07 RX ORDER — DIPHENHYDRAMINE HYDROCHLORIDE 50 MG/ML
12.5 INJECTION INTRAMUSCULAR; INTRAVENOUS
Status: DISCONTINUED | OUTPATIENT
Start: 2022-06-07 | End: 2022-06-07 | Stop reason: HOSPADM

## 2022-06-07 RX ORDER — CEFAZOLIN SODIUM IN 0.9 % NACL 3 G/100 ML
3 INTRAVENOUS SOLUTION, PIGGYBACK (ML) INTRAVENOUS ONCE
Status: COMPLETED | OUTPATIENT
Start: 2022-06-07 | End: 2022-06-07

## 2022-06-07 RX ORDER — FENTANYL CITRATE 50 UG/ML
75 INJECTION, SOLUTION INTRAMUSCULAR; INTRAVENOUS
Status: DISCONTINUED | OUTPATIENT
Start: 2022-06-07 | End: 2022-06-07 | Stop reason: HOSPADM

## 2022-06-07 RX ORDER — SODIUM CHLORIDE, SODIUM LACTATE, POTASSIUM CHLORIDE, CALCIUM CHLORIDE 600; 310; 30; 20 MG/100ML; MG/100ML; MG/100ML; MG/100ML
INJECTION, SOLUTION INTRAVENOUS CONTINUOUS PRN
Status: DISCONTINUED | OUTPATIENT
Start: 2022-06-07 | End: 2022-06-07 | Stop reason: SURG

## 2022-06-07 RX ORDER — HYDRALAZINE HYDROCHLORIDE 20 MG/ML
5 INJECTION INTRAMUSCULAR; INTRAVENOUS
Status: DISCONTINUED | OUTPATIENT
Start: 2022-06-07 | End: 2022-06-07 | Stop reason: HOSPADM

## 2022-06-07 RX ORDER — ONDANSETRON 2 MG/ML
4 INJECTION INTRAMUSCULAR; INTRAVENOUS ONCE AS NEEDED
Status: DISCONTINUED | OUTPATIENT
Start: 2022-06-07 | End: 2022-06-07 | Stop reason: HOSPADM

## 2022-06-07 RX ORDER — FENTANYL CITRATE 50 UG/ML
100 INJECTION, SOLUTION INTRAMUSCULAR; INTRAVENOUS
Status: DISCONTINUED | OUTPATIENT
Start: 2022-06-07 | End: 2022-06-07 | Stop reason: HOSPADM

## 2022-06-07 RX ADMIN — LIDOCAINE HYDROCHLORIDE 50 MG: 10 INJECTION, SOLUTION EPIDURAL; INFILTRATION; INTRACAUDAL; PERINEURAL at 10:49

## 2022-06-07 RX ADMIN — FENTANYL CITRATE 50 MCG: 50 INJECTION, SOLUTION INTRAMUSCULAR; INTRAVENOUS at 10:53

## 2022-06-07 RX ADMIN — ONDANSETRON 4 MG: 2 INJECTION INTRAMUSCULAR; INTRAVENOUS at 10:49

## 2022-06-07 RX ADMIN — Medication 3 G: at 10:51

## 2022-06-07 RX ADMIN — SODIUM CHLORIDE, POTASSIUM CHLORIDE, SODIUM LACTATE AND CALCIUM CHLORIDE 1000 ML: 600; 310; 30; 20 INJECTION, SOLUTION INTRAVENOUS at 08:37

## 2022-06-07 RX ADMIN — DEXAMETHASONE SODIUM PHOSPHATE 4 MG: 4 INJECTION, SOLUTION INTRAMUSCULAR; INTRAVENOUS at 10:49

## 2022-06-07 RX ADMIN — EPHEDRINE SULFATE 10 MG: 5 INJECTION INTRAVENOUS at 11:05

## 2022-06-07 RX ADMIN — SODIUM CHLORIDE, SODIUM LACTATE, POTASSIUM CHLORIDE, AND CALCIUM CHLORIDE: .6; .31; .03; .02 INJECTION, SOLUTION INTRAVENOUS at 10:41

## 2022-06-07 RX ADMIN — FENTANYL CITRATE 50 MCG: 50 INJECTION, SOLUTION INTRAMUSCULAR; INTRAVENOUS at 10:49

## 2022-06-07 RX ADMIN — PROPOFOL 200 MG: 10 INJECTION, EMULSION INTRAVENOUS at 10:49

## 2022-06-07 NOTE — ANESTHESIA POSTPROCEDURE EVALUATION
Patient: Leonid MASSEY Shanita    Procedure Summary     Date: 06/07/22 Room / Location: [facility] OR  / [facility] MAIN OR    Anesthesia Start: 1041 Anesthesia Stop: 1133    Procedure: KNEE INCISION AND DRAINAGE (Left Knee) Diagnosis:       Postoperative wound dehiscence, subsequent encounter      Infection of superficial incisional surgical site after procedure, subsequent encounter      (Postoperative wound dehiscence, subsequent encounter [T81.31XD])      (Infection of superficial incisional surgical site after procedure, subsequent encounter [T81.41XD])    Surgeons: Yash Celis MD Provider: Kar Lawton MD    Anesthesia Type: general ASA Status: 3          Anesthesia Type: general    Vitals  Vitals Value Taken Time   /55 06/07/22 1151   Temp 97.3 °F (36.3 °C) 06/07/22 1133   Pulse 72 06/07/22 1154   Resp 12 06/07/22 1148   SpO2 94 % 06/07/22 1154   Vitals shown include unvalidated device data.        Post Anesthesia Care and Evaluation    Patient location during evaluation: PACU  Patient participation: complete - patient participated  Level of consciousness: awake  Pain scale: See nurse's notes for pain score.  Pain management: adequate    Airway patency: patent  Anesthetic complications: No anesthetic complications  PONV Status: none  Cardiovascular status: acceptable  Respiratory status: acceptable  Hydration status: acceptable    Comments: Patient seen and examined postoperatively; vital signs stable; SpO2 greater than or equal to 90%; cardiopulmonary status stable; nausea/vomiting adequately controlled; pain adequately controlled; no apparent anesthesia complications; patient discharged from anesthesia care when discharge criteria were met

## 2022-06-07 NOTE — ANESTHESIA PREPROCEDURE EVALUATION
Anesthesia Evaluation     Patient summary reviewed and Nursing notes reviewed   NPO Solid Status: > 8 hours  NPO Liquid Status: > 8 hours           Airway   Mallampati: II  TM distance: >3 FB  Neck ROM: full  No difficulty expected  Dental - normal exam     Pulmonary - normal exam   (+) asthma,  Cardiovascular - normal exam    (+) hypertension, valvular problems/murmurs AS,     ROS comment: Moderate AS    Neuro/Psych- negative ROS  GI/Hepatic/Renal/Endo    (+) obesity,       Musculoskeletal (-) negative ROS    Abdominal  - normal exam    Bowel sounds: normal.   Substance History - negative use     OB/GYN negative ob/gyn ROS         Other                        Anesthesia Plan    ASA 3     general     intravenous induction     Anesthetic plan, risks, benefits, and alternatives have been provided, discussed and informed consent has been obtained with: patient.    Plan discussed with CRNA and CAA.        CODE STATUS:

## 2022-06-07 NOTE — H&P
History & Physical       Patient: Leonid Diehl    Date of Admission: 6/7/2022  7:26 AM    YOB: 1955    Medical Record Number: [id number]    Attending Physician: Yash Celis MD        Chief Complaints: Postoperative wound dehiscence, subsequent encounter [T81.31XD]  Infection of superficial incisional surgical site after procedure, subsequent encounter [T81.41XD]      History of Present Illness: 67 y.o. male presents with Postoperative wound dehiscence, subsequent encounter [T81.31XD]  Infection of superficial incisional surgical site after procedure, subsequent encounter [T81.41XD]. Onset of symptoms was gradual and associated with wound dehiscence.  Symptoms are associated with wound dehiscence and mild superficial drainage from the middle part of the incision.  The proximal and distal part of the incision has completely healed.  Symptoms are aggravated by deep flexion of the knee.   Symptoms improve with local dressing changes. Patient is now being admitted to the services of Yash Celis MD for further evaluation and treatment.      No Known Allergies      Home Medications:  Medications Prior to Admission   Medication Sig Dispense Refill Last Dose   • albuterol sulfate  (90 Base) MCG/ACT inhaler Inhale 2 puffs Every 6 (Six) Hours As Needed for Wheezing or Shortness of Air. (Patient taking differently: Inhale 2 puffs Every 6 (Six) Hours As Needed for Wheezing or Shortness of Air. USES SEASONALLY) 8.5 g 2    • allopurinol (ZYLOPRIM) 300 MG tablet Take 1 tablet by mouth Daily. 90 tablet 1    • carvedilol (COREG) 3.125 MG tablet Take 1 tablet by mouth Daily. 90 tablet 1    • doxycycline (VIBRAMYCIN) 100 MG capsule Take 1 capsule by mouth 2 (Two) Times a Day. 20 capsule 0    • ferrous sulfate 324 (65 Fe) MG tablet delayed-release EC tablet Take 324 mg by mouth Every Night.      • fexofenadine-pseudoephedrine (ALLEGRA-D 24) 180-240 MG per 24 hr tablet Take 1 tablet by mouth Daily.      •  furosemide (LASIX) 20 MG tablet Take 1 tablet by mouth Daily As Needed (swelling). swelling (Patient taking differently: Take 20 mg by mouth Daily. swelling) 90 tablet 1    • guaiFENesin (MUCINEX) 600 MG 12 hr tablet Take 1,200 mg by mouth 2 (Two) Times a Day.      • HYDROcodone-acetaminophen (NORCO) 5-325 MG per tablet Take 1 tablet by mouth Every 8 (Eight) Hours As Needed for Moderate Pain . 40 tablet 0    • lisinopril-hydrochlorothiazide (PRINZIDE,ZESTORETIC) 10-12.5 MG per tablet Take 1 tablet by mouth Daily. (Patient taking differently: Take 0.5 tablets by mouth Daily.) 90 tablet 1    • meloxicam (MOBIC) 7.5 MG tablet Take 1 tablet by mouth Daily. 90 tablet 1    • potassium chloride 10 MEQ CR tablet TAKE 1 TABLET BY MOUTH DAILY AS NEEDED (Patient taking differently: Take 10 mEq by mouth Daily. WITH LASIX) 90 tablet 0    • traZODone (DESYREL) 50 MG tablet Take 1 tablet by mouth Every Night. 90 tablet 0    • Symbicort 160-4.5 MCG/ACT inhaler Inhale 2 puffs 2 (Two) Times a Day. (Patient taking differently: Inhale 2 puffs 2 (Two) Times a Day. USES SEASONALLLY) 10.2 g 11        Current Medications:  Scheduled Meds:  Continuous Infusions:No current facility-administered medications for this encounter.    PRN Meds:.       Past Medical History:   Diagnosis Date   • Aortic stenosis, moderate 04/05/2022   • Asthma    • Benign prostatic hyperplasia 06/20/2017   • Chronic diastolic congestive heart failure (HCC) 02/04/2019    states didn't really have.  no sx's present   • Gout    • Hay fever 01/28/2016   • History of alcohol abuse 04/18/2019   • Hx of pancreatitis 04/18/2019   • Infection of left knee (HCC) 05/2022   • Insomnia    • Iron deficiency anemia 01/11/2019   • Obesity (BMI 35.0-39.9 without comorbidity)    • Peripheral edema     none presently   • Primary hypertension 01/28/2016   • Raynaud's disease    • Thrombocytopenia (HCC)     when had PNA        Past Surgical History:   Procedure Laterality Date   •  CATARACT EXTRACTION, BILATERAL     • TOTAL KNEE ARTHROPLASTY Left 4/5/2022    Procedure: TOTAL KNEE ARTHROPLASTY;  Surgeon: Yash Celis MD;  Location: [facility] MAIN OR;  Service: Orthopedics;  Laterality: Left;   • TOTAL SHOULDER ARTHROPLASTY W/ DISTAL CLAVICLE EXCISION Right 10/22/2019    Procedure: TOTAL SHOULDER REVERSE ARTHROPLASTY with Biceps Tenodesis;  Surgeon: Chris Lafleur MD;  Location: [facility] MAIN OR;  Service: Orthopedics        Social History     Occupational History   • Not on file   Tobacco Use   • Smoking status: Never Smoker   • Smokeless tobacco: Never Used   Vaping Use   • Vaping Use: Never used   Substance and Sexual Activity   • Alcohol use: No   • Drug use: No   • Sexual activity: Defer      Social History     Social History Narrative   • Not on file        Family History   Problem Relation Age of Onset   • Cancer Father    • Heart disease Sister    • Hypertension Brother          Review of Systems:   HEENT: Patient denies any headaches, vision changes, change in hearing, or tinnitus, Patient denies any rhinorrhea,epistaxis, sinus pain, mouth or dental problems, sore throat or hoarseness, or dysphagia  Pulmonary: Patient denies any cough, congestion, SOA, or wheezing  Cardiovascular: Patient denies any chest pain, dyspnea, palpitations, weakness, intolerance of exercise, varicosities, swelling of extremities, known murmur  Gastrointestinal:  Patient denies nausea, vomiting, diarrhea, constipation, loss  of appetite, change in appetite, dysphagia, gas, heartburn, melena, change in bowel habits, use of laxatives or other drugs to alter the function of the gastrointestinal tract.  Genital/Urinary: Patient denies dysuria, change in color of urine, change in frequency of urination, pain with urgency, incontinence, retention, or nocturia.  Musculoskeletal: Patient denies increased warmth; redness; or swelling of joints; limitation of function; deformity; crepitation: pain in a joint or an  "extremity, the neck, or the back, especially with movement.  Neurological: Patient denies dizziness, tremor, ataxia, difficulty in speaking, change in speech, paresthesia, loss of sensation, seizures, syncope, changes in memory.  Endocrine system: Patient denies tremors, palpitations, intolerance of heat or cold, polyuria, polydipsia, polyphagia, diaphoresis, exophthalmos, or goiter.  Psychological: Patient denies thoughts/plans or harming self or other; depression,  insomnia, night terrors, mason, memory loss, disorientation.  Skin: Patient denies any bruising, rashes, discoloration, pruritus, wounds, ulcers, decubiti, changes in the hair or nails  Hematopoietic: Patient denies history of spontaneous or excessive bleeding, epistaxis, hematuria, melena, fatigue, enlarged or tender lymph nodes, pallor, history of anemia.    Physical Exam: 67 y.o. male  Vitals:    06/02/22 1534   Weight: 122 kg (270 lb)   Height: 188 cm (74\")       General Appearance:          Alert, cooperative, in no acute distress                                                 Head:    Normocephalic, without obvious abnormality, atraumatic   Eyes:            Lids and lashes normal, conjunctivae and sclerae normal, no   icterus, no pallor, corneas clear, PERRLA   Ears:    Ears appear intact with no abnormalities noted   Throat:   No oral lesions, no thrush, oral mucosa moist   Neck:   No adenopathy, supple, trachea midline, no thyromegaly, no   carotid bruit, no JVD   Back:     No kyphosis present, no scoliosis present, no skin lesions,      erythema or scars, no tenderness to percussion or                   palpation,   range of motion normal   Lungs:     Clear to auscultation,respirations regular, even and                  unlabored    Heart:    Regular rhythm and normal rate, normal S1 and S2, no            murmur, no gallop, no rub, no click   Chest Wall:    No abnormalities observed   Abdomen:     Normal bowel sounds, no masses, no " organomegaly, soft        nontender, nondistended, no guarding, no rebound                tenderness   Rectal:     Deferred   Extremities:   Tenderness over anterior aspect of the knee. Moves all extremities well, no edema,   no cyanosis, no redness   Pulses:   Pulses palpable and equal bilaterally   Skin:   No bleeding, bruising or rash   Lymph nodes:   No palpable adenopathy   Neurologic:   Cranial nerves 2 - 12 grossly intact, sensation intact, DTR       present and equal bilaterally           Diagnostic Tests:  No visits with results within 2 Day(s) from this visit.   Latest known visit with results is:   Lab on 06/04/2022   Component Date Value Ref Range Status   • COVID19 06/04/2022 Not Detected  Not Detected - Ref. Range Final     No results found.      Assessment:  Patient Active Problem List   Diagnosis   • Iron deficiency anemia   • Asthma   • Benign prostatic hyperplasia   • Chronic diastolic congestive heart failure (HCC)   • Encounter for general adult medical examination without abnormal findings   • Hay fever   • Biliary disease   • Hepatic disease   • History of alcohol abuse   • Hx of pancreatitis   • Primary hypertension   • Systolic murmur   • Bilateral primary osteoarthritis of knee   • Aortic stenosis, moderate   • Gout   • Insomnia   • Thrombocytopenia (HCC)   • Hx of total knee replacement, left   • Morbid obesity (HCC)   • Wound dehiscence, surgical   • Infection of superficial incisional surgical site after procedure         Plan:  The patient voiced understanding of the risks, benefits, and alternative forms of treatment that were discussed and the patient consents to proceed with incision and drainage of left knee wound dehiscence.     Risks and benefits of surgical debridement and closure of the wound discussed with the patient and his wife at length.  They understand and accept and we will proceed with surgical intervention to perform an irrigation debridement and closure of the  wound.  Discharge Plan: today to home      Date: 6/7/2022    Yash Celis MD      DICTATED UTILIZING DRAGON DICTATION

## 2022-06-07 NOTE — ANESTHESIA PROCEDURE NOTES
Airway  Urgency: elective    Date/Time: 6/7/2022 10:50 AM  Airway not difficult    General Information and Staff    Patient location during procedure: OR  Anesthesiologist: Kar Lawton MD  CRNA/CAA: Brigette Ruiz CRNA    Indications and Patient Condition  Indications for airway management: airway protection    Preoxygenated: yes  MILS maintained throughout  Mask difficulty assessment: 1 - vent by mask    Final Airway Details  Final airway type: supraglottic airway      Successful airway: classic  Size 5    Number of attempts at approach: 1  Assessment: lips, teeth, and gum same as pre-op and atraumatic intubation

## 2022-06-07 NOTE — OP NOTE
PATIENT NAME: Leonid Diehl   PATIENT : 1955   DATE OF PROCEDURE: 2022   PRINCIPAL DIAGNOSIS: Postoperative wound dehiscence, subsequent encounter [T81.31XD]  Infection of superficial incisional surgical site after procedure, subsequent encounter [T81.41XD]  SECONDARY DIAGNOSIS: Left knee wound dehiscence  POSTOPERATIVE DIAGNOSIS: Post-Op Diagnosis Codes:     * Postoperative wound dehiscence, subsequent encounter [T81.31XD]     * Infection of superficial incisional surgical site after procedure, subsequent encounter [T81.41XD]  PROCEDURE PERFORMED: Irrigation debridement and secondary closure of the left knee wound dehiscence.  SURGEON: BOZENA NEAL M.D.  ASSISTANT: Karen Peters first assistant was responsible for performing the following activities: Retraction, Suction, Irrigation, Suturing, Closing and Placing Dressing and their skilled assistance was necessary for the success of this case.     ANESTHESIOLOGIST: Dr. Kar Lawton MD.  ANESTHESIA USED: General with an LMA.  ANALGESIC PROCEDURE USED: 20 cc of Exparel were infiltrated around the incision for postoperative analgesia.  ESTIMATED BLOOD LOSS: minimal  SPECIMENS: Multiple cultures and sensitivities were obtained including tissue for cultures.  DRAINS: None  COMPLICATIONS: None  POSITION: Supine on the operating room table.     INDICATIONS: Patient had showed some wound dehiscence with superficial cellulitis and but there was no evidence of deep-seated joint infection related to the total knee arthroplasty implants. Risks and benefits and need for surgical intervention to debride the wound, obtain cultures and obtain secondary closure were discussed with the patient in great detail. Accordingly an informed consent was signed.    PROCEDURE:  Patient was taken to the operating room and placed under appropriate anesthetic. The operative extremity was correctly identified in the operating room suite. The wound was prepped and draped in a  standard fashion. The extremity was exsanguinated. Edges of the margin were debrided with a sharp knife.  Intraoperative findings included a 1 cm wound dehiscence at the distalmost part of the incision following the total knee arthroplasty.  There was no evidence of a deep-seated joint infection related to the knee implants.  Cultures were obtained including superficial and deep cultures. Margins of tissue were removed with a sharp knife all the way down to subcutaneous tissue. Antibiotics were administered through the IV once the cultures had been obtained.  The wound was lavaged with 3 liters of antibiotic irrigating solution. Tissue margins were debrided back to healthy appearing tissue. Wound was then closed up in layers and loosely approximated. Occlusive dressing was applied. Hemostasis was obtained.  20 cc of Exparel were infiltrated from a distance of the wound margins for postoperative analgesia.  Patient was reversed from anesthetic and taken from the operating room to the recovery room in stable, satisfactory condition. No complications were encountered. I discussed the satisfactory performance of the procedure with the patient’s family and answered all questions for them.    Yash Celis MD  6/7/2022  11:33 EDT

## 2022-06-10 LAB
BACTERIA SPEC AEROBE CULT: NORMAL
GRAM STN SPEC: NORMAL
GRAM STN SPEC: NORMAL

## 2022-06-15 ENCOUNTER — OFFICE VISIT (OUTPATIENT)
Dept: ORTHOPEDIC SURGERY | Facility: CLINIC | Age: 67
End: 2022-06-15

## 2022-06-15 VITALS
DIASTOLIC BLOOD PRESSURE: 79 MMHG | WEIGHT: 287.2 LBS | HEIGHT: 72 IN | BODY MASS INDEX: 38.9 KG/M2 | SYSTOLIC BLOOD PRESSURE: 157 MMHG | HEART RATE: 64 BPM

## 2022-06-15 DIAGNOSIS — Z96.652 HX OF TOTAL KNEE REPLACEMENT, LEFT: Primary | ICD-10-CM

## 2022-06-15 DIAGNOSIS — E66.01 MORBID OBESITY: ICD-10-CM

## 2022-06-15 PROCEDURE — 99024 POSTOP FOLLOW-UP VISIT: CPT | Performed by: PHYSICIAN ASSISTANT

## 2022-06-15 NOTE — PATIENT INSTRUCTIONS
Total Knee Replacement, Care After  After the procedure, it is common to have stiffness and discomfort, or redness, pain, and swelling around your cut from surgery (incision). You may also have a small amount of blood or clear fluid coming from your incision.  Follow these instructions at home:  Your doctor may give you more instructions. If you have problems, contact your doctor.  Medicines  Take over-the-counter and prescription medicines only as told by your doctor.  If you were prescribed a blood thinner, take it as told by your doctor.  If told, take steps to prevent problems with pooping (constipation). You may need to:  Drink enough fluid to keep your pee (urine) pale yellow.  Take medicines. You will be told what medicines to take.  Eat foods that are high in fiber. These include beans, whole grains, and fresh fruits and vegetables.  Limit foods that are high in fat and sugar. These include fried or sweet foods.  Incision care    Follow instructions from your doctor about how to take care of your incision. Make sure you:  Wash your hands with soap and water for at least 20 seconds before and after you change your bandage. If you cannot use soap and water, use hand .  Change your bandage.  Leave stitches, staples, or skin glue in place for at least 2 weeks.  Leave tape strips alone unless you are told to take them off. You may trim the edges of the tape strips if they curl up.  Do not take baths, swim, or use a hot tub until your doctor says it is okay.  Check your incision every day for signs of infection. Check for:  More redness, swelling, or pain.  More fluid or blood.  Warmth.  Pus or a bad smell.    Activity  Rest as told by your doctor.  Get up to take short walks every 1 to 2 hours. Ask for help if you feel weak or unsteady.  Follow instructions from your doctor about:  Using a walker, crutches, or a cane.  How much body weight you may safely support on your affected leg (weight-bearing  restrictions).  How to get out of a bed and chair and how to go up and down stairs. A physical therapist will show you how to do this.  Do exercises as told by your doctor or physical therapist.  Avoid activities that put stress on your knees. These include running, jumping rope, and doing jumping jacks.  Do not play contact sports until your doctor says it is okay.  Return to your normal activities when your doctor says that it is safe.  Managing pain, stiffness, and swelling    If told, put ice on your knee. To do this:  Put ice in a plastic bag or use an icing device (cold flow pad). Follow your doctor's directions about how to use the icing device.  Place a towel between your skin and the bag or between your skin and the icing device.  Leave the ice on for 20 minutes, 2-3 times a day.  Take off the ice if your skin turns bright red. This is very important. If you cannot feel pain, heat, or cold, you have a greater risk of damage to the area.  Move your toes often.  Raise your leg above the level of your heart while you are sitting or lying down.  Use a few pillows to keep your leg straight.  Do not put a pillow just under the knee. If the knee is bent for a long time, this may make the knee stiff.  Wear elastic knee support as told by your doctor.    Safety    To help prevent falls:  Keep floors clear of objects you may trip over.  Place items that you may need within easy reach.  Wear an apron or tool belt with pockets for carrying objects. This leaves your hands free to help with your balance.  Do not drive or use machines until your doctor says it is safe.    General instructions  Wear compression stockings as told by your doctor.  Continue with breathing exercises. This helps prevent lung infection.  Do not smoke or use any products that contain nicotine or tobacco. If you need help quitting, ask your doctor.  If you plan to visit a dentist:  Tell your doctor. Ask about things to do before your teeth are  cleaned.  Tell your dentist about your new joint.  Keep all follow-up visits.  Contact a doctor if:  You have a fever or chills.  You have a cough or feel short of breath.  Your medicine is not controlling your pain.  You have any of these signs of infection around your incision:  More redness, swelling, or pain.  More fluid or blood.  Warmth.  Pus or a bad smell.  You fall.  Get help right away if:  You have very bad pain.  You have trouble breathing.  You have chest pain.  You have pain in your calf or leg.  You have redness, swelling, or warmth in your calf or leg.  Your incision breaks open after the stitches or staples are taken out.  These symptoms may be an emergency. Get help right away. Call your local emergency services (911 in the [place]  Do not wait to see if the symptoms will go away.  Do not drive yourself to the hospital.  Summary  After the procedure, it is common to have stiffness and discomfort, or redness, pain, and swelling around your incision.  Follow instructions from your doctor about how to take care of your incision.  Use crutches, a walker, or a cane as told by your doctor.  If you were prescribed a blood thinner, take it as told by your doctor.  Keep all follow-up visits.  This information is not intended to replace advice given to you by your health care provider. Make sure you discuss any questions you have with your health care provider.  Document Revised: 06/08/2021 Document Reviewed: 06/08/2021  [employer] Patient Education © 2021 [employer]

## 2022-06-15 NOTE — PROGRESS NOTES
ORTHO POSTOP VISIT       Subjective:    HPI:  Leonid Diehl is a 67 y.o. male who presents about 1 week out from having a left knee I&D with wound closure.  His original left total knee replacement was on 4/5/2022.  He reports that he is doing well with little to no pain in the knee area.  Culture taken during surgery showed no growth.    Medications:    Current Outpatient Medications:   •  albuterol sulfate  (90 Base) MCG/ACT inhaler, Inhale 2 puffs Every 6 (Six) Hours As Needed for Wheezing or Shortness of Air. (Patient taking differently: Inhale 2 puffs Every 6 (Six) Hours As Needed for Wheezing or Shortness of Air. USES SEASONALLY), Disp: 8.5 g, Rfl: 2  •  allopurinol (ZYLOPRIM) 300 MG tablet, Take 1 tablet by mouth Daily., Disp: 90 tablet, Rfl: 1  •  carvedilol (COREG) 3.125 MG tablet, Take 1 tablet by mouth Daily., Disp: 90 tablet, Rfl: 1  •  ferrous sulfate 324 (65 Fe) MG tablet delayed-release EC tablet, Take 324 mg by mouth Every Night., Disp: , Rfl:   •  fexofenadine-pseudoephedrine (ALLEGRA-D 24) 180-240 MG per 24 hr tablet, Take 1 tablet by mouth Daily., Disp: , Rfl:   •  furosemide (LASIX) 20 MG tablet, Take 1 tablet by mouth Daily As Needed (swelling). swelling (Patient taking differently: Take 20 mg by mouth Daily. swelling), Disp: 90 tablet, Rfl: 1  •  guaiFENesin (MUCINEX) 600 MG 12 hr tablet, Take 1,200 mg by mouth 2 (Two) Times a Day., Disp: , Rfl:   •  lisinopril-hydrochlorothiazide (PRINZIDE,ZESTORETIC) 10-12.5 MG per tablet, Take 1 tablet by mouth Daily. (Patient taking differently: Take 0.5 tablets by mouth Daily.), Disp: 90 tablet, Rfl: 1  •  meloxicam (MOBIC) 7.5 MG tablet, Take 1 tablet by mouth Daily., Disp: 90 tablet, Rfl: 1  •  potassium chloride 10 MEQ CR tablet, TAKE 1 TABLET BY MOUTH DAILY AS NEEDED (Patient taking differently: Take 10 mEq by mouth Daily. WITH LASIX), Disp: 90 tablet, Rfl: 0  •  Symbicort 160-4.5 MCG/ACT inhaler, Inhale 2 puffs 2 (Two) Times a Day. (Patient  "taking differently: Inhale 2 puffs 2 (Two) Times a Day. USES SEASONALLLY), Disp: 10.2 g, Rfl: 11  •  traZODone (DESYREL) 50 MG tablet, Take 1 tablet by mouth Every Night., Disp: 90 tablet, Rfl: 0  •  HYDROcodone-acetaminophen (NORCO) 5-325 MG per tablet, Take 1-2 tablets by mouth Every 6 (Six) Hours As Needed for Moderate Pain ., Disp: 40 tablet, Rfl: 0  Current outpatient and discharge medications have been reconciled for the patient.  Reviewed by: TWIN Bailey      Allergies:  No Known Allergies       Objective   Objective:    /79 (BP Location: Left arm, Patient Position: Sitting, Cuff Size: Large Adult)   Pulse 64   Ht 182.9 cm (72\")   Wt 130 kg (287 lb 3.2 oz)   BMI 38.95 kg/m²     Physical Examination:  Alert, oriented, obese individual in no acute distress, ambulating unassisted  Left lower extremity shows a well-healing surgical incision with interrupted sutures in place with no erythema, drainage, or open skin lesions. There is a minimal to mild amount of swelling. It is grossly well aligned, and the patient is neurovascularly intact distally. The knee is stable to varus and valgus stress, there is no patellar maltracking or crepitus noted, and plantar and dorsiflexion is 5/5. There is no tenderness to palpation or with range of motion, which is about 0-120.           Imaging:  no diagnostic testing performed this visit            Assessment:  1. Hx of total knee replacement, left    2. Morbid obesity (HCC)       Status post I&D with wound closure on 6/7/2022 with original left total knee replacement on 4/5/2022        Plan:  We will plan to see him back in 2 weeks for suture removal.  Continue home exercises and therapy.  He should call with any questions or concerns.             TWIN Bailey  06/15/22  09:06 EDT    \"Please note that portions of this note were completed with a voice recognition program\".                   "

## 2022-06-27 RX ORDER — POTASSIUM CHLORIDE 750 MG/1
10 TABLET, FILM COATED, EXTENDED RELEASE ORAL DAILY
Qty: 90 TABLET | Refills: 1 | Status: SHIPPED | OUTPATIENT
Start: 2022-06-27 | End: 2022-10-24 | Stop reason: SDUPTHER

## 2022-06-29 ENCOUNTER — OFFICE VISIT (OUTPATIENT)
Dept: ORTHOPEDIC SURGERY | Facility: CLINIC | Age: 67
End: 2022-06-29

## 2022-06-29 VITALS
HEART RATE: 65 BPM | DIASTOLIC BLOOD PRESSURE: 76 MMHG | BODY MASS INDEX: 38.95 KG/M2 | HEIGHT: 72 IN | SYSTOLIC BLOOD PRESSURE: 153 MMHG | WEIGHT: 287.6 LBS

## 2022-06-29 DIAGNOSIS — Z96.652 HX OF TOTAL KNEE REPLACEMENT, LEFT: Primary | ICD-10-CM

## 2022-06-29 DIAGNOSIS — E66.01 MORBID OBESITY: ICD-10-CM

## 2022-06-29 PROCEDURE — 99024 POSTOP FOLLOW-UP VISIT: CPT | Performed by: PHYSICIAN ASSISTANT

## 2022-06-29 NOTE — PATIENT INSTRUCTIONS
Total Knee Replacement, Care After  After the procedure, it is common to have stiffness and discomfort, or redness, pain, and swelling around your cut from surgery (incision). You may also have a small amount of blood or clear fluid coming from your incision.  Follow these instructions at home:  Your doctor may give you more instructions. If you have problems, contact your doctor.  Medicines  Take over-the-counter and prescription medicines only as told by your doctor.  If you were prescribed a blood thinner, take it as told by your doctor.  If told, take steps to prevent problems with pooping (constipation). You may need to:  Drink enough fluid to keep your pee (urine) pale yellow.  Take medicines. You will be told what medicines to take.  Eat foods that are high in fiber. These include beans, whole grains, and fresh fruits and vegetables.  Limit foods that are high in fat and sugar. These include fried or sweet foods.  Incision care    Follow instructions from your doctor about how to take care of your incision. Make sure you:  Wash your hands with soap and water for at least 20 seconds before and after you change your bandage. If you cannot use soap and water, use hand .  Change your bandage.  Leave stitches, staples, or skin glue in place for at least 2 weeks.  Leave tape strips alone unless you are told to take them off. You may trim the edges of the tape strips if they curl up.  Do not take baths, swim, or use a hot tub until your doctor says it is okay.  Check your incision every day for signs of infection. Check for:  More redness, swelling, or pain.  More fluid or blood.  Warmth.  Pus or a bad smell.    Activity  Rest as told by your doctor.  Get up to take short walks every 1 to 2 hours. Ask for help if you feel weak or unsteady.  Follow instructions from your doctor about:  Using a walker, crutches, or a cane.  How much body weight you may safely support on your affected leg (weight-bearing  restrictions).  How to get out of a bed and chair and how to go up and down stairs. A physical therapist will show you how to do this.  Do exercises as told by your doctor or physical therapist.  Avoid activities that put stress on your knees. These include running, jumping rope, and doing jumping jacks.  Do not play contact sports until your doctor says it is okay.  Return to your normal activities when your doctor says that it is safe.  Managing pain, stiffness, and swelling    If told, put ice on your knee. To do this:  Put ice in a plastic bag or use an icing device (cold flow pad). Follow your doctor's directions about how to use the icing device.  Place a towel between your skin and the bag or between your skin and the icing device.  Leave the ice on for 20 minutes, 2-3 times a day.  Take off the ice if your skin turns bright red. This is very important. If you cannot feel pain, heat, or cold, you have a greater risk of damage to the area.  Move your toes often.  Raise your leg above the level of your heart while you are sitting or lying down.  Use a few pillows to keep your leg straight.  Do not put a pillow just under the knee. If the knee is bent for a long time, this may make the knee stiff.  Wear elastic knee support as told by your doctor.    Safety    To help prevent falls:  Keep floors clear of objects you may trip over.  Place items that you may need within easy reach.  Wear an apron or tool belt with pockets for carrying objects. This leaves your hands free to help with your balance.  Do not drive or use machines until your doctor says it is safe.    General instructions  Wear compression stockings as told by your doctor.  Continue with breathing exercises. This helps prevent lung infection.  Do not smoke or use any products that contain nicotine or tobacco. If you need help quitting, ask your doctor.  If you plan to visit a dentist:  Tell your doctor. Ask about things to do before your teeth are  cleaned.  Tell your dentist about your new joint.  Keep all follow-up visits.  Contact a doctor if:  You have a fever or chills.  You have a cough or feel short of breath.  Your medicine is not controlling your pain.  You have any of these signs of infection around your incision:  More redness, swelling, or pain.  More fluid or blood.  Warmth.  Pus or a bad smell.  You fall.  Get help right away if:  You have very bad pain.  You have trouble breathing.  You have chest pain.  You have pain in your calf or leg.  You have redness, swelling, or warmth in your calf or leg.  Your incision breaks open after the stitches or staples are taken out.  These symptoms may be an emergency. Get help right away. Call your local emergency services (911 in the [place]  Do not wait to see if the symptoms will go away.  Do not drive yourself to the hospital.  Summary  After the procedure, it is common to have stiffness and discomfort, or redness, pain, and swelling around your incision.  Follow instructions from your doctor about how to take care of your incision.  Use crutches, a walker, or a cane as told by your doctor.  If you were prescribed a blood thinner, take it as told by your doctor.  Keep all follow-up visits.  This information is not intended to replace advice given to you by your health care provider. Make sure you discuss any questions you have with your health care provider.  Document Revised: 06/08/2021 Document Reviewed: 06/08/2021  [employer] Patient Education © 2021 [employer]

## 2022-06-29 NOTE — PROGRESS NOTES
ORTHO POSTOP VISIT       Subjective:    HPI:  Leonid Diehl is a 67 y.o. male who presents about 3 weeks out from having a left knee I&D with wound closure.  His original left total knee replacement was on 4/5/2022.  He reports that he is doing well with little to no pain in the knee area.  Culture taken during surgery showed no growth.    Medications:    Current Outpatient Medications:   •  albuterol sulfate  (90 Base) MCG/ACT inhaler, Inhale 2 puffs Every 6 (Six) Hours As Needed for Wheezing or Shortness of Air. (Patient taking differently: Inhale 2 puffs Every 6 (Six) Hours As Needed for Wheezing or Shortness of Air. USES SEASONALLY), Disp: 8.5 g, Rfl: 2  •  allopurinol (ZYLOPRIM) 300 MG tablet, Take 1 tablet by mouth Daily., Disp: 90 tablet, Rfl: 1  •  carvedilol (COREG) 3.125 MG tablet, Take 1 tablet by mouth Daily., Disp: 90 tablet, Rfl: 1  •  ferrous sulfate 324 (65 Fe) MG tablet delayed-release EC tablet, Take 324 mg by mouth Every Night., Disp: , Rfl:   •  fexofenadine-pseudoephedrine (ALLEGRA-D 24) 180-240 MG per 24 hr tablet, Take 1 tablet by mouth Daily., Disp: , Rfl:   •  furosemide (LASIX) 20 MG tablet, Take 1 tablet by mouth Daily As Needed (swelling). swelling (Patient taking differently: Take 20 mg by mouth Daily. swelling), Disp: 90 tablet, Rfl: 1  •  guaiFENesin (MUCINEX) 600 MG 12 hr tablet, Take 1,200 mg by mouth 2 (Two) Times a Day., Disp: , Rfl:   •  HYDROcodone-acetaminophen (NORCO) 5-325 MG per tablet, Take 1-2 tablets by mouth Every 6 (Six) Hours As Needed for Moderate Pain ., Disp: 40 tablet, Rfl: 0  •  lisinopril-hydrochlorothiazide (PRINZIDE,ZESTORETIC) 10-12.5 MG per tablet, Take 1 tablet by mouth Daily. (Patient taking differently: Take 0.5 tablets by mouth Daily.), Disp: 90 tablet, Rfl: 1  •  meloxicam (MOBIC) 7.5 MG tablet, Take 1 tablet by mouth Daily., Disp: 90 tablet, Rfl: 1  •  potassium chloride 10 MEQ CR tablet, Take 1 tablet by mouth Daily. WITH LASIX, Disp: 90  "tablet, Rfl: 1  •  Symbicort 160-4.5 MCG/ACT inhaler, Inhale 2 puffs 2 (Two) Times a Day. (Patient taking differently: Inhale 2 puffs 2 (Two) Times a Day. USES SEASONALLLY), Disp: 10.2 g, Rfl: 11  •  traZODone (DESYREL) 50 MG tablet, Take 1 tablet by mouth Every Night., Disp: 90 tablet, Rfl: 0  Current outpatient and discharge medications have been reconciled for the patient.  Reviewed by: TWIN Bailey      Allergies:  No Known Allergies       Objective   Objective:    /76 (BP Location: Left arm, Patient Position: Sitting, Cuff Size: Large Adult)   Pulse 65   Ht 182.9 cm (72\")   Wt 130 kg (287 lb 9.6 oz)   BMI 39.01 kg/m²     Physical Examination:  Alert, oriented, obese individual in no acute distress, ambulating unassisted  Left lower extremity shows a well-healed surgical incision with no erythema, drainage, or open skin lesions. There is a minimal amount of swelling. It is grossly well aligned, and the patient is neurovascularly intact distally. The knee is stable to varus and valgus stress, there is no patellar maltracking or crepitus noted, and plantar and dorsiflexion is 5/5. There is no tenderness to palpation or with range of motion, which is about 0-120.           Imaging:  no diagnostic testing performed this visit            Assessment:  1. Hx of total knee replacement, left    2. Morbid obesity (HCC)       Status post I&D with wound closure on 6/7/2022 with original left total knee replacement on 4/5/2022        Plan:  At this time, we will plan to see the patient back in about 4 weeks. The patient should continue PT, WBAT, and call with any problems.               TWIN Bailey  06/29/22  09:33 EDT    \"Please note that portions of this note were completed with a voice recognition program\".                 "

## 2022-07-07 ENCOUNTER — READMISSION MANAGEMENT (OUTPATIENT)
Dept: CALL CENTER | Facility: HOSPITAL | Age: 67
End: 2022-07-07

## 2022-07-07 NOTE — OUTREACH NOTE
Total Joint Month 3 Survey    Flowsheet Row Responses   [facility] patient discharged from? [facility]   Does the patient have one of the following disease processes/diagnoses(primary or secondary)? Total Joint Replacement   Joint surgery performed? Knee   Month 3 attempt successful? Yes   Call start time [id number]   Call end time 1005   Has the patient been back in either the hospital or Emergency Department since discharge? No   Discharge diagnosis  TOTAL KNEE ARTHROPLASTY    Person spoke with today (if not patient) and relationship Wife, Dorita Diehl   Is the patient taking all medications as directed (includes completed medication regime)? Yes   Is the patient able to teach back alternate methods of pain control? Ice, Reposition, Short, frequent activity   Has the patient kept scheduled appointments due by today? Yes   Is the patient still receiving Home Health Services? N/A   Home health comments therapy has ended    Is the patient still attending therapy sessions(either in the home or as an outpatient)? Yes   Has the patient fallen since discharge? No   What is the patient's perception of their functional status since discharge? Improving   Is the patient able to teach back signs and symptoms of infection? Temp >100.4 for 24h or longer, Incisional drainage   If the patient is a current smoker, are they able to teach back resources for cessation? Not a smoker   Is the patient/caregiver able to teach back the hierarchy of who to call/visit for symptoms/problems? PCP, Specialist, Home health nurse, Urgent Care, ED, 911 Yes   Graduated Yes   Did the patient feel the follow up calls were helpful during their recovery period? Yes   Was the number of calls appropriate? Yes   Wrap up additional comments Wife reports doing well. Therapy has ended. Healing well.           VALERIA GAVIRIA - Registered Nurse

## 2022-07-27 ENCOUNTER — OFFICE VISIT (OUTPATIENT)
Dept: ORTHOPEDIC SURGERY | Facility: CLINIC | Age: 67
End: 2022-07-27

## 2022-07-27 VITALS
HEIGHT: 72 IN | SYSTOLIC BLOOD PRESSURE: 149 MMHG | BODY MASS INDEX: 40.09 KG/M2 | HEART RATE: 58 BPM | WEIGHT: 296 LBS | DIASTOLIC BLOOD PRESSURE: 80 MMHG

## 2022-07-27 DIAGNOSIS — M17.11 PRIMARY OSTEOARTHRITIS OF RIGHT KNEE: ICD-10-CM

## 2022-07-27 DIAGNOSIS — E66.01 MORBID OBESITY: ICD-10-CM

## 2022-07-27 DIAGNOSIS — Z96.652 HX OF TOTAL KNEE REPLACEMENT, LEFT: Primary | ICD-10-CM

## 2022-07-27 PROCEDURE — 99213 OFFICE O/P EST LOW 20 MIN: CPT | Performed by: PHYSICIAN ASSISTANT

## 2022-07-27 RX ORDER — MELOXICAM 15 MG/1
15 TABLET ORAL DAILY
Qty: 90 TABLET | Refills: 3 | Status: SHIPPED | OUTPATIENT
Start: 2022-07-27

## 2022-07-27 NOTE — PROGRESS NOTES
ORTHO POSTOP VISIT       Subjective:    HPI:  Leonid Diehl is a 67 y.o. male who presents about 7 weeks out from having a left knee I&D with wound closure.  His original left total knee replacement was on 4/5/2022.  He reports that he is doing well with mild intermittent discomfort.  He is pleased with his results thus far.    Medications:    Current Outpatient Medications:   •  albuterol sulfate  (90 Base) MCG/ACT inhaler, Inhale 2 puffs Every 6 (Six) Hours As Needed for Wheezing or Shortness of Air. (Patient taking differently: Inhale 2 puffs Every 6 (Six) Hours As Needed for Wheezing or Shortness of Air. USES SEASONALLY), Disp: 8.5 g, Rfl: 2  •  allopurinol (ZYLOPRIM) 300 MG tablet, Take 1 tablet by mouth Daily., Disp: 90 tablet, Rfl: 1  •  carvedilol (COREG) 3.125 MG tablet, Take 1 tablet by mouth Daily., Disp: 90 tablet, Rfl: 1  •  ferrous sulfate 324 (65 Fe) MG tablet delayed-release EC tablet, Take 324 mg by mouth Every Night., Disp: , Rfl:   •  fexofenadine-pseudoephedrine (ALLEGRA-D 24) 180-240 MG per 24 hr tablet, Take 1 tablet by mouth Daily., Disp: , Rfl:   •  furosemide (LASIX) 20 MG tablet, Take 1 tablet by mouth Daily As Needed (swelling). swelling (Patient taking differently: Take 20 mg by mouth Daily. swelling), Disp: 90 tablet, Rfl: 1  •  guaiFENesin (MUCINEX) 600 MG 12 hr tablet, Take 1,200 mg by mouth 2 (Two) Times a Day., Disp: , Rfl:   •  lisinopril-hydrochlorothiazide (PRINZIDE,ZESTORETIC) 10-12.5 MG per tablet, Take 1 tablet by mouth Daily. (Patient taking differently: Take 0.5 tablets by mouth Daily.), Disp: 90 tablet, Rfl: 1  •  potassium chloride 10 MEQ CR tablet, Take 1 tablet by mouth Daily. WITH LASIX, Disp: 90 tablet, Rfl: 1  •  Symbicort 160-4.5 MCG/ACT inhaler, Inhale 2 puffs 2 (Two) Times a Day. (Patient taking differently: Inhale 2 puffs 2 (Two) Times a Day. USES SEASONALLLY), Disp: 10.2 g, Rfl: 11  •  traZODone (DESYREL) 50 MG tablet, Take 1 tablet by mouth Every  "Night., Disp: 90 tablet, Rfl: 0  •  meloxicam (MOBIC) 15 MG tablet, Take 1 tablet by mouth Daily. Prn joint pain, Disp: 90 tablet, Rfl: 3  Current outpatient and discharge medications have been reconciled for the patient.  Reviewed by: TWIN Bailey      Allergies:  No Known Allergies       Objective   Objective:    /80 (BP Location: Right arm, Patient Position: Sitting, Cuff Size: Large Adult)   Pulse 58   Ht 182.9 cm (72\")   Wt 134 kg (296 lb)   BMI 40.14 kg/m²     Physical Examination:  Alert, oriented, morbidly obese individual in no acute distress, ambulating unassisted  Left lower extremity shows a well-healed surgical incision with no erythema, drainage, or open skin lesions. There is a minimal amount of swelling. It is grossly well aligned, and the patient is neurovascularly intact distally. The knee is stable to varus and valgus stress, there is no patellar maltracking or crepitus noted, and plantar and dorsiflexion is 5/5. There is no tenderness to palpation or with range of motion, which is about 0-120.           Imaging:  no diagnostic testing performed this visit            Assessment:  1. Hx of total knee replacement, left    2. Morbid obesity (HCC)    3. Primary osteoarthritis of right knee       Status post I&D with wound closure on 6/7/2022 with original left total knee replacement on 4/5/2022        Plan:  We will see him back in about 10 months with repeat imaging.  , GI, and dental antibiotic prophylaxis discussed.  He should call with any questions or concerns.               TWIN Bailey  07/27/22  09:32 EDT    \"Please note that portions of this note were completed with a voice recognition program\".               "

## 2022-08-01 RX ORDER — TRAZODONE HYDROCHLORIDE 50 MG/1
TABLET ORAL
Qty: 90 TABLET | Refills: 0 | Status: SHIPPED | OUTPATIENT
Start: 2022-08-01 | End: 2022-08-22 | Stop reason: SDUPTHER

## 2022-08-17 ENCOUNTER — OFFICE VISIT (OUTPATIENT)
Dept: ORTHOPEDIC SURGERY | Facility: CLINIC | Age: 67
End: 2022-08-17

## 2022-08-17 VITALS — WEIGHT: 293.4 LBS | BODY MASS INDEX: 39.74 KG/M2 | HEIGHT: 72 IN

## 2022-08-17 DIAGNOSIS — Z96.652 HX OF TOTAL KNEE REPLACEMENT, LEFT: Primary | ICD-10-CM

## 2022-08-17 DIAGNOSIS — M17.0 BILATERAL PRIMARY OSTEOARTHRITIS OF KNEE: ICD-10-CM

## 2022-08-17 PROCEDURE — 99214 OFFICE O/P EST MOD 30 MIN: CPT | Performed by: ORTHOPAEDIC SURGERY

## 2022-08-17 NOTE — PROGRESS NOTES
"Chief Complaint  Follow-up of the Right Knee    Subjective    History of Present Illness      Leonid Diehl is a 67 y.o. male who presents to [facility] for follow-up on left total knee arthroplasty and persistent right knee pain and discomfort.  History of Present Illness this patient underwent a left total knee arthroplasty performed by me about 5 months ago.  He did develop some wound dehiscence postoperatively but has done extremely well following that secondary closure.  His range of motion improved significantly with physical therapy.  He is very pleased with his outcome.  He did develop some plantar fasciitis on the left side which is resolving.  He is now being seen for right-sided plantar fasciitis with Dr. Best armenta.  The patient is now complaining of severe right knee pain and discomfort.  He has difficulty with ambulation with a progressive varus deformity.  He has difficulty with going up and down the steps.  Cross body activities bother the patient significantly.  He states that his knee pathology is affecting his quality of life in a negative fashion and wants to not proceed with knee replacement surgery.  Pain Location:  RIGHT knee  Radiation: none  Quality: dull, aching, sharp, stabbing, grinding, throbbing  Intensity/Severity: moderate to severe  Duration: several years  Progression of symptoms: yes, progressive worsening  Onset quality: gradual   Timing: intermittent  Aggravating Factors: going up and down stairs, kneeling, rising after sitting, walking  Alleviating Factors: NSAIDs, rest  Previous Episodes: yes  Associated Symptoms: pain, swelling, clicking/popping, decreased ROM, decreased strength  ADLs Affected: grooming/hygiene/toileting/personal care, dressing, ambulating, work related activities, recreational activities/sports  Previous Treatment: NSAIDs and prior surgery to the left knee      Objective   Vital Signs:   Ht 182.9 cm (72\")   Wt 133 kg (293 lb " 6.4 oz)   BMI 39.79 kg/m²     Physical Exam  Physical Exam  Vitals signs and nursing note reviewed.   Constitutional:       Appearance: Normal appearance.   Pulmonary:      Effort: Pulmonary effort is normal.   Skin:     General: Skin is warm and dry.      Capillary Refill: Capillary refill takes less than 2 seconds.   Neurological:      General: No focal deficit present.      Mental Status: He is alert and oriented to person, place, and time. Mental status is at baseline.   Psychiatric:         Mood and Affect: Mood normal.         Behavior: Behavior normal.         Thought Content: Thought content normal.         Judgment: Judgment normal.     Ortho Exam   Right knee (varus). Patient has crepitus throughout range of motion. Positive patellar grind test. Mild effusion. Lachman is negative. Pivot shift is negative. Anterior and posterior drawer signs are negative. Significant joint line tenderness is noted on the medial aspect of the knee. Patient has a varus orientation of the knee. There is fullness and tenderness in the Popliteal fossa. Mild distention of a Popliteal cyst is noted in this location. Range of motion in flexion is from 0-110 degrees. Neurovascular status is intact.  Dorsalis pedis and posterior tibial artery pulses are palpable. Common peroneal nerve function is well preserved. Patient's gait is cautious and antalgic. Skin and soft tissues are mildly swollen, consistent with synovitis and effusion. The patient has a significant limp with the first few steps after starting the gait cycle. Getting out of a chair takes a lot of effort due to pain on knee flexion.     Left knee.The patient is status post total knee arthroplasty postoperative 4.5 month(s). Incision is clean. Calf is soft and nontender. Homans sign is negative. There is no clicking, popping or catching. Anterior and posterior drawer signs are negative.  There is no instability of the components. Appropriate amounts of swelling and  bruising are noted. Dorsalis pedis and posterior tibial artery pulses are palpable. Common peroneal nerve function is well preserved. Range of motion is from 0-1 25 degrees of flexion. Gait is cautious but otherwise fairly normal. There is no evidence of a deep seated joint infection.        Result Review :   The following data was reviewed by: Yash Celis MD on 08/17/2022:  Radiologic studies - see below for interpretation  Right knee xrays  weightbearing/standing ap/lateral views were performed at [facility] on 03/02/22. Images were independently viewed and interpreted by myself, my impression as follows:    bilateral Knee X-Ray  Indication: Evaluation of pain and discomfort on the right knee and follow-up on left total knee arthroplasty.  AP, Lateral views  Findings: Well-placed implants with a good cement mantle without any subsidence of the implants.  no bony lesion  Soft tissues within normal limits  within normal limits joint spaces  Hardware appropriately positioned yes      yes prior studies available for comparison.    X-RAY was ordered and reviewed by Yash Celis MD        Procedures           Assessment   Assessment and Plan    Diagnoses and all orders for this visit:    1. Hx of total knee replacement, left (Primary)    2. Bilateral primary osteoarthritis of knee          Follow Up   · Compression/brace to the knee to prevent it from buckling and giving out.  · Calcium and vitamin D for bone health.  · , GI and dental procedure prophylaxis with antibiotics to prevent metastatic infection of the knee arthroplasty implants.  · Extensive amount of time spent in the office today with the patient and his wife going over treatment options and potential complications of surgical intervention for right knee arthroplasty.  · We will go ahead and get him medically cleared and then place him on the schedule for total knee arthroplasty on the opposite side, the right side.  · Rest, ice, compression, and  elevation (RICE) therapy  · Stretching and strengthening exercises  · OTC Alternate Ibuprofen and Tylenol as needed  · Follow up in 6 week(s)  • Patient was given instructions and counseling regarding his condition or for health maintenance advice. Please see specific information pulled into the AVS if appropriate.     Yash Celis MD   Date of Encounter: 8/17/2022   Electronically signed by Yash Celis MD, 08/17/22, 9:18 AM EDT.     EMR Dragon/Transcription disclaimer:  Much of this encounter note is an electronic transcription/translation of spoken language to printed text. The electronic translation of spoken language may permit erroneous, or at times, nonsensical words or phrases to be inadvertently transcribed; Although I have reviewed the note for such errors, some may still exist.

## 2022-08-22 ENCOUNTER — OFFICE VISIT (OUTPATIENT)
Dept: FAMILY MEDICINE CLINIC | Facility: CLINIC | Age: 67
End: 2022-08-22

## 2022-08-22 VITALS
SYSTOLIC BLOOD PRESSURE: 128 MMHG | HEIGHT: 72 IN | WEIGHT: 294.2 LBS | BODY MASS INDEX: 39.85 KG/M2 | OXYGEN SATURATION: 95 % | DIASTOLIC BLOOD PRESSURE: 60 MMHG | HEART RATE: 59 BPM

## 2022-08-22 DIAGNOSIS — H91.13 PRESBYCUSIS OF BOTH EARS: ICD-10-CM

## 2022-08-22 DIAGNOSIS — I10 ESSENTIAL HYPERTENSION: ICD-10-CM

## 2022-08-22 DIAGNOSIS — M1A.39X0 CHRONIC GOUT DUE TO RENAL IMPAIRMENT OF MULTIPLE SITES WITHOUT TOPHUS: ICD-10-CM

## 2022-08-22 DIAGNOSIS — H61.22 IMPACTED CERUMEN OF LEFT EAR: ICD-10-CM

## 2022-08-22 DIAGNOSIS — J30.2 SEASONAL ALLERGIC RHINITIS, UNSPECIFIED TRIGGER: ICD-10-CM

## 2022-08-22 DIAGNOSIS — G47.09 OTHER INSOMNIA: ICD-10-CM

## 2022-08-22 DIAGNOSIS — D50.9 IRON DEFICIENCY ANEMIA, UNSPECIFIED IRON DEFICIENCY ANEMIA TYPE: ICD-10-CM

## 2022-08-22 DIAGNOSIS — I35.0 NONRHEUMATIC AORTIC VALVE STENOSIS: ICD-10-CM

## 2022-08-22 DIAGNOSIS — Z00.00 MEDICARE ANNUAL WELLNESS VISIT, SUBSEQUENT: Primary | ICD-10-CM

## 2022-08-22 DIAGNOSIS — J44.9 CHRONIC OBSTRUCTIVE PULMONARY DISEASE, UNSPECIFIED COPD TYPE: ICD-10-CM

## 2022-08-22 PROCEDURE — 80061 LIPID PANEL: CPT | Performed by: NURSE PRACTITIONER

## 2022-08-22 PROCEDURE — 1159F MED LIST DOCD IN RCRD: CPT | Performed by: NURSE PRACTITIONER

## 2022-08-22 PROCEDURE — 99213 OFFICE O/P EST LOW 20 MIN: CPT | Performed by: NURSE PRACTITIONER

## 2022-08-22 PROCEDURE — 85027 COMPLETE CBC AUTOMATED: CPT | Performed by: NURSE PRACTITIONER

## 2022-08-22 PROCEDURE — 36415 COLL VENOUS BLD VENIPUNCTURE: CPT | Performed by: NURSE PRACTITIONER

## 2022-08-22 PROCEDURE — 84550 ASSAY OF BLOOD/URIC ACID: CPT | Performed by: NURSE PRACTITIONER

## 2022-08-22 PROCEDURE — 1126F AMNT PAIN NOTED NONE PRSNT: CPT | Performed by: NURSE PRACTITIONER

## 2022-08-22 PROCEDURE — G0439 PPPS, SUBSEQ VISIT: HCPCS | Performed by: NURSE PRACTITIONER

## 2022-08-22 PROCEDURE — 84443 ASSAY THYROID STIM HORMONE: CPT | Performed by: NURSE PRACTITIONER

## 2022-08-22 PROCEDURE — 1170F FXNL STATUS ASSESSED: CPT | Performed by: NURSE PRACTITIONER

## 2022-08-22 PROCEDURE — 80053 COMPREHEN METABOLIC PANEL: CPT | Performed by: NURSE PRACTITIONER

## 2022-08-22 RX ORDER — FUROSEMIDE 20 MG/1
20 TABLET ORAL DAILY PRN
Qty: 90 TABLET | Refills: 1 | Status: SHIPPED | OUTPATIENT
Start: 2022-08-22 | End: 2023-02-22

## 2022-08-22 RX ORDER — ALBUTEROL SULFATE 90 UG/1
2 AEROSOL, METERED RESPIRATORY (INHALATION) EVERY 6 HOURS PRN
Qty: 8.5 G | Refills: 2 | Status: SHIPPED | OUTPATIENT
Start: 2022-08-22 | End: 2023-04-04 | Stop reason: SDUPTHER

## 2022-08-22 RX ORDER — FLUTICASONE PROPIONATE 50 MCG
2 SPRAY, SUSPENSION (ML) NASAL 2 TIMES DAILY
COMMUNITY

## 2022-08-22 RX ORDER — TRAZODONE HYDROCHLORIDE 50 MG/1
50 TABLET ORAL NIGHTLY
Qty: 90 TABLET | Refills: 1 | Status: SHIPPED | OUTPATIENT
Start: 2022-08-22 | End: 2023-04-04 | Stop reason: SDUPTHER

## 2022-08-22 RX ORDER — FERROUS SULFATE TAB EC 324 MG (65 MG FE EQUIVALENT) 324 (65 FE) MG
324 TABLET DELAYED RESPONSE ORAL
Qty: 90 TABLET | Refills: 1 | Status: SHIPPED | OUTPATIENT
Start: 2022-08-22 | End: 2023-04-04

## 2022-08-22 RX ORDER — LISINOPRIL AND HYDROCHLOROTHIAZIDE 12.5; 1 MG/1; MG/1
0.5 TABLET ORAL DAILY
Qty: 90 TABLET | Refills: 1 | Status: SHIPPED | OUTPATIENT
Start: 2022-08-22 | End: 2022-08-26

## 2022-08-22 RX ORDER — CARVEDILOL 3.12 MG/1
3.12 TABLET ORAL DAILY
Qty: 90 TABLET | Refills: 1 | Status: SHIPPED | OUTPATIENT
Start: 2022-08-22 | End: 2023-04-04 | Stop reason: SDUPTHER

## 2022-08-22 RX ORDER — ALLOPURINOL 300 MG/1
300 TABLET ORAL DAILY
Qty: 90 TABLET | Refills: 1 | Status: SHIPPED | OUTPATIENT
Start: 2022-08-22 | End: 2023-04-04 | Stop reason: SDUPTHER

## 2022-08-22 NOTE — PROGRESS NOTES
The ABCs of the Annual Wellness Visit  Subsequent Medicare Wellness Visit    Chief Complaint   Patient presents with   • Arthritis   • Hypertension   • Gout   • COPD   • Follow-up     6 mo   • Medicare Wellness-subsequent   • Knee Problem     Pt had lt knee replaced, will have rt knee replaced next.   • Ear Problem     Would like you to check ears     Subjective    History of Present Illness:  Leonid Diehl is a 67 y.o. male who presents for a Subsequent Medicare Wellness Visit and to follow-up on chronic conditions    HTN, stable on meds and takes as directed, denies chest pain, headache, shortness of air, palpitations and swelling of extremities.     Gouty arthritis, on allopurinol    Seasonal allergic rhinitis, uses Zyrtec and Flonase as needed, reports loss of hearing left greater than right ear and both ears feel full.    COPD, symptoms stable using Symbicort seasonally    Insomnia, sleeping well with trazodone nightly    Recent left total knee replacement per Dr. Celis, had postop incisional infection with debridement, has now healed well.  Planning for right total knee replacement soon, awaiting to be scheduled    Aortic valve stenosis, per echo completed prior to LTKR.  Patient denies shortness of air, swelling of the extremities or fatigue        The following portions of the patient's history were reviewed and   updated as appropriate: allergies, current medications, past family history, past medical history, past social history, past surgical history and problem list.    Compared to one year ago, the patient feels his physical   health is better.    Compared to one year ago, the patient feels his mental   health is the same.    Recent Hospitalizations:  He was admitted within the past 365 days at [facility].       Current Medical Providers:  Patient Care Team:  Kerri Cruz APRN as PCP - General (Nurse Practitioner)  Yash Celis MD as Surgeon (Orthopedic Surgery)  FREDERICK Ford DPM as  Consulting Physician (Podiatry)    Outpatient Medications Prior to Visit   Medication Sig Dispense Refill   • Cetirizine HCl (ZYRTEC PO) Take  by mouth.     • fluticasone (FLONASE) 50 MCG/ACT nasal spray 2 sprays into the nostril(s) as directed by provider Daily.     • guaiFENesin (MUCINEX) 600 MG 12 hr tablet Take 1,200 mg by mouth 2 (Two) Times a Day.     • meloxicam (MOBIC) 15 MG tablet Take 1 tablet by mouth Daily. Prn joint pain 90 tablet 3   • mupirocin (BACTROBAN) 2 % ointment Apply a pea-sized amount to each nostril twice daily for 5 days prior to surgery. 22 g 0   • potassium chloride 10 MEQ CR tablet Take 1 tablet by mouth Daily. WITH LASIX 90 tablet 1   • Symbicort 160-4.5 MCG/ACT inhaler Inhale 2 puffs 2 (Two) Times a Day. (Patient taking differently: Inhale 2 puffs 2 (Two) Times a Day. USES SEASONALLLY) 10.2 g 11   • albuterol sulfate  (90 Base) MCG/ACT inhaler Inhale 2 puffs Every 6 (Six) Hours As Needed for Wheezing or Shortness of Air. (Patient taking differently: Inhale 2 puffs Every 6 (Six) Hours As Needed for Wheezing or Shortness of Air. USES SEASONALLY) 8.5 g 2   • allopurinol (ZYLOPRIM) 300 MG tablet Take 1 tablet by mouth Daily. 90 tablet 1   • carvedilol (COREG) 3.125 MG tablet Take 1 tablet by mouth Daily. 90 tablet 1   • ferrous sulfate 324 (65 Fe) MG tablet delayed-release EC tablet Take 324 mg by mouth Every Night.     • furosemide (LASIX) 20 MG tablet Take 1 tablet by mouth Daily As Needed (swelling). swelling (Patient taking differently: Take 20 mg by mouth Daily. swelling) 90 tablet 1   • lisinopril-hydrochlorothiazide (PRINZIDE,ZESTORETIC) 10-12.5 MG per tablet Take 1 tablet by mouth Daily. (Patient taking differently: Take 0.5 tablets by mouth Daily.) 90 tablet 1   • traZODone (DESYREL) 50 MG tablet TAKE ONE TABLET BY MOUTH ONCE NIGHTLY 90 tablet 0   • fexofenadine-pseudoephedrine (ALLEGRA-D 24) 180-240 MG per 24 hr tablet Take 1 tablet by mouth Daily.       No  "facility-administered medications prior to visit.       No opioid medication identified on active medication list. I have reviewed chart for other potential  high risk medication/s and harmful drug interactions in the elderly.          Aspirin is not on active medication list.  Aspirin use is not indicated based on review of current medical condition/s. Risk of harm outweighs potential benefits.  .    Patient Active Problem List   Diagnosis   • Iron deficiency anemia   • Asthma   • Benign prostatic hyperplasia   • Chronic diastolic congestive heart failure (HCC)   • Encounter for general adult medical examination without abnormal findings   • Hay fever   • Biliary disease   • Hepatic disease   • History of alcohol abuse   • Hx of pancreatitis   • Primary hypertension   • Systolic murmur   • Bilateral primary osteoarthritis of knee   • Aortic stenosis, moderate   • Gout   • Insomnia   • Thrombocytopenia (HCC)   • Hx of total knee replacement, left   • Morbid obesity (HCC)   • Wound dehiscence, surgical   • Infection of superficial incisional surgical site after procedure     Advance Care Planning  Advance Directive is not on file.  ACP discussion was held with the patient during this visit. Patient does not have an advance directive, information provided.          Objective    Vitals:    08/22/22 0837   BP: 128/60   BP Location: Left arm   Patient Position: Sitting   Cuff Size: Large Adult   Pulse: 59   SpO2: 95%   Weight: 133 kg (294 lb 3.2 oz)   Height: 182.9 cm (72.01\")   PainSc: 0-No pain     Estimated body mass index is 39.89 kg/m² as calculated from the following:    Height as of this encounter: 182.9 cm (72.01\").    Weight as of this encounter: 133 kg (294 lb 3.2 oz).    Class 2 Severe Obesity (BMI >=35 and <=39.9). Obesity-related health conditions include the following: hypertension, dyslipidemias and osteoarthritis. Obesity is worsening. BMI is is above average; BMI management plan is completed. We " discussed low calorie, low carb based diet program, portion control and increasing exercise.      Does the patient have evidence of cognitive impairment? No    Physical Exam  Vitals and nursing note reviewed.   Constitutional:       General: He is not in acute distress.     Appearance: He is well-developed. He is not diaphoretic.   HENT:      Head: Normocephalic and atraumatic.      Right Ear: Tympanic membrane and ear canal normal.      Left Ear: There is impacted cerumen.      Ears:      Comments: R TM scarred w/o effusion  Eyes:      Pupils: Pupils are equal, round, and reactive to light.   Neck:      Thyroid: No thyromegaly.   Cardiovascular:      Rate and Rhythm: Normal rate and regular rhythm.      Heart sounds: Normal heart sounds. No murmur heard.  Pulmonary:      Effort: Pulmonary effort is normal. No respiratory distress.      Breath sounds: Normal breath sounds.   Abdominal:      General: Bowel sounds are normal. There is no distension.      Palpations: Abdomen is soft.      Tenderness: There is no abdominal tenderness.   Musculoskeletal:         General: Swelling (R knee) and tenderness (R knee, mod baird rom. LTKR healed well ) present. Normal range of motion.      Cervical back: Normal range of motion.   Skin:     General: Skin is warm and dry.      Findings: No erythema.   Neurological:      Mental Status: He is alert and oriented to person, place, and time.   Psychiatric:         Behavior: Behavior normal.         Thought Content: Thought content normal.         Judgment: Judgment normal.       Lab Results   Component Value Date    TRIG 87 08/22/2022    HDL 48 08/22/2022    LDL 62 08/22/2022    VLDL 17 08/22/2022            HEALTH RISK ASSESSMENT    Smoking Status:  Social History     Tobacco Use   Smoking Status Never Smoker   Smokeless Tobacco Never Used     Alcohol Consumption:  Social History     Substance and Sexual Activity   Alcohol Use No     Fall Risk Screen:    STEADI Fall Risk Assessment was  completed, and patient is at LOW risk for falls.Assessment completed on:8/22/2022    Depression Screening:  PHQ-2/PHQ-9 Depression Screening 8/22/2022   Retired PHQ-9 Total Score -   Retired Total Score -   Little Interest or Pleasure in Doing Things 0-->not at all   Feeling Down, Depressed or Hopeless 0-->not at all   Trouble Falling or Staying Asleep, or Sleeping Too Much 0-->not at all   Feeling Tired or Having Little Energy 0-->not at all   Poor Appetite or Overeating 0-->not at all   Feeling Bad about Yourself - or that You are a Failure or Have Let Yourself or Your Family Down 0-->not at all   Trouble Concentrating on Things, Such as Reading the Newspaper or Watching Television 0-->not at all   Moving or Speaking So Slowly that Other People Could Have Noticed? Or the Opposite - Being So Fidgety 0-->not at all   Thoughts that You Would be Better Off Dead or of Hurting Yourself in Some Way 0-->not at all   PHQ-9: Brief Depression Severity Measure Score 0   If You Checked Off Any Problems, How Difficult Have These Problems Made It For You to Do Your Work, Take Care of Things at Home, or Get Along with Other People? not difficult at all       Health Habits and Functional and Cognitive Screening:  Functional & Cognitive Status 8/22/2022   Do you have difficulty preparing food and eating? No   Do you have difficulty bathing yourself, getting dressed or grooming yourself? No   Do you have difficulty using the toilet? No   Do you have difficulty moving around from place to place? No   Do you have trouble with steps or getting out of a bed or a chair? No   Do you need help using the phone?  No   Are you deaf or do you have serious difficulty hearing?  Yes   Do you need help with transportation? No   Do you need help shopping? No   Do you need help preparing meals?  No   Do you need help with housework?  No   Do you need help with laundry? No   Do you need help taking your medications? No   Do you need help managing  money? No   Do you ever drive or ride in a car without wearing a seat belt? No   Have you felt unusual stress, anger or loneliness in the last month? No   Who do you live with? Spouse   If you need help, do you have trouble finding someone available to you? No   Have you been bothered in the last four weeks by sexual problems? No   Do you have difficulty concentrating, remembering or making decisions? No     ATTENTION  What is the year: correct  What is the month of the year: correct  What is the day of the week?: correct  What is the date?: incorrect  MEMORY  Repeat address three times, only score third attempt: [address] [place], [place]: 6  HOW MANY ANIMALS DID THE PATIENT NAME  Verbal Fluency -- Animal Names (0-25): 14-16  CLOCK DRAWING  Clock Drawing: All Correct  MEMORY RECALL  Tell me what you remember about that name and address we were repeating at the beginning: 3  ACE TOTAL SCORE  Total ACE Score - <25/30 strongly suggests cognitive impairment; <21/30 almost certainly shows dementia: 22      Age-appropriate Screening Schedule:  Refer to the list below for future screening recommendations based on patient's age, sex and/or medical conditions. Orders for these recommended tests are listed in the plan section. The patient has been provided with a written plan.    Health Maintenance   Topic Date Due   • TDAP/TD VACCINES (1 - Tdap) Never done   • ZOSTER VACCINE (2 of 3) 12/21/2017   • INFLUENZA VACCINE  10/01/2022              Assessment & Plan   CMS Preventative Services Quick Reference  Risk Factors Identified During Encounter  Cardiovascular Disease  Immunizations Discussed/Encouraged (specific Immunizations; Prevnar 20 (Pneumococcal 20-valent conjugate) and Shingrix  Obesity/Overweight   The above risks/problems have been discussed with the patient.  Follow up actions/plans if indicated are seen below in the Assessment/Plan Section.  Pertinent information has been shared with the  patient in the After Visit Summary.    Diagnoses and all orders for this visit:    1. Medicare annual wellness visit, subsequent (Primary)  Comments:  Labs today as ordered  Pneumo 20 next visit  Recommend shingrix at pharmacy    2. Impacted cerumen of left ear  Comments:  Successful ear wash today of the left ear, TM visualized following procedure and intact    3. Seasonal allergic rhinitis, unspecified trigger  Comments:  Recommend continue Zyrtec and start Flonase twice daily    4. Presbycusis of both ears  Comments:  Recommend if no improvement in hearing following ear wash, consider referral to ENT    5. Chronic gout due to renal impairment of multiple sites without tophus  Comments:  stable, refill allopurinol. check uric acid today  Orders:  -     allopurinol (ZYLOPRIM) 300 MG tablet; Take 1 tablet by mouth Daily.  Dispense: 90 tablet; Refill: 1  -     Uric Acid    6. Essential hypertension  Comments:  bp stable, cont 1/2 tab lis/hctz, cont/refill carvedilol. Follow up cardiology as directed.   Orders:  -     Lipid Panel  -     Comprehensive Metabolic Panel  -     CBC (No Diff)  -     TSH  -     carvedilol (COREG) 3.125 MG tablet; Take 1 tablet by mouth Daily.  Dispense: 90 tablet; Refill: 1  -     lisinopril-hydrochlorothiazide (PRINZIDE,ZESTORETIC) 10-12.5 MG per tablet; Take 0.5 tablets by mouth Daily.  Dispense: 90 tablet; Refill: 1    7. Chronic obstructive pulmonary disease, unspecified COPD type (HCC)  Comments:  stable, cont symbicort and albuterol seasonally prn.   Orders:  -     albuterol sulfate  (90 Base) MCG/ACT inhaler; Inhale 2 puffs Every 6 (Six) Hours As Needed for Wheezing or Shortness of Air.  Dispense: 8.5 g; Refill: 2    8. Other insomnia  Comments:  Stable with trazodone, continue and refill  Orders:  -     traZODone (DESYREL) 50 MG tablet; Take 1 tablet by mouth Every Night.  Dispense: 90 tablet; Refill: 1    9. Iron deficiency anemia, unspecified iron deficiency anemia  type  Comments:  Labs today as ordered, continue and refill ferrous sulfate  Orders:  -     ferrous sulfate 324 (65 Fe) MG tablet delayed-release EC tablet; Take 1 tablet by mouth Daily With Breakfast.  Dispense: 90 tablet; Refill: 1    10. Nonrheumatic aortic valve stenosis  Comments:  consider repeat echo prior to right total knee replacement, see previous from 3/25/22.     Other orders  -     furosemide (LASIX) 20 MG tablet; Take 1 tablet by mouth Daily As Needed (swelling). swelling  Dispense: 90 tablet; Refill: 1      Age appropriate preventative counseling provided, including healthy lifestyle modifications and exercise  We will discuss Cologuard/colonoscopy at follow-up visit    Follow Up:   Return in about 6 months (around 2/22/2023) for Recheck DM, HTN. DM panel and PSA prior to appt, Annual physical.     An After Visit Summary and PPPS were made available to the patient.          I spent 35 minutes caring for Leonid on this date of service. This time includes time spent by me in the following activities:preparing for the visit, reviewing tests, performing a medically appropriate examination and/or evaluation , counseling and educating the patient/family/caregiver, ordering medications, tests, or procedures and documenting information in the medical record

## 2022-08-23 LAB
ALBUMIN SERPL-MCNC: 3.6 G/DL (ref 3.5–5.2)
ALBUMIN/GLOB SERPL: 1 G/DL
ALP SERPL-CCNC: 92 U/L (ref 39–117)
ALT SERPL W P-5'-P-CCNC: 13 U/L (ref 1–41)
ANION GAP SERPL CALCULATED.3IONS-SCNC: 11.3 MMOL/L (ref 5–15)
AST SERPL-CCNC: 27 U/L (ref 1–40)
BILIRUB SERPL-MCNC: 0.3 MG/DL (ref 0–1.2)
BUN SERPL-MCNC: 37 MG/DL (ref 8–23)
BUN/CREAT SERPL: 24.7 (ref 7–25)
CALCIUM SPEC-SCNC: 8.6 MG/DL (ref 8.6–10.5)
CHLORIDE SERPL-SCNC: 110 MMOL/L (ref 98–107)
CHOLEST SERPL-MCNC: 127 MG/DL (ref 0–200)
CO2 SERPL-SCNC: 19.7 MMOL/L (ref 22–29)
CREAT SERPL-MCNC: 1.5 MG/DL (ref 0.76–1.27)
DEPRECATED RDW RBC AUTO: 43.5 FL (ref 37–54)
EGFRCR SERPLBLD CKD-EPI 2021: 51 ML/MIN/1.73
ERYTHROCYTE [DISTWIDTH] IN BLOOD BY AUTOMATED COUNT: 13.1 % (ref 12.3–15.4)
GLOBULIN UR ELPH-MCNC: 3.7 GM/DL
GLUCOSE SERPL-MCNC: 94 MG/DL (ref 65–99)
HCT VFR BLD AUTO: 38.8 % (ref 37.5–51)
HDLC SERPL-MCNC: 48 MG/DL (ref 40–60)
HGB BLD-MCNC: 13.4 G/DL (ref 13–17.7)
LDLC SERPL CALC-MCNC: 62 MG/DL (ref 0–100)
LDLC/HDLC SERPL: 1.28 {RATIO}
MCH RBC QN AUTO: 31.8 PG (ref 26.6–33)
MCHC RBC AUTO-ENTMCNC: 34.5 G/DL (ref 31.5–35.7)
MCV RBC AUTO: 92.2 FL (ref 79–97)
PLATELET # BLD AUTO: 129 10*3/MM3 (ref 140–450)
PMV BLD AUTO: 10.8 FL (ref 6–12)
POTASSIUM SERPL-SCNC: 4.2 MMOL/L (ref 3.5–5.2)
PROT SERPL-MCNC: 7.3 G/DL (ref 6–8.5)
RBC # BLD AUTO: 4.21 10*6/MM3 (ref 4.14–5.8)
SODIUM SERPL-SCNC: 141 MMOL/L (ref 136–145)
TRIGL SERPL-MCNC: 87 MG/DL (ref 0–150)
TSH SERPL DL<=0.05 MIU/L-ACNC: 0.58 UIU/ML (ref 0.27–4.2)
URATE SERPL-MCNC: 8.6 MG/DL (ref 3.4–7)
VLDLC SERPL-MCNC: 17 MG/DL (ref 5–40)
WBC NRBC COR # BLD: 9.36 10*3/MM3 (ref 3.4–10.8)

## 2022-08-26 RX ORDER — LISINOPRIL 10 MG/1
10 TABLET ORAL DAILY
Qty: 90 TABLET | Refills: 1 | Status: SHIPPED | OUTPATIENT
Start: 2022-08-26 | End: 2023-02-22

## 2022-08-31 ENCOUNTER — TELEPHONE (OUTPATIENT)
Dept: FAMILY MEDICINE CLINIC | Facility: CLINIC | Age: 67
End: 2022-08-31

## 2022-08-31 ENCOUNTER — TELEPHONE (OUTPATIENT)
Dept: ORTHOPEDIC SURGERY | Facility: CLINIC | Age: 67
End: 2022-08-31

## 2022-08-31 NOTE — TELEPHONE ENCOUNTER
Caller: ALISSA KNOTT    Relationship to patient: WIFE    Best call back number:  [phone]    Chief complaint:  WIFE CALLING CHECKING ON SURGERY DATE FOR RIGHT KNEE REPLACEMENT. ORDER IN Epic 8/17/22.    Type of visit:  SURGERY

## 2022-08-31 NOTE — TELEPHONE ENCOUNTER
Caller: Leonid Diehl    Relationship: Self    Best call back number:     What is the best time to reach you:     Who are you requesting to speak with (clinical staff, provider,  specific staff member):     Do you know the name of the person who called:     What was the call regarding: PATIENT IS CALLING IN WISHING TO GET A CALL BACK TO DISCUSS HIS KNEE REPLACEMENT.     Do you require a callback: YES

## 2022-09-01 NOTE — TELEPHONE ENCOUNTER
Spoke with Wife patient wife, this has been addressed. However his surgery is scheduled for the 20th of this month and he was suppose to get labs done in a month. Wanted to know if he needed to come before his surgery to get labs done even though it would be to early.

## 2022-09-12 ENCOUNTER — HOSPITAL ENCOUNTER (OUTPATIENT)
Dept: GENERAL RADIOLOGY | Facility: HOSPITAL | Age: 67
Discharge: HOME | End: 2022-09-12

## 2022-09-12 ENCOUNTER — LAB (OUTPATIENT)
Dept: LAB | Facility: HOSPITAL | Age: 67
End: 2022-09-12

## 2022-09-12 ENCOUNTER — HOSPITAL ENCOUNTER (OUTPATIENT)
Dept: CARDIOLOGY | Facility: HOSPITAL | Age: 67
Discharge: HOME | End: 2022-09-12

## 2022-09-12 DIAGNOSIS — M17.0 BILATERAL PRIMARY OSTEOARTHRITIS OF KNEE: ICD-10-CM

## 2022-09-12 LAB
ABO GROUP BLD: NORMAL
ANION GAP SERPL CALCULATED.3IONS-SCNC: 13.4 MMOL/L (ref 5–15)
BACTERIA UR QL AUTO: ABNORMAL /HPF
BILIRUB UR QL STRIP: NEGATIVE
BLD GP AB SCN SERPL QL: NEGATIVE
BUN SERPL-MCNC: 20 MG/DL (ref 8–23)
BUN/CREAT SERPL: 18 (ref 7–25)
CALCIUM SPEC-SCNC: 8.9 MG/DL (ref 8.6–10.5)
CHLORIDE SERPL-SCNC: 105 MMOL/L (ref 98–107)
CLARITY UR: CLEAR
CO2 SERPL-SCNC: 22.6 MMOL/L (ref 22–29)
COLOR UR: YELLOW
CREAT SERPL-MCNC: 1.11 MG/DL (ref 0.76–1.27)
DEPRECATED RDW RBC AUTO: 43.4 FL (ref 37–54)
EGFRCR SERPLBLD CKD-EPI 2021: 73.2 ML/MIN/1.73
ERYTHROCYTE [DISTWIDTH] IN BLOOD BY AUTOMATED COUNT: 12.6 % (ref 12.3–15.4)
GLUCOSE SERPL-MCNC: 105 MG/DL (ref 65–99)
GLUCOSE UR STRIP-MCNC: NEGATIVE MG/DL
HCT VFR BLD AUTO: 36.8 % (ref 37.5–51)
HGB BLD-MCNC: 12.7 G/DL (ref 13–17.7)
HGB UR QL STRIP.AUTO: ABNORMAL
HYALINE CASTS UR QL AUTO: ABNORMAL /LPF
INR PPP: 1.1 (ref 0.93–1.1)
KETONES UR QL STRIP: NEGATIVE
LEUKOCYTE ESTERASE UR QL STRIP.AUTO: NEGATIVE
MCH RBC QN AUTO: 32.8 PG (ref 26.6–33)
MCHC RBC AUTO-ENTMCNC: 34.5 G/DL (ref 31.5–35.7)
MCV RBC AUTO: 95.1 FL (ref 79–97)
MRSA DNA SPEC QL NAA+PROBE: NORMAL
NITRITE UR QL STRIP: NEGATIVE
PH UR STRIP.AUTO: 6.5 [PH] (ref 5–8)
PLATELET # BLD AUTO: 96 10*3/MM3 (ref 140–450)
PMV BLD AUTO: 10.5 FL (ref 6–12)
POTASSIUM SERPL-SCNC: 4.7 MMOL/L (ref 3.5–5.2)
PROT UR QL STRIP: NEGATIVE
PROTHROMBIN TIME: 11.3 SECONDS (ref 9.6–11.7)
RBC # BLD AUTO: 3.87 10*6/MM3 (ref 4.14–5.8)
RBC # UR STRIP: ABNORMAL /HPF
REF LAB TEST METHOD: ABNORMAL
RH BLD: POSITIVE
SODIUM SERPL-SCNC: 141 MMOL/L (ref 136–145)
SP GR UR STRIP: 1.02 (ref 1–1.03)
SQUAMOUS #/AREA URNS HPF: ABNORMAL /HPF
T&S EXPIRATION DATE: NORMAL
UROBILINOGEN UR QL STRIP: ABNORMAL
WBC # UR STRIP: ABNORMAL /HPF
WBC NRBC COR # BLD: 4.84 10*3/MM3 (ref 3.4–10.8)

## 2022-09-12 PROCEDURE — 71046 X-RAY EXAM CHEST 2 VIEWS: CPT

## 2022-09-12 PROCEDURE — 80048 BASIC METABOLIC PNL TOTAL CA: CPT

## 2022-09-12 PROCEDURE — 86850 RBC ANTIBODY SCREEN: CPT

## 2022-09-12 PROCEDURE — 85610 PROTHROMBIN TIME: CPT | Performed by: ORTHOPAEDIC SURGERY

## 2022-09-12 PROCEDURE — 93005 ELECTROCARDIOGRAM TRACING: CPT | Performed by: ORTHOPAEDIC SURGERY

## 2022-09-12 PROCEDURE — 93010 ELECTROCARDIOGRAM REPORT: CPT | Performed by: INTERNAL MEDICINE

## 2022-09-12 PROCEDURE — 85027 COMPLETE CBC AUTOMATED: CPT | Performed by: ORTHOPAEDIC SURGERY

## 2022-09-12 PROCEDURE — 36415 COLL VENOUS BLD VENIPUNCTURE: CPT | Performed by: ORTHOPAEDIC SURGERY

## 2022-09-12 PROCEDURE — 86900 BLOOD TYPING SEROLOGIC ABO: CPT

## 2022-09-12 PROCEDURE — 87641 MR-STAPH DNA AMP PROBE: CPT

## 2022-09-12 PROCEDURE — 81001 URINALYSIS AUTO W/SCOPE: CPT | Performed by: ORTHOPAEDIC SURGERY

## 2022-09-12 PROCEDURE — 86901 BLOOD TYPING SEROLOGIC RH(D): CPT

## 2022-09-16 ENCOUNTER — TELEPHONE (OUTPATIENT)
Dept: ORTHOPEDIC SURGERY | Facility: CLINIC | Age: 67
End: 2022-09-16

## 2022-09-16 NOTE — TELEPHONE ENCOUNTER
Provider: DR NEAL  Caller: [facility]   Relationship to Patient: [facility]AD [facility]  Pharmacy: [facility] [place]   Phone Number:[phone]  Reason for Call: WANTED TO LET DR NEAL KNOW SHE IS FAXING SX CLEARANCE TO PRACTICE

## 2022-09-19 ENCOUNTER — ANESTHESIA EVENT (OUTPATIENT)
Dept: PERIOP | Facility: HOSPITAL | Age: 67
End: 2022-09-19

## 2022-09-20 ENCOUNTER — ANESTHESIA (OUTPATIENT)
Dept: PERIOP | Facility: HOSPITAL | Age: 67
End: 2022-09-20

## 2022-09-20 ENCOUNTER — HOSPITAL ENCOUNTER (OUTPATIENT)
Facility: HOSPITAL | Age: 67
Discharge: HOME | End: 2022-09-22
Attending: ORTHOPAEDIC SURGERY | Admitting: ORTHOPAEDIC SURGERY

## 2022-09-20 ENCOUNTER — APPOINTMENT (OUTPATIENT)
Dept: GENERAL RADIOLOGY | Facility: HOSPITAL | Age: 67
End: 2022-09-20

## 2022-09-20 DIAGNOSIS — M17.0 BILATERAL PRIMARY OSTEOARTHRITIS OF KNEE: ICD-10-CM

## 2022-09-20 DIAGNOSIS — Z96.651 S/P TOTAL KNEE ARTHROPLASTY, RIGHT: Primary | ICD-10-CM

## 2022-09-20 DIAGNOSIS — Z96.652 HX OF TOTAL KNEE REPLACEMENT, LEFT: Primary | ICD-10-CM

## 2022-09-20 PROCEDURE — C1776 JOINT DEVICE (IMPLANTABLE): HCPCS | Performed by: ORTHOPAEDIC SURGERY

## 2022-09-20 PROCEDURE — A9270 NON-COVERED ITEM OR SERVICE: HCPCS | Performed by: ANESTHESIOLOGY

## 2022-09-20 PROCEDURE — 81001 URINALYSIS AUTO W/SCOPE: CPT | Performed by: ORTHOPAEDIC SURGERY

## 2022-09-20 PROCEDURE — G0378 HOSPITAL OBSERVATION PER HR: HCPCS

## 2022-09-20 PROCEDURE — 73560 X-RAY EXAM OF KNEE 1 OR 2: CPT

## 2022-09-20 PROCEDURE — 25010000002 PROPOFOL 10 MG/ML EMULSION: Performed by: NURSE ANESTHETIST, CERTIFIED REGISTERED

## 2022-09-20 PROCEDURE — C9290 INJ, BUPIVACAINE LIPOSOME: HCPCS | Performed by: ORTHOPAEDIC SURGERY

## 2022-09-20 PROCEDURE — C1713 ANCHOR/SCREW BN/BN,TIS/BN: HCPCS | Performed by: ORTHOPAEDIC SURGERY

## 2022-09-20 PROCEDURE — 25010000002 ROPIVACAINE PER 1 MG: Performed by: ANESTHESIOLOGY

## 2022-09-20 PROCEDURE — 25010000002 HYDROMORPHONE PER 4 MG: Performed by: NURSE ANESTHETIST, CERTIFIED REGISTERED

## 2022-09-20 PROCEDURE — 25010000002 HYDROMORPHONE 1 MG/ML SOLUTION: Performed by: NURSE ANESTHETIST, CERTIFIED REGISTERED

## 2022-09-20 PROCEDURE — 25010000002 PROPOFOL 1000 MG/100ML EMULSION: Performed by: NURSE ANESTHETIST, CERTIFIED REGISTERED

## 2022-09-20 PROCEDURE — 63710000001 ACETAMINOPHEN 500 MG TABLET: Performed by: ANESTHESIOLOGY

## 2022-09-20 PROCEDURE — 76942 ECHO GUIDE FOR BIOPSY: CPT | Performed by: ORTHOPAEDIC SURGERY

## 2022-09-20 PROCEDURE — S0260 H&P FOR SURGERY: HCPCS | Performed by: ORTHOPAEDIC SURGERY

## 2022-09-20 PROCEDURE — 25010000002 CEFAZOLIN PER 500 MG: Performed by: ORTHOPAEDIC SURGERY

## 2022-09-20 PROCEDURE — 25010000002 DEXAMETHASONE PER 1 MG: Performed by: ANESTHESIOLOGY

## 2022-09-20 PROCEDURE — A9270 NON-COVERED ITEM OR SERVICE: HCPCS | Performed by: ORTHOPAEDIC SURGERY

## 2022-09-20 PROCEDURE — 85610 PROTHROMBIN TIME: CPT | Performed by: ORTHOPAEDIC SURGERY

## 2022-09-20 PROCEDURE — 25010000002 FENTANYL CITRATE (PF) 100 MCG/2ML SOLUTION: Performed by: NURSE ANESTHETIST, CERTIFIED REGISTERED

## 2022-09-20 PROCEDURE — 97162 PT EVAL MOD COMPLEX 30 MIN: CPT

## 2022-09-20 PROCEDURE — 63710000001 CELECOXIB 200 MG CAPSULE: Performed by: ANESTHESIOLOGY

## 2022-09-20 PROCEDURE — 63710000001 OXYCODONE 10 MG TABLET EXTENDED-RELEASE 12 HOUR: Performed by: ANESTHESIOLOGY

## 2022-09-20 PROCEDURE — 94799 UNLISTED PULMONARY SVC/PX: CPT

## 2022-09-20 PROCEDURE — 85027 COMPLETE CBC AUTOMATED: CPT | Performed by: ORTHOPAEDIC SURGERY

## 2022-09-20 PROCEDURE — 27447 TOTAL KNEE ARTHROPLASTY: CPT | Performed by: ORTHOPAEDIC SURGERY

## 2022-09-20 PROCEDURE — 63710000001 MUPIROCIN 2 % OINTMENT 22 G TUBE: Performed by: ORTHOPAEDIC SURGERY

## 2022-09-20 PROCEDURE — 63710000001 OXYCODONE 5 MG TABLET: Performed by: ORTHOPAEDIC SURGERY

## 2022-09-20 PROCEDURE — 0 BUPIVACAINE LIPOSOME 1.3 % SUSPENSION: Performed by: ORTHOPAEDIC SURGERY

## 2022-09-20 PROCEDURE — 36415 COLL VENOUS BLD VENIPUNCTURE: CPT | Performed by: ORTHOPAEDIC SURGERY

## 2022-09-20 PROCEDURE — 20985 CPTR-ASST DIR MS PX: CPT | Performed by: ORTHOPAEDIC SURGERY

## 2022-09-20 PROCEDURE — 25010000002 ONDANSETRON PER 1 MG: Performed by: NURSE ANESTHETIST, CERTIFIED REGISTERED

## 2022-09-20 PROCEDURE — 25010000002 ROPIVACAINE PER 1 MG: Performed by: ORTHOPAEDIC SURGERY

## 2022-09-20 DEVICE — CAP TOTL KN CMT PRIMARY W/ROSA: Type: IMPLANTABLE DEVICE | Site: KNEE | Status: FUNCTIONAL

## 2022-09-20 DEVICE — CMT BONE REFOBACIN R W/GENT 1X40: Type: IMPLANTABLE DEVICE | Site: KNEE | Status: FUNCTIONAL

## 2022-09-20 DEVICE — STEM TIB/KN PERSONA CMT 5D SZH RT: Type: IMPLANTABLE DEVICE | Site: KNEE | Status: FUNCTIONAL

## 2022-09-20 DEVICE — EXT STEM FEM/KN PERSONA TPR 14XPLS30MM: Type: IMPLANTABLE DEVICE | Site: KNEE | Status: FUNCTIONAL

## 2022-09-20 DEVICE — DEV CONTRL TISS STRATAFIX SPIRAL MNCRYL UD 3/0 PLS 30CM: Type: IMPLANTABLE DEVICE | Site: KNEE | Status: FUNCTIONAL

## 2022-09-20 DEVICE — SCRW HEX PERSONA 3.5X3.2X33MM: Type: IMPLANTABLE DEVICE | Site: KNEE | Status: FUNCTIONAL

## 2022-09-20 DEVICE — ART/SRF KN PERSONA/VE MC GH 8TO11 10MM RT: Type: IMPLANTABLE DEVICE | Site: KNEE | Status: FUNCTIONAL

## 2022-09-20 DEVICE — DEV WND/CLS CONTRL TISS STRATAFIX SYMM PDS PLS CTX 60CM VIL: Type: IMPLANTABLE DEVICE | Site: KNEE | Status: FUNCTIONAL

## 2022-09-20 DEVICE — CAP EXT STEM KN UPCHRG: Type: IMPLANTABLE DEVICE | Site: KNEE | Status: FUNCTIONAL

## 2022-09-20 DEVICE — PAT KN PERSONA VE CRS/LNK CMT 9.5X38MM: Type: IMPLANTABLE DEVICE | Site: KNEE | Status: FUNCTIONAL

## 2022-09-20 DEVICE — COMP FEM/KN PERSONA CR CMT COCR STD SZ11 RT: Type: IMPLANTABLE DEVICE | Site: KNEE | Status: FUNCTIONAL

## 2022-09-20 RX ORDER — BUDESONIDE AND FORMOTEROL FUMARATE DIHYDRATE 160; 4.5 UG/1; UG/1
2 AEROSOL RESPIRATORY (INHALATION)
Status: DISCONTINUED | OUTPATIENT
Start: 2022-09-20 | End: 2022-09-22 | Stop reason: HOSPADM

## 2022-09-20 RX ORDER — ALBUTEROL SULFATE 90 UG/1
2 AEROSOL, METERED RESPIRATORY (INHALATION) EVERY 6 HOURS PRN
Status: DISCONTINUED | OUTPATIENT
Start: 2022-09-20 | End: 2022-09-22 | Stop reason: HOSPADM

## 2022-09-20 RX ORDER — ONDANSETRON 2 MG/ML
INJECTION INTRAMUSCULAR; INTRAVENOUS AS NEEDED
Status: DISCONTINUED | OUTPATIENT
Start: 2022-09-20 | End: 2022-09-20 | Stop reason: SURG

## 2022-09-20 RX ORDER — FUROSEMIDE 20 MG/1
20 TABLET ORAL DAILY PRN
Status: DISCONTINUED | OUTPATIENT
Start: 2022-09-20 | End: 2022-09-22 | Stop reason: HOSPADM

## 2022-09-20 RX ORDER — ONDANSETRON 4 MG/1
4 TABLET, FILM COATED ORAL EVERY 6 HOURS PRN
Qty: 30 TABLET | Refills: 0 | Status: SHIPPED | OUTPATIENT
Start: 2022-09-20 | End: 2022-09-28 | Stop reason: SDUPTHER

## 2022-09-20 RX ORDER — LISINOPRIL 5 MG/1
10 TABLET ORAL DAILY
Status: DISCONTINUED | OUTPATIENT
Start: 2022-09-21 | End: 2022-09-21

## 2022-09-20 RX ORDER — OXYCODONE AND ACETAMINOPHEN 7.5; 325 MG/1; MG/1
TABLET ORAL
Qty: 40 TABLET | Refills: 0 | Status: SHIPPED | OUTPATIENT
Start: 2022-09-20 | End: 2022-10-20

## 2022-09-20 RX ORDER — CARVEDILOL 3.12 MG/1
3.12 TABLET ORAL DAILY
Status: DISCONTINUED | OUTPATIENT
Start: 2022-09-21 | End: 2022-09-22 | Stop reason: HOSPADM

## 2022-09-20 RX ORDER — SODIUM CHLORIDE 0.9 % (FLUSH) 0.9 %
10 SYRINGE (ML) INJECTION AS NEEDED
Status: DISCONTINUED | OUTPATIENT
Start: 2022-09-20 | End: 2022-09-20 | Stop reason: HOSPADM

## 2022-09-20 RX ORDER — CEFAZOLIN SODIUM 1 G/3ML
INJECTION, POWDER, FOR SOLUTION INTRAMUSCULAR; INTRAVENOUS AS NEEDED
Status: DISCONTINUED | OUTPATIENT
Start: 2022-09-20 | End: 2022-09-20 | Stop reason: HOSPADM

## 2022-09-20 RX ORDER — ACETAMINOPHEN 500 MG
TABLET ORAL AS NEEDED
Status: DISCONTINUED | OUTPATIENT
Start: 2022-09-20 | End: 2022-09-20 | Stop reason: SURG

## 2022-09-20 RX ORDER — ROPIVACAINE HYDROCHLORIDE 5 MG/ML
INJECTION, SOLUTION EPIDURAL; INFILTRATION; PERINEURAL AS NEEDED
Status: DISCONTINUED | OUTPATIENT
Start: 2022-09-20 | End: 2022-09-20 | Stop reason: HOSPADM

## 2022-09-20 RX ORDER — OXYCODONE HCL 10 MG/1
TABLET, FILM COATED, EXTENDED RELEASE ORAL AS NEEDED
Status: DISCONTINUED | OUTPATIENT
Start: 2022-09-20 | End: 2022-09-20 | Stop reason: SURG

## 2022-09-20 RX ORDER — CEFAZOLIN SODIUM IN 0.9 % NACL 3 G/100 ML
3 INTRAVENOUS SOLUTION, PIGGYBACK (ML) INTRAVENOUS ONCE
Status: COMPLETED | OUTPATIENT
Start: 2022-09-20 | End: 2022-09-20

## 2022-09-20 RX ORDER — HYDROMORPHONE HCL 110MG/55ML
PATIENT CONTROLLED ANALGESIA SYRINGE INTRAVENOUS AS NEEDED
Status: DISCONTINUED | OUTPATIENT
Start: 2022-09-20 | End: 2022-09-20 | Stop reason: SURG

## 2022-09-20 RX ORDER — SODIUM CHLORIDE, SODIUM LACTATE, POTASSIUM CHLORIDE, CALCIUM CHLORIDE 600; 310; 30; 20 MG/100ML; MG/100ML; MG/100ML; MG/100ML
INJECTION, SOLUTION INTRAVENOUS CONTINUOUS PRN
Status: DISCONTINUED | OUTPATIENT
Start: 2022-09-20 | End: 2022-09-20 | Stop reason: SURG

## 2022-09-20 RX ORDER — PROPOFOL 10 MG/ML
INJECTION, EMULSION INTRAVENOUS AS NEEDED
Status: DISCONTINUED | OUTPATIENT
Start: 2022-09-20 | End: 2022-09-20 | Stop reason: SURG

## 2022-09-20 RX ORDER — POTASSIUM CHLORIDE 750 MG/1
10 TABLET, FILM COATED, EXTENDED RELEASE ORAL DAILY
Refills: 1 | Status: DISCONTINUED | OUTPATIENT
Start: 2022-09-21 | End: 2022-09-22 | Stop reason: HOSPADM

## 2022-09-20 RX ORDER — MELOXICAM 15 MG/1
15 TABLET ORAL DAILY
Status: DISCONTINUED | OUTPATIENT
Start: 2022-09-21 | End: 2022-09-22 | Stop reason: HOSPADM

## 2022-09-20 RX ORDER — NALOXONE HCL 0.4 MG/ML
0.4 VIAL (ML) INJECTION
Status: DISCONTINUED | OUTPATIENT
Start: 2022-09-20 | End: 2022-09-22 | Stop reason: HOSPADM

## 2022-09-20 RX ORDER — DEXAMETHASONE SODIUM PHOSPHATE 4 MG/ML
INJECTION, SOLUTION INTRA-ARTICULAR; INTRALESIONAL; INTRAMUSCULAR; INTRAVENOUS; SOFT TISSUE
Status: COMPLETED | OUTPATIENT
Start: 2022-09-20 | End: 2022-09-20

## 2022-09-20 RX ORDER — OXYCODONE HYDROCHLORIDE 5 MG/1
10 TABLET ORAL ONCE AS NEEDED
Status: DISCONTINUED | OUTPATIENT
Start: 2022-09-20 | End: 2022-09-20 | Stop reason: HOSPADM

## 2022-09-20 RX ORDER — MELOXICAM 15 MG/1
15 TABLET ORAL DAILY
Status: DISCONTINUED | OUTPATIENT
Start: 2022-09-21 | End: 2022-09-20 | Stop reason: SDUPTHER

## 2022-09-20 RX ORDER — ONDANSETRON 2 MG/ML
4 INJECTION INTRAMUSCULAR; INTRAVENOUS EVERY 6 HOURS PRN
Status: DISCONTINUED | OUTPATIENT
Start: 2022-09-20 | End: 2022-09-22 | Stop reason: HOSPADM

## 2022-09-20 RX ORDER — CELECOXIB 200 MG/1
CAPSULE ORAL AS NEEDED
Status: DISCONTINUED | OUTPATIENT
Start: 2022-09-20 | End: 2022-09-20 | Stop reason: SURG

## 2022-09-20 RX ORDER — OXYCODONE HYDROCHLORIDE 5 MG/1
10 TABLET ORAL EVERY 4 HOURS PRN
Status: DISCONTINUED | OUTPATIENT
Start: 2022-09-20 | End: 2022-09-22 | Stop reason: HOSPADM

## 2022-09-20 RX ORDER — LIDOCAINE HYDROCHLORIDE 20 MG/ML
INJECTION, SOLUTION EPIDURAL; INFILTRATION; INTRACAUDAL; PERINEURAL AS NEEDED
Status: DISCONTINUED | OUTPATIENT
Start: 2022-09-20 | End: 2022-09-20 | Stop reason: SURG

## 2022-09-20 RX ORDER — EPHEDRINE SULFATE 5 MG/ML
INJECTION INTRAVENOUS AS NEEDED
Status: DISCONTINUED | OUTPATIENT
Start: 2022-09-20 | End: 2022-09-20 | Stop reason: SURG

## 2022-09-20 RX ORDER — ONDANSETRON 4 MG/1
4 TABLET, FILM COATED ORAL EVERY 6 HOURS PRN
Status: DISCONTINUED | OUTPATIENT
Start: 2022-09-20 | End: 2022-09-22 | Stop reason: HOSPADM

## 2022-09-20 RX ORDER — SODIUM CHLORIDE, SODIUM LACTATE, POTASSIUM CHLORIDE, CALCIUM CHLORIDE 600; 310; 30; 20 MG/100ML; MG/100ML; MG/100ML; MG/100ML
75 INJECTION, SOLUTION INTRAVENOUS CONTINUOUS
Status: DISCONTINUED | OUTPATIENT
Start: 2022-09-20 | End: 2022-09-22 | Stop reason: HOSPADM

## 2022-09-20 RX ORDER — SODIUM CHLORIDE, SODIUM LACTATE, POTASSIUM CHLORIDE, CALCIUM CHLORIDE 600; 310; 30; 20 MG/100ML; MG/100ML; MG/100ML; MG/100ML
9 INJECTION, SOLUTION INTRAVENOUS CONTINUOUS PRN
Status: DISCONTINUED | OUTPATIENT
Start: 2022-09-20 | End: 2022-09-22 | Stop reason: HOSPADM

## 2022-09-20 RX ORDER — SODIUM CHLORIDE 0.9 % (FLUSH) 0.9 %
10 SYRINGE (ML) INJECTION EVERY 12 HOURS SCHEDULED
Status: DISCONTINUED | OUTPATIENT
Start: 2022-09-20 | End: 2022-09-20 | Stop reason: HOSPADM

## 2022-09-20 RX ORDER — FENTANYL CITRATE 50 UG/ML
INJECTION, SOLUTION INTRAMUSCULAR; INTRAVENOUS AS NEEDED
Status: DISCONTINUED | OUTPATIENT
Start: 2022-09-20 | End: 2022-09-20 | Stop reason: SURG

## 2022-09-20 RX ORDER — ROPIVACAINE HYDROCHLORIDE 5 MG/ML
INJECTION, SOLUTION EPIDURAL; INFILTRATION; PERINEURAL
Status: COMPLETED | OUTPATIENT
Start: 2022-09-20 | End: 2022-09-20

## 2022-09-20 RX ADMIN — EPHEDRINE SULFATE 5 MG: 5 INJECTION INTRAVENOUS at 08:27

## 2022-09-20 RX ADMIN — ACETAMINOPHEN 1000 MG: 500 TABLET, FILM COATED ORAL at 07:06

## 2022-09-20 RX ADMIN — EPHEDRINE SULFATE 5 MG: 5 INJECTION INTRAVENOUS at 08:13

## 2022-09-20 RX ADMIN — CELECOXIB 200 MG: 200 CAPSULE ORAL at 07:06

## 2022-09-20 RX ADMIN — OXYCODONE 10 MG: 5 TABLET ORAL at 14:35

## 2022-09-20 RX ADMIN — ONDANSETRON 8 MG: 2 INJECTION INTRAMUSCULAR; INTRAVENOUS at 09:51

## 2022-09-20 RX ADMIN — DEXAMETHASONE SODIUM PHOSPHATE 4 MG: 4 INJECTION, SOLUTION INTRAMUSCULAR; INTRAVENOUS at 07:10

## 2022-09-20 RX ADMIN — EPHEDRINE SULFATE 5 MG: 5 INJECTION INTRAVENOUS at 08:22

## 2022-09-20 RX ADMIN — HYDROMORPHONE HYDROCHLORIDE 0.5 MG: 1 INJECTION, SOLUTION INTRAMUSCULAR; INTRAVENOUS; SUBCUTANEOUS at 10:39

## 2022-09-20 RX ADMIN — SODIUM CHLORIDE, POTASSIUM CHLORIDE, SODIUM LACTATE AND CALCIUM CHLORIDE 75 ML/HR: 600; 310; 30; 20 INJECTION, SOLUTION INTRAVENOUS at 17:28

## 2022-09-20 RX ADMIN — OXYCODONE 10 MG: 5 TABLET ORAL at 20:43

## 2022-09-20 RX ADMIN — PROPOFOL INJECTABLE EMULSION 125 MCG/KG/MIN: 10 INJECTION, EMULSION INTRAVENOUS at 08:10

## 2022-09-20 RX ADMIN — FENTANYL CITRATE 100 MCG: 50 INJECTION, SOLUTION INTRAMUSCULAR; INTRAVENOUS at 08:00

## 2022-09-20 RX ADMIN — LIDOCAINE HYDROCHLORIDE 60 MG: 20 INJECTION, SOLUTION EPIDURAL; INFILTRATION; INTRACAUDAL; PERINEURAL at 08:00

## 2022-09-20 RX ADMIN — PROPOFOL INJECTABLE EMULSION 2 MG: 10 INJECTION, EMULSION INTRAVENOUS at 08:02

## 2022-09-20 RX ADMIN — ROPIVACAINE HYDROCHLORIDE 30 ML: 5 INJECTION EPIDURAL; INFILTRATION; PERINEURAL at 07:10

## 2022-09-20 RX ADMIN — PROPOFOL INJECTABLE EMULSION 200 MG: 10 INJECTION, EMULSION INTRAVENOUS at 08:00

## 2022-09-20 RX ADMIN — HYDROMORPHONE HYDROCHLORIDE 0.5 MG: 2 INJECTION, SOLUTION INTRAMUSCULAR; INTRAVENOUS; SUBCUTANEOUS at 08:11

## 2022-09-20 RX ADMIN — SODIUM CHLORIDE, POTASSIUM CHLORIDE, SODIUM LACTATE AND CALCIUM CHLORIDE 9 ML/HR: 600; 310; 30; 20 INJECTION, SOLUTION INTRAVENOUS at 06:52

## 2022-09-20 RX ADMIN — MUPIROCIN: 20 OINTMENT TOPICAL at 20:43

## 2022-09-20 RX ADMIN — HYDROMORPHONE HYDROCHLORIDE 1 MG: 2 INJECTION, SOLUTION INTRAMUSCULAR; INTRAVENOUS; SUBCUTANEOUS at 10:13

## 2022-09-20 RX ADMIN — OXYCODONE HYDROCHLORIDE 10 MG: 10 TABLET, FILM COATED, EXTENDED RELEASE ORAL at 07:06

## 2022-09-20 RX ADMIN — HYDROMORPHONE HYDROCHLORIDE 0.5 MG: 2 INJECTION, SOLUTION INTRAMUSCULAR; INTRAVENOUS; SUBCUTANEOUS at 09:41

## 2022-09-20 RX ADMIN — SODIUM CHLORIDE, SODIUM LACTATE, POTASSIUM CHLORIDE, AND CALCIUM CHLORIDE: .6; .31; .03; .02 INJECTION, SOLUTION INTRAVENOUS at 08:40

## 2022-09-20 RX ADMIN — Medication 3 G: at 07:55

## 2022-09-20 RX ADMIN — CEFAZOLIN 2 G: 2 INJECTION, POWDER, FOR SOLUTION INTRAMUSCULAR; INTRAVENOUS at 16:17

## 2022-09-20 ASSESSMENT — KOOS JR
KOOS JR SCORE: 10
KOOS JR SCORE: 61.583

## 2022-09-20 NOTE — OP NOTE
TOTAL KNEE ARTHROPLASTY OPERATIVE     PATIENT NAME:Leonid Diehl     YOB: 1955     ATTENDING PHYSICIAN: Yash Celis MD     DATE OF PROCEDURE: 9/20/2022     PREOPERATIVE DIAGNOSIS: Severe degenerative osteoarthritis, right knee.    POSTOPERATIVE DIAGNOSIS: Post-Op Diagnosis Codes:     * Bilateral primary osteoarthritis of knee [M17.0]    PRINCIPAL DIAGNOSIS: Severe degenerative osteoarthritis right knee with a varus deformity.    PROCEDURE: Right total knee arthroplasty.    SURGEON:  Yash Celis M.D.    ASSISTANT: first Parvin assistant was responsible for performing the following activities: Retraction, Suction, Irrigation, Suturing, Closing and Placing Dressing and their skilled assistance was necessary for the success of this case.       ANESTHESIOLOGIST: Dr. Sky sharma MD    ANESTHESIA: Adductor canal block for postop pain control followed by general anesthesia.    POSITION: Supine on the operating table.    DRAINS: None.  None.    COMPLICATIONS: None    ESTIMATED BLOOD LOSS: 100ml    IMPLANT USED: Augustin Persona total knee replacement system, # 11  Standard  posterior cruciate retaining femoral component, number H tibia with a 30 mm intramedullary stem, 10 mm thick MC, medially constrained Vitamin E impregnated polyethylene insert, #38 patella anchored into position using Refobicin antibiotic impregnated bone cement.     SPECIMENS: * No orders in the log *    Please note: We used the robotic arm system, Mechelle from [employer] for intraoperative assistance with alignment of the bone cuts in the soft tissues.    INDICATION: The patient has been having very significant pain and discomfort in the knee.  We had scheduled the patient for total knee replacement after all forms of conservative nonoperative care had failed to provide adequate relief of symptoms.  Patient understood all the risks and benefits which were discussed in great detail including the possibility of death, infection,  myocardial infarction, DVT, pulmonary embolism, stiffness of the knee, wound infection and breakdown, possibility of revision surgery, neurovascular compromise, pneumonia, pulmonary embolism      DETAILS OF PROCEDURE: Surgical timeout was called. Operative extremity was correctly identified in the operating room suite. Patient was placed under appropriate anesthetic.  Patient was administered IV antibiotics per SCIP protocol and hospital policy. The patient’s operative extremity was correctly identified in the operating room suite.A Tourniquet was applied after the leg has been exsanguinated. The correctly identified extremity was prepped and draped in a standard fashion.      An anterior approach was performed and a quad sparing, subvastus approach was carried out. The patellofemoral ligament was resected. Medial soft tissue release was carried out on the medial face of the tibia to balance the soft tissues and to release the contracture of the knee MCL. Tibial Osteophytes were resected. Severe bone-on-bone appearance was noted.  A thorough synovectomy was carried out. The Synovium was thickened and hypertrophic. The PCL and MCL were carefully protected throughout the entire procedure. The robotic arm, Mechelle system was brought into the operating room and the distal femur was mapped. A 10 mm thick cut was made off the distal femur. A measuring guide was used and #11 standard posterior cruciate retaining femoral component jig was found to be the best fit. Anterior, posterior and chamfer cuts were created. Care was taken to prevent creating a distal femoral notch. The proximal tibia was then mapped with navigation. 4 mm of the bone was resected off the medial tibial plateau.  An appropriate tibial slope was built into the cut to match the normal anatomy.  A number H tibia was found to be the best fit resting nicely on the cortical margins of the proximally resected tibia.  A trial reduction was carried out. Rotation  and orientation of the components were carefully marked. Flexion and extension gaps were checked and found to be symmetric. The stability of the components was checked in full extension, mid- flexion and full flexion.The patella was everted, it was measured and cut. Patellar Tracking was excellent.  A #38 patella was found to be the best fit.  A Lateral release was not deemed necessary. Femoral lug holes were drilled. The Proximal Tibia was die punched. Patellar anchor holes were drilled.  The posterior capsular structures and the subperiosteal ligamentous insertions were infiltrated with Exparel as a local anesthetic.    The cancellous bone was lavaged with a Pulse lavage  and dried.  We also used diluted Betadine as an antiseptic rinse solution.  Cement was mixed on the back table. Components were cemented into position sequentially, starting with the number H right tibial component and going to the #11 standard posterior cruciate retaining femur and then the #38 patella. The excess amounts of bone cement were removed from the bone-metal interface. Trial reduction was carried out once the cement was fully cured. A 10 mm tibial polyethylene insert, MC medially constrained design insert, was then locked into position on the tibial tray. Wound was lavaged with antibiotic containing irrigating solution. Tourniquet was deflated, hemostasis achieved. An analgesic cocktail was injected intra-articularly into the joint capsule and ligamentous insertions on bone for post op pain relief. The arthrotomy was repaired in 60 degrees of flexion. Subcutaneous sutures were applied. Occlusive dressing was applied. No complications were encountered. Sponge count and needle count were correct. The patient was reversed from anesthesia and taken from the operating room to the recovery room in stable condition. I discussed the satisfactory performance of this procedure with the patient’s family and answered all questions for  them.       Yash Celis MD  9/20/2022  10:21 EDT

## 2022-09-20 NOTE — PLAN OF CARE
Goal Outcome Evaluation:  Plan of Care Reviewed With: patient, spouse           Outcome Evaluation: 66 yo male s/p elective R TKA this AM.  Pt is from home with his spouse and had L knee replaced in April.  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  Initiated post op exercises and ambualted x 75' with RW and CGAx1.  Will follow 2x/day with plans for home with spouse and has OP PT appt scheduled for 9/23.

## 2022-09-20 NOTE — H&P
[facility] Medicine Services - Consult Note    Patient Name: Leonid Diehl  : 1955  MRN: [id number]  Primary Care Physician:  Kerri Cruz APRN  Referring Physician: Yash Celis MD  Date of admission: 2022    Consults    Subjective      Reason for Consult/ Chief Complaint: right knee pain    History of Present Illness: Leonid Diehl is a 67 y.o. male with a history of osteoarthritis to both knees. He underwent left total knee arthroplasty in April by Dr. Celis and now has returned for right total knee arthroplasty 2022 by Dr. Celis. Peripheral nerve block was placed by anesthesia. The hospitalist group was consulted for medical management of his chronic conditions aortic stenosis, diastolic CHF, hypertension, anemia, thrombocytopenia, asthma, and gout.       Review of Systems   Constitutional: Negative.   HENT: Negative.    Eyes: Negative.    Cardiovascular: Negative.    Respiratory: Negative.    Endocrine: Negative.    Hematologic/Lymphatic: Negative.    Skin: Negative.    Musculoskeletal: Positive for joint pain.   Gastrointestinal: Negative.    Genitourinary: Negative.    Neurological: Negative.    Psychiatric/Behavioral: Negative.    Allergic/Immunologic: Negative.    All other systems reviewed and are negative.       Personal History     Past Medical History:   Diagnosis Date   • Aortic stenosis, moderate 2022   • Arthritis    • Asthma    • Benign prostatic hyperplasia 2017   • Chronic diastolic congestive heart failure (HCC) 2019    states didn't really have.  no sx's present   • COPD (chronic obstructive pulmonary disease) (HCC)    • Gout    • Hay fever 2016   • Heart murmur    • History of alcohol abuse 2019   • Hx of pancreatitis 2019   • Infection of left knee (HCC) 2022   • Insomnia    • Iron deficiency anemia 2019   • Obesity (BMI 35.0-39.9 without comorbidity)    • Peripheral edema     none presently   • Primary  hypertension 01/28/2016   • Seasonal allergies    • Thrombocytopenia (HCC)     when had PNA       Past Surgical History:   Procedure Laterality Date   • CATARACT EXTRACTION, BILATERAL     • KNEE INCISION AND DRAINAGE Left 06/07/2022    Procedure: KNEE INCISION AND DRAINAGE;  Surgeon: Yash Celis MD;  Location: [facility];  Service: Orthopedics;  Laterality: Left;   • TOTAL KNEE ARTHROPLASTY Left 04/05/2022    Procedure: TOTAL KNEE ARTHROPLASTY;  Surgeon: Yash Celis MD;  Location: [facility];  Service: Orthopedics;  Laterality: Left;   • TOTAL SHOULDER ARTHROPLASTY W/ DISTAL CLAVICLE EXCISION Right 10/22/2019    Procedure: TOTAL SHOULDER REVERSE ARTHROPLASTY with Biceps Tenodesis;  Surgeon: Chris Lafleur MD;  Location: [facility] OR;  Service: Orthopedics       Family History: family history includes Cancer in his father; Heart disease in his sister; Hypertension in his brother. Otherwise pertinent FHx was reviewed and not pertinent to current issue.    Social History:  reports that he has never smoked. He has never used smokeless tobacco. He reports current alcohol use. He reports that he does not use drugs.    Home Medications:   Calcium Carbonate Antacid, Cetirizine HCl, albuterol sulfate HFA, allopurinol, apixaban, budesonide-formoterol, carvedilol, ferrous sulfate, fluticasone, furosemide, guaiFENesin, lisinopril, meloxicam, mupirocin, ondansetron, oxyCODONE-acetaminophen, potassium chloride, and traZODone    Allergies:  No Known Allergies      Objective      Vitals:  Temp:  [97.4 °F (36.3 °C)-97.9 °F (36.6 °C)] 97.4 °F (36.3 °C)  Heart Rate:  [49-69] 49  Resp:  [6-16] 12  BP: ()/(41-76) 110/58  Flow (L/min):  [2-6] 2    Physical Exam  Vitals and nursing note reviewed.   HENT:      Head: Normocephalic and atraumatic.   Eyes:      Extraocular Movements: Extraocular movements intact.      Pupils: Pupils are equal, round, and reactive to light.   Cardiovascular:      Rate and Rhythm:  Normal rate and regular rhythm.      Pulses: Normal pulses.      Heart sounds: Murmur heard.   Pulmonary:      Effort: Pulmonary effort is normal.      Breath sounds: Normal breath sounds.   Abdominal:      General: Bowel sounds are normal.      Palpations: Abdomen is soft.      Tenderness: There is no abdominal tenderness.   Musculoskeletal:      Comments: Right knee immobilized    Skin:     General: Skin is warm and dry.   Neurological:      Mental Status: He is alert and oriented to person, place, and time.   Psychiatric:         Mood and Affect: Mood normal.         Behavior: Behavior normal.          Result Review    Result Review:  I have personally reviewed the results from the time of this admission to 9/20/2022 16:12 EDT and agree with these findings:  [x]  Laboratory  []  Microbiology  []  Radiology  []  EKG/Telemetry   []  Cardiology/Vascular   []  Pathology  [x]  Old records      Assessment & Plan        Active Hospital Problems:  Active Hospital Problems    Diagnosis    • **Bilateral primary osteoarthritis of knee    • S/P total knee arthroplasty, right    • Hx of total knee replacement, left    • Aortic stenosis, moderate    • Thrombocytopenia (HCC)    • Gout    • Chronic diastolic congestive heart failure (HCC)    • Iron deficiency anemia    • Systolic murmur    • Primary hypertension    • Hay fever    • Asthma      Plan:       Bilateral primary osteoarthritis of knee s/p right total knee arthroplasty 9/20/2022  -h/o left total knee arthroplasty (04/05/22)  -peripheral nerve block placed by anesthesia  -pain control per ortho   -IV cefazolin for surgical prophylaxis per ortho  -Eliquis 2.5 mg every 12 hours per Ortho  -PT/OT to eval and treat  -fall precautions     Aortic stenosis, moderate  -patient advised to follow up with cardiology as outpatient     Chronic diastolic congestive heart failure   -appears compensated  -continue as needed Lasix     Iron deficiency anemia  -on iron supplement    -monitor H&H     Asthma / Hay fever  -stable without exacerbation  -continue albuterol and Symbicort  -Zyrtec-D substituted for Allegra-D     Primary hypertension, chronic  -Controlled   -continue carvedilol and lisinopril  -monitor BP     Gout  -continue allopurinol      Insomnia  -continue trazodone      Thrombocytopenia, chronic  -Platelets 96   -Monitor        This patient has been examined wearing appropriate Personal Protective Equipment 09/20/22      Signature: Electronically signed by BERKLEY Evans, 09/20/22, 4:13 PM EDT.

## 2022-09-20 NOTE — ANESTHESIA POSTPROCEDURE EVALUATION
Patient: Leonid Diehl    Procedure Summary     Date: 09/20/22 Room / Location: [facility] OR 06 / [facility] MAIN OR    Anesthesia Start: 0747 Anesthesia Stop: 1025    Procedure: TOTAL KNEE ARTHROPLASTY WITH JARED ROBOT (Right Knee) Diagnosis:       Bilateral primary osteoarthritis of knee      (Bilateral primary osteoarthritis of knee [M17.0])    Surgeons: Yash Celis MD Provider: Sky Varghese MD    Anesthesia Type: general with block, ERAS Protocol ASA Status: 3          Anesthesia Type: general with block, ERAS Protocol    Vitals  Vitals Value Taken Time   /52 09/20/22 1321   Temp 97.4 °F (36.3 °C) 09/20/22 1307   Pulse 51 09/20/22 1332   Resp 8 09/20/22 1307   SpO2 95 % 09/20/22 1332   Vitals shown include unvalidated device data.        Post Anesthesia Care and Evaluation    Patient location during evaluation: PACU  Patient participation: complete - patient participated  Level of consciousness: awake  Pain scale: See nurse's notes for pain score.  Pain management: adequate    Airway patency: patent  Anesthetic complications: No anesthetic complications  PONV Status: none  Cardiovascular status: acceptable  Respiratory status: acceptable and spontaneous ventilation  Hydration status: acceptable    Comments: Patient seen and examined postoperatively; vital signs stable; SpO2 greater than or equal to 90%; cardiopulmonary status stable; nausea/vomiting adequately controlled; pain adequately controlled; no apparent anesthesia complications; patient discharged from anesthesia care when discharge criteria were met

## 2022-09-20 NOTE — ANESTHESIA PREPROCEDURE EVALUATION
Anesthesia Evaluation     Patient summary reviewed and Nursing notes reviewed   no history of anesthetic complications:  NPO Solid Status: > 8 hours  NPO Liquid Status: > 2 hours           Airway   Dental      Pulmonary    (+) COPD, asthma,  Cardiovascular     ECG reviewed  Patient on routine beta blocker    (+) hypertension, valvular problems/murmurs murmur, AS and MR, CHF Diastolic >=55%, hyperlipidemia,       Neuro/Psych  GI/Hepatic/Renal/Endo    (+) obesity,   liver disease,     Musculoskeletal     Abdominal    Substance History      OB/GYN          Other   arthritis, blood dyscrasia thrombocytopenia,     ROS/Med Hx Other: BPH, allergies, h/o alcohol abuse, h/o pancreatitis, hypoxemia, hyperglycemia, tongue swelling, biliary dz, hepatic dz, edema, gout, Raynaud's, insomnia, wound dehiscence    Echocardiogram Findings    Left Ventricle Calculated left ventricular EF = 66% Left ventricular ejection fraction appears to be 61 - 65%.  Right Ventricle The right ventricular cavity is mildly dilated.  Left Atrium The left atrial cavity is moderately dilated.  Right Atrium Normal right atrial cavity size noted.  Aortic Valve The aortic valve is abnormal in structure. There is calcification of the aortic valve. Moderate aortic valve stenosis is present.  Mitral Valve Mild mitral valve regurgitation is present.  Pulmonic Valve The pulmonic valve is not well visualized.  Greater Vessels No dilation of the aortic root is present.  Pericardium There is no evidence of pericardial effusion. .    Stress  No evidence for reversible myocardial ischemia noted.  Normal left ventricular ejection fraction of  58 %.      PSH  CATARACT EXTRACTION, BILATERAL TOTAL SHOULDER ARTHROPLASTY W/ DISTAL CLAVICLE EXCISION  TOTAL KNEE ARTHROPLASTY KNEE INCISION AND DRAINAGE                Anesthesia Plan    ASA 3     general with block and ERAS Protocol   total IV anesthesia  (Patient identified; pre-operative vital signs, all relevant  labs/studies, complete medical/surgical/anesthetic history, full medication list, full allergy list, and NPO status obtained/reviewed; physical assessment performed; anesthetic options, side effects, potential complications, risks, and benefits discussed; questions answered; written anesthesia consent obtained; patient cleared for procedure; anesthesia machine and equipment checked and functioning)  intravenous induction     Anesthetic plan, risks, benefits, and alternatives have been provided, discussed and informed consent has been obtained with: patient.    Plan discussed with CRNA and CAA.        CODE STATUS:

## 2022-09-20 NOTE — THERAPY EVALUATION
Patient Name: Leonid Diehl  : 1955    MRN: [id number]                              Today's Date: 2022       Admit Date: 2022    Visit Dx:     ICD-10-CM ICD-9-CM   1. Bilateral primary osteoarthritis of knee  M17.0 715.16     Patient Active Problem List   Diagnosis   • Iron deficiency anemia   • Asthma   • Benign prostatic hyperplasia   • Chronic diastolic congestive heart failure (HCC)   • Encounter for general adult medical examination without abnormal findings   • Hay fever   • Biliary disease   • Hepatic disease   • History of alcohol abuse   • Hx of pancreatitis   • Primary hypertension   • Systolic murmur   • Bilateral primary osteoarthritis of knee   • Aortic stenosis, moderate   • Gout   • Insomnia   • Thrombocytopenia (HCC)   • Hx of total knee replacement, left   • Morbid obesity (HCC)   • Wound dehiscence, surgical   • Infection of superficial incisional surgical site after procedure     Past Medical History:   Diagnosis Date   • Aortic stenosis, moderate 2022   • Arthritis    • Asthma    • Benign prostatic hyperplasia 2017   • Chronic diastolic congestive heart failure (HCC) 2019    states didn't really have.  no sx's present   • COPD (chronic obstructive pulmonary disease) (HCC)    • Gout    • Hay fever 2016   • Heart murmur    • History of alcohol abuse 2019   • Hx of pancreatitis 2019   • Infection of left knee (HCC) 2022   • Insomnia    • Iron deficiency anemia 2019   • Obesity (BMI 35.0-39.9 without comorbidity)    • Peripheral edema     none presently   • Primary hypertension 2016   • Seasonal allergies    • Thrombocytopenia (HCC)     when had PNA     Past Surgical History:   Procedure Laterality Date   • CATARACT EXTRACTION, BILATERAL     • KNEE INCISION AND DRAINAGE Left 2022    Procedure: KNEE INCISION AND DRAINAGE;  Surgeon: Yash Celis MD;  Location: [facility] MAIN OR;  Service: Orthopedics;  Laterality: Left;   • TOTAL  KNEE ARTHROPLASTY Left 04/05/2022    Procedure: TOTAL KNEE ARTHROPLASTY;  Surgeon: Yash Celis MD;  Location: [facility] MAIN OR;  Service: Orthopedics;  Laterality: Left;   • TOTAL SHOULDER ARTHROPLASTY W/ DISTAL CLAVICLE EXCISION Right 10/22/2019    Procedure: TOTAL SHOULDER REVERSE ARTHROPLASTY with Biceps Tenodesis;  Surgeon: Chris Lafleur MD;  Location: [facility] MAIN OR;  Service: Orthopedics      General Information     [facility] Name 09/20/22 1520          Physical Therapy Time and Intention    Document Type evaluation  -     Mode of Treatment physical therapy  -[facility] Name 09/20/22 1520          General Information    Patient Profile Reviewed yes  -     Prior Level of Function independent:  -     Existing Precautions/Restrictions no known precautions/restrictions  -     Barriers to Rehab none identified  -[facility] Name 09/20/22 1520          Living Environment    People in Home spouse  -[facility] Name 09/20/22 1520          Home Main Entrance    Number of Stairs, Main Entrance none  -[facility] Name 09/20/22 1520          Stairs Within Home, Primary    Number of Stairs, Within Home, Primary none  -[facility] Name 09/20/22 1520          Cognition    Orientation Status (Cognition) oriented x 4  -[facility] Name 09/20/22 1520          Safety Issues, Functional Mobility    Impairments Affecting Function (Mobility) pain;range of motion (ROM)  -           User Key  (r) = Recorded By, (t) = Taken By, (c) = Cosigned By    Initials Name Provider Type     Rosangela Mckeon PT Physical Therapist               Mobility     [facility] Name 09/20/22 1536          Bed Mobility    Bed Mobility bed mobility (all) activities  -     All Activities, [place] (Bed Mobility) modified independence  -[facility] Name 09/20/22 1536          Sit-Stand Transfer    Sit-Stand [place] (Transfers) moderate assist (50% patient effort);minimum assist (75% patient effort)  from low bed position  -[facility] Name 09/20/22  1536          Gait/Stairs (Locomotion)    [place] Level (Gait) contact guard  -     Assistive Device (Gait) walker, front-wheeled  -     Ambulated day of surgery or within 4 hours of PACU discharge yes  -     Distance in Feet (Gait) 75'  -     Deviations/Abnormal Patterns (Gait) gait speed decreased  -     Number of Steps (Stairs) progressed to reciprocal gait pattern, short step length  -[facility] Name 09/20/22 1536          Mobility    Extremity Weight-bearing Status right lower extremity  -     Right Lower Extremity (Weight-bearing Status) weight-bearing as tolerated (WBAT)  -           User Key  (r) = Recorded By, (t) = Taken By, (c) = Cosigned By    Initials Name Provider Type     Rosangela Mckeon PT Physical Therapist               Obj/Interventions     [facility] Name 09/20/22 1537          Range of Motion Comprehensive    Comment, General Range of Motion R knee AROM 5-80 degrees  -[facility] Name 09/20/22 1537          Strength Comprehensive (MMT)    Comment, General Manual Muscle Testing (MMT) Assessment RLE hip/knee 4/5.  ankle 5/5  -[facility] Name 09/20/22 1537          Motor Skills    Therapeutic Exercise --  issued post op written HEP and performed x 10 reps each supine and sitting.  -[facility] Name 09/20/22 1537          Balance    Balance Assessment sitting static balance;sitting dynamic balance;standing static balance;standing dynamic balance  -     Static Sitting Balance independent  -     Dynamic Sitting Balance independent  -     Static Standing Balance modified independence  -     Dynamic Standing Balance modified independence  -     Position/Device Used, Standing Balance supported;walker, rolling  -[facility] Name 09/20/22 1537          Sensory Assessment (Somatosensory)    Sensory Assessment (Somatosensory) --  reports still some tingling RLE from nerve block  -           User Key  (r) = Recorded By, (t) = Taken By, (c) = Cosigned By    Initials Name Provider Type     Rosangela Kumar, PT Physical Therapist               Goals/Plan     [facility] Name 09/20/22 1553          Transfer Goal 1 (PT)    Activity/Assistive Device (Transfer Goal 1, PT) transfers, all  -     [place] Level/Cues Needed (Transfer Goal 1, PT) modified independence  -     Time Frame (Transfer Goal 1, PT) short term goal (STG);3 days  -[facility] Name 09/20/22 1553          Gait Training Goal 1 (PT)    Activity/Assistive Device (Gait Training Goal 1, PT) gait (walking locomotion);assistive device use;walker, rolling  -     [place] Level (Gait Training Goal 1, PT) modified independence  -     Distance (Gait Training Goal 1, PT) 150'  -[facility]     Time Frame (Gait Training Goal 1, PT) short term goal (STG);3 days  -[facility]     Row Name 09/20/22 1553          Patient Education Goal (PT)    Activity (Patient Education Goal, PT) HEP  -     [place]/Cues/Accuracy (Memory Goal 2, PT) demonstrates adequately;verbalizes understanding  -     Time Frame (Patient Education Goal, PT) short term goal (STG);3 days  -[facility]     Row Name 09/20/22 1553          Therapy Assessment/Plan (PT)    Planned Therapy Interventions (PT) bed mobility training;gait training;transfer training;ROM (range of motion);strengthening;patient/family education;home exercise program  -           User Key  (r) = Recorded By, (t) = Taken By, (c) = Cosigned By    Initials Name Provider Type    Rosangela Kumar, PT Physical Therapist               Clinical Impression     Row Name 09/20/22 0872          Pain    Additional Documentation Pain Scale: FACES Pre/Post-Treatment (Group)  -[facility]     Row Name 09/20/22 9369          Pain Scale: FACES Pre/Post-Treatment    Pain: FACES Scale, Pretreatment 2-->hurts little bit  -     Posttreatment Pain Rating 4-->hurts little more  -     Pain Location - Side/Orientation Right  -     Pain Location - knee  -[facility] 09/20/22 9546          Plan of Care Review    Plan of Care Reviewed With patient;spouse   -     Outcome Evaluation 66 yo male s/p elective R TKA this AM.  Pt is from home with his spouse and had L knee replaced in April.  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  Initiated post op exercises and ambualted x 75' with RW and CGAx1.  Will follow 2x/day with plans for home with spouse and has OP PT appt scheduled for 9/23.  -     Row Name 09/20/22 1542          Therapy Assessment/Plan (PT)    Rehab Potential (PT) good, to achieve stated therapy goals  -     Criteria for Skilled Interventions Met (PT) yes;skilled treatment is necessary  -     Therapy Frequency (PT) 2 times/day  -     Row Name 09/20/22 6159          Positioning and Restraints    Pre-Treatment Position in bed  -     Post Treatment Position chair  -     In Chair notified nsg;reclined;call light within reach;encouraged to call for assist  ice to R knee  -           User Key  (r) = Recorded By, (t) = Taken By, (c) = Cosigned By    Initials Name Provider Type    Rosangela Kumar, PT Physical Therapist               Outcome Measures     Row Name 09/20/22 1400          How much help from another person do you currently need...    Turning from your back to your side while in flat bed without using bedrails? 3  -SM     Moving from lying on back to sitting on the side of a flat bed without bedrails? 3  -SM     Moving to and from a bed to a chair (including a wheelchair)? 3  -SM     Standing up from a chair using your arms (e.g., wheelchair, bedside chair)? 3  -SM     Climbing 3-5 steps with a railing? 3  -SM     To walk in hospital room? 3  -SM     AM-PAC 6 Clicks Score (PT) 18  -SM     Highest level of mobility 6 --> Walked 10 steps or more  -           User Key  (r) = Recorded By, (t) = Taken By, (c) = Cosigned By    Initials Name Provider Type    Noris Navarro, RN Registered Nurse                             Physical Therapy Education                 Title: PT OT SLP Therapies  (Done)     Topic: Physical Therapy (Done)     Point: Mobility training (Done)     Learning Progress Summary           Patient Acceptance, E,H, VU by  at 9/20/2022 1554   Family Acceptance, E,H, VU by  at 9/20/2022 1554                   Point: Home exercise program (Done)     Learning Progress Summary           Patient Acceptance, E,H, VU by  at 9/20/2022 1554   Family Acceptance, E,H, VU by  at 9/20/2022 1554                               User Key     Initials Effective Dates Name Provider Type [facility] 06/16/21 -  Rosangela Mckeon PT Physical Therapist PT              PT Recommendation and Plan  Planned Therapy Interventions (PT): bed mobility training, gait training, transfer training, ROM (range of motion), strengthening, patient/family education, home exercise program  Plan of Care Reviewed With: patient, spouse  Outcome Evaluation: 66 yo male s/p elective R TKA this AM.  Pt is from home with his spouse and had L knee replaced in April.  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  Initiated post op exercises and ambualted x 75' with RW and CGAx1.  Will follow 2x/day with plans for home with spouse and has OP PT appt scheduled for 9/23.     Time Calculation:    PT Charges     Row Name 09/20/22 1555             Time Calculation    Start Time 1433  -      Stop Time 1518  -      Time Calculation (min) 45 min  -      PT Received On 09/20/22  -      PT - Next Appointment 09/21/22  -      PT Goal Re-Cert Due Date 10/04/22  -              Time Calculation- PT    Total Timed Code Minutes- PT 0 minute(s)  -            User Key  (r) = Recorded By, (t) = Taken By, (c) = Cosigned By    Initials Name Provider Type     Rosangela Mckeon PT Physical Therapist              Therapy Charges for Today     Code Description Service Date Service Provider Modifiers Qty    [id number] HC PT EVAL MOD COMPLEXITY 4 9/20/2022 Rosangela Mckeon, ALICIA GP 1          PT  G-Codes  -[facility] 6 Clicks Score (PT): 18    Rosangela Mckeon, PT  9/20/2022

## 2022-09-20 NOTE — ANESTHESIA PROCEDURE NOTES
Peripheral Block    Pre-sedation assessment completed: 9/20/2022 6:30 AM    Patient reassessed immediately prior to procedure    Patient location during procedure: pre-op  Reason for block: procedure for pain, at surgeon's request, post-op pain management and secondary anesthetic  Performed by  Anesthesiologist: Sky Varghese MD  Preanesthetic Checklist  Completed: patient identified, IV checked, site marked, risks and benefits discussed, surgical consent, monitors and equipment checked, pre-op evaluation and timeout performed  Prep:  Pt Position: sitting  Sterile barriers:cap, gloves, mask and washed/disinfected hands  Prep: ChloraPrep  Patient monitoring: blood pressure monitoring, continuous pulse oximetry and EKG  Procedure    Sedation: no  Performed under: local infiltration  Guidance:ultrasound guided and landmark technique    ULTRASOUND INTERPRETATION.  Using ultrasound guidance a 20 G gauge needle was placed in close proximity to the femoral nerve, at which point, under ultrasound guidance anesthetic was injected in the area of the nerve and spread of the anesthesia was seen on ultrasound in close proximity thereto.  There were no abnormalities seen on ultrasound; a digital image was taken; and the patient tolerated the procedure with no complications. Images:still images obtained, printed/placed on chart    Laterality:right  Block Type:adductor canal block  Injection Technique:single-shot  Needle Type:echogenic  Needle Gauge:20 G  Resistance on Injection: less than 15 psi    Medications Used: dexamethasone (DECADRON) injection, 4 mg  ropivacaine (NAROPIN) 0.5 % injection, 30 mL  Med administered at 9/20/2022 7:10 AM      Post Assessment  Injection Assessment: negative aspiration for heme, no paresthesia on injection and incremental injection  Patient Tolerance:comfortable throughout block  Complications:no  Additional Notes  Pre-procedure:  Peripheral nerve block performed preoperatively prior to  the start of anesthesia time at the request of the patient and the surgeon for the management of postoperative acute surgical pain as well as for a secondary intraoperative anesthetic (general anesthesia is the primary intraoperative anesthetic); patient identified; pre-procedure vital signs, all relevant labs/studies, complete medical/surgical/anesthetic history, full medication list, full allergy list, and NPO status obtained/reviewed; physical assessment performed; anesthetic options, side effects, potential complications, risks, and benefits discussed; questions answered; patient wishes to proceed with the procedure; written anesthesia procedure consent obtained; patient cleared for procedure; time out performed; IV access in situ    Procedure:  ASA monitor placed; supplemental oxygen provided; patient positioned; hand hygiene performed; sterile technique maintained throughout the procedure; sterile prep applied; insertion site determined by anatomical landmarks, palpation, and ultrasound imaging; live ultrasound guidance throughout the procedure; target nerves/landmarks identified on live ultrasound; skin and subcutaneous tissues numbed by injection of 1% lidocaine; 4 inch 20G Stimuplex Ultra 360 Insulated Echogenic Needle used; realtime needle advancement and placement near the target nerves witnessed on live ultrasound; negative aspiration prior to injection; correct needle placement confirmed on live ultrasound by local anesthetic spread around the target nerves; local anesthetic mixture injected with negative aspiration prior to each injection and after each 1-5 mL injected; needle withdrawn; dressing applied; ultrasound image printed and placed in the patient's permanent medical record    Post-procedure:  Peripheral nerve block placed successfully; good block; no apparent complications; minimal estimated blood loss; vital signs stable throughout; see nurse's notes for vitals; transported to the OR, general  anesthesia induced, and surgery started

## 2022-09-20 NOTE — PLAN OF CARE
Goal Outcome Evaluation:  Plan of Care Reviewed With: patient, spouse        Progress: improving     Patient admitted post op, ALYSSA in tact, dressing clean dry intact, family at bedside, call light in reach, VSS

## 2022-09-20 NOTE — ANESTHESIA PROCEDURE NOTES
Airway  Urgency: elective    Date/Time: 9/20/2022 8:00 AM  End Time:9/20/2022 8:10 AM  Airway not difficult    General Information and Staff    Patient location during procedure: OR    Indications and Patient Condition  Indications for airway management: airway protection    Preoxygenated: yes  MILS maintained throughout  Mask difficulty assessment: 2 - vent by mask + OA or adjuvant +/- NMBA    Final Airway Details  Final airway type: supraglottic airway      Successful airway: LMA  Size 4  Cuff Pressure (cm H2O): 30    Cormack-Lehane Classification: grade III - view of epiglottis only  Number of attempts at approach: 2  Assessment: lips, teeth, and gum same as pre-op

## 2022-09-20 NOTE — H&P
History & Physical       Patient: Leonid Diehl    Date of Admission: 9/20/2022  5:30 AM    YOB: 1955    Medical Record Number: [id number]    Attending Physician: Yash Celis MD        Chief Complaints: Bilateral primary osteoarthritis of knee [M17.0]  Hx of total knee replacement, left [Z96.652]      History of Present Illness: 67 y.o. male presents with Bilateral primary osteoarthritis of knee [M17.0]  Hx of total knee replacement, left [Z96.652]. Onset of symptoms was gradual and slowly progressive over the past 5 years.  Symptoms are associated with pain and discomfort with a varus deformity of the right knee.  Symptoms are aggravated by deep flexion of the knee.   Symptoms improve with using a supportive brace and anti-inflammatory medication. Patient is now being admitted to the services of Yash Celis MD for further evaluation and treatment.      No Known Allergies      Home Medications:  Medications Prior to Admission   Medication Sig Dispense Refill Last Dose   • albuterol sulfate  (90 Base) MCG/ACT inhaler Inhale 2 puffs Every 6 (Six) Hours As Needed for Wheezing or Shortness of Air. (Patient taking differently: Inhale 2 puffs As Needed for Wheezing or Shortness of Air.) 8.5 g 2 Past Month at Unknown time   • allopurinol (ZYLOPRIM) 300 MG tablet Take 1 tablet by mouth Daily. 90 tablet 1 9/19/2022 at Unknown time   • Calcium Carbonate Antacid (CALCIUM CARBONATE PO) Take 1 tablet by mouth Every Night.   9/13/2022   • carvedilol (COREG) 3.125 MG tablet Take 1 tablet by mouth Daily. 90 tablet 1 9/19/2022 at 0800   • Cetirizine HCl (ZYRTEC PO) Take 10 mg by mouth Daily.   9/19/2022 at Unknown time   • ferrous sulfate 324 (65 Fe) MG tablet delayed-release EC tablet Take 1 tablet by mouth Daily With Breakfast. (Patient taking differently: Take 324 mg by mouth Every Night.) 90 tablet 1 9/19/2022 at Unknown time   • fluticasone (FLONASE) 50 MCG/ACT nasal spray 2 sprays into the  nostril(s) as directed by provider 2 (Two) Times a Day.   Past Month at Unknown time   • furosemide (LASIX) 20 MG tablet Take 1 tablet by mouth Daily As Needed (swelling). swelling (Patient taking differently: Take 20 mg by mouth Every Night.) 90 tablet 1 9/18/2022   • guaiFENesin (MUCINEX) 600 MG 12 hr tablet Take 1,200 mg by mouth 2 (Two) Times a Day.   9/19/2022 at Unknown time   • lisinopril (PRINIVIL,ZESTRIL) 10 MG tablet Take 1 tablet by mouth Daily. 90 tablet 1 9/19/2022   • meloxicam (MOBIC) 15 MG tablet Take 1 tablet by mouth Daily. Prn joint pain 90 tablet 3 9/13/2022   • mupirocin (BACTROBAN) 2 % ointment Apply a pea-sized amount to each nostril twice daily for 5 days prior to surgery. 22 g 0 9/20/2022 at Unknown time   • potassium chloride 10 MEQ CR tablet Take 1 tablet by mouth Daily. WITH LASIX (Patient taking differently: Take 10 mEq by mouth Every Morning. WITH LASIX) 90 tablet 1 9/19/2022 at Unknown time   • Symbicort 160-4.5 MCG/ACT inhaler Inhale 2 puffs 2 (Two) Times a Day. (Patient taking differently: Inhale 2 puffs 2 (Two) Times a Day. USES SEASONALLLY) 10.2 g 11 9/17/2022   • traZODone (DESYREL) 50 MG tablet Take 1 tablet by mouth Every Night. 90 tablet 1 9/19/2022 at Unknown time       Current Medications:  Scheduled Meds:ceFAZolin, 3 g, Intravenous, Once  sodium chloride, 10 mL, Intravenous, Q12H      Continuous Infusions:lactated ringers, 9 mL/hr, Last Rate: 9 mL/hr (09/20/22 0652)      PRN Meds:.lactated ringers  •  sodium chloride       Past Medical History:   Diagnosis Date   • Aortic stenosis, moderate 04/05/2022   • Arthritis    • Asthma    • Benign prostatic hyperplasia 06/20/2017   • Chronic diastolic congestive heart failure (HCC) 02/04/2019    states didn't really have.  no sx's present   • COPD (chronic obstructive pulmonary disease) (HCC)    • Gout    • Hay fever 01/28/2016   • Heart murmur    • History of alcohol abuse 04/18/2019   • Hx of pancreatitis 04/18/2019   • Infection  of left knee (HCC) 05/2022   • Insomnia    • Iron deficiency anemia 01/11/2019   • Obesity (BMI 35.0-39.9 without comorbidity)    • Peripheral edema     none presently   • Primary hypertension 01/28/2016   • Seasonal allergies    • Thrombocytopenia (HCC)     when had PNA        Past Surgical History:   Procedure Laterality Date   • CATARACT EXTRACTION, BILATERAL     • KNEE INCISION AND DRAINAGE Left 06/07/2022    Procedure: KNEE INCISION AND DRAINAGE;  Surgeon: Yash Celis MD;  Location: [facility] MAIN OR;  Service: Orthopedics;  Laterality: Left;   • TOTAL KNEE ARTHROPLASTY Left 04/05/2022    Procedure: TOTAL KNEE ARTHROPLASTY;  Surgeon: Yash Celis MD;  Location: [facility] MAIN OR;  Service: Orthopedics;  Laterality: Left;   • TOTAL SHOULDER ARTHROPLASTY W/ DISTAL CLAVICLE EXCISION Right 10/22/2019    Procedure: TOTAL SHOULDER REVERSE ARTHROPLASTY with Biceps Tenodesis;  Surgeon: Chris Lafleur MD;  Location: [facility] MAIN OR;  Service: Orthopedics        Social History     Occupational History   • Not on file   Tobacco Use   • Smoking status: Never Smoker   • Smokeless tobacco: Never Used   Vaping Use   • Vaping Use: Never used   Substance and Sexual Activity   • Alcohol use: Yes     Comment: occasionally   • Drug use: No   • Sexual activity: Defer      Social History     Social History Narrative   • Not on file        Family History   Problem Relation Age of Onset   • Cancer Father    • Heart disease Sister    • Hypertension Brother          Review of Systems:   HEENT: Patient denies any headaches, vision changes, change in hearing, or tinnitus, Patient denies any rhinorrhea,epistaxis, sinus pain, mouth or dental problems, sore throat or hoarseness, or dysphagia  Pulmonary: Patient denies any cough, congestion, SOA, or wheezing  Cardiovascular: Patient denies any chest pain, dyspnea, palpitations, weakness, intolerance of exercise, varicosities, swelling of extremities, known murmur  Gastrointestinal:   "Patient denies nausea, vomiting, diarrhea, constipation, loss  of appetite, change in appetite, dysphagia, gas, heartburn, melena, change in bowel habits, use of laxatives or other drugs to alter the function of the gastrointestinal tract.  Genital/Urinary: Patient denies dysuria, change in color of urine, change in frequency of urination, pain with urgency, incontinence, retention, or nocturia.  Musculoskeletal: Patient denies increased warmth; redness; or swelling of joints; limitation of function; deformity; crepitation: pain in a joint or an extremity, the neck, or the back, especially with movement.  Neurological: Patient denies dizziness, tremor, ataxia, difficulty in speaking, change in speech, paresthesia, loss of sensation, seizures, syncope, changes in memory.  Endocrine system: Patient denies tremors, palpitations, intolerance of heat or cold, polyuria, polydipsia, polyphagia, diaphoresis, exophthalmos, or goiter.  Psychological: Patient denies thoughts/plans or harming self or other; depression,  insomnia, night terrors, mason, memory loss, disorientation.  Skin: Patient denies any bruising, rashes, discoloration, pruritus, wounds, ulcers, decubiti, changes in the hair or nails  Hematopoietic: Patient denies history of spontaneous or excessive bleeding, epistaxis, hematuria, melena, fatigue, enlarged or tender lymph nodes, pallor, history of anemia.    Physical Exam: 67 y.o. male  Vitals:    09/09/22 1017 09/20/22 0657 09/20/22 0711 09/20/22 0726   BP:  128/59 147/56 124/52   BP Location:  Right arm Left arm Left arm   Patient Position:  Lying Lying Lying   Pulse:  60 66 58   Resp:  11 16 13   Temp:  97.6 °F (36.4 °C)     TempSrc:  Oral     SpO2:  98% 95% 100%   Weight: 133 kg (294 lb) 130 kg (285 lb 12.8 oz)     Height: 188 cm (74\") 188 cm (74\")         General Appearance:          Alert, cooperative, in no acute distress                                                 Head:    Normocephalic, without " obvious abnormality, atraumatic   Eyes:            Lids and lashes normal, conjunctivae and sclerae normal, no   icterus, no pallor, corneas clear, PERRLA   Ears:    Ears appear intact with no abnormalities noted   Throat:   No oral lesions, no thrush, oral mucosa moist   Neck:   No adenopathy, supple, trachea midline, no thyromegaly, no   carotid bruit, no JVD   Back:     No kyphosis present, no scoliosis present, no skin lesions,      erythema or scars, no tenderness to percussion or                   palpation,   range of motion normal   Lungs:     Clear to auscultation,respirations regular, even and                  unlabored    Heart:    Regular rhythm and normal rate, normal S1 and S2, no            murmur, no gallop, no rub, no click   Chest Wall:    No abnormalities observed   Abdomen:     Normal bowel sounds, no masses, no organomegaly, soft        nontender, nondistended, no guarding, no rebound                tenderness   Rectal:     Deferred   Extremities:   Tenderness over medial aspect of the right knee. Moves all extremities well, no edema,   no cyanosis, no redness   Pulses:   Pulses palpable and equal bilaterally   Skin:   No bleeding, bruising or rash   Lymph nodes:   No palpable adenopathy   Neurologic:   Cranial nerves 2 - 12 grossly intact, sensation intact, DTR       present and equal bilaterally   Right knee. Patient has crepitus throughout range of motion. Positive patellar grind test. Mild effusion. Lachman is negative. Pivot shift is negative. Anterior and posterior drawer signs are negative. Significant joint line tenderness is noted on the medial aspect of the knee. Patient has a varus orientation of the knee. There is fullness and tenderness in the popliteal fossa. Mild distention of a popliteal cyst is noted in this location. Range of motion in flexion is from 0-110 degrees. Neurovascular status is intact.  Dorsalis pedis and posterior tibial artery pulses are palpable. Common peroneal  nerve function is well preserved. Patient's gait is cautious and antalgic. Skin and soft tissues are mildly swollen, consistent with synovitis and effusion. The patient has a significant limp with the first few steps after starting the gait cycle. Getting out of a chair takes a lot of effort due to pain on knee flexion.        Diagnostic Tests:  Admission on 09/20/2022   Component Date Value Ref Range Status   • WBC 09/12/2022 4.84  3.40 - 10.80 10*3/mm3 Final   • RBC 09/12/2022 3.87 (A) 4.14 - 5.80 10*6/mm3 Final   • Hemoglobin 09/12/2022 12.7 (A) 13.0 - 17.7 g/dL Final   • Hematocrit 09/12/2022 36.8 (A) 37.5 - 51.0 % Final   • MCV 09/12/2022 95.1  79.0 - 97.0 fL Final   • MCH 09/12/2022 32.8  26.6 - 33.0 pg Final   • MCHC 09/12/2022 34.5  31.5 - 35.7 g/dL Final   • RDW 09/12/2022 12.6  12.3 - 15.4 % Final   • RDW-SD 09/12/2022 43.4  37.0 - 54.0 fl Final   • MPV 09/12/2022 10.5  6.0 - 12.0 fL Final   • Platelets 09/12/2022 96 (A) 140 - 450 10*3/mm3 Final   • Color, UA 09/12/2022 Yellow  Yellow, Straw Final   • Appearance, UA 09/12/2022 Clear  Clear Final   • pH, UA 09/12/2022 6.5  5.0 - 8.0 Final   • Specific Gravity, UA 09/12/2022 1.021  1.005 - 1.030 Final   • Glucose, UA 09/12/2022 Negative  Negative Final   • Ketones, UA 09/12/2022 Negative  Negative Final   • Bilirubin, UA 09/12/2022 Negative  Negative Final   • Blood, UA 09/12/2022 Small (1+) (A) Negative Final   • Protein, UA 09/12/2022 Negative  Negative Final   • Leuk Esterase, UA 09/12/2022 Negative  Negative Final   • Nitrite, UA 09/12/2022 Negative  Negative Final   • Urobilinogen, UA 09/12/2022 0.2 E.U./dL  0.2 - 1.0 E.U./dL Final   • Protime 09/12/2022 11.3  9.6 - 11.7 Seconds Final   • INR 09/12/2022 1.10  0.93 - 1.10 Final   • RBC, UA 09/12/2022 6-12 (A) None Seen, 0-2 /HPF Final   • WBC, UA 09/12/2022 0-2  None Seen, 0-2 /HPF Final   • Bacteria, UA 09/12/2022 None Seen  None Seen /HPF Final   • Squamous Epithelial Cells, UA 09/12/2022 0-2  None  Seen, 0-2 /HPF Final   • Hyaline Casts, UA 09/12/2022 None Seen  None Seen /LPF Final   • Methodology 09/12/2022 Automated Microscopy   Final     No results found.      Assessment:  Patient Active Problem List   Diagnosis   • Iron deficiency anemia   • Asthma   • Benign prostatic hyperplasia   • Chronic diastolic congestive heart failure (HCC)   • Encounter for general adult medical examination without abnormal findings   • Hay fever   • Biliary disease   • Hepatic disease   • History of alcohol abuse   • Hx of pancreatitis   • Primary hypertension   • Systolic murmur   • Bilateral primary osteoarthritis of knee   • Aortic stenosis, moderate   • Gout   • Insomnia   • Thrombocytopenia (HCC)   • Hx of total knee replacement, left   • Morbid obesity (HCC)   • Wound dehiscence, surgical   • Infection of superficial incisional surgical site after procedure         Plan:  The patient voiced understanding of the risks, benefits, and alternative forms of treatment that were discussed and the patient consents to proceed with right total knee arthroplasty with Mechelle robotic assistance.     The patient was seen today for preoperative discussion.  The patient has been tried on over-the-counter and prescription NSAID's despite the risks of anti-inflammatory bleeding, peptic ulcers and erosive gastritis with short term benefit only.  Braces have been prescribed for mechanical support.  Patient has been participating in an exercise program specifically targeting joint pain relief with limited benefit. Intraarticular injections have been used periodically with some but not complete relief of pain.  Ambulation aids have also been utilized.      The details of the surgical procedure were explained including the location of probable incisions and a description of the likely hardware/grafts to be used. The patient understands the likely convalescence after surgery as well as the rehabilitation required.  Also, we have thoroughly  discussed with the patient the risks, benefits and alternatives to surgery.  Risks include but are not limited to the risk of infection, joint stiffness, limited range of motion, wound healing problems, scar tissue build up, myocardial infarction, stroke, blood clots (including DVT and/or pulmonary embolus along with the risk of death) neurologic and/or vascular injury, limb length discrepancy, fracture, dislocation, nonunion, malunion, continued pain and need for further surgery including hardware failure requiring revision.   Discharge Plan: tomorrow to home and home health      Date: 9/20/2022    Yash Celis MD      DICTATED UTILIZING DRAGON DICTATION

## 2022-09-21 LAB
ANION GAP SERPL CALCULATED.3IONS-SCNC: 5 MMOL/L (ref 5–15)
BUN SERPL-MCNC: 18 MG/DL (ref 8–23)
BUN/CREAT SERPL: 22 (ref 7–25)
CALCIUM SPEC-SCNC: 8.2 MG/DL (ref 8.6–10.5)
CHLORIDE SERPL-SCNC: 103 MMOL/L (ref 98–107)
CO2 SERPL-SCNC: 25 MMOL/L (ref 22–29)
CREAT SERPL-MCNC: 0.82 MG/DL (ref 0.76–1.27)
EGFRCR SERPLBLD CKD-EPI 2021: 96.9 ML/MIN/1.73
GLUCOSE SERPL-MCNC: 143 MG/DL (ref 65–99)
HCT VFR BLD AUTO: 31.2 % (ref 37.5–51)
HGB BLD-MCNC: 10.4 G/DL (ref 13–17.7)
POTASSIUM SERPL-SCNC: 4.6 MMOL/L (ref 3.5–5.2)
SODIUM SERPL-SCNC: 133 MMOL/L (ref 136–145)

## 2022-09-21 PROCEDURE — G0378 HOSPITAL OBSERVATION PER HR: HCPCS

## 2022-09-21 PROCEDURE — 25010000002 CEFAZOLIN PER 500 MG: Performed by: ORTHOPAEDIC SURGERY

## 2022-09-21 PROCEDURE — 63710000001 APIXABAN 2.5 MG TABLET: Performed by: ORTHOPAEDIC SURGERY

## 2022-09-21 PROCEDURE — 63710000001 POTASSIUM CHLORIDE 10 MEQ TABLET CONTROLLED-RELEASE: Performed by: ORTHOPAEDIC SURGERY

## 2022-09-21 PROCEDURE — A9270 NON-COVERED ITEM OR SERVICE: HCPCS | Performed by: ORTHOPAEDIC SURGERY

## 2022-09-21 PROCEDURE — 63710000001 CARVEDILOL 3.125 MG TABLET: Performed by: ORTHOPAEDIC SURGERY

## 2022-09-21 PROCEDURE — 63710000001 MELOXICAM 15 MG TABLET: Performed by: ORTHOPAEDIC SURGERY

## 2022-09-21 PROCEDURE — 80048 BASIC METABOLIC PNL TOTAL CA: CPT | Performed by: ORTHOPAEDIC SURGERY

## 2022-09-21 PROCEDURE — 85014 HEMATOCRIT: CPT | Performed by: ORTHOPAEDIC SURGERY

## 2022-09-21 PROCEDURE — 97116 GAIT TRAINING THERAPY: CPT

## 2022-09-21 PROCEDURE — 63710000001 ONDANSETRON PER 8 MG: Performed by: ORTHOPAEDIC SURGERY

## 2022-09-21 PROCEDURE — 85018 HEMOGLOBIN: CPT | Performed by: ORTHOPAEDIC SURGERY

## 2022-09-21 PROCEDURE — 63710000001 LISINOPRIL 5 MG TABLET: Performed by: ORTHOPAEDIC SURGERY

## 2022-09-21 PROCEDURE — 63710000001 OXYCODONE 5 MG TABLET: Performed by: ORTHOPAEDIC SURGERY

## 2022-09-21 PROCEDURE — 97150 GROUP THERAPEUTIC PROCEDURES: CPT

## 2022-09-21 RX ADMIN — OXYCODONE 10 MG: 5 TABLET ORAL at 22:39

## 2022-09-21 RX ADMIN — MELOXICAM 15 MG: 15 TABLET ORAL at 08:23

## 2022-09-21 RX ADMIN — CARVEDILOL 3.12 MG: 3.12 TABLET, FILM COATED ORAL at 08:23

## 2022-09-21 RX ADMIN — CEFAZOLIN 2 G: 2 INJECTION, POWDER, FOR SOLUTION INTRAMUSCULAR; INTRAVENOUS at 00:36

## 2022-09-21 RX ADMIN — OXYCODONE 10 MG: 5 TABLET ORAL at 17:46

## 2022-09-21 RX ADMIN — OXYCODONE 10 MG: 5 TABLET ORAL at 07:18

## 2022-09-21 RX ADMIN — OXYCODONE 10 MG: 5 TABLET ORAL at 13:31

## 2022-09-21 RX ADMIN — LISINOPRIL 10 MG: 5 TABLET ORAL at 08:23

## 2022-09-21 RX ADMIN — ONDANSETRON HYDROCHLORIDE 4 MG: 4 TABLET, FILM COATED ORAL at 07:18

## 2022-09-21 RX ADMIN — APIXABAN 2.5 MG: 2.5 TABLET, FILM COATED ORAL at 08:23

## 2022-09-21 RX ADMIN — POTASSIUM CHLORIDE 10 MEQ: 750 TABLET, EXTENDED RELEASE ORAL at 08:23

## 2022-09-21 RX ADMIN — OXYCODONE 10 MG: 5 TABLET ORAL at 00:43

## 2022-09-21 RX ADMIN — APIXABAN 2.5 MG: 2.5 TABLET, FILM COATED ORAL at 20:08

## 2022-09-21 NOTE — PROGRESS NOTES
[facility] Medicine Services Daily Progress Note    Patient Name: Leonid Diehl  : 1955  MRN: [id number]  Primary Care Physician:  Kerri Cruz APRN  Date of admission: 2022      Subjective      Chief Complaint:   Knee pain      Patient Reports    Vitals and labs reviewed  Most recent blood pressure is a little low normal hold lisinopril  Duloxetine may need fluid bolus  May discharge if stable blood pressure  Would recommend check CBC next few days      Review of Systems   All other systems reviewed and are negative.   *        Objective      Vitals:   Temp:  [97.4 °F (36.3 °C)-98.5 °F (36.9 °C)] 98.1 °F (36.7 °C)  Heart Rate:  [49-73] 61  Resp:  [8-16] 16  BP: ()/(40-76) 86/50  Flow (L/min):  [2] 2    Physical Exam  Vitals and nursing note reviewed.   Constitutional:       General: He is not in acute distress.     Appearance: Normal appearance. He is well-developed. He is not ill-appearing, toxic-appearing or diaphoretic.   HENT:      Head: Normocephalic and atraumatic.      Right Ear: Ear canal and external ear normal.      Left Ear: Ear canal and external ear normal.      Nose: Nose normal. No congestion or rhinorrhea.      Mouth/Throat:      Mouth: Mucous membranes are moist.      Pharynx: No oropharyngeal exudate.   Eyes:      General: No scleral icterus.        Right eye: No discharge.         Left eye: No discharge.      Extraocular Movements: Extraocular movements intact.      Conjunctiva/sclera: Conjunctivae normal.      Pupils: Pupils are equal, round, and reactive to light.   Neck:      Thyroid: No thyromegaly.      Vascular: No carotid bruit or JVD.      Trachea: No tracheal deviation.   Cardiovascular:      Rate and Rhythm: Normal rate and regular rhythm.      Pulses: Normal pulses.      Heart sounds: Normal heart sounds. No murmur heard.    No friction rub. No gallop.   Pulmonary:      Effort: Pulmonary effort is normal. No respiratory distress.       Breath sounds: Normal breath sounds. No stridor. No wheezing, rhonchi or rales.   Chest:      Chest wall: No tenderness.   Abdominal:      General: Bowel sounds are normal. There is no distension.      Palpations: Abdomen is soft. There is no mass.      Tenderness: There is no abdominal tenderness. There is no guarding or rebound.      Hernia: No hernia is present.   Musculoskeletal:         General: No swelling, tenderness, deformity or signs of injury. Normal range of motion.      Cervical back: Normal range of motion and neck supple. No rigidity. No muscular tenderness.      Right lower leg: No edema.      Left lower leg: No edema.   Lymphadenopathy:      Cervical: No cervical adenopathy.   Skin:     General: Skin is warm and dry.      Coloration: Skin is not jaundiced or pale.      Findings: No bruising, erythema or rash.   Neurological:      General: No focal deficit present.      Mental Status: He is alert and oriented to person, place, and time. Mental status is at baseline.      Cranial Nerves: No cranial nerve deficit.      Sensory: No sensory deficit.      Motor: No weakness or abnormal muscle tone.      Coordination: Coordination normal.   Psychiatric:         Mood and Affect: Mood normal.         Behavior: Behavior normal.         Thought Content: Thought content normal.         Judgment: Judgment normal.               Result Review    Result Review:  I have personally reviewed the results from the time of this admission to 9/21/2022 12:08 EDT and agree with these findings:  []  Laboratory  []  Microbiology  []  Radiology  []  EKG/Telemetry   []  Cardiology/Vascular   []  Pathology  []  Old records  []  Other:  Most notable findings include:       Wounds (last 24 hours)     LDA Wound     Row Name 09/21/22 0800 09/20/22 2043 09/20/22 1600       Wound 09/20/22 0847 Right anterior knee Incision    Wound - Properties Group Placement Date: 09/20/22  -IR Placement Time: 0847  -IR Side: Right  -IR Orientation:  anterior  -IR Location: knee  -IR Primary Wound Type: Incision  -IR    Dressing Appearance dry;intact;no drainage  -SM dry;intact;no drainage  -TL --    Closure JOSE LUIS  -SM JOSE LUIS  -TL JOSE LUIS  -SM    Drainage Amount none  -SM none  -TL --    Retired Wound - Properties Group Placement Date: 09/20/22  -IR Placement Time: 0847  -IR Side: Right  -IR Orientation: anterior  -IR Location: knee  -IR Primary Wound Type: Incision  -IR    Retired Wound - Properties Group Date first assessed: 09/20/22  -IR Time first assessed: 0847  -IR Side: Right  -IR Location: knee  -IR Primary Wound Type: Incision  -IR    Row Name 09/20/22 1400 09/20/22 1309 09/20/22 1215       Wound 09/20/22 0847 Right anterior knee Incision    Wound - Properties Group Placement Date: 09/20/22  -IR Placement Time: 0847  -IR Side: Right  -IR Orientation: anterior  -IR Location: knee  -IR Primary Wound Type: Incision  -IR    Dressing Appearance dry;intact;no drainage  -SM dry;intact;no drainage  -DM dry;intact;no drainage  -DM    Closure JOSE LUIS  -SM JOSE LUIS  -DM JOSE LUIS  -DM    Retired Wound - Properties Group Placement Date: 09/20/22  -IR Placement Time: 0847  -IR Side: Right  -IR Orientation: anterior  -IR Location: knee  -IR Primary Wound Type: Incision  -IR    Retired Wound - Properties Group Date first assessed: 09/20/22  -IR Time first assessed: 0847  -IR Side: Right  -IR Location: knee  -IR Primary Wound Type: Incision  -IR          User Key  (r) = Recorded By, (t) = Taken By, (c) = Cosigned By    Initials Name Provider Type    Eve Monroy, RN Registered Nurse    Adelita Gonzalez RN Registered Nurse    Noris Navarro RN Registered Nurse    IR Paola Jasso RN Registered Nurse                  Assessment & Plan      Brief Patient Summary:  Leonid Diehl is a 67 y.o. male who         apixaban, 2.5 mg, Oral, Q12H  budesonide-formoterol, 2 puff, Inhalation, BID - RT  carvedilol, 3.125 mg, Oral, Daily  lisinopril, 10 mg, Oral, Daily  meloxicam, 15 mg,  Oral, Daily  potassium chloride, 10 mEq, Oral, Daily       lactated ringers, 9 mL/hr, Last Rate: 9 mL/hr (09/20/22 0652)  lactated ringers, 75 mL/hr, Last Rate: 75 mL/hr (09/20/22 7988)         Active Hospital Problems:  Active Hospital Problems    Diagnosis    • **Bilateral primary osteoarthritis of knee    • S/P total knee arthroplasty, right    • Hx of total knee replacement, left    • Aortic stenosis, moderate    • Thrombocytopenia (HCC)    • Gout    • Chronic diastolic congestive heart failure (HCC)    • Iron deficiency anemia    • Systolic murmur    • Primary hypertension    • Hay fever    • Asthma      Plan:     Bilateral primary osteoarthritis of knee s/p right total knee arthroplasty 9/20/2022  -h/o left total knee arthroplasty (04/05/22)  -peripheral nerve block placed by anesthesia  -pain control per ortho   -IV cefazolin for surgical prophylaxis per ortho  -Eliquis 2.5 mg every 12 hours per Ortho  -PT/OT to eval and treat  -fall precautions     Aortic stenosis, moderate  -patient advised to follow up with cardiology as outpatient   Avoid hypotension-hold lisinopril the blood pressure stable        Chronic diastolic congestive heart failure   -appears compensated  -continue as needed Lasix     Iron deficiency anemia  -on iron supplement   -monitor H&H  -Drop in hemoglobin suggest blood loss anemia  - No active bleeding  Now on Eliquis for DVT prophylaxis    Asthma / Hay fever  -stable without exacerbation  -continue albuterol and Symbicort  -Zyrtec-D substituted for Allegra-D     Primary hypertension, chronic  -Controlled   -continue carvedilol and lisinopril as outlined above  -monitor BP     Gout  -continue allopurinol      Insomnia  -continue trazodone      Thrombocytopenia, chronic  -Platelets 96   -Monitor       DVT prophylaxis:  Medical DVT prophylaxis orders are present.    CODE STATUS:    Code Status (Patient has no pulse and is not breathing): CPR (Attempt to Resuscitate)  Medical Interventions  (Patient has pulse or is breathing): Full      Disposition:  I expect patient to be discharged home.    This patient has been examined wearing appropriate Personal Protective Equipment and discussed with hospital infection control department. 09/21/22      Electronically signed by Ken Javed MD, 09/21/22, 12:08 EDT.  [facility]ist [facility]

## 2022-09-21 NOTE — PLAN OF CARE
Goal Outcome Evaluation:  Plan of Care Reviewed With: patient           Outcome Evaluation: Pt c/o moderate pain with pain meds effective. Pt amb in room to bathroom with rolling walker and gait belt, SBA/S. Pt slept/rested well during shift. Call light and personal items in reach. Plan of care ongoing.

## 2022-09-21 NOTE — CASE MANAGEMENT/SOCIAL WORK
Discharge Planning Assessment  [facility]     Patient Name: Leonid Diehl  MRN: [id number]  Today's Date: 9/21/2022    Admit Date: 9/20/2022    Plan: Home with family.   Discharge Needs Assessment     Row Name 09/21/22 1235       Living Environment    People in Home spouse    Name(s) of People in Home Dorita    Current Living Arrangements home    Primary Care Provided by self    Provides Primary Care For no one    Family Caregiver if Needed spouse    Family Caregiver Names Dorita    Quality of Family Relationships supportive;involved;helpful    Able to Return to Prior Arrangements yes       Resource/Environmental Concerns    Resource/Environmental Concerns none    Transportation Concerns none       Transition Planning    Patient/Family Anticipates Transition to home with family    Patient/Family Anticipated Services at Transition none    Transportation Anticipated family or friend will provide       Discharge Needs Assessment    Readmission Within the Last 30 Days no previous admission in last 30 days    Equipment Currently Used at Home none;other (see comments)  has cane and rolling walker    Concerns to be Addressed discharge planning;care coordination/care conferences    Anticipated Changes Related to Illness none    Equipment Needed After Discharge none;other (see comments)  has rolling walker    Outpatient/Agency/Support Group Needs outpatient therapy    Discharge Facility/Level of Care Needs outpatient therapy    Provided Post Acute Provider List? Yes    Post Acute Provider List Outpatient Therapy    Delivered To Patient    Method of Delivery Telephone               Discharge Plan     Row Name 09/21/22 1237       Plan    Plan Home with family.    Roadmap to Recovery Yes    Plan Comments Spoke with patient at bedside. Confirmed PCP and pharmacy.  Patient lives at home with spouse who will be able to transport  patient home at discharge.  Patient denies any dc needs at this time.   Patient had rolling walker at home  and has appointment with [facility] on [facility] in [place] on 09/23/22 at 0900.              Continued Care and Services - Admitted Since 9/20/2022    Coordination has not been started for this encounter.       Expected Discharge Date and Time     Expected Discharge Date Expected Discharge Time    Sep 21, 2022          Demographic Summary     Row Name 09/21/22 1234       General Information    Admission Type observation    Arrived From home    Referral Source admission list    Reason for Consult discharge planning    Preferred Language English       Contact Information    Permission Granted to Share Info With                Functional Status     Row Name 09/21/22 1235       Functional Status    Usual Activity Tolerance good       Functional Status, IADL    Medications independent    Meal Preparation independent    Housekeeping independent    Laundry independent    Shopping independent       Mental Status    General Appearance WDL WDL       Mental Status Summary    Recent Changes in Mental Status/Cognitive Functioning no changes                  Cathy Mccann RN   Met with patient in room wearing PPE: mask, face shield/goggles, gloves, gown.      Maintained distance greater than six feet and spent less than 15 minutes in the room.

## 2022-09-21 NOTE — PLAN OF CARE
Assessment: Leonid Diehl presents with functional mobility impairments which indicate the need for skilled intervention. Tolerating session today without incident. Pt tolerated group therapy fair this date, requiring VC for motivation. Pt then stood with one minor LOB coming from short surface and requires MIN A for correction. Pt then ambulated 100 feet with RWx and CGA with difficulty getting RLE extension. Pt likely safe to d/c home with spouse and OPPT. Will continue to follow and progress as tolerated.

## 2022-09-21 NOTE — DISCHARGE INSTR - ACTIVITY
I. ACTIVITIES:  1. Exercises:  Complete exercise program as taught by the hospital physical therapist 2 times per day  Exercise program will be advanced by the physical therapist  During the day be up ambulating every 2 hours (while awake) for short distances  Complete the ankle pump exercises at least 10 times per hour (while awake)  Elevate legs when in bed and for at least 30 minutes during the day.Use cold packs 20-30 minutes approximately 5 times per day. This should be done before and after completing your exercises and at any time you are experiencing pain/ stiffness in your operative extremity.      2. Activities of Daily Living:  No tub baths, hot tubs, or swimming pools for 4 weeks  May shower with waterproof dressing in place as long as it is intact.  Do not scrub or rub the incision. Let water run over dressing and pat dry.    II. Restrictions  Continue precautions as taught at the hospital  Your surgeon will discuss with you when you will be able to drive again, typically it is when you have enough strength in your leg to step hard on the brake and are off the pain medication.  Weight bearing is as tolerated  First week stay inside on even terrain. May go up and down stairs one stair at a time utilizing the hand rail once cleared by physical therapy to do so.  After one week, you may venture outside (if cleared to do so by physical therapist).    III. Precautions:  Everyone that comes near you should wash their hands  No elective dental, genital-urinary, or colon procedures or surgical procedures for 12 weeks after surgery unless absolutely necessary.   If dental work or surgical procedure is deemed absolutely necessary, you will need to contact your surgeon as you will need to take antibiotics 1 hour prior to any dental work (including teeth cleanings).  Please discuss with your surgeon prophylactic antibiotics as the length of time this intervention will be necessary for you varies with each patient's  health history and situation.  Avoid sick people. If you must be around someone who is ill, they should wear a mask.  Avoid visits to the Emergency Room or Urgent Care unless you are having a life threatening event.  Stockings/ace wraps are to be placed on in the morning and removed at night. Monitor the stockings to ensure that any swelling is not causing the stockings to become too tight. In this case, remove stockings immediately.    IV. INCISION CARE:  Wash your hands often.  Notify office if dressing becomes dislodged or drainage noted. Change the dressing as directed by your physician.  No creams or ointments to the incision  Do not touch or pick at the incision  Check incision every day and notify surgeon immediately if any of the following signs or symptoms are noted:  Increase in redness  Increase in swelling around the incision/dressing and of the entire extremity  Increase in pain  Drainage oozing touching the edges of the dressing  Pulling apart of the edges of the incision  Increase in overall body temperature (greater than 100.5 degrees)  Your surgeon will instruct you regarding suture or staple removal    V. Medications:  1. Anticoagulants: You will be discharged on an anticoagulant. This is a prophylactic medication that helps prevent blood clots during your post-operative period. The type and length of dosage varies based on your individual needs, procedure performed, and surgeon's preference.  While taking the anticoagulant, you should avoid taking any additional aspirin, ibuprofen (Advil or Motrin), Aleve (Naprosyn) or other non-steroidal anti-inflammatory medications, unless prescribed by your surgeon.  Notify surgeon immediately if any mami bleeding is noted in the urine, stool, emesis, or from the nose or the incision. Blood in the stool will often appear as black rather than red. Blood in urine may appear as pink. Blood in emesis may appear as brown/black like coffee grounds.  You will need  to apply pressure for longer periods of time to any cuts or abrasions to stop bleeding  Avoid alcohol while taking anticoagulants    2. Stool Softeners: You will be at greater risk of constipation after surgery due to being less mobile and the pain medications.  Take stool softeners as instructed by your surgeon while on pain medications. Over the counter Colace 100 mg 1-2 capsules twice daily.  If stools become too loose or too frequent, please decreases the dosage or stop the stool softener.  If constipation occurs despite use of stool softeners, you are to continue the stool softeners and add a laxative (Milk of Magnesia 1 ounce daily as needed)  Drink plenty of fluids, and eat fruits and vegetables during your recovery time    3. Pain Medications utilized after surgery are narcotics and the law requires that the following information be given to all patients that are prescribed narcotics:  CLASSIFICATION: Pain medications are called Opioids and are narcotics  LEGALITIES: It is illegal to share narcotics with others and to drive within 24 hours of taking narcotics  POTENTIAL SIDE EFFECTS: Potential side effects of opioids include: nausea, vomiting, itching, dizziness, drowsiness, dry mouth, constipation, and difficulty urinating.  POTENTIAL ADVERSE EFFECTS:  Opioid tolerance can develop with use of pain medications and this simply means that it requires more and more of the medication to control pain; however, this is seen more in patients that use opioids for longer periods of time.  Opioid dependence can develop with use of Opioids and this simply means that to stop the medication can cause withdrawal symptoms; however, this is seen with patients that use Opioids for longer periods of time.  Opioid addiction can develop with use of Opioids and the incidence of this is very unlikely in patients who take the medications as ordered and stop the medications as instructed.  Opioid overdose can be dangerous, but is  unlikely when the medication is taken as ordered and stopped when ordered. It is important not to mix opioids with alcohol or with and type of sedative such as Benadryl as this can lead to over sedation and respiratory difficulty.  DOSAGE:  Pain medications will need to be taken consistently for the first week to decrease pain and promote adequate pain relief and participation in physical therapy.  After the initial surgical pain begins to resolve, you may begin to decrease the pain medication. By the end of 6 weeks, you should be off of pain medications.  Refills will not be given by the office during evening hours, on weekends, or after 6 weeks post-op.  To seek refills on pain medications during the initial 6 week post-operative period, you must call the office 48 hours in advance to request the refill. The office will then notify you when to  the prescription. DO NOT wait until you are out of the medication to request a refill.    V. FOLLOW-UP VISITS:  You will need to follow up in the office with Dr. Yash Celis/ Jaymie Woods PA-C in 1 week. Please call this number [phone] to schedule this appointment.  You will need to follow up with your primary care physician in 4 weeks.  If you have any concerns or suspected complications prior to your follow up visit, please call your surgeons office. Do not wait until your appointment time if you suspect complications. These will need to be addressed in the office promptly.    You have a ALYSSA wound dressing in place.  This was placed under sterile conditions in the operating room.  It remains in place until your follow-up appointment.  The drain will operate with slight suction for 7 days.  After 7 days, it will stop working automatically.  At that time, remove the batteries from the battery pack and then discard the battery pack in the trash.  You may cover the end of the tubing with plastic wrap and tape it to the dressing or cut the tubing off near the  "dressing and cover it with tape, assuring that the area continues to be waterproof.  Keep it this way until your follow-up appointment.  Showering is ok after surgery.  Pause the battery pack (push the large orange button) and unscrew the battery pack from the tubing.  Place plastic wrap or tape over the end of the tubing.  Shower, attempting to keep the dressing out of the stream of water.  When finished, gently pat the dressing and tubing dry, remove plastic wrap or tape from end of tubing, and reattach tubing to the battery pack.  Then press the large orange button.  You will feel the battery pack vibrate until it achieves suction.  Then it will flash green over the \"ok\" button and run as usual.      "

## 2022-09-21 NOTE — PLAN OF CARE
Goal Outcome Evaluation:  Plan of Care Reviewed With: patient        Progress: improving     Patient up to chair and ambulated in room, PT completed this am, Safety measures noted, PRN med for pain, dressing intact, no new drainage, AYLSSA working

## 2022-09-21 NOTE — PLAN OF CARE
PT evaluated this pt and does not feel pt needs OT at this time. Will need new OT orders if OT needs arise. Will complete orders.

## 2022-09-21 NOTE — THERAPY TREATMENT NOTE
"Subjective: Pt agreeable to therapeutic plan of care.    Session completed in group setting,  WB'ing status: Weight Bearing As Tolerated    Therapeutic Exercise:     Completed with R Lower Extremity.    Total Knee Program  Quad sets/Glut sets (15 reps, hold 3 seconds)  Ankle Pumps (15 reps with theraband)  Gastroc stretch (10 reps, 10 sec holds)  AAROM heel slide (10 reps, 10 sec holds)  AROM heel slide (15 reps)  AROM SAQ (15 reps)  AROM Hip abduction (15 reps)  Remove board - SLR (15 reps)  Sit up - LAQ (15 reps)  Scoot out - knee flexion in sitting (10 reps, 10 sec holds)  Extension stretch (10 reps, 10 sec holds)    Gait Training - 100 CGA with RWx, difficulty with extension in RLE requiring cueing for heel strike    Precautions: None    Objective:     Bed mobility - N/A or Not attempted.  Transfers - Min-A and with rolling walker from low bed surface  Ambulation - 100 feet CGA and with rolling walker    Vitals: WNL    Pain: 6 VAS  Education: Provided education on importance of mobility and skilled verbal / tactile cueing throughout intervention.     Assessment: Leonid Diehl presents with functional mobility impairments which indicate the need for skilled intervention. Tolerating session today without incident. Pt tolerated group therapy fair this date, requiring VC for motivation. Pt then stood with one minor LOB coming from short surface and requires MIN A for correction. Pt then ambulated 100 feet with RWx and CGA with difficulty getting RLE extension. Pt likely safe to d/c home with spouse and OPPT. Will continue to follow and progress as tolerated.     Plan/Recommendations:   Low Intensity Therapy recommended post-acute care - This is recommended as therapy feels this patient would require 2-3 visits per week. OP or HH would be the best option depending on patient's home bound status. Consider, if the patient has other  \"skilled\" needs such as wounds, IV antibiotics, etc. Combined with \"low intensity\" " could also equate to a SNF. If patient is medically complex, consider LTAC.. Pt requires rolling walker at discharge.     Pt desires Home with family assist and Outpatient therapy at discharge. Pt cooperative; agreeable to therapeutic recommendations and plan of care.         Basic Mobility 6-click:  Rollin = Total, A lot = 2, A little = 3; 4 = None  Supine>Sit:   1 = Total, A lot = 2, A little = 3; 4 = None   Sit>Stand with arms:  1 = Total, A lot = 2, A little = 3; 4 = None  Bed>Chair:   1 = Total, A lot = 2, A little = 3; 4 = None  Ambulate in room:  1 = Total, A lot = 2, A little = 3; 4 = None  3-5 Steps with railin = Total, A lot = 2, A little = 3; 4 = None  Score: 20    Modified [place]: N/A = No pre-op stroke/TIA    Post-Tx Position: Up in Chair and Call light and personal items within reach  PPE: mask and eye protection

## 2022-09-22 ENCOUNTER — READMISSION MANAGEMENT (OUTPATIENT)
Dept: CALL CENTER | Facility: HOSPITAL | Age: 67
End: 2022-09-22

## 2022-09-22 VITALS
DIASTOLIC BLOOD PRESSURE: 41 MMHG | HEIGHT: 74 IN | HEART RATE: 69 BPM | WEIGHT: 285.8 LBS | TEMPERATURE: 99.5 F | BODY MASS INDEX: 36.68 KG/M2 | RESPIRATION RATE: 14 BRPM | SYSTOLIC BLOOD PRESSURE: 115 MMHG | OXYGEN SATURATION: 98 %

## 2022-09-22 LAB
BASOPHILS # BLD AUTO: 0 10*3/MM3 (ref 0–0.2)
BASOPHILS NFR BLD AUTO: 0.4 % (ref 0–1.5)
DEPRECATED RDW RBC AUTO: 52.5 FL (ref 37–54)
EOSINOPHIL # BLD AUTO: 0.2 10*3/MM3 (ref 0–0.4)
EOSINOPHIL NFR BLD AUTO: 2.1 % (ref 0.3–6.2)
ERYTHROCYTE [DISTWIDTH] IN BLOOD BY AUTOMATED COUNT: 15 % (ref 12.3–15.4)
HCT VFR BLD AUTO: 27.3 % (ref 37.5–51)
HCT VFR BLD AUTO: 29.6 % (ref 37.5–51)
HGB BLD-MCNC: 9.1 G/DL (ref 13–17.7)
HGB BLD-MCNC: 9.6 G/DL (ref 13–17.7)
LYMPHOCYTES # BLD AUTO: 1.3 10*3/MM3 (ref 0.7–3.1)
LYMPHOCYTES NFR BLD AUTO: 13.1 % (ref 19.6–45.3)
MCH RBC QN AUTO: 32.6 PG (ref 26.6–33)
MCHC RBC AUTO-ENTMCNC: 32.5 G/DL (ref 31.5–35.7)
MCV RBC AUTO: 100.4 FL (ref 79–97)
MONOCYTES # BLD AUTO: 1.7 10*3/MM3 (ref 0.1–0.9)
MONOCYTES NFR BLD AUTO: 17.3 % (ref 5–12)
NEUTROPHILS NFR BLD AUTO: 6.5 10*3/MM3 (ref 1.7–7)
NEUTROPHILS NFR BLD AUTO: 67.1 % (ref 42.7–76)
NRBC BLD AUTO-RTO: 0 /100 WBC (ref 0–0.2)
PLATELET # BLD AUTO: 82 10*3/MM3 (ref 140–450)
PMV BLD AUTO: 9.3 FL (ref 6–12)
QT INTERVAL: 402 MS
RBC # BLD AUTO: 2.95 10*6/MM3 (ref 4.14–5.8)
WBC NRBC COR # BLD: 9.7 10*3/MM3 (ref 3.4–10.8)

## 2022-09-22 PROCEDURE — 63710000001 POTASSIUM CHLORIDE 10 MEQ TABLET CONTROLLED-RELEASE: Performed by: ORTHOPAEDIC SURGERY

## 2022-09-22 PROCEDURE — A9270 NON-COVERED ITEM OR SERVICE: HCPCS | Performed by: ORTHOPAEDIC SURGERY

## 2022-09-22 PROCEDURE — 85018 HEMOGLOBIN: CPT | Performed by: ORTHOPAEDIC SURGERY

## 2022-09-22 PROCEDURE — G0378 HOSPITAL OBSERVATION PER HR: HCPCS

## 2022-09-22 PROCEDURE — 63710000001 OXYCODONE 5 MG TABLET: Performed by: ORTHOPAEDIC SURGERY

## 2022-09-22 PROCEDURE — 63710000001 MELOXICAM 15 MG TABLET: Performed by: ORTHOPAEDIC SURGERY

## 2022-09-22 PROCEDURE — 99024 POSTOP FOLLOW-UP VISIT: CPT | Performed by: ORTHOPAEDIC SURGERY

## 2022-09-22 PROCEDURE — 85025 COMPLETE CBC W/AUTO DIFF WBC: CPT | Performed by: INTERNAL MEDICINE

## 2022-09-22 PROCEDURE — 63710000001 POLYETHYLENE GLYCOL 17 G PACK: Performed by: ORTHOPAEDIC SURGERY

## 2022-09-22 PROCEDURE — 63710000001 APIXABAN 2.5 MG TABLET: Performed by: ORTHOPAEDIC SURGERY

## 2022-09-22 PROCEDURE — 85014 HEMATOCRIT: CPT | Performed by: ORTHOPAEDIC SURGERY

## 2022-09-22 RX ORDER — POLYETHYLENE GLYCOL 3350 17 G/17G
17 POWDER, FOR SOLUTION ORAL DAILY
Status: DISCONTINUED | OUTPATIENT
Start: 2022-09-22 | End: 2022-09-22 | Stop reason: HOSPADM

## 2022-09-22 RX ADMIN — OXYCODONE 10 MG: 5 TABLET ORAL at 12:09

## 2022-09-22 RX ADMIN — APIXABAN 2.5 MG: 2.5 TABLET, FILM COATED ORAL at 08:24

## 2022-09-22 RX ADMIN — OXYCODONE 10 MG: 5 TABLET ORAL at 02:37

## 2022-09-22 RX ADMIN — OXYCODONE 10 MG: 5 TABLET ORAL at 07:05

## 2022-09-22 RX ADMIN — MELOXICAM 15 MG: 15 TABLET ORAL at 08:24

## 2022-09-22 RX ADMIN — POLYETHYLENE GLYCOL 3350 17 G: 17 POWDER, FOR SOLUTION ORAL at 08:24

## 2022-09-22 RX ADMIN — POTASSIUM CHLORIDE 10 MEQ: 750 TABLET, EXTENDED RELEASE ORAL at 08:24

## 2022-09-22 NOTE — DISCHARGE SUMMARY
Orthopedic Discharge Summary      Patient: Leonid Diehl      YOB: 1955    Medical Record Number: [id number]    Attending Physician: Yash Celis MD orthopedic surgery  Consulting Physician(s): Hospitalist/internal medicine service.  Date of Admission: 9/20/2022  5:30 AM  Date of Discharge: 9/22/2022  The patient has done very well post total knee arthroplasty.  He had a very significant deformity preoperatively.  We were able to correct intraoperatively.  He is pleased with his outcome.  He was a little dizzy yesterday with some episodes of hypotension which have since resolved.  He has made good progress with physical therapy.  He is on Eliquis for DVT prophylaxis.  His Percocet is controlling his pain and discomfort.    Patient Active Problem List   Diagnosis   • Iron deficiency anemia   • Asthma   • Benign prostatic hyperplasia   • Chronic diastolic congestive heart failure (HCC)   • Encounter for general adult medical examination without abnormal findings   • Hay fever   • Biliary disease   • Hepatic disease   • History of alcohol abuse   • Hx of pancreatitis   • Primary hypertension   • Systolic murmur   • Bilateral primary osteoarthritis of knee   • Aortic stenosis, moderate   • Gout   • Insomnia   • Thrombocytopenia (HCC)   • Hx of total knee replacement, left   • Morbid obesity (HCC)   • Wound dehiscence, surgical   • Infection of superficial incisional surgical site after procedure   • S/P total knee arthroplasty, right     IA TOTAL KNEE ARTHROPLASTY [21249] (TOTAL KNEE ARTHROPLASTY WITH JARED ROBOT)     No Known Allergies    Current Medications:     Discharge Medications      New Medications      Instructions Start Date   Eliquis 2.5 MG tablet tablet  Generic drug: apixaban   2.5 mg, Oral, 2 Times Daily      ondansetron 4 MG tablet  Commonly known as: Zofran   4 mg, Oral, Every 6 Hours PRN      oxyCODONE-acetaminophen 7.5-325 MG per tablet  Commonly known as: PERCOCET   Take one tablet by  mouth every 4-6 hours as needed for pain         Changes to Medications      Instructions Start Date   albuterol sulfate  (90 Base) MCG/ACT inhaler  Commonly known as: PROVENTIL HFA;VENTOLIN HFA;PROAIR HFA  What changed: when to take this   2 puffs, Inhalation, Every 6 Hours PRN      ferrous sulfate 324 (65 Fe) MG tablet delayed-release EC tablet  What changed: when to take this   324 mg, Oral, Daily With Breakfast      furosemide 20 MG tablet  Commonly known as: LASIX  What changed:   · when to take this  · additional instructions   20 mg, Oral, Daily PRN, swelling      potassium chloride 10 MEQ CR tablet  What changed: when to take this   10 mEq, Oral, Daily, WITH LASIX      Symbicort 160-4.5 MCG/ACT inhaler  Generic drug: budesonide-formoterol  What changed: additional instructions   2 puffs, Inhalation, 2 Times Daily - RT         Continue These Medications      Instructions Start Date   allopurinol 300 MG tablet  Commonly known as: ZYLOPRIM   300 mg, Oral, Daily      CALCIUM CARBONATE PO   1 tablet, Oral, Nightly      carvedilol 3.125 MG tablet  Commonly known as: COREG   3.125 mg, Oral, Daily      fluticasone 50 MCG/ACT nasal spray  Commonly known as: FLONASE   2 sprays, Nasal, 2 Times Daily      guaiFENesin 600 MG 12 hr tablet  Commonly known as: MUCINEX   1,200 mg, Oral, 2 Times Daily      lisinopril 10 MG tablet  Commonly known as: PRINIVIL,ZESTRIL   10 mg, Oral, Daily      meloxicam 15 MG tablet  Commonly known as: MOBIC   15 mg, Oral, Daily, Prn joint pain      mupirocin 2 % ointment  Commonly known as: BACTROBAN   Apply a pea-sized amount to each nostril twice daily for 5 days prior to surgery.      traZODone 50 MG tablet  Commonly known as: DESYREL   50 mg, Oral, Nightly      ZYRTEC PO   10 mg, Oral, Daily                  Past Medical History:   Diagnosis Date   • Aortic stenosis, moderate 04/05/2022   • Arthritis    • Asthma    • Benign prostatic hyperplasia 06/20/2017   • Chronic diastolic  congestive heart failure (HCC) 02/04/2019    states didn't really have.  no sx's present   • COPD (chronic obstructive pulmonary disease) (HCC)    • Gout    • Hay fever 01/28/2016   • Heart murmur    • History of alcohol abuse 04/18/2019   • Hx of pancreatitis 04/18/2019   • Infection of left knee (HCC) 05/2022   • Insomnia    • Iron deficiency anemia 01/11/2019   • Obesity (BMI 35.0-39.9 without comorbidity)    • Peripheral edema     none presently   • Primary hypertension 01/28/2016   • Seasonal allergies    • Thrombocytopenia (HCC)     when had PNA        Past Surgical History:   Procedure Laterality Date   • CATARACT EXTRACTION, BILATERAL     • KNEE INCISION AND DRAINAGE Left 06/07/2022    Procedure: KNEE INCISION AND DRAINAGE;  Surgeon: Yash Celis MD;  Location: [facility] MAIN OR;  Service: Orthopedics;  Laterality: Left;   • TOTAL KNEE ARTHROPLASTY Left 04/05/2022    Procedure: TOTAL KNEE ARTHROPLASTY;  Surgeon: Yash Celis MD;  Location: [facility] MAIN OR;  Service: Orthopedics;  Laterality: Left;   • TOTAL KNEE ARTHROPLASTY Right 9/20/2022    Procedure: TOTAL KNEE ARTHROPLASTY WITH JARED ROBOT;  Surgeon: Yash Celis MD;  Location: [facility] MAIN OR;  Service: Robotics - Ortho;  Laterality: Right;   • TOTAL SHOULDER ARTHROPLASTY W/ DISTAL CLAVICLE EXCISION Right 10/22/2019    Procedure: TOTAL SHOULDER REVERSE ARTHROPLASTY with Biceps Tenodesis;  Surgeon: Chris Lafleur MD;  Location: [facility] MAIN OR;  Service: Orthopedics        Social History     Occupational History   • Not on file   Tobacco Use   • Smoking status: Never Smoker   • Smokeless tobacco: Never Used   Vaping Use   • Vaping Use: Never used   Substance and Sexual Activity   • Alcohol use: Yes     Comment: occasionally   • Drug use: No   • Sexual activity: Defer      Social History     Social History Narrative   • Not on file        Family History   Problem Relation Age of Onset   • Cancer Father    • Heart disease Sister    • Hypertension  [facility] Course:  67 y.o. male admitted to [facility] to services of No att. providers found with Bilateral primary osteoarthritis of knee [M17.0]  Hx of total knee replacement, left [Z96.652] on 9/20/2022 and underwent MA TOTAL KNEE ARTHROPLASTY [95760] (TOTAL KNEE ARTHROPLASTY WITH JARED ROBOT) Per No att. providers found. Antibiotic and VTE prophylaxis were per SCIP protocols. Post-operatively the patient transferred to the post-operative floor where the patient underwent mobilization therapy that included active as well as passive ROM exercises. Opioids were titrated to achieve appropriate pain management to allow for participation in mobilization exercises. Vital signs are now stable. The incision is intact without signs or symptoms of infection. Operative extremity neurovascular status remains intact.   Appropriate education re: incision care, activity levels, medications, and follow up visits was completed and all questions were answered. The patient is now deemed stable for discharge from hospital.      DIAGNOSTIC TESTS:   Admission on 09/20/2022, Discharged on 09/22/2022   Component Date Value Ref Range Status   • WBC 09/12/2022 4.84  3.40 - 10.80 10*3/mm3 Final   • RBC 09/12/2022 3.87 (A) 4.14 - 5.80 10*6/mm3 Final   • Hemoglobin 09/12/2022 12.7 (A) 13.0 - 17.7 g/dL Final   • Hematocrit 09/12/2022 36.8 (A) 37.5 - 51.0 % Final   • MCV 09/12/2022 95.1  79.0 - 97.0 fL Final   • MCH 09/12/2022 32.8  26.6 - 33.0 pg Final   • MCHC 09/12/2022 34.5  31.5 - 35.7 g/dL Final   • RDW 09/12/2022 12.6  12.3 - 15.4 % Final   • RDW-SD 09/12/2022 43.4  37.0 - 54.0 fl Final   • MPV 09/12/2022 10.5  6.0 - 12.0 fL Final   • Platelets 09/12/2022 96 (A) 140 - 450 10*3/mm3 Final   • Color, UA 09/12/2022 Yellow  Yellow, Straw Final   • Appearance, UA 09/12/2022 Clear  Clear Final   • pH, UA 09/12/2022 6.5  5.0 - 8.0 Final   • Specific Gravity, UA 09/12/2022 1.021  1.005 - 1.030 Final   • Glucose, UA  09/12/2022 Negative  Negative Final   • Ketones, UA 09/12/2022 Negative  Negative Final   • Bilirubin, UA 09/12/2022 Negative  Negative Final   • Blood, UA 09/12/2022 Small (1+) (A) Negative Final   • Protein, UA 09/12/2022 Negative  Negative Final   • Leuk Esterase, UA 09/12/2022 Negative  Negative Final   • Nitrite, UA 09/12/2022 Negative  Negative Final   • Urobilinogen, UA 09/12/2022 0.2 E.U./dL  0.2 - 1.0 E.U./dL Final   • Protime 09/12/2022 11.3  9.6 - 11.7 Seconds Final   • INR 09/12/2022 1.10  0.93 - 1.10 Final   • RBC, UA 09/12/2022 6-12 (A) None Seen, 0-2 /HPF Final   • WBC, UA 09/12/2022 0-2  None Seen, 0-2 /HPF Final   • Bacteria, UA 09/12/2022 None Seen  None Seen /HPF Final   • Squamous Epithelial Cells, UA 09/12/2022 0-2  None Seen, 0-2 /HPF Final   • Hyaline Casts, UA 09/12/2022 None Seen  None Seen /LPF Final   • Methodology 09/12/2022 Automated Microscopy   Final   • Glucose 09/21/2022 143 (A) 65 - 99 mg/dL Final   • BUN 09/21/2022 18  8 - 23 mg/dL Final   • Creatinine 09/21/2022 0.82  0.76 - 1.27 mg/dL Final   • Sodium 09/21/2022 133 (A) 136 - 145 mmol/L Final   • Potassium 09/21/2022 4.6  3.5 - 5.2 mmol/L Final   • Chloride 09/21/2022 103  98 - 107 mmol/L Final   • CO2 09/21/2022 25.0  22.0 - 29.0 mmol/L Final   • Calcium 09/21/2022 8.2 (A) 8.6 - 10.5 mg/dL Final   • BUN/Creatinine Ratio 09/21/2022 22.0  7.0 - 25.0 Final   • Anion Gap 09/21/2022 5.0  5.0 - 15.0 mmol/L Final   • eGFR 09/21/2022 96.9  >60.0 mL/min/1.73 Final    National Kidney Foundation and American Society of Nephrology (ASN) Task Force recommended calculation based on the Chronic Kidney Disease Epidemiology Collaboration (CKD-EPI) equation refit without adjustment for race.   • Hemoglobin 09/21/2022 10.4 (A) 13.0 - 17.7 g/dL Final   • Hematocrit 09/21/2022 31.2 (A) 37.5 - 51.0 % Final   • Hemoglobin 09/22/2022 9.1 (A) 13.0 - 17.7 g/dL Final   • Hematocrit 09/22/2022 27.3 (A) 37.5 - 51.0 % Final   • WBC 09/22/2022 9.70  3.40 -  10.80 10*3/mm3 Final   • RBC 09/22/2022 2.95 (A) 4.14 - 5.80 10*6/mm3 Final   • Hemoglobin 09/22/2022 9.6 (A) 13.0 - 17.7 g/dL Final   • Hematocrit 09/22/2022 29.6 (A) 37.5 - 51.0 % Final   • MCV 09/22/2022 100.4 (A) 79.0 - 97.0 fL Final   • MCH 09/22/2022 32.6  26.6 - 33.0 pg Final   • MCHC 09/22/2022 32.5  31.5 - 35.7 g/dL Final   • RDW 09/22/2022 15.0  12.3 - 15.4 % Final   • RDW-SD 09/22/2022 52.5  37.0 - 54.0 fl Final   • MPV 09/22/2022 9.3  6.0 - 12.0 fL Final   • Platelets 09/22/2022 82 (A) 140 - 450 10*3/mm3 Final   • Neutrophil % 09/22/2022 67.1  42.7 - 76.0 % Final   • Lymphocyte % 09/22/2022 13.1 (A) 19.6 - 45.3 % Final   • Monocyte % 09/22/2022 17.3 (A) 5.0 - 12.0 % Final   • Eosinophil % 09/22/2022 2.1  0.3 - 6.2 % Final   • Basophil % 09/22/2022 0.4  0.0 - 1.5 % Final   • Neutrophils, Absolute 09/22/2022 6.50  1.70 - 7.00 10*3/mm3 Final   • Lymphocytes, Absolute 09/22/2022 1.30  0.70 - 3.10 10*3/mm3 Final   • Monocytes, Absolute 09/22/2022 1.70 (A) 0.10 - 0.90 10*3/mm3 Final   • Eosinophils, Absolute 09/22/2022 0.20  0.00 - 0.40 10*3/mm3 Final   • Basophils, Absolute 09/22/2022 0.00  0.00 - 0.20 10*3/mm3 Final   • nRBC 09/22/2022 0.0  0.0 - 0.2 /100 WBC Final     No results found.    Discharge and Follow up Instructions:   Discharge Instructions:       1. Patient is to continue with physical therapy exercises BID and continue working with        the physical therapist as ordered.  2.  Continue to follow precautions as instructed.   3.  Patient may weight bear as tolerated.   4.  Continue AMI hose daily and ice regularly. Patient instructed on frequent calf                  pumping exercises.  May remove compression stockings at night.  5.  Patient also instructed on incentive spirometer during hospitalization and                          encouraged to continue to use at home regularly.   6.  Patient is instructed to continue DVT prophylaxis.  7.  The dressing should be left in place. If waterproof  dressing is intact the patient may         shower immediately following discharge. If the dressing becomes disloged or                   saturated it should be changed and patient must wait until POD #5 to shower. If               dressing is changed, apply dry sterile dressing after showering.  8.  Follow up appointment in 2 weeks - patient to call the office at [phone] to schedule.       Date: 9/22/2022    Yash Celis MD      Time: Discharge 20 min     DICTATED UTILIZING DRAGON DICTATION

## 2022-09-22 NOTE — PROGRESS NOTES
[facility] Medicine Services Daily Progress Note    Patient Name: Leonid Diehl  : 1955  MRN: [id number]  Primary Care Physician:  Kerri Cruz APRN  Date of admission: 2022      Subjective      Chief Complaint:   Knee pain      Patient Reports    Vitals and labs reviewed  Most recent blood pressure is a little low normal hold lisinopril  Duloxetine may need fluid bolus  May discharge if stable blood pressure  Would recommend check CBC next few days      Patient's reports moderate soaking of the wound  His hemoglobin seems to be relatively stable  He had blood pressure that has improved overnight and now holding off on his Coreg until pressure improves further  Advised patient to hold off on Coreg until pressure is above 120 systolic and may resume lisinopril once blood pressure above 140  We will check his CBC/platelets to evaluate if there is any drop in his plateletsToday  Platelets came back 82.  Patient can safely continue Eliquis for now  May discharge later today if okay with orthopedic surgeon/primary attending    Review of Systems   All other systems reviewed and are negative.   *        Objective      Vitals:   Temp:  [98.1 °F (36.7 °C)-99.2 °F (37.3 °C)] 98.7 °F (37.1 °C)  Heart Rate:  [61-77] 77  Resp:  [14-16] 16  BP: ()/(44-62) 119/46    Physical Exam  Vitals and nursing note reviewed.   Constitutional:       General: He is not in acute distress.     Appearance: Normal appearance. He is well-developed. He is not ill-appearing, toxic-appearing or diaphoretic.   HENT:      Head: Normocephalic and atraumatic.      Right Ear: Ear canal and external ear normal.      Left Ear: Ear canal and external ear normal.      Nose: Nose normal. No congestion or rhinorrhea.      Mouth/Throat:      Mouth: Mucous membranes are moist.      Pharynx: No oropharyngeal exudate.   Eyes:      General: No scleral icterus.        Right eye: No discharge.         Left eye: No  discharge.      Extraocular Movements: Extraocular movements intact.      Conjunctiva/sclera: Conjunctivae normal.      Pupils: Pupils are equal, round, and reactive to light.   Neck:      Thyroid: No thyromegaly.      Vascular: No carotid bruit or JVD.      Trachea: No tracheal deviation.   Cardiovascular:      Rate and Rhythm: Normal rate and regular rhythm.      Pulses: Normal pulses.      Heart sounds: Normal heart sounds. No murmur heard.    No friction rub. No gallop.   Pulmonary:      Effort: Pulmonary effort is normal. No respiratory distress.      Breath sounds: Normal breath sounds. No stridor. No wheezing, rhonchi or rales.   Chest:      Chest wall: No tenderness.   Abdominal:      General: Bowel sounds are normal. There is no distension.      Palpations: Abdomen is soft. There is no mass.      Tenderness: There is no abdominal tenderness. There is no guarding or rebound.      Hernia: No hernia is present.   Musculoskeletal:         General: No swelling, tenderness, deformity or signs of injury. Normal range of motion.      Cervical back: Normal range of motion and neck supple. No rigidity. No muscular tenderness.      Right lower leg: No edema.      Left lower leg: No edema.   Lymphadenopathy:      Cervical: No cervical adenopathy.   Skin:     General: Skin is warm and dry.      Coloration: Skin is not jaundiced or pale.      Findings: No bruising, erythema or rash.   Neurological:      General: No focal deficit present.      Mental Status: He is alert and oriented to person, place, and time. Mental status is at baseline.      Cranial Nerves: No cranial nerve deficit.      Sensory: No sensory deficit.      Motor: No weakness or abnormal muscle tone.      Coordination: Coordination normal.   Psychiatric:         Mood and Affect: Mood normal.         Behavior: Behavior normal.         Thought Content: Thought content normal.         Judgment: Judgment normal.               Result Review    Result  Review:  I have personally reviewed the results from the time of this admission to 9/22/2022 10:23 EDT and agree with these findings:  [x]  Laboratory  [x]  Microbiology  [x]  Radiology  []  EKG/Telemetry   []  Cardiology/Vascular   []  Pathology  []  Old records  []  Other:  Most notable findings include:       Wounds (last 24 hours)     LDA Wound     Row Name 09/21/22 1740 09/21/22 1600 09/21/22 1200       Wound 09/20/22 0847 Right anterior knee Incision    Wound - Properties Group Placement Date: 09/20/22  -IR Placement Time: 0847  -IR Side: Right  -IR Orientation: anterior  -IR Location: knee  -IR Primary Wound Type: Incision  -IR    Dressing Appearance intact;dry  -LR -- --    Closure JOSE LUIS  -LR JOSE LUIS  -SM JOSE LUIS  -SM    Drainage Amount -- none  -SM none  -SM    Retired Wound - Properties Group Placement Date: 09/20/22  -IR Placement Time: 0847  -IR Side: Right  -IR Orientation: anterior  -IR Location: knee  -IR Primary Wound Type: Incision  -IR    Retired Wound - Properties Group Date first assessed: 09/20/22  -IR Time first assessed: 0847  -IR Side: Right  -IR Location: knee  -IR Primary Wound Type: Incision  -IR          User Key  (r) = Recorded By, (t) = Taken By, (c) = Cosigned By    Initials Name Provider Type    Marika Benites RN Registered Nurse    Noris Navarro RN Registered Nurse    Paola Rosen RN Registered Nurse                  Assessment & Plan      Brief Patient Summary:  Leonid Diehl is a 67 y.o. male who         apixaban, 2.5 mg, Oral, Q12H  budesonide-formoterol, 2 puff, Inhalation, BID - RT  carvedilol, 3.125 mg, Oral, Daily  meloxicam, 15 mg, Oral, Daily  polyethylene glycol, 17 g, Oral, Daily  potassium chloride, 10 mEq, Oral, Daily       lactated ringers, 9 mL/hr, Last Rate: 9 mL/hr (09/20/22 0652)  lactated ringers, 75 mL/hr, Last Rate: 75 mL/hr (09/20/22 1728)         Active Hospital Problems:  Active Hospital Problems    Diagnosis    • **Bilateral primary osteoarthritis  of knee    • S/P total knee arthroplasty, right    • Hx of total knee replacement, left    • Aortic stenosis, moderate    • Thrombocytopenia (HCC)    • Gout    • Chronic diastolic congestive heart failure (HCC)    • Iron deficiency anemia    • Systolic murmur    • Primary hypertension    • Hay fever    • Asthma      Plan:     Bilateral primary osteoarthritis of knee s/p right total knee arthroplasty 9/20/2022  -h/o left total knee arthroplasty (04/05/22)  -peripheral nerve block placed by anesthesia  -pain control per ortho   -IV cefazolin for surgical prophylaxis per ortho  -Eliquis 2.5 mg every 12 hours per Ortho  -PT/OT to eval and treat  -fall precautions     Aortic stenosis, moderate  -patient advised to follow up with cardiology as outpatient   Avoid hypotension-hold lisinopril the blood pressure stable        Chronic diastolic congestive heart failure   -appears compensated  -continue as needed Lasix  He had blood pressure that has improved overnight and now holding off on his Coreg until pressure improves further  Advised patient to hold off on Coreg until pressure is above 120 systolic and may resume lisinopril once blood pressure above 140  We will check his CBC/platelets to evaluate if there is any drop in his plateletsToday  Platelets came back 82.  Patient can safely continue Eliquis for now  May discharge later today if okay with orthopedic surgeon/primary attending      Iron deficiency anemia  -on iron supplement   -monitor H&H  -Drop in hemoglobin suggest blood loss anemia  - No active bleeding  Now on Eliquis for DVT prophylaxis    Asthma / Hay fever  -stable without exacerbation  -continue albuterol and Symbicort  -Zyrtec-D substituted for Allegra-D     Primary hypertension, chronic  -Controlled   -continue carvedilol and lisinopril as outlined above  -monitor BP     Gout  -continue allopurinol      Insomnia  -continue trazodone      Thrombocytopenia, chronic  -Platelets 96   -Monitor       DVT  prophylaxis:  Medical DVT prophylaxis orders are present.    CODE STATUS:    Code Status (Patient has no pulse and is not breathing): CPR (Attempt to Resuscitate)  Medical Interventions (Patient has pulse or is breathing): Full      Disposition:  I expect patient to be discharged home.    This patient has been examined wearing appropriate Personal Protective Equipment and discussed with hospital infection control department. 09/22/22      Electronically signed by Ken Javed MD, 09/22/22, 10:23 EDT.  [facility] Hospitalist Team

## 2022-09-22 NOTE — CASE MANAGEMENT/SOCIAL WORK
Case Management Discharge Note      Final Note: Home    Provided Post Acute Provider List?: Yes  Post Acute Provider List: Outpatient Therapy  Delivered To: Patient  Method of Delivery: Telephone    Selected Continued Care - Discharged on 9/22/2022 Admission date: 9/20/2022 - Discharge disposition: Home or Self Care                Transportation Services  Private: Car    Final Discharge Disposition Code: 01 - home or self-care

## 2022-09-22 NOTE — OUTREACH NOTE
Prep Survey    Flowsheet Row Responses   [facility] patient discharged from? [facility]   Is LACE score < 7 ? No   Emergency Room discharge w/ pulse ox? No   Eligibility [facility]   Date of Admission 09/20/22   Date of Discharge 09/22/22   Discharge Disposition Home or Self Care   Discharge diagnosis Right total knee arthroplasty   Does the patient have one of the following disease processes/diagnoses(primary or secondary)? Total Joint Replacement   Does the patient have Home health ordered? No   Is there a DME ordered? No   Prep survey completed? Yes          NAUN EMERY - Registered Nurse

## 2022-09-22 NOTE — PLAN OF CARE
Addended by: RAZA BARNES on: 2/23/2022 03:07 PM     Modules accepted: Orders     Goal Outcome Evaluation:   VSS. Aox4. Assist of 2 to bathroom w/walker. Pt ambulates well, just needs help getting up from bed or chair. Dressing is clean and intact w/dry drainage. Pain controlled w/p.o. roxicodone. To discharge home today.

## 2022-09-22 NOTE — PLAN OF CARE
Goal Outcome Evaluation:      C/o  moderate incisional pain throughout the shift, medicated per MAR. Neurovascular checks are intact.  Call light is within reach and is able to make his needs known. Vital signs taken and recorded.

## 2022-09-23 ENCOUNTER — HOSPITAL ENCOUNTER (EMERGENCY)
Facility: HOSPITAL | Age: 67
Discharge: HOME | End: 2022-09-23
Attending: EMERGENCY MEDICINE | Admitting: EMERGENCY MEDICINE

## 2022-09-23 ENCOUNTER — TRANSITIONAL CARE MANAGEMENT TELEPHONE ENCOUNTER (OUTPATIENT)
Dept: CALL CENTER | Facility: HOSPITAL | Age: 67
End: 2022-09-23

## 2022-09-23 VITALS
SYSTOLIC BLOOD PRESSURE: 127 MMHG | HEIGHT: 74 IN | TEMPERATURE: 98.7 F | RESPIRATION RATE: 20 BRPM | BODY MASS INDEX: 36.57 KG/M2 | OXYGEN SATURATION: 97 % | HEART RATE: 74 BPM | WEIGHT: 285 LBS | DIASTOLIC BLOOD PRESSURE: 51 MMHG

## 2022-09-23 DIAGNOSIS — D69.6 THROMBOCYTOPENIA: ICD-10-CM

## 2022-09-23 DIAGNOSIS — R58 POSTOPERATIVE ECCHYMOSIS: Primary | ICD-10-CM

## 2022-09-23 LAB
ANION GAP SERPL CALCULATED.3IONS-SCNC: 9 MMOL/L (ref 5–15)
BASOPHILS # BLD AUTO: 0 10*3/MM3 (ref 0–0.2)
BASOPHILS NFR BLD AUTO: 0.6 % (ref 0–1.5)
BUN SERPL-MCNC: 18 MG/DL (ref 8–23)
BUN/CREAT SERPL: 20.9 (ref 7–25)
CALCIUM SPEC-SCNC: 8.7 MG/DL (ref 8.6–10.5)
CHLORIDE SERPL-SCNC: 107 MMOL/L (ref 98–107)
CO2 SERPL-SCNC: 20 MMOL/L (ref 22–29)
CREAT SERPL-MCNC: 0.86 MG/DL (ref 0.76–1.27)
DEPRECATED RDW RBC AUTO: 53.8 FL (ref 37–54)
EGFRCR SERPLBLD CKD-EPI 2021: 95.5 ML/MIN/1.73
EOSINOPHIL # BLD AUTO: 0.1 10*3/MM3 (ref 0–0.4)
EOSINOPHIL NFR BLD AUTO: 2.1 % (ref 0.3–6.2)
ERYTHROCYTE [DISTWIDTH] IN BLOOD BY AUTOMATED COUNT: 15.3 % (ref 12.3–15.4)
GLUCOSE SERPL-MCNC: 113 MG/DL (ref 65–99)
HCT VFR BLD AUTO: 27 % (ref 37.5–51)
HGB BLD-MCNC: 8.7 G/DL (ref 13–17.7)
LYMPHOCYTES # BLD AUTO: 0.8 10*3/MM3 (ref 0.7–3.1)
LYMPHOCYTES NFR BLD AUTO: 12.3 % (ref 19.6–45.3)
MCH RBC QN AUTO: 32.8 PG (ref 26.6–33)
MCHC RBC AUTO-ENTMCNC: 32.4 G/DL (ref 31.5–35.7)
MCV RBC AUTO: 101.1 FL (ref 79–97)
MONOCYTES # BLD AUTO: 1.2 10*3/MM3 (ref 0.1–0.9)
MONOCYTES NFR BLD AUTO: 16.8 % (ref 5–12)
NEUTROPHILS NFR BLD AUTO: 4.7 10*3/MM3 (ref 1.7–7)
NEUTROPHILS NFR BLD AUTO: 68.2 % (ref 42.7–76)
NRBC BLD AUTO-RTO: 0 /100 WBC (ref 0–0.2)
PLATELET # BLD AUTO: 69 10*3/MM3 (ref 140–450)
PMV BLD AUTO: 9.1 FL (ref 6–12)
POTASSIUM SERPL-SCNC: 4.5 MMOL/L (ref 3.5–5.2)
RBC # BLD AUTO: 2.67 10*6/MM3 (ref 4.14–5.8)
SODIUM SERPL-SCNC: 136 MMOL/L (ref 136–145)
WBC NRBC COR # BLD: 6.9 10*3/MM3 (ref 3.4–10.8)

## 2022-09-23 PROCEDURE — 99283 EMERGENCY DEPT VISIT LOW MDM: CPT

## 2022-09-23 PROCEDURE — 85025 COMPLETE CBC W/AUTO DIFF WBC: CPT | Performed by: EMERGENCY MEDICINE

## 2022-09-23 PROCEDURE — 80048 BASIC METABOLIC PNL TOTAL CA: CPT | Performed by: EMERGENCY MEDICINE

## 2022-09-23 PROCEDURE — 36415 COLL VENOUS BLD VENIPUNCTURE: CPT

## 2022-09-23 RX ORDER — CEPHALEXIN 500 MG/1
500 CAPSULE ORAL 4 TIMES DAILY
Qty: 20 CAPSULE | Refills: 0 | Status: SHIPPED | OUTPATIENT
Start: 2022-09-23 | End: 2022-09-28 | Stop reason: SDUPTHER

## 2022-09-23 NOTE — OUTREACH NOTE
Call Center TCM Note    Flowsheet Row Responses   [facility]? [facility]   Does the patient have one of the following disease processes/diagnoses(primary or secondary)? Total Joint Replacement   Joint surgery performed? Knee   TCM attempt successful? No   Unsuccessful attempts Attempt 2          Jaz Christie LPN    9/23/2022, 15:05 EDT

## 2022-09-23 NOTE — OUTREACH NOTE
Call Center TCM Note    Flowsheet Row Responses   [facility] from? [facility]   Does the patient have one of the following disease processes/diagnoses(primary or secondary)? Total Joint Replacement   Joint surgery performed? Knee   TCM attempt successful? No   Unsuccessful attempts Attempt 1          Jaz Christie LPN    9/23/2022, 08:31 EDT

## 2022-09-26 ENCOUNTER — TRANSITIONAL CARE MANAGEMENT TELEPHONE ENCOUNTER (OUTPATIENT)
Dept: CALL CENTER | Facility: HOSPITAL | Age: 67
End: 2022-09-26

## 2022-09-26 NOTE — OUTREACH NOTE
Call Center TCM Note    Flowsheet Row Responses   [facility] patient discharged from? [facility]   Does the patient have one of the following disease processes/diagnoses(primary or secondary)? Total Joint Replacement   Joint surgery performed? Knee   TCM attempt successful? Yes   Call start time 1210   Call end time 1211   Has the patient been back in either the hospital or Emergency Department since discharge? Yes   If the patient has been back to hospital or Emergency Department list date and reason was in ER on 9/23 for knee pain   Discharge diagnosis Right total knee arthroplasty   Is patient permission given to speak with other caregiver? Yes   List who call center can speak with spouse- Dorita   Person spoke with today (if not patient) and relationship spouse   Does the patient have all medications related to this admission filled (includes all antibiotics, pain medications, etc.) Yes   Is the patient taking all medications as directed (includes completed medication regime)? Yes   Comments will have a post-op appt with surgeon on 9/28   Has home health visited the patient within 72 hours of discharge? N/A   Psychosocial issues? No   Does the patient have a wound vac in place? N/A   Has the patient fallen since discharge? No   Did the patient receive a copy of their discharge instructions? Yes   What is the patient's perception of their functional status since discharge? Improving   Is the patient/caregiver able to teach back the hierarchy of who to call/visit for symptoms/problems? PCP, Specialist, Home health nurse, Urgent Care, ED, 911 Yes   TCM call completed? Yes   Wrap up additional comments Quick call but spouse states that patient is doing well, will be seeing his surgeon on 9/28.          Rosa Elena West RN    9/26/2022, 12:12 EDT

## 2022-09-28 ENCOUNTER — OFFICE VISIT (OUTPATIENT)
Dept: ORTHOPEDIC SURGERY | Facility: CLINIC | Age: 67
End: 2022-09-28

## 2022-09-28 DIAGNOSIS — Z96.651 S/P TOTAL KNEE ARTHROPLASTY, RIGHT: Primary | ICD-10-CM

## 2022-09-28 DIAGNOSIS — Z96.652 HX OF TOTAL KNEE REPLACEMENT, LEFT: ICD-10-CM

## 2022-09-28 PROCEDURE — 99024 POSTOP FOLLOW-UP VISIT: CPT | Performed by: ORTHOPAEDIC SURGERY

## 2022-09-28 RX ORDER — OXYCODONE HYDROCHLORIDE AND ACETAMINOPHEN 5; 325 MG/1; MG/1
TABLET ORAL
Qty: 40 TABLET | Refills: 0 | Status: SHIPPED | OUTPATIENT
Start: 2022-09-28 | End: 2022-10-20

## 2022-09-28 RX ORDER — CEPHALEXIN 500 MG/1
500 CAPSULE ORAL EVERY 6 HOURS
Qty: 30 CAPSULE | Refills: 0 | Status: SHIPPED | OUTPATIENT
Start: 2022-09-28 | End: 2022-10-24 | Stop reason: SDUPTHER

## 2022-09-28 RX ORDER — ONDANSETRON 4 MG/1
4 TABLET, FILM COATED ORAL EVERY 6 HOURS PRN
Qty: 30 TABLET | Refills: 0 | Status: SHIPPED | OUTPATIENT
Start: 2022-09-28 | End: 2023-04-04

## 2022-09-28 NOTE — PROGRESS NOTES
POST OP TOTAL GLOBAL      NAME: Leonid Diehl           : 1955    MRN: [id number]    Chief Complaint   Patient presents with   • Right Knee - Post-op   • Wound Check       Date of Surgery: 2022  ?  HPI:   Patient returns today for 8 day follow up of right total knee arthroplasty Incision(s) healing nicely/as expected with no signs of infection. Patient reports doing well with no unusual complaints. No fevers, rigors or chills. Appears to be progressing appropriately. Patient is on appropriate anticoagulation.  He is doing well postoperatively.  He did have quite a bit of swelling and bruising which is to be expected after the extensive soft tissue release to correct the flexion contracture and the varus deformity of the knee.      Ortho Exam:   Right knee.The patient is status post total knee arthroplasty postoperative 8 day(s). Incision is clean. Calf is soft and nontender. Homans sign is negative. There is no clicking, popping or catching. Anterior and posterior drawer signs are negative.  There is no instability of the components. Appropriate amounts of swelling and bruising are noted. Dorsalis pedis and posterior tibial artery pulses are palpable. Common peroneal nerve function is well preserved. Range of motion is from Ten-90 degrees of flexion. Gait is cautious but otherwise fairly normal. There is no evidence of a deep seated joint infection.      Diagnostic Studies:  xrays to be obtained at next visit        Assessment:  Diagnoses and all orders for this visit:    1. S/P total knee arthroplasty, right (Primary)            Plan   · Incision care  · To use ACE wrap/AMI stocking  · Continue ice to joint   · Stretching and strengthening exercises following the total knee arthroplasty protocols with physical therapy.  · Tablet Percocet 5/325 mg tab 1 p.o. nightly for pain swelling and discomfort total 40 pills.  · Controlled substance treatment options discussed in detail. Patient's signed consent  to medical options on file. MOIRA form in chart.  The patient is being provided this narcotic prescription to address the acute medical condition that they are undergoing/experiencing at this time.  It is my medical and surgical assessment that more than 3 days of narcotic medication is necessary to help control the pain and discomfort that this patient is experiencing at this point.  Risks of narcotic medication usage outlined.  Possibility of physical and psychological dependence and abuse, especially long term, emphasized to the patient.  I have explained to the patient that we will try to wean them off the narcotic medication as soon as possible and introduce non-narcotic modalities of pain control.  At this point in the patient's clinical spectrum, however, alternative available treatments are inadequate to control their pain and symptoms.  I have also discussed the possibility of random drug testing as well as pill counts to prevent misuse and misappropriation of the narcotic medications prescribed to the patient.  · Aggressive ROM  · Falls precautions  · Once Xarelto is completed, change to an enteric coated Aspirin 325 mg daily until 6 weeks following your surgery  · Continue with lifelong antibiotic prophylaxis with dental procedures following total joint replacement.  · Follow up in 4 week(s)    Date of encounter: 09/28/2022   Yash Celis MD

## 2022-09-30 ENCOUNTER — READMISSION MANAGEMENT (OUTPATIENT)
Dept: CALL CENTER | Facility: HOSPITAL | Age: 67
End: 2022-09-30

## 2022-09-30 NOTE — OUTREACH NOTE
Total Joint Week 2 Survey    Flowsheet Row Responses   [facility] from? [facility]   Does the patient have one of the following disease processes/diagnoses(primary or secondary)? Total Joint Replacement   Joint surgery performed? Knee   Week 2 attempt successful? No   Unsuccessful attempts Attempt 1          GABRIEL CALLE - Registered Nurse

## 2022-10-03 ENCOUNTER — READMISSION MANAGEMENT (OUTPATIENT)
Dept: CALL CENTER | Facility: HOSPITAL | Age: 67
End: 2022-10-03

## 2022-10-03 NOTE — OUTREACH NOTE
Total Joint Month 2 Survey    Flowsheet Row Responses   [facility] patient discharged from? [facility]   Does the patient have one of the following disease processes/diagnoses(primary or secondary)? Total Joint Replacement   Joint surgery performed? Knee   Call start time 0828   Call end time 0832   Has the patient been back in either the hospital or Emergency Department since discharge? Yes   Is patient permission given to speak with other caregiver? Yes   List who call center can speak with spouse- Dorita   Person spoke with today (if not patient) and relationship spouse   Is the patient taking all medications as directed (includes completed medication regime)? Yes   Is the patient able to teach back alternate methods of pain control? Ice   Has the patient kept scheduled appointments due by today? Yes   Has the patient fallen since discharge? No   What is the patient's perception of their functional status since discharge? Improving   Is the patient able to teach back signs and symptoms of infection? Temp >100.4 for 24h or longer, Incisional drainage, Blisters around incision, Increased swelling or redness around incision (not associated with surgical edema), Severe discomfort or pain, Shortness of breath or chest pain   Is the patient/caregiver able to teach back the hierarchy of who to call/visit for symptoms/problems? PCP, Specialist, Home health nurse, Urgent Care, ED, 911 Yes          MARLY MACK - Registered Nurse

## 2022-10-04 ENCOUNTER — APPOINTMENT (OUTPATIENT)
Dept: ULTRASOUND IMAGING | Facility: HOSPITAL | Age: 67
End: 2022-10-04

## 2022-10-04 ENCOUNTER — HOSPITAL ENCOUNTER (OUTPATIENT)
Facility: HOSPITAL | Age: 67
Discharge: HOME | End: 2022-10-04
Attending: EMERGENCY MEDICINE | Admitting: EMERGENCY MEDICINE

## 2022-10-04 VITALS
DIASTOLIC BLOOD PRESSURE: 73 MMHG | TEMPERATURE: 98 F | HEART RATE: 68 BPM | BODY MASS INDEX: 35.94 KG/M2 | WEIGHT: 280 LBS | RESPIRATION RATE: 18 BRPM | OXYGEN SATURATION: 96 % | HEIGHT: 74 IN | SYSTOLIC BLOOD PRESSURE: 128 MMHG

## 2022-10-04 DIAGNOSIS — M79.89 RIGHT LEG SWELLING: Primary | ICD-10-CM

## 2022-10-04 DIAGNOSIS — Z98.890 POST-OPERATIVE STATE: ICD-10-CM

## 2022-10-04 PROCEDURE — G0463 HOSPITAL OUTPT CLINIC VISIT: HCPCS | Performed by: EMERGENCY MEDICINE

## 2022-10-04 PROCEDURE — 99202 OFFICE O/P NEW SF 15 MIN: CPT | Performed by: EMERGENCY MEDICINE

## 2022-10-04 PROCEDURE — 93971 EXTREMITY STUDY: CPT

## 2022-10-04 NOTE — DISCHARGE INSTRUCTIONS
Your ultrasound was negative for DVT.  Please continue to elevate the leg and participate in physical therapy.  You may discontinue the Eliquis.

## 2022-10-04 NOTE — ED PROVIDER NOTES
[facility] FREE-STANDING ED / URGENT CARE    Patient: Leonid MASSEY Shanita 1955 [id number]  Physician: Ирина Crabtree MD  DOS: 10/4/2022    HPI  History of Present Illness  67-year-old male presents with pain and swelling in his right knee and calf that is worsened in the past several days.  He had a right knee replacement 2 weeks ago, and has had expected recovery until the last few days when his right calf is getting more tense and bruised.  The patient's physical therapist was concerned for DVT and sent him here to the emergency department for evaluation.  No significant numbness or tingling in the leg.  He typically keeps the leg elevated which helps with swelling somewhat.  The patient has been on prophylactic Eliquis for the past 2 weeks, last dose today.        ROS  As reviewed in HPI above.    Patient Active Problem List   Diagnosis   • Iron deficiency anemia   • Asthma   • Benign prostatic hyperplasia   • Chronic diastolic congestive heart failure (HCC)   • Encounter for general adult medical examination without abnormal findings   • Hay fever   • Biliary disease   • Hepatic disease   • History of alcohol abuse   • Hx of pancreatitis   • Primary hypertension   • Systolic murmur   • Bilateral primary osteoarthritis of knee   • Aortic stenosis, moderate   • Gout   • Insomnia   • Thrombocytopenia (HCC)   • Hx of total knee replacement, left   • Morbid obesity (HCC)   • Wound dehiscence, surgical   • Infection of superficial incisional surgical site after procedure   • S/P total knee arthroplasty, right     Prior to Admission medications    Medication Sig Start Date End Date Taking? Authorizing Provider   albuterol sulfate  (90 Base) MCG/ACT inhaler Inhale 2 puffs Every 6 (Six) Hours As Needed for Wheezing or Shortness of Air.  Patient taking differently: Inhale 2 puffs As Needed for Wheezing or Shortness of Air. 8/22/22   Kerri Cruz APRN   allopurinol (ZYLOPRIM) 300 MG tablet Take 1 tablet by  mouth Daily. 8/22/22   Kerri Cruz APRN   apixaban (ELIQUIS) 2.5 MG tablet tablet Take 1 tablet by mouth 2 (Two) Times a Day. 9/20/22   Yash Celis MD   Calcium Carbonate Antacid (CALCIUM CARBONATE PO) Take 1 tablet by mouth Every Night.    ProviderJessica MD   carvedilol (COREG) 3.125 MG tablet Take 1 tablet by mouth Daily. 8/22/22   Kerri Cruz APRN   cephalexin (KEFLEX) 500 MG capsule Take 1 capsule by mouth Every 6 (Six) Hours. 9/28/22   Yash Celis MD   Cetirizine HCl (ZYRTEC PO) Take 10 mg by mouth Daily.    ProviderJessica MD   ferrous sulfate 324 (65 Fe) MG tablet delayed-release EC tablet Take 1 tablet by mouth Daily With Breakfast.  Patient taking differently: Take 324 mg by mouth Every Night. 8/22/22   Kerri Cruz APRN   fluticasone (FLONASE) 50 MCG/ACT nasal spray 2 sprays into the nostril(s) as directed by provider 2 (Two) Times a Day.    ProviderJessica MD   furosemide (LASIX) 20 MG tablet Take 1 tablet by mouth Daily As Needed (swelling). swelling  Patient taking differently: Take 20 mg by mouth Every Night. 8/22/22   Kerri Cruz APRN   guaiFENesin (MUCINEX) 600 MG 12 hr tablet Take 1,200 mg by mouth 2 (Two) Times a Day.    Jessica Barrios MD   lisinopril (PRINIVIL,ZESTRIL) 10 MG tablet Take 1 tablet by mouth Daily. 8/26/22   Kerri Cruz APRN   meloxicam (MOBIC) 15 MG tablet Take 1 tablet by mouth Daily. Prn joint pain 7/27/22   Jaymie Woods PA   mupirocin (BACTROBAN) 2 % ointment Apply a pea-sized amount to each nostril twice daily for 5 days prior to surgery. 8/17/22   Yash Celis MD   ondansetron (Zofran) 4 MG tablet Take 1 tablet by mouth Every 6 (Six) Hours As Needed for Nausea or Vomiting. 9/28/22   Yash Celis MD   oxyCODONE-acetaminophen (PERCOCET) 5-325 MG per tablet Take one tablet by mouth every 4-6 hours prn pain 9/28/22   Yash Celis MD   oxyCODONE-acetaminophen (PERCOCET) 7.5-325 MG per tablet Take one tablet by mouth every  4-6 hours as needed for pain 9/20/22   Yash Celis MD   potassium chloride 10 MEQ CR tablet Take 1 tablet by mouth Daily. WITH LASIX  Patient taking differently: Take 10 mEq by mouth Every Morning. WITH LASIX 6/27/22   Kerri Cruz APRN   Symbicort 160-4.5 MCG/ACT inhaler Inhale 2 puffs 2 (Two) Times a Day.  Patient taking differently: Inhale 2 puffs 2 (Two) Times a Day. USES SEASONALLLY 2/8/21   Kerri Cruz APRN   traZODone (DESYREL) 50 MG tablet Take 1 tablet by mouth Every Night. 8/22/22   Kerri Cruz APRN     Past Medical History:   Diagnosis Date   • Aortic stenosis, moderate 04/05/2022   • Arthritis    • Asthma    • Benign prostatic hyperplasia 06/20/2017   • Chronic diastolic congestive heart failure (HCC) 02/04/2019    states didn't really have.  no sx's present   • COPD (chronic obstructive pulmonary disease) (HCC)    • Gout    • Hay fever 01/28/2016   • Heart murmur    • History of alcohol abuse 04/18/2019   • Hx of pancreatitis 04/18/2019   • Infection of left knee (HCC) 05/2022   • Insomnia    • Iron deficiency anemia 01/11/2019   • Obesity (BMI 35.0-39.9 without comorbidity)    • Peripheral edema     none presently   • Primary hypertension 01/28/2016   • Seasonal allergies    • Thrombocytopenia (HCC)     when had PNA     Past Surgical History:   Procedure Laterality Date   • CATARACT EXTRACTION, BILATERAL     • KNEE INCISION AND DRAINAGE Left 06/07/2022    Procedure: KNEE INCISION AND DRAINAGE;  Surgeon: Yash Celis MD;  Location: [facility] MAIN OR;  Service: Orthopedics;  Laterality: Left;   • TOTAL KNEE ARTHROPLASTY Left 04/05/2022    Procedure: TOTAL KNEE ARTHROPLASTY;  Surgeon: Yash Celis MD;  Location: [facility] MAIN OR;  Service: Orthopedics;  Laterality: Left;   • TOTAL KNEE ARTHROPLASTY Right 9/20/2022    Procedure: TOTAL KNEE ARTHROPLASTY WITH JARED ROBOT;  Surgeon: Yash Celis MD;  Location: [facility] MAIN OR;  Service: Robotics - Ortho;  Laterality: Right;   • TOTAL SHOULDER  ARTHROPLASTY W/ DISTAL CLAVICLE EXCISION Right 10/22/2019    Procedure: TOTAL SHOULDER REVERSE ARTHROPLASTY with Biceps Tenodesis;  Surgeon: Chris Lafleur MD;  Location: [facility] OR;  Service: Orthopedics     Family History   Problem Relation Age of Onset   • Cancer Father    • Heart disease Sister    • Hypertension Brother      Social History     Socioeconomic History   • Marital status:    Tobacco Use   • Smoking status: Never Smoker   • Smokeless tobacco: Never Used   Vaping Use   • Vaping Use: Never used   Substance and Sexual Activity   • Alcohol use: Yes     Comment: occasionally   • Drug use: No   • Sexual activity: Defer     No Known Allergies    PHYSICAL EXAM  Vital Signs (last day)     Date/Time Temp Temp src Pulse Resp BP Patient Position SpO2    10/04/22 1137 98 (36.7) Oral 62 18 132/65 Sitting 96        Physical Exam  Vitals and nursing note reviewed.   Constitutional:       General: He is not in acute distress.     Appearance: He is well-developed.   HENT:      Head: Normocephalic and atraumatic.   Eyes:      Conjunctiva/sclera: Conjunctivae normal.   Cardiovascular:      Comments: Right foot is warm, capillary refill less than 2 seconds.  Pulmonary:      Effort: Pulmonary effort is normal.   Abdominal:      General: There is no distension.   Musculoskeletal:      Comments: Right calf is tense and swollen, mildly tender.   Skin:     General: Skin is warm and dry.      Findings: Bruising present.      Comments: Extensive bruising throughout the right leg.  Incision present overlying the right knee, clean, dry, intact.  No surrounding erythema.  No purulent discharge.   Neurological:      Mental Status: He is alert.   Psychiatric:         Mood and Affect: Mood normal.         Behavior: Behavior normal.           DIAGNOSTICS  No radiology results for the last day    Labs Reviewed - No data to display      MDM  Number of Diagnoses or Management Options  Post-operative state  Right leg  swelling  Diagnosis management comments: DVT ultrasound is negative.  The bruising is likely a natural consequence of the patient's postoperative state.  Discharged home.           No orders of the defined types were placed in this encounter.      Procedures    Final diagnoses:   Right leg swelling   Post-operative state       Kerri Cruz, APRN  [address]  [place] [facility]  [phone]    Schedule an appointment as soon as possible for a visit in 5 days        ED Disposition     ED Disposition   Discharge    Condition   Stable    Comment   Patient discharged home with spouse in NAD and ambulatory. Both patient and spouse verbalized understanding of follow up and discharge instructions.             MD Bro Aj, Ирина Armas MD  10/07/22 0104

## 2022-10-12 ENCOUNTER — TELEPHONE (OUTPATIENT)
Dept: ORTHOPEDIC SURGERY | Facility: CLINIC | Age: 67
End: 2022-10-12

## 2022-10-12 NOTE — TELEPHONE ENCOUNTER
Patient and his spouse Dorita came into the office this morning to have staples removed. I removed a portion of the mandeep and needed Dr. Celis's assistance. Dr. Celis removed the remaining staples. Island dressing was applied and ace wrapped from ankle to groin.     Patient tolerated the procedure well.

## 2022-10-20 ENCOUNTER — READMISSION MANAGEMENT (OUTPATIENT)
Dept: CALL CENTER | Facility: HOSPITAL | Age: 67
End: 2022-10-20

## 2022-10-20 DIAGNOSIS — Z96.652 HX OF TOTAL KNEE REPLACEMENT, LEFT: ICD-10-CM

## 2022-10-20 RX ORDER — OXYCODONE HYDROCHLORIDE AND ACETAMINOPHEN 5; 325 MG/1; MG/1
TABLET ORAL
Qty: 40 TABLET | Refills: 0 | Status: SHIPPED | OUTPATIENT
Start: 2022-10-20 | End: 2023-04-04

## 2022-10-20 NOTE — OUTREACH NOTE
Total Joint Month 1 Survey    Flowsheet Row Responses   [facility] patient discharged from? [facility]   Does the patient have one of the following disease processes/diagnoses(primary or secondary)? Total Joint Replacement   Joint surgery performed? Knee   Month 1 attempt successful? Yes   Call start time 1324   Call end time 1326   Has the patient been back in either the hospital or Emergency Department since discharge? Yes   Is patient permission given to speak with other caregiver? Yes   Person spoke with today (if not patient) and relationship spouse   Is the patient taking all medications as directed (includes completed medication regime)? Yes   Is the patient able to teach back alternate methods of pain control? Ice, Short, frequent activity, Correct alignment   Has the patient kept scheduled appointments due by today? Yes   Is the patient still receiving Home Health Services? N/A   Is the patient still attending therapy sessions(either in the home or as an outpatient)? Yes   Has the patient fallen since discharge? No   What is the patient's perception of their functional status since discharge? Improving   Is the patient able to teach back signs and symptoms of infection? Incisional drainage, Blisters around incision, Increased swelling or redness around incision (not associated with surgical edema), Severe discomfort or pain, Changes in mobility, Shortness of breath or chest pain  [staple removal last week so a few places with healing.]   If the patient is a current smoker, are they able to teach back resources for cessation? Not a smoker   Is the patient/caregiver able to teach back the hierarchy of who to call/visit for symptoms/problems? PCP, Specialist, Home health nurse, Urgent Care, ED, 911 Yes   Additional teach back comments No questions today.   Month 1 call completed? Yes   Revoked No further contact(revokes)-requires comment   Graduated/Revoked comments He is still in [employer], doing more each  nikki.          FE WONG - Registered Nurse

## 2022-10-20 NOTE — TELEPHONE ENCOUNTER
PATIENTS SPOUSE ALISSA EDGAR [phone] CALLED IN WITH PATIENT ASKING IF HE CAN BE PRESCRIBED A REFILL FOR HIS oxyCODONE-acetaminophen (PERCOCET) 5-325 MG per tablet. PATIENT HAS BEEN STRETCHING THE PILLS OUT OVER THE LAST MONTH BUT IS STILL HAVING PAIN IN HIS RIGHT KNEE. IF OK WITH SENDING RX IT CAN BE SENT TO [facility] IN [place], [place]. PLEASE ADVISE.

## 2022-10-24 ENCOUNTER — OFFICE VISIT (OUTPATIENT)
Dept: FAMILY MEDICINE CLINIC | Facility: CLINIC | Age: 67
End: 2022-10-24

## 2022-10-24 VITALS
WEIGHT: 281 LBS | HEIGHT: 74 IN | BODY MASS INDEX: 36.06 KG/M2 | DIASTOLIC BLOOD PRESSURE: 70 MMHG | TEMPERATURE: 98.2 F | SYSTOLIC BLOOD PRESSURE: 122 MMHG | HEART RATE: 58 BPM | OXYGEN SATURATION: 96 %

## 2022-10-24 DIAGNOSIS — Z96.651 S/P TOTAL KNEE REPLACEMENT, RIGHT: Primary | ICD-10-CM

## 2022-10-24 DIAGNOSIS — T81.41XA SUPERFICIAL INCISIONAL SURGICAL SITE INFECTION: ICD-10-CM

## 2022-10-24 DIAGNOSIS — J44.9 CHRONIC OBSTRUCTIVE PULMONARY DISEASE, UNSPECIFIED COPD TYPE: ICD-10-CM

## 2022-10-24 PROCEDURE — 99214 OFFICE O/P EST MOD 30 MIN: CPT | Performed by: NURSE PRACTITIONER

## 2022-10-24 RX ORDER — CEPHALEXIN 500 MG/1
500 CAPSULE ORAL 3 TIMES DAILY
Qty: 30 CAPSULE | Refills: 0 | Status: SHIPPED | OUTPATIENT
Start: 2022-10-24 | End: 2022-11-03 | Stop reason: SDUPTHER

## 2022-10-24 RX ORDER — POTASSIUM CHLORIDE 750 MG/1
10 TABLET, FILM COATED, EXTENDED RELEASE ORAL DAILY
Qty: 90 TABLET | Refills: 1 | Status: SHIPPED | OUTPATIENT
Start: 2022-10-24 | End: 2023-04-04 | Stop reason: SDUPTHER

## 2022-10-24 RX ORDER — BUDESONIDE AND FORMOTEROL FUMARATE DIHYDRATE 160; 4.5 UG/1; UG/1
2 AEROSOL RESPIRATORY (INHALATION)
Qty: 10.2 G | Refills: 11 | Status: SHIPPED | OUTPATIENT
Start: 2022-10-24

## 2022-10-24 NOTE — PROGRESS NOTES
Chief Complaint  Chief Complaint   Patient presents with   • Hospital Follow Up Visit     Knee-9/20/22           Subjective          Leonid Diehl presents to [facility] for   History of Present Illness     Patient presents for f/u right total knee replacement per Dr. Celis on 9/20/2022. He reports overall doing well, however he did go back to Dr. Celis for swelling and drainage, completed abx.  He has been cleaning the incision with Betadine every 3 days and changing the dressing. The knee is still edematous, redness has improved somewhat, there is still drainage present and able to be visualized form dressing that needs changed today.     COPD, mostly stable, he owns a farm, uses symbicort seasonally during allergy flare ups.          The following portions of the patient's history were reviewed and updated as appropriate: allergies, current medications, past family history, past medical history, past social history, past surgical history and problem list.    Past Medical History:   Diagnosis Date   • Aortic stenosis, moderate 04/05/2022   • Arthritis    • Asthma    • Benign prostatic hyperplasia 06/20/2017   • Chronic diastolic congestive heart failure (HCC) 02/04/2019   • COPD (chronic obstructive pulmonary disease) ([facility])    • Gout    • Hay fever 01/28/2016   • Heart murmur    • History of alcohol abuse 04/18/2019   • Hx of pancreatitis 04/18/2019   • Infection of left knee (HCC) 05/2022   • Insomnia    • Iron deficiency anemia 01/11/2019   • Obesity (BMI 35.0-39.9 without comorbidity)    • Peripheral edema    • Primary hypertension 01/28/2016   • Seasonal allergies    • Thrombocytopenia (HCC)      Past Surgical History:   Procedure Laterality Date   • CATARACT EXTRACTION, BILATERAL     • KNEE INCISION AND DRAINAGE Left 06/07/2022    Procedure: KNEE INCISION AND DRAINAGE;  Surgeon: Yash Celis MD;  Location: [facility] MAIN OR;  Service: Orthopedics;  Laterality: Left;   • TOTAL  KNEE ARTHROPLASTY Left 04/05/2022    Procedure: TOTAL KNEE ARTHROPLASTY;  Surgeon: Yash Celis MD;  Location: [facility] MAIN OR;  Service: Orthopedics;  Laterality: Left;   • TOTAL KNEE ARTHROPLASTY Right 9/20/2022    Procedure: TOTAL KNEE ARTHROPLASTY WITH JARED ROBOT;  Surgeon: Yash Celis MD;  Location: [facility] MAIN OR;  Service: Robotics - Ortho;  Laterality: Right;   • TOTAL SHOULDER ARTHROPLASTY W/ DISTAL CLAVICLE EXCISION Right 10/22/2019    Procedure: TOTAL SHOULDER REVERSE ARTHROPLASTY with Biceps Tenodesis;  Surgeon: Chris Lafleur MD;  Location: [facility] MAIN OR;  Service: Orthopedics     Family History   Problem Relation Age of Onset   • Cancer Father    • Heart disease Sister    • Hypertension Brother      Social History     Tobacco Use   • Smoking status: Never   • Smokeless tobacco: Never   Substance Use Topics   • Alcohol use: Yes     Comment: occasionally       Current Outpatient Medications:   •  albuterol sulfate  (90 Base) MCG/ACT inhaler, Inhale 2 puffs Every 6 (Six) Hours As Needed for Wheezing or Shortness of Air. (Patient taking differently: Inhale 2 puffs As Needed for Wheezing or Shortness of Air.), Disp: 8.5 g, Rfl: 2  •  allopurinol (ZYLOPRIM) 300 MG tablet, Take 1 tablet by mouth Daily., Disp: 90 tablet, Rfl: 1  •  budesonide-formoterol (Symbicort) 160-4.5 MCG/ACT inhaler, Inhale 2 puffs 2 (Two) Times a Day., Disp: 10.2 g, Rfl: 11  •  Calcium Carbonate Antacid (CALCIUM CARBONATE PO), Take 1 tablet by mouth Every Night., Disp: , Rfl:   •  carvedilol (COREG) 3.125 MG tablet, Take 1 tablet by mouth Daily., Disp: 90 tablet, Rfl: 1  •  cephalexin (KEFLEX) 500 MG capsule, Take 1 capsule by mouth 3 (Three) Times a Day for 10 days., Disp: 30 capsule, Rfl: 0  •  Cetirizine HCl (ZYRTEC PO), Take 10 mg by mouth Daily., Disp: , Rfl:   •  ferrous sulfate 324 (65 Fe) MG tablet delayed-release EC tablet, Take 1 tablet by mouth Daily With Breakfast. (Patient taking differently: Take 1  "tablet by mouth Every Night.), Disp: 90 tablet, Rfl: 1  •  fluticasone (FLONASE) 50 MCG/ACT nasal spray, 2 sprays into the nostril(s) as directed by provider 2 (Two) Times a Day., Disp: , Rfl:   •  furosemide (LASIX) 20 MG tablet, Take 1 tablet by mouth Daily As Needed (swelling). swelling (Patient taking differently: Take 1 tablet by mouth Every Night.), Disp: 90 tablet, Rfl: 1  •  guaiFENesin (MUCINEX) 600 MG 12 hr tablet, Take 1,200 mg by mouth 2 (Two) Times a Day., Disp: , Rfl:   •  lisinopril (PRINIVIL,ZESTRIL) 10 MG tablet, Take 1 tablet by mouth Daily., Disp: 90 tablet, Rfl: 1  •  meloxicam (MOBIC) 15 MG tablet, Take 1 tablet by mouth Daily. Prn joint pain, Disp: 90 tablet, Rfl: 3  •  mupirocin (BACTROBAN) 2 % ointment, Apply a pea-sized amount to each nostril twice daily for 5 days prior to surgery., Disp: 22 g, Rfl: 0  •  ondansetron (Zofran) 4 MG tablet, Take 1 tablet by mouth Every 6 (Six) Hours As Needed for Nausea or Vomiting., Disp: 30 tablet, Rfl: 0  •  oxyCODONE-acetaminophen (PERCOCET) 5-325 MG per tablet, Take one tablet by mouth every 6 hours prn pain, Disp: 40 tablet, Rfl: 0  •  potassium chloride 10 MEQ CR tablet, Take 1 tablet by mouth Daily. WITH LASIX, Disp: 90 tablet, Rfl: 1  •  traZODone (DESYREL) 50 MG tablet, Take 1 tablet by mouth Every Night., Disp: 90 tablet, Rfl: 1    Objective   Vital Signs:   /70   Pulse 58   Temp 98.2 °F (36.8 °C)   Ht 188 cm (74\")   Wt 127 kg (281 lb)   SpO2 96%   BMI 36.08 kg/m²           Physical Exam  Constitutional:       General: He is not in acute distress.     Appearance: Normal appearance.   Pulmonary:      Effort: Pulmonary effort is normal.   Musculoskeletal:         General: Swelling (RLE trace pitting) and tenderness (right knee vertical TKR incision approximated, surrounding erythema, incision with slough, yellow brown drainage and foul smell ) present. Normal range of motion.      Cervical back: Normal range of motion.   Skin:     " General: Skin is warm and dry.   Neurological:      General: No focal deficit present.      Mental Status: He is alert.   Psychiatric:         Mood and Affect: Mood normal.         Behavior: Behavior normal.         Thought Content: Thought content normal.         Judgment: Judgment normal.          Result Review :     No visits with results within 7 Day(s) from this visit.   Latest known visit with results is:   Admission on 09/23/2022, Discharged on 09/23/2022   Component Date Value Ref Range Status   • Glucose 09/23/2022 113 (H)  65 - 99 mg/dL Final   • BUN 09/23/2022 18  8 - 23 mg/dL Final   • Creatinine 09/23/2022 0.86  0.76 - 1.27 mg/dL Final   • Sodium 09/23/2022 136  136 - 145 mmol/L Final   • Potassium 09/23/2022 4.5  3.5 - 5.2 mmol/L Final   • Chloride 09/23/2022 107  98 - 107 mmol/L Final   • CO2 09/23/2022 20.0 (L)  22.0 - 29.0 mmol/L Final   • Calcium 09/23/2022 8.7  8.6 - 10.5 mg/dL Final   • BUN/Creatinine Ratio 09/23/2022 20.9  7.0 - 25.0 Final   • Anion Gap 09/23/2022 9.0  5.0 - 15.0 mmol/L Final   • eGFR 09/23/2022 95.5  >60.0 mL/min/1.73 Final    National Kidney Foundation and American Society of Nephrology (ASN) Task Force recommended calculation based on the Chronic Kidney Disease Epidemiology Collaboration (CKD-EPI) equation refit without adjustment for race.   • WBC 09/23/2022 6.90  3.40 - 10.80 10*3/mm3 Final   • RBC 09/23/2022 2.67 (L)  4.14 - 5.80 10*6/mm3 Final   • Hemoglobin 09/23/2022 8.7 (L)  13.0 - 17.7 g/dL Final   • Hematocrit 09/23/2022 27.0 (L)  37.5 - 51.0 % Final   • MCV 09/23/2022 101.1 (H)  79.0 - 97.0 fL Final   • MCH 09/23/2022 32.8  26.6 - 33.0 pg Final   • MCHC 09/23/2022 32.4  31.5 - 35.7 g/dL Final   • RDW 09/23/2022 15.3  12.3 - 15.4 % Final   • RDW-SD 09/23/2022 53.8  37.0 - 54.0 fl Final   • MPV 09/23/2022 9.1  6.0 - 12.0 fL Final   • Platelets 09/23/2022 69 (L)  140 - 450 10*3/mm3 Final   • Neutrophil % 09/23/2022 68.2  42.7 - 76.0 % Final   • Lymphocyte % 09/23/2022  12.3 (L)  19.6 - 45.3 % Final   • Monocyte % 09/23/2022 16.8 (H)  5.0 - 12.0 % Final   • Eosinophil % 09/23/2022 2.1  0.3 - 6.2 % Final   • Basophil % 09/23/2022 0.6  0.0 - 1.5 % Final   • Neutrophils, Absolute 09/23/2022 4.70  1.70 - 7.00 10*3/mm3 Final   • Lymphocytes, Absolute 09/23/2022 0.80  0.70 - 3.10 10*3/mm3 Final   • Monocytes, Absolute 09/23/2022 1.20 (H)  0.10 - 0.90 10*3/mm3 Final   • Eosinophils, Absolute 09/23/2022 0.10  0.00 - 0.40 10*3/mm3 Final   • Basophils, Absolute 09/23/2022 0.00  0.00 - 0.20 10*3/mm3 Final   • nRBC 09/23/2022 0.0  0.0 - 0.2 /100 WBC Final                  Class 2 Severe Obesity (BMI >=35 and <=39.9). Obesity-related health conditions include the following: hypertension, dyslipidemias and osteoarthritis. Obesity is unchanged. BMI is is above average; BMI management plan is completed. We discussed portion control and increasing exercise.           Assessment and Plan    Diagnoses and all orders for this visit:    1. S/P total knee replacement, right (Primary)    2. Chronic obstructive pulmonary disease, unspecified COPD type (HCC)  Comments:  stable, rf symbicort, cont albuterol prn.   Orders:  -     budesonide-formoterol (Symbicort) 160-4.5 MCG/ACT inhaler; Inhale 2 puffs 2 (Two) Times a Day.  Dispense: 10.2 g; Refill: 11    3. Superficial incisional surgical site infection    Other orders  -     cephalexin (KEFLEX) 500 MG capsule; Take 1 capsule by mouth 3 (Three) Times a Day for 10 days.  Dispense: 30 capsule; Refill: 0  -     potassium chloride 10 MEQ CR tablet; Take 1 tablet by mouth Daily. WITH LASIX  Dispense: 90 tablet; Refill: 1    rec call Dr Celis if s/s do not improve s/p keflex.   rec cleanse with peroxide daily and paint with betadine  Change dressing daily, ok to leave cecil if not draining  Wean oxycodone if pain controlled  Use ibuprofen or tylenol prn.     I spent 30 minutes caring for Leonid Diehl on this date of service. This time includes time spent by me in  the following activities: preparing for the visit, reviewing tests, performing a medically appropriate examination and/or evaluation , counseling and educating the patient/family/caregiver, ordering medications, tests, or procedures and documenting information in the medical record        Follow Up     Return if symptoms worsen or fail to improve, for Next scheduled follow up.  Patient was given instructions and counseling regarding his condition or for health maintenance advice. Please see specific information pulled into the AVS if appropriate.        Part of this note may be an electronic transcription/translation of spoken language to printed text using the Dragon Dictation System

## 2022-11-03 RX ORDER — CEPHALEXIN 500 MG/1
500 CAPSULE ORAL 3 TIMES DAILY
Qty: 30 CAPSULE | Refills: 0 | Status: SHIPPED | OUTPATIENT
Start: 2022-11-03 | End: 2022-11-13

## 2022-11-23 ENCOUNTER — OFFICE VISIT (OUTPATIENT)
Dept: ORTHOPEDIC SURGERY | Facility: CLINIC | Age: 67
End: 2022-11-23

## 2022-11-23 VITALS — WEIGHT: 281 LBS | BODY MASS INDEX: 36.06 KG/M2 | OXYGEN SATURATION: 95 % | HEIGHT: 74 IN | HEART RATE: 74 BPM

## 2022-11-23 DIAGNOSIS — Z96.651 STATUS POST TOTAL KNEE REPLACEMENT, RIGHT: Primary | ICD-10-CM

## 2022-11-23 PROCEDURE — 99024 POSTOP FOLLOW-UP VISIT: CPT | Performed by: ORTHOPAEDIC SURGERY

## 2022-11-23 RX ORDER — OXYCODONE HYDROCHLORIDE AND ACETAMINOPHEN 5; 325 MG/1; MG/1
TABLET ORAL
Qty: 40 TABLET | Refills: 0 | Status: CANCELLED | OUTPATIENT
Start: 2022-11-23

## 2022-11-23 NOTE — PROGRESS NOTES
POST OP TOTAL GLOBAL      NAME: Leonid Diehl           : 1955    MRN: [id number]    Chief Complaint   Patient presents with   • Right Knee - Post-op       Date of Surgery: Patient underwent right total knee arthroplasty on 2022.  ?  HPI:   Patient returns today for 8 week follow up of right total knee arthroplasty Incision(s) healed nicely with no signs of infection. Patient reports doing well with no unusual complaints. No fevers, rigors or chills. Appears to be progressing appropriately. Patient is on appropriate anticoagulation.  He states that he wants to go to physical therapy for another month or so.  I do think that is a reasonable request.  He still has a lot of nighttime pain and discomfort.  We have discussed with the patient about cutting back on the use of his Percocet.  He states that he gets a lot of itching at night and he has scratched some very deep marks into his periincisional skin.  I have counseled him use some skin lotion over the areas of rough skin to allow it to heal.      Ortho Exam:   Right knee.The patient is status post total knee arthroplasty postoperative 8 week(s). Incision is clean. Calf is soft and nontender. Homans sign is negative. There is no clicking, popping or catching. Anterior and posterior drawer signs are negative.  There is no instability of the components. Appropriate amounts of swelling and bruising are noted. Dorsalis pedis and posterior tibial artery pulses are palpable. Common peroneal nerve function is well preserved. Range of motion is from 0-125 degrees of flexion. Gait is cautious but otherwise fairly normal. There is no evidence of a deep seated joint infection.      Diagnostic Studies:          Assessment:  Diagnoses and all orders for this visit:    1. Status post total knee replacement, right (Primary)            Plan   · Incision care  · To use ACE wrap/AMI stocking  · Continue ice to joint   · Stretching and strengthening exercises  continue with the physical therapy post total knee arthroplasty.  · Aggressive ROM  · Falls precautions  · Tablet Percocet 5/325 mg tab 1 p.o. nightly for pain swelling and discomfort.  · Controlled substance treatment options discussed in detail. Patient's signed consent to medical options on file. [facility] form in chart.  The patient is being provided this narcotic prescription to address the acute medical condition that they are undergoing/experiencing at this time.  It is my medical and surgical assessment that more than 3 days of narcotic medication is necessary to help control the pain and discomfort that this patient is experiencing at this point.  Risks of narcotic medication usage outlined.  Possibility of physical and psychological dependence and abuse, especially long term, emphasized to the patient.  I have explained to the patient that we will try to wean them off the narcotic medication as soon as possible and introduce non-narcotic modalities of pain control.  At this point in the patient's clinical spectrum, however, alternative available treatments are inadequate to control their pain and symptoms.  I have also discussed the possibility of random drug testing as well as pill counts to prevent misuse and misappropriation of the narcotic medications prescribed to the patient.  · You may now resume any prescription medications you were taking prior to surgery  · Continue with lifelong antibiotic prophylaxis with dental procedures following total joint replacement.  · Follow up in 6 week(s)    Date of encounter: 11/23/2022   Yash Celis MD

## 2023-01-04 ENCOUNTER — OFFICE VISIT (OUTPATIENT)
Dept: ORTHOPEDIC SURGERY | Facility: CLINIC | Age: 68
End: 2023-01-04
Payer: MEDICARE

## 2023-01-04 DIAGNOSIS — Z96.652 HX OF TOTAL KNEE REPLACEMENT, LEFT: ICD-10-CM

## 2023-01-04 DIAGNOSIS — Z96.651 S/P TOTAL KNEE ARTHROPLASTY, RIGHT: ICD-10-CM

## 2023-01-04 DIAGNOSIS — Z96.651 STATUS POST TOTAL KNEE REPLACEMENT, RIGHT: Primary | ICD-10-CM

## 2023-01-04 PROCEDURE — 99213 OFFICE O/P EST LOW 20 MIN: CPT | Performed by: ORTHOPAEDIC SURGERY

## 2023-01-04 NOTE — PROGRESS NOTES
Orthopedic Visit      NAME: Leonid Diehl           : 1955    MRN: [id number]    Chief Complaint   Patient presents with   • Right Knee - Follow-up       Date of Surgery: 22  ?  HPI:   Patient returns today for 3.5 month follow up of right total knee arthroplasty Incision(s) healed nicely with no signs of infection. Patient reports doing well with no unusual complaints. No fevers, rigors or chills. Appears to be progressing appropriately. Patient is on appropriate anticoagulation.  He has undergone bilateral total knee arthroplasty.  The left one was performed by me about 8 months ago.  The right one was performed 3-1/2 months ago on 2022.  Physical therapy has helped him tremendously to improve his range of motion.  He is not taking any significant pain medication for either of his knee replacements.  His quality of life has improved significantly for the better.  He is able to go back to work without any significant issues at this point.  He is able to walk for exercise as well.  He states that he is quite pleased with the fact that he underwent the knee replacement surgery.      Ortho Exam:   Right knee.The patient is status post total knee arthroplasty postoperative 3.5 month(s). Incision is clean. Calf is soft and nontender. Homans sign is negative. There is no clicking, popping or catching. Anterior and posterior drawer signs are negative.  There is no instability of the components. Appropriate amounts of swelling and bruising are noted. Dorsalis pedis and posterior tibial artery pulses are palpable. Common peroneal nerve function is well preserved. Range of motion is from 0-125 degrees of flexion. Gait is cautious but otherwise fairly normal. There is no evidence of a deep seated joint infection.      Diagnostic Studies:  xrays obtained today    bilateral Knee X-Ray  Indication: Evaluation of implant position after total knee arthroplasty bilateral.  AP, Lateral views  Findings:  Well-placed implants with a good cement mantle without any subsidence of the implants.  no bony lesion  Soft tissues within normal limits  within normal limits joint spaces  Hardware appropriately positioned yes      yes prior studies available for comparison.      X-RAY was ordered and reviewed by Yash Celis MD    Assessment:  Diagnoses and all orders for this visit:    1. Status post total knee replacement, right (Primary)  -     XR Knee 3 View Right    2. S/P total knee arthroplasty, right    3. Hx of total knee replacement, left            Plan   · Incision care  · To use ACE wrap/AMI stocking  · Continue ice to joint   · Stretching and strengthening exercises of the quads and hamstrings.  · Calcium and vitamin D for bone health.  · , GI and dental procedure prophylaxis with antibiotics to prevent metastatic infection of the knee arthroplasty implants.  · Aggressive ROM  · Falls precautions  · You may now resume any prescription medications you were taking prior to surgery  · Continue with lifelong antibiotic prophylaxis with dental procedures following total joint replacement.  · Follow up in 12 month(s)    Date of encounter: 01/04/2023   Yash Celis MD

## 2023-02-22 RX ORDER — FUROSEMIDE 20 MG/1
TABLET ORAL
Qty: 90 TABLET | Refills: 1 | Status: SHIPPED | OUTPATIENT
Start: 2023-02-22 | End: 2023-04-04 | Stop reason: SDUPTHER

## 2023-02-22 RX ORDER — LISINOPRIL 10 MG/1
TABLET ORAL
Qty: 90 TABLET | Refills: 1 | Status: SHIPPED | OUTPATIENT
Start: 2023-02-22

## 2023-03-28 ENCOUNTER — CLINICAL SUPPORT (OUTPATIENT)
Dept: FAMILY MEDICINE CLINIC | Facility: CLINIC | Age: 68
End: 2023-03-28
Payer: MEDICARE

## 2023-03-28 DIAGNOSIS — Z12.5 PROSTATE CANCER SCREENING: ICD-10-CM

## 2023-03-28 DIAGNOSIS — R73.09 ABNORMAL GLUCOSE: ICD-10-CM

## 2023-03-28 DIAGNOSIS — Z00.00 PREVENTATIVE HEALTH CARE: Primary | ICD-10-CM

## 2023-03-28 DIAGNOSIS — I10 PRIMARY HYPERTENSION: ICD-10-CM

## 2023-03-28 PROCEDURE — 80061 LIPID PANEL: CPT | Performed by: NURSE PRACTITIONER

## 2023-03-28 PROCEDURE — 83036 HEMOGLOBIN GLYCOSYLATED A1C: CPT | Performed by: NURSE PRACTITIONER

## 2023-03-28 PROCEDURE — 36415 COLL VENOUS BLD VENIPUNCTURE: CPT | Performed by: NURSE PRACTITIONER

## 2023-03-28 PROCEDURE — 85027 COMPLETE CBC AUTOMATED: CPT | Performed by: NURSE PRACTITIONER

## 2023-03-28 PROCEDURE — G0103 PSA SCREENING: HCPCS | Performed by: NURSE PRACTITIONER

## 2023-03-28 PROCEDURE — 80053 COMPREHEN METABOLIC PANEL: CPT | Performed by: NURSE PRACTITIONER

## 2023-03-28 NOTE — PROGRESS NOTES
Venipuncture Blood Specimen Collection  Venipuncture performed in left arm by Lacie Velasco MA with good hemostasis. Patient tolerated the procedure well without complications.   03/28/23   Lacie Velasco MA

## 2023-03-29 LAB
ALBUMIN SERPL-MCNC: 3.9 G/DL (ref 3.5–5.2)
ALBUMIN/GLOB SERPL: 1.1 G/DL
ALP SERPL-CCNC: 125 U/L (ref 39–117)
ALT SERPL W P-5'-P-CCNC: 12 U/L (ref 1–41)
ANION GAP SERPL CALCULATED.3IONS-SCNC: 11.7 MMOL/L (ref 5–15)
AST SERPL-CCNC: 25 U/L (ref 1–40)
BILIRUB SERPL-MCNC: 0.8 MG/DL (ref 0–1.2)
BUN SERPL-MCNC: 9 MG/DL (ref 8–23)
BUN/CREAT SERPL: 10.8 (ref 7–25)
CALCIUM SPEC-SCNC: 9 MG/DL (ref 8.6–10.5)
CHLORIDE SERPL-SCNC: 102 MMOL/L (ref 98–107)
CHOLEST SERPL-MCNC: 131 MG/DL (ref 0–200)
CO2 SERPL-SCNC: 25.3 MMOL/L (ref 22–29)
CREAT SERPL-MCNC: 0.83 MG/DL (ref 0.76–1.27)
DEPRECATED RDW RBC AUTO: 43.1 FL (ref 37–54)
EGFRCR SERPLBLD CKD-EPI 2021: 95.9 ML/MIN/1.73
ERYTHROCYTE [DISTWIDTH] IN BLOOD BY AUTOMATED COUNT: 12.8 % (ref 12.3–15.4)
GLOBULIN UR ELPH-MCNC: 3.7 GM/DL
GLUCOSE SERPL-MCNC: 106 MG/DL (ref 65–99)
HBA1C MFR BLD: 5.3 % (ref 4.8–5.6)
HCT VFR BLD AUTO: 42.6 % (ref 37.5–51)
HDLC SERPL-MCNC: 47 MG/DL (ref 40–60)
HGB BLD-MCNC: 14.5 G/DL (ref 13–17.7)
LDLC SERPL CALC-MCNC: 67 MG/DL (ref 0–100)
LDLC/HDLC SERPL: 1.42 {RATIO}
MCH RBC QN AUTO: 31.6 PG (ref 26.6–33)
MCHC RBC AUTO-ENTMCNC: 34 G/DL (ref 31.5–35.7)
MCV RBC AUTO: 92.8 FL (ref 79–97)
PLATELET # BLD AUTO: 142 10*3/MM3 (ref 140–450)
PMV BLD AUTO: 10.9 FL (ref 6–12)
POTASSIUM SERPL-SCNC: 4.4 MMOL/L (ref 3.5–5.2)
PROT SERPL-MCNC: 7.6 G/DL (ref 6–8.5)
PSA SERPL-MCNC: 0.16 NG/ML (ref 0–4)
RBC # BLD AUTO: 4.59 10*6/MM3 (ref 4.14–5.8)
SODIUM SERPL-SCNC: 139 MMOL/L (ref 136–145)
TRIGL SERPL-MCNC: 86 MG/DL (ref 0–150)
VLDLC SERPL-MCNC: 17 MG/DL (ref 5–40)
WBC NRBC COR # BLD: 6.09 10*3/MM3 (ref 3.4–10.8)

## 2023-04-04 ENCOUNTER — OFFICE VISIT (OUTPATIENT)
Dept: FAMILY MEDICINE CLINIC | Facility: CLINIC | Age: 68
End: 2023-04-04
Payer: MEDICARE

## 2023-04-04 VITALS
DIASTOLIC BLOOD PRESSURE: 82 MMHG | HEART RATE: 59 BPM | HEIGHT: 74 IN | WEIGHT: 287.8 LBS | SYSTOLIC BLOOD PRESSURE: 126 MMHG | BODY MASS INDEX: 36.93 KG/M2 | OXYGEN SATURATION: 95 %

## 2023-04-04 DIAGNOSIS — G47.09 OTHER INSOMNIA: ICD-10-CM

## 2023-04-04 DIAGNOSIS — J30.2 SEASONAL ALLERGIC RHINITIS, UNSPECIFIED TRIGGER: ICD-10-CM

## 2023-04-04 DIAGNOSIS — H53.9 VISUAL CHANGES: ICD-10-CM

## 2023-04-04 DIAGNOSIS — I35.0 NONRHEUMATIC AORTIC VALVE STENOSIS: ICD-10-CM

## 2023-04-04 DIAGNOSIS — J44.9 CHRONIC OBSTRUCTIVE PULMONARY DISEASE, UNSPECIFIED COPD TYPE: ICD-10-CM

## 2023-04-04 DIAGNOSIS — Z12.11 COLON CANCER SCREENING: ICD-10-CM

## 2023-04-04 DIAGNOSIS — Z23 NEED FOR PNEUMOCOCCAL VACCINATION: ICD-10-CM

## 2023-04-04 DIAGNOSIS — I10 ESSENTIAL HYPERTENSION: Primary | ICD-10-CM

## 2023-04-04 DIAGNOSIS — M1A.39X0 CHRONIC GOUT DUE TO RENAL IMPAIRMENT OF MULTIPLE SITES WITHOUT TOPHUS: ICD-10-CM

## 2023-04-04 RX ORDER — FUROSEMIDE 20 MG/1
20 TABLET ORAL DAILY
Qty: 90 TABLET | Refills: 1 | Status: SHIPPED | OUTPATIENT
Start: 2023-04-04

## 2023-04-04 RX ORDER — POTASSIUM CHLORIDE 750 MG/1
10 TABLET, FILM COATED, EXTENDED RELEASE ORAL DAILY
Qty: 90 TABLET | Refills: 1 | Status: SHIPPED | OUTPATIENT
Start: 2023-04-04

## 2023-04-04 RX ORDER — ALBUTEROL SULFATE 90 UG/1
2 AEROSOL, METERED RESPIRATORY (INHALATION) EVERY 6 HOURS PRN
Qty: 8.5 G | Refills: 2 | Status: SHIPPED | OUTPATIENT
Start: 2023-04-04

## 2023-04-04 RX ORDER — CARVEDILOL 3.12 MG/1
3.12 TABLET ORAL DAILY
Qty: 90 TABLET | Refills: 1 | Status: SHIPPED | OUTPATIENT
Start: 2023-04-04

## 2023-04-04 RX ORDER — TRAZODONE HYDROCHLORIDE 50 MG/1
50 TABLET ORAL NIGHTLY
Qty: 90 TABLET | Refills: 1 | Status: SHIPPED | OUTPATIENT
Start: 2023-04-04

## 2023-04-04 RX ORDER — ALLOPURINOL 300 MG/1
300 TABLET ORAL DAILY
Qty: 90 TABLET | Refills: 1 | Status: SHIPPED | OUTPATIENT
Start: 2023-04-04

## 2023-04-04 NOTE — PROGRESS NOTES
"Chief Complaint  Chief Complaint   Patient presents with   • Follow-up     6 month follow up    • Eye Problem     Pt having vision trouble in right eye, pt seeing retina specialist on Thursday            Subjective          Leonid MASSEY Shanita presents to [facility] for   History of Present Illness     Patient underwent left and right total knee replacements per Dr. Celis, he is  taking meloxicam.  He continues ferrous sulfate for postoperative anemia    HTN, stable on meds and takes as directed, denies chest pain, headache, shortness of air, palpitations and swelling of extremities.     Insomnia, taking trazodone nightly    Gout, stable on allopurinol daily    COPD/allergic rhinitis, patient typically uses Symbicort and albuterol seasonally and has been using lately, he is also aking zyrtec, mucinex DM daily    Right eye vision changes, \"seeing spider webs\" and has not been able to see lower peripheral vision, has to tilt head down to see lower visual fields with right eye. Has appt with retinal specialist Thursday this wk    Here to review labs        The following portions of the patient's history were reviewed and updated as appropriate: allergies, current medications, past family history, past medical history, past social history, past surgical history and problem list.    Past Medical History:   Diagnosis Date   • Aortic stenosis, moderate 04/05/2022   • Arthritis    • Asthma    • Benign prostatic hyperplasia 06/20/2017   • Chronic diastolic congestive heart failure 02/04/2019   • COPD (chronic obstructive pulmonary disease)    • Gout    • Hay fever 01/28/2016   • Heart murmur    • History of alcohol abuse 04/18/2019   • Hx of pancreatitis 04/18/2019   • Infection of left knee 05/2022   • Insomnia    • Iron deficiency anemia 01/11/2019   • Obesity (BMI 35.0-39.9 without comorbidity)    • Peripheral edema    • Primary hypertension 01/28/2016   • Seasonal allergies    • Thrombocytopenia  "     Past Surgical History:   Procedure Laterality Date   • CATARACT EXTRACTION, BILATERAL     • KNEE INCISION AND DRAINAGE Left 06/07/2022    Procedure: KNEE INCISION AND DRAINAGE;  Surgeon: Yash Celis MD;  Location: [facility] MAIN OR;  Service: Orthopedics;  Laterality: Left;   • TOTAL KNEE ARTHROPLASTY Left 04/05/2022    Procedure: TOTAL KNEE ARTHROPLASTY;  Surgeon: Yash Celis MD;  Location: [facility] MA[facility] OR;  Service: Orthopedics;  Laterality: Left;   • TOTAL KNEE ARTHROPLASTY Right 9/20/2022    Procedure: TOTAL KNEE ARTHROPLASTY WITH JARED ROBOT;  Surgeon: Yash Celis MD;  Location: [facility] MA[facility] OR;  Service: Robotics - Ortho;  Laterality: Right;   • TOTAL SHOULDER ARTHROPLASTY W/ DISTAL CLAVICLE EXCISION Right 10/22/2019    Procedure: TOTAL SHOULDER REVERSE ARTHROPLASTY with Biceps Tenodesis;  Surgeon: Chris Lafleur MD;  Location: [facility] MAIN OR;  Service: Orthopedics     Family History   Problem Relation Age of Onset   • Cancer Father    • Heart disease Sister    • Hypertension Brother      Social History     Tobacco Use   • Smoking status: Never   • Smokeless tobacco: Never   Substance Use Topics   • Alcohol use: Yes     Comment: occasionally       Current Outpatient Medications:   •  albuterol sulfate  (90 Base) MCG/ACT inhaler, Inhale 2 puffs Every 6 (Six) Hours As Needed for Wheezing or Shortness of Air., Disp: 8.5 g, Rfl: 2  •  allopurinol (ZYLOPRIM) 300 MG tablet, Take 1 tablet by mouth Daily., Disp: 90 tablet, Rfl: 1  •  budesonide-formoterol (Symbicort) 160-4.5 MCG/ACT inhaler, Inhale 2 puffs 2 (Two) Times a Day., Disp: 10.2 g, Rfl: 11  •  Calcium Carbonate Antacid (CALCIUM CARBONATE PO), Take 1 tablet by mouth Every Night., Disp: , Rfl:   •  carvedilol (COREG) 3.125 MG tablet, Take 1 tablet by mouth Daily., Disp: 90 tablet, Rfl: 1  •  Cetirizine HCl (ZYRTEC PO), Take 10 mg by mouth Daily., Disp: , Rfl:   •  Dextromethorphan-guaiFENesin (MUCINEX DM PO), Take 1 tablet by mouth 2  "(Two) Times a Day., Disp: , Rfl:   •  fluticasone (FLONASE) 50 MCG/ACT nasal spray, 2 sprays into the nostril(s) as directed by provider 2 (Two) Times a Day., Disp: , Rfl:   •  furosemide (LASIX) 20 MG tablet, Take 1 tablet by mouth Daily., Disp: 90 tablet, Rfl: 1  •  lisinopril (PRINIVIL,ZESTRIL) 10 MG tablet, TAKE ONE TABLET BY MOUTH DAILY, Disp: 90 tablet, Rfl: 1  •  meloxicam (MOBIC) 15 MG tablet, Take 1 tablet by mouth Daily. Prn joint pain, Disp: 90 tablet, Rfl: 3  •  potassium chloride 10 MEQ CR tablet, Take 1 tablet by mouth Daily. WITH LASIX, Disp: 90 tablet, Rfl: 1  •  traZODone (DESYREL) 50 MG tablet, Take 1 tablet by mouth Every Night., Disp: 90 tablet, Rfl: 1    Objective   Vital Signs:   /82 (BP Location: Left arm, Patient Position: Sitting, Cuff Size: Large Adult)   Pulse 59   Ht 188 cm (74\")   Wt 131 kg (287 lb 12.8 oz)   SpO2 95%   BMI 36.95 kg/m²           Physical Exam  Vitals and nursing note reviewed.   Constitutional:       General: He is not in acute distress.     Appearance: Normal appearance. He is well-developed. He is obese. He is not ill-appearing or diaphoretic.   HENT:      Head: Normocephalic and atraumatic.   Eyes:      Pupils: Pupils are equal, round, and reactive to light.   Neck:      Thyroid: No thyromegaly.   Cardiovascular:      Rate and Rhythm: Normal rate and regular rhythm.      Heart sounds: Murmur (GIII/VI SELINA) heard.   Pulmonary:      Effort: Pulmonary effort is normal. No respiratory distress.      Breath sounds: Normal breath sounds. No wheezing or rhonchi.   Abdominal:      General: Bowel sounds are normal. There is no distension.      Palpations: Abdomen is soft.      Tenderness: There is no abdominal tenderness.   Musculoskeletal:         General: Normal range of motion.      Cervical back: Normal range of motion.   Skin:     General: Skin is warm and dry.      Findings: No erythema.   Neurological:      General: No focal deficit present.      Mental " Status: He is alert and oriented to person, place, and time. Mental status is at baseline.      Gait: Gait abnormal.   Psychiatric:         Mood and Affect: Mood normal.         Behavior: Behavior normal.         Thought Content: Thought content normal.         Judgment: Judgment normal.          Result Review :     No visits with results within 7 Day(s) from this visit.   Latest known visit with results is:   Clinical Support on 03/28/2023   Component Date Value Ref Range Status   • WBC 03/28/2023 6.09  3.40 - 10.80 10*3/mm3 Final   • RBC 03/28/2023 4.59  4.14 - 5.80 10*6/mm3 Final   • Hemoglobin 03/28/2023 14.5  13.0 - 17.7 g/dL Final   • Hematocrit 03/28/2023 42.6  37.5 - 51.0 % Final   • MCV 03/28/2023 92.8  79.0 - 97.0 fL Final   • MCH 03/28/2023 31.6  26.6 - 33.0 pg Final   • MCHC 03/28/2023 34.0  31.5 - 35.7 g/dL Final   • RDW 03/28/2023 12.8  12.3 - 15.4 % Final   • RDW-SD 03/28/2023 43.1  37.0 - 54.0 fl Final   • MPV 03/28/2023 10.9  6.0 - 12.0 fL Final   • Platelets 03/28/2023 142  140 - 450 10*3/mm3 Final   • Glucose 03/28/2023 106 (H)  65 - 99 mg/dL Final   • BUN 03/28/2023 9  8 - 23 mg/dL Final   • Creatinine 03/28/2023 0.83  0.76 - 1.27 mg/dL Final   • Sodium 03/28/2023 139  136 - 145 mmol/L Final   • Potassium 03/28/2023 4.4  3.5 - 5.2 mmol/L Final   • Chloride 03/28/2023 102  98 - 107 mmol/L Final   • CO2 03/28/2023 25.3  22.0 - 29.0 mmol/L Final   • Calcium 03/28/2023 9.0  8.6 - 10.5 mg/dL Final   • Total Protein 03/28/2023 7.6  6.0 - 8.5 g/dL Final   • Albumin 03/28/2023 3.9  3.5 - 5.2 g/dL Final   • ALT (SGPT) 03/28/2023 12  1 - 41 U/L Final   • AST (SGOT) 03/28/2023 25  1 - 40 U/L Final   • Alkaline Phosphatase 03/28/2023 125 (H)  39 - 117 U/L Final   • Total Bilirubin 03/28/2023 0.8  0.0 - 1.2 mg/dL Final   • Globulin 03/28/2023 3.7  gm/dL Final   • A/G Ratio 03/28/2023 1.1  g/dL Final   • BUN/Creatinine Ratio 03/28/2023 10.8  7.0 - 25.0 Final   • Anion Gap 03/28/2023 11.7  5.0 - 15.0 mmol/L  Final   • eGFR 03/28/2023 95.9  >60.0 mL/min/1.73 Final   • Total Cholesterol 03/28/2023 131  0 - 200 mg/dL Final   • Triglycerides 03/28/2023 86  0 - 150 mg/dL Final   • HDL Cholesterol 03/28/2023 47  40 - 60 mg/dL Final   • LDL Cholesterol  03/28/2023 67  0 - 100 mg/dL Final   • VLDL Cholesterol 03/28/2023 17  5 - 40 mg/dL Final   • LDL/HDL Ratio 03/28/2023 1.42   Final   • Hemoglobin A1C 03/28/2023 5.30  4.80 - 5.60 % Final   • PSA 03/28/2023 0.158  0.000 - 4.000 ng/mL Final                  Class 2 Severe Obesity (BMI >=35 and <=39.9). Obesity-related health conditions include the following: hypertension and dyslipidemias. Obesity is unchanged. BMI is is above average; BMI management plan is completed. We discussed portion control and increasing exercise.           Assessment and Plan    Diagnoses and all orders for this visit:    1. Essential hypertension (Primary)  Comments:  bp stable, cont lisinopril, lasix/k, carvedilol. Follow up cardiology as directed.   Orders:  -     carvedilol (COREG) 3.125 MG tablet; Take 1 tablet by mouth Daily.  Dispense: 90 tablet; Refill: 1    2. Need for pneumococcal vaccination  -     Pneumococcal Conjugate Vaccine 20-Valent (PCV20)    3. Colon cancer screening  -     Cologuard - Stool, Per Rectum; Future    4. Seasonal allergic rhinitis, unspecified trigger  Comments:  continue zyrtec daily, flonase, singulair and mucinex dm prn    5. Chronic gout due to renal impairment of multiple sites without tophus  Comments:  stable, refill allopurinol. check uric acid today  Orders:  -     allopurinol (ZYLOPRIM) 300 MG tablet; Take 1 tablet by mouth Daily.  Dispense: 90 tablet; Refill: 1    6. Chronic obstructive pulmonary disease, unspecified COPD type  Comments:  stable, cont symbicort and albuterol seasonally prn.   Orders:  -     albuterol sulfate  (90 Base) MCG/ACT inhaler; Inhale 2 puffs Every 6 (Six) Hours As Needed for Wheezing or Shortness of Air.  Dispense: 8.5 g;  Refill: 2    7. Other insomnia  Comments:  Stable with trazodone, continue and refill  Orders:  -     traZODone (DESYREL) 50 MG tablet; Take 1 tablet by mouth Every Night.  Dispense: 90 tablet; Refill: 1    8. Nonrheumatic aortic valve stenosis  Comments:  normal EF, mod AV stenosis. echo last done 3/2022  notify for increasing fatigue, soa, swelling    9. Visual changes  Comments:  likely retinal, see specialist 4/6/23    Other orders  -     furosemide (LASIX) 20 MG tablet; Take 1 tablet by mouth Daily.  Dispense: 90 tablet; Refill: 1  -     potassium chloride 10 MEQ CR tablet; Take 1 tablet by mouth Daily. WITH LASIX  Dispense: 90 tablet; Refill: 1        I spent 30 minutes caring for Leonid Diehl on this date of service. This time includes time spent by me in the following activities: preparing for the visit, reviewing tests, performing a medically appropriate examination and/or evaluation , counseling and educating the patient/family/caregiver, ordering medications, tests, or procedures and documenting information in the medical record        Follow Up     Return in about 6 months (around 10/4/2023) for Recheck HTN, insomnia. HTN panel and uric acid prior to appt.  Patient was given instructions and counseling regarding his condition or for health maintenance advice. Please see specific information pulled into the AVS if appropriate.        Part of this note may be an electronic transcription/translation of spoken language to printed text using the Dragon Dictation System

## 2023-04-04 NOTE — PROGRESS NOTES
Injection  Injection performed in left arm by Lacie Velasco MA. Patient tolerated the procedure well without complications.  04/04/23   Lacie Velasco MA

## 2023-04-19 ENCOUNTER — APPOINTMENT (OUTPATIENT)
Dept: GENERAL RADIOLOGY | Facility: HOSPITAL | Age: 68
End: 2023-04-19
Payer: MEDICARE

## 2023-04-19 ENCOUNTER — HOSPITAL ENCOUNTER (EMERGENCY)
Facility: HOSPITAL | Age: 68
Discharge: HOME | End: 2023-04-19
Attending: EMERGENCY MEDICINE | Admitting: EMERGENCY MEDICINE
Payer: MEDICARE

## 2023-04-19 ENCOUNTER — APPOINTMENT (OUTPATIENT)
Dept: CT IMAGING | Facility: HOSPITAL | Age: 68
End: 2023-04-19
Payer: MEDICARE

## 2023-04-19 VITALS
HEART RATE: 70 BPM | HEIGHT: 74 IN | BODY MASS INDEX: 34.65 KG/M2 | DIASTOLIC BLOOD PRESSURE: 70 MMHG | WEIGHT: 270 LBS | RESPIRATION RATE: 22 BRPM | SYSTOLIC BLOOD PRESSURE: 166 MMHG | TEMPERATURE: 98.8 F | OXYGEN SATURATION: 94 %

## 2023-04-19 DIAGNOSIS — S22.41XA CLOSED FRACTURE OF MULTIPLE RIBS OF RIGHT SIDE, INITIAL ENCOUNTER: Primary | ICD-10-CM

## 2023-04-19 DIAGNOSIS — S22.5XXA CLOSED FRACTURE OF MULTIPLE RIBS WITH FLAIL CHEST, INITIAL ENCOUNTER: ICD-10-CM

## 2023-04-19 LAB
ALBUMIN SERPL-MCNC: 3.7 G/DL (ref 3.5–5.2)
ALBUMIN/GLOB SERPL: 0.8 G/DL
ALP SERPL-CCNC: 138 U/L (ref 39–117)
ALT SERPL W P-5'-P-CCNC: 18 U/L (ref 1–41)
ANION GAP SERPL CALCULATED.3IONS-SCNC: 12.3 MMOL/L (ref 5–15)
AST SERPL-CCNC: 43 U/L (ref 1–40)
BASOPHILS # BLD AUTO: 0.02 10*3/MM3 (ref 0–0.2)
BASOPHILS NFR BLD AUTO: 0.4 % (ref 0–1.5)
BILIRUB SERPL-MCNC: 0.7 MG/DL (ref 0–1.2)
BUN SERPL-MCNC: 9 MG/DL (ref 8–23)
BUN/CREAT SERPL: 12.2 (ref 7–25)
CALCIUM SPEC-SCNC: 8.5 MG/DL (ref 8.6–10.5)
CHLORIDE SERPL-SCNC: 107 MMOL/L (ref 98–107)
CO2 SERPL-SCNC: 22.7 MMOL/L (ref 22–29)
CREAT SERPL-MCNC: 0.74 MG/DL (ref 0.76–1.27)
DEPRECATED RDW RBC AUTO: 45.8 FL (ref 37–54)
EGFRCR SERPLBLD CKD-EPI 2021: 99.3 ML/MIN/1.73
EOSINOPHIL # BLD AUTO: 0.19 10*3/MM3 (ref 0–0.4)
EOSINOPHIL NFR BLD AUTO: 3.6 % (ref 0.3–6.2)
ERYTHROCYTE [DISTWIDTH] IN BLOOD BY AUTOMATED COUNT: 13.5 % (ref 12.3–15.4)
FLUAV SUBTYP SPEC NAA+PROBE: NOT DETECTED
FLUBV RNA ISLT QL NAA+PROBE: NOT DETECTED
GLOBULIN UR ELPH-MCNC: 4.5 GM/DL
GLUCOSE SERPL-MCNC: 109 MG/DL (ref 65–99)
HCT VFR BLD AUTO: 44 % (ref 37.5–51)
HGB BLD-MCNC: 14.5 G/DL (ref 13–17.7)
IMM GRANULOCYTES # BLD AUTO: 0 10*3/MM3 (ref 0–0.05)
IMM GRANULOCYTES NFR BLD AUTO: 0 % (ref 0–0.5)
LYMPHOCYTES # BLD AUTO: 1.2 10*3/MM3 (ref 0.7–3.1)
LYMPHOCYTES NFR BLD AUTO: 22.7 % (ref 19.6–45.3)
MCH RBC QN AUTO: 30.4 PG (ref 26.6–33)
MCHC RBC AUTO-ENTMCNC: 33 G/DL (ref 31.5–35.7)
MCV RBC AUTO: 92.2 FL (ref 79–97)
MONOCYTES # BLD AUTO: 0.32 10*3/MM3 (ref 0.1–0.9)
MONOCYTES NFR BLD AUTO: 6 % (ref 5–12)
NEUTROPHILS NFR BLD AUTO: 3.56 10*3/MM3 (ref 1.7–7)
NEUTROPHILS NFR BLD AUTO: 67.3 % (ref 42.7–76)
NT-PROBNP SERPL-MCNC: 209.4 PG/ML (ref 0–900)
PLATELET # BLD AUTO: 136 10*3/MM3 (ref 140–450)
PMV BLD AUTO: 10.1 FL (ref 6–12)
POTASSIUM SERPL-SCNC: 3.5 MMOL/L (ref 3.5–5.2)
PROT SERPL-MCNC: 8.2 G/DL (ref 6–8.5)
QT INTERVAL: 424 MS
RBC # BLD AUTO: 4.77 10*6/MM3 (ref 4.14–5.8)
SARS-COV-2 RNA RESP QL NAA+PROBE: NOT DETECTED
SODIUM SERPL-SCNC: 142 MMOL/L (ref 136–145)
WBC NRBC COR # BLD: 5.29 10*3/MM3 (ref 3.4–10.8)

## 2023-04-19 PROCEDURE — 71045 X-RAY EXAM CHEST 1 VIEW: CPT

## 2023-04-19 PROCEDURE — 71260 CT THORAX DX C+: CPT

## 2023-04-19 PROCEDURE — 25010000002 METHYLPREDNISOLONE PER 125 MG: Performed by: EMERGENCY MEDICINE

## 2023-04-19 PROCEDURE — 85025 COMPLETE CBC W/AUTO DIFF WBC: CPT | Performed by: EMERGENCY MEDICINE

## 2023-04-19 PROCEDURE — 96374 THER/PROPH/DIAG INJ IV PUSH: CPT

## 2023-04-19 PROCEDURE — 36415 COLL VENOUS BLD VENIPUNCTURE: CPT

## 2023-04-19 PROCEDURE — 83880 ASSAY OF NATRIURETIC PEPTIDE: CPT | Performed by: EMERGENCY MEDICINE

## 2023-04-19 PROCEDURE — 99284 EMERGENCY DEPT VISIT MOD MDM: CPT

## 2023-04-19 PROCEDURE — 87636 SARSCOV2 & INF A&B AMP PRB: CPT | Performed by: EMERGENCY MEDICINE

## 2023-04-19 PROCEDURE — 93005 ELECTROCARDIOGRAM TRACING: CPT | Performed by: EMERGENCY MEDICINE

## 2023-04-19 PROCEDURE — 25510000001 IOPAMIDOL PER 1 ML: Performed by: EMERGENCY MEDICINE

## 2023-04-19 PROCEDURE — 80053 COMPREHEN METABOLIC PANEL: CPT | Performed by: EMERGENCY MEDICINE

## 2023-04-19 RX ORDER — METHYLPREDNISOLONE SODIUM SUCCINATE 125 MG/2ML
125 INJECTION, POWDER, LYOPHILIZED, FOR SOLUTION INTRAMUSCULAR; INTRAVENOUS ONCE
Status: COMPLETED | OUTPATIENT
Start: 2023-04-19 | End: 2023-04-19

## 2023-04-19 RX ORDER — ALBUTEROL SULFATE 2.5 MG/3ML
2.5 SOLUTION RESPIRATORY (INHALATION) ONCE
Status: COMPLETED | OUTPATIENT
Start: 2023-04-19 | End: 2023-04-19

## 2023-04-19 RX ORDER — IPRATROPIUM BROMIDE AND ALBUTEROL SULFATE 2.5; .5 MG/3ML; MG/3ML
3 SOLUTION RESPIRATORY (INHALATION) ONCE
Status: COMPLETED | OUTPATIENT
Start: 2023-04-19 | End: 2023-04-19

## 2023-04-19 RX ADMIN — IPRATROPIUM BROMIDE AND ALBUTEROL SULFATE 3 ML: .5; 2.5 SOLUTION RESPIRATORY (INHALATION) at 08:23

## 2023-04-19 RX ADMIN — ALBUTEROL SULFATE 2.5 MG: 2.5 SOLUTION RESPIRATORY (INHALATION) at 08:23

## 2023-04-19 RX ADMIN — METHYLPREDNISOLONE SODIUM SUCCINATE 125 MG: 125 INJECTION, POWDER, FOR SOLUTION INTRAMUSCULAR; INTRAVENOUS at 08:25

## 2023-04-19 RX ADMIN — IOPAMIDOL 100 ML: 755 INJECTION, SOLUTION INTRAVENOUS at 09:57

## 2023-04-19 NOTE — FSED PROVIDER NOTE
Subjective   History of Present Illness  Patient is complaining of progressive shortness of breath this morning.  Patient had no cough or cold symptoms prior to this.  Patient does have a history of COPD he never smoked but he was in [employer].  He does have Symbicort which she took this morning.  He did not use his rescue inhaler.        Review of Systems   Respiratory: Positive for shortness of breath.    Cardiovascular: Positive for chest pain.   All other systems reviewed and are negative.      Past Medical History:   Diagnosis Date   • Aortic stenosis, moderate 04/05/2022   • Arthritis    • Asthma    • Benign prostatic hyperplasia 06/20/2017   • Chronic diastolic congestive heart failure 02/04/2019    states didn't really have.  no sx's present   • COPD (chronic obstructive pulmonary disease)    • Gout    • Hay fever 01/28/2016   • Heart murmur    • History of alcohol abuse 04/18/2019   • Hx of pancreatitis 04/18/2019   • Infection of left knee 05/2022   • Insomnia    • Iron deficiency anemia 01/11/2019   • Obesity (BMI 35.0-39.9 without comorbidity)    • Peripheral edema     none presently   • Primary hypertension 01/28/2016   • Seasonal allergies    • Thrombocytopenia     when had PNA       No Known Allergies    Past Surgical History:   Procedure Laterality Date   • CATARACT EXTRACTION, BILATERAL     • KNEE INCISION AND DRAINAGE Left 06/07/2022    Procedure: KNEE INCISION AND DRAINAGE;  Surgeon: Yash Celis MD;  Location: [facility] MAIN OR;  Service: Orthopedics;  Laterality: Left;   • TOTAL KNEE ARTHROPLASTY Left 04/05/2022    Procedure: TOTAL KNEE ARTHROPLASTY;  Surgeon: Yash Celis MD;  Location: [facility] MAIN OR;  Service: Orthopedics;  Laterality: Left;   • TOTAL KNEE ARTHROPLASTY Right 9/20/2022    Procedure: TOTAL KNEE ARTHROPLASTY WITH JARED ROBOT;  Surgeon: Yash Celis MD;  Location: [facility] MAIN OR;  Service: Robotics - Ortho;  Laterality: Right;   • TOTAL SHOULDER ARTHROPLASTY W/ DISTAL  CLAVICLE EXCISION Right 10/22/2019    Procedure: TOTAL SHOULDER REVERSE ARTHROPLASTY with Biceps Tenodesis;  Surgeon: Chris Lafleur MD;  Location: [facility] MAIN OR;  Service: Orthopedics       Family History   Problem Relation Age of Onset   • Cancer Father    • Heart disease Sister    • Hypertension Brother        Social History     Socioeconomic History   • Marital status:    Tobacco Use   • Smoking status: Never   • Smokeless tobacco: Never   Vaping Use   • Vaping Use: Never used   Substance and Sexual Activity   • Alcohol use: Yes     Comment: occasionally   • Drug use: No   • Sexual activity: Defer           Objective   Physical Exam  Vitals and nursing note reviewed.   Constitutional:       Appearance: He is well-developed. He is not ill-appearing, toxic-appearing or diaphoretic.      Comments: Patient is hyperventilating with a sat of 98%   HENT:      Head: Normocephalic and atraumatic.      Mouth/Throat:      Mouth: Mucous membranes are moist.   Eyes:      Extraocular Movements: Extraocular movements intact.      Pupils: Pupils are equal, round, and reactive to light.   Cardiovascular:      Rate and Rhythm: Normal rate and regular rhythm.   Pulmonary:      Effort: Pulmonary effort is normal.      Breath sounds: Examination of the right-lower field reveals decreased breath sounds. Examination of the left-lower field reveals decreased breath sounds. Decreased breath sounds present. No wheezing, rhonchi or rales.   Chest:      Chest wall: Tenderness present.      Comments: Tenderness to palpation to ribs 7 and 8 9 area on the right mid axillary line reproducible  Abdominal:      General: Bowel sounds are normal.      Palpations: Abdomen is soft.   Musculoskeletal:         General: Normal range of motion.      Cervical back: Normal range of motion and neck supple.      Right lower leg: Edema present.      Left lower leg: Edema present.      Comments: 1+ edema lower extremity   Lymphadenopathy:       Cervical: No cervical adenopathy.   Skin:     General: Skin is warm and dry.      Capillary Refill: Capillary refill takes less than 2 seconds.      Coloration: Skin is not cyanotic.   Neurological:      General: No focal deficit present.      Mental Status: He is alert.   Psychiatric:         Mood and Affect: Mood normal.         Behavior: Behavior normal.         Procedures           ED Course                                           Medical Decision Making  Patient's improved dramatically and now we know why he was having trouble he has a right pleural effusion and opacity on his right side where he broke several ribs and some are segmented.  That explains a lot of his physiology as explained below he can be discharged home safely recheck in approximately 4 to 6 weeks for another x-ray to see if that effusions clearing up.  Certainly to be careful not to fall again on that side.    Amount and/or Complexity of Data Reviewed  Labs: ordered.     Details: Patient's BNP is 209 and normal his electrolytes liver enzymes are all fairly normal his creatinine 0.74 slightly low his calcium slightly low at 8.5 SGOT slightly high at 43 and alk phos 138 none of these are very significant numbers.  The patient's COVID is negative his white count is 5.2 his hemoglobin is 14.5 he is not anemic there is no infection  Radiology: ordered.     Details: Patient's chest x-ray showed a right basilar opacity when compared to his old and he has small to moderate pleural effusion with adjacent atelectasis no new focal airspace opacity was otherwise present patient then had a CT ordered because of that finding and because I could see that he would hyperventilate sometimes sitting he was having reproducible pain on the right-hand side and the CT scan was done to rule out possibility of a tumor or other abnormality PE or fractured ribs or pneumothoraces that CT scan showed multiple rib fractures which were subacute they these were  nondisplaced to minimally displaced and several are segmented indicating a free segment no pneumothorax small to moderate right basilar pleural effusion was present.  This goes with a fall after really talking to them 6 weeks ago he had fallen and she had to help him up but he just kept on working and never saw anybody and never got x-rayed.  ECG/medicine tests: ordered.     Details: EKG interpreted by myself shows a normal sinus rhythm with a ventricular rate of 65 DC interval 179 ms and a QT corrected 441 ms no ST-T wave changes.  Discussion of management or test interpretation with external provider(s): Patient did feel better with medication given here in the department and looking at all his test he had fallen he has a free segment rib fractures and multiple rib fractures probably 6 weeks old from a previous fall.  Patient will need to take it easy but it does explain why sometimes when he is sitting the wrong way he takes shallow breaths and his sats drop into the low 90s because it probably hurts enough that he does not take the the deeper breath when he lays a certain way his sats are 95-98 and he is taking normal breaths which is what I have witnessed.    Risk  Prescription drug management.          Final diagnoses:   Closed fracture of multiple ribs of right side, initial encounter   Closed fracture of multiple ribs with flail chest, initial encounter       ED Disposition  ED Disposition     ED Disposition   Discharge    Condition   Stable    Comment   --             Kerri Cruz, APRN  [address] [place] [facility]  [phone]    In 4 weeks           Medication List      No changes were made to your prescriptions during this visit.

## 2023-04-19 NOTE — DISCHARGE INSTRUCTIONS
Call your doctor to make the appointment coming up in 3 to 4 weeks or sooner as needed.  Do not fall again avoid any trauma to your right side and return to the emergency department if you become short of breath.  Take Tylenol 650 mg every 4 hours as needed for any pain.

## 2023-04-20 ENCOUNTER — PATIENT OUTREACH (OUTPATIENT)
Dept: CASE MANAGEMENT | Facility: OTHER | Age: 68
End: 2023-04-20
Payer: MEDICARE

## 2023-04-20 NOTE — OUTREACH NOTE
AMBULATORY CASE MANAGEMENT NOTE    Name and Relationship of Patient/Support Person: Dorita Diehl - Emergency Contact    Patient Outreach    Pt discharged from [facility] ED on 4/19/23, seen for SOA. RN-ACM outreach call made to pt, spoke with pt's wife Dorita. Explained role of RN-ACM and reason for call. Dorita states pt is doing ok. Reviewed ED AVS with Dorita. Education provided. Dorita states she plans to call pt's PCP office to schedule follow up appt. Reviewed SDOH. Dorita denies any pt needs. Pt has next routine PCP appt scheduled. No questions per Dorita. Advised her to call with any needs. Follow up outreach prn.     Adult Patient Profile  Questions/Answers    Flowsheet Row Most Recent Value   Equipment Currently Used at Home nebulizer   Primary Source of Support/Comfort spouse   People in Home spouse   Current Living Arrangements home        Send Education  Questions/Answers    Flowsheet Row Most Recent Value   Annual Wellness Visit:  Patient Has Completed   Other Patient Education/Resources  24/7 [facility] Nurse Call Line   24/7 Nurse Call Line Education Method Verbal   Advanced Directives: --  [Resources provided]      SDOH updated and reviewed with the patient during this program:  Financial Resource Strain: Low Risk    • Difficulty of Paying Living Expenses: Not very hard      Food Insecurity: No Food Insecurity   • Worried About Running Out of Food in the Last Year: Never true   • Ran Out of Food in the Last Year: Never true      Transportation Needs: No Transportation Needs   • Lack of Transportation (Medical): No   • Lack of Transportation (Non-Medical): No       Armond EDGAR  Ambulatory Case Management    4/20/2023, 13:37 EDT

## 2023-05-09 DIAGNOSIS — R19.5 POSITIVE COLORECTAL CANCER SCREENING USING COLOGUARD TEST: Primary | ICD-10-CM

## 2023-05-31 ENCOUNTER — APPOINTMENT (OUTPATIENT)
Dept: CT IMAGING | Facility: HOSPITAL | Age: 68
End: 2023-05-31
Payer: MEDICARE

## 2023-05-31 ENCOUNTER — APPOINTMENT (OUTPATIENT)
Dept: GENERAL RADIOLOGY | Facility: HOSPITAL | Age: 68
End: 2023-05-31
Payer: MEDICARE

## 2023-05-31 ENCOUNTER — HOSPITAL ENCOUNTER (EMERGENCY)
Facility: HOSPITAL | Age: 68
Discharge: HOME | End: 2023-05-31
Attending: EMERGENCY MEDICINE
Payer: MEDICARE

## 2023-05-31 VITALS
DIASTOLIC BLOOD PRESSURE: 44 MMHG | OXYGEN SATURATION: 90 % | TEMPERATURE: 98.3 F | SYSTOLIC BLOOD PRESSURE: 92 MMHG | RESPIRATION RATE: 18 BRPM | HEART RATE: 70 BPM

## 2023-05-31 DIAGNOSIS — R93.0 ABNORMAL CT SCAN OF HEAD: ICD-10-CM

## 2023-05-31 DIAGNOSIS — S01.411A CHEEK LACERATION, RIGHT, INITIAL ENCOUNTER: ICD-10-CM

## 2023-05-31 DIAGNOSIS — S01.81XA FOREHEAD LACERATION, INITIAL ENCOUNTER: ICD-10-CM

## 2023-05-31 DIAGNOSIS — S01.21XA NASAL LACERATION, INITIAL ENCOUNTER: ICD-10-CM
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
## 2023-05-31 DIAGNOSIS — F10.920 ALCOHOLIC INTOXICATION WITHOUT COMPLICATION: Primary | ICD-10-CM

## 2023-05-31 LAB
ALBUMIN SERPL-MCNC: 3.7 G/DL (ref 3.5–5.2)
ALBUMIN/GLOB SERPL: 1.2 G/DL
ALP SERPL-CCNC: 113 U/L (ref 39–117)
ALT SERPL W P-5'-P-CCNC: 16 U/L (ref 1–41)
ANION GAP SERPL CALCULATED.3IONS-SCNC: 9.5 MMOL/L (ref 5–15)
AST SERPL-CCNC: 31 U/L (ref 1–40)
BASOPHILS # BLD AUTO: 0.09 10*3/MM3 (ref 0–0.2)
BASOPHILS NFR BLD AUTO: 0.9 % (ref 0–1.5)
BILIRUB SERPL-MCNC: 0.5 MG/DL (ref 0–1.2)
BUN SERPL-MCNC: 18 MG/DL (ref 8–23)
BUN/CREAT SERPL: 14.1 (ref 7–25)
CALCIUM SPEC-SCNC: 8.7 MG/DL (ref 8.6–10.5)
CHLORIDE SERPL-SCNC: 112 MMOL/L (ref 98–107)
CO2 SERPL-SCNC: 20.5 MMOL/L (ref 22–29)
CREAT SERPL-MCNC: 1.28 MG/DL (ref 0.76–1.27)
DEPRECATED RDW RBC AUTO: 54.8 FL (ref 37–54)
EGFRCR SERPLBLD CKD-EPI 2021: 61.3 ML/MIN/1.73
EOSINOPHIL # BLD AUTO: 0.51 10*3/MM3 (ref 0–0.4)
EOSINOPHIL NFR BLD AUTO: 5.1 % (ref 0.3–6.2)
ERYTHROCYTE [DISTWIDTH] IN BLOOD BY AUTOMATED COUNT: 15.1 % (ref 12.3–15.4)
ETHANOL BLD-MCNC: 248 MG/DL (ref 0–10)
ETHANOL UR QL: 0.25 %
GLOBULIN UR ELPH-MCNC: 3.2 GM/DL
GLUCOSE SERPL-MCNC: 116 MG/DL (ref 65–99)
HCT VFR BLD AUTO: 43 % (ref 37.5–51)
HGB BLD-MCNC: 13.8 G/DL (ref 13–17.7)
IMM GRANULOCYTES # BLD AUTO: 0.04 10*3/MM3 (ref 0–0.05)
IMM GRANULOCYTES NFR BLD AUTO: 0.4 % (ref 0–0.5)
LYMPHOCYTES # BLD AUTO: 2.16 10*3/MM3 (ref 0.7–3.1)
LYMPHOCYTES NFR BLD AUTO: 21.7 % (ref 19.6–45.3)
MCH RBC QN AUTO: 31.2 PG (ref 26.6–33)
MCHC RBC AUTO-ENTMCNC: 32.1 G/DL (ref 31.5–35.7)
MCV RBC AUTO: 97.3 FL (ref 79–97)
MONOCYTES # BLD AUTO: 1.05 10*3/MM3 (ref 0.1–0.9)
MONOCYTES NFR BLD AUTO: 10.6 % (ref 5–12)
NEUTROPHILS NFR BLD AUTO: 6.09 10*3/MM3 (ref 1.7–7)
NEUTROPHILS NFR BLD AUTO: 61.3 % (ref 42.7–76)
PLATELET # BLD AUTO: 136 10*3/MM3 (ref 140–450)
PMV BLD AUTO: 10.2 FL (ref 6–12)
POTASSIUM SERPL-SCNC: 4 MMOL/L (ref 3.5–5.2)
PROT SERPL-MCNC: 6.9 G/DL (ref 6–8.5)
RBC # BLD AUTO: 4.42 10*6/MM3 (ref 4.14–5.8)
SODIUM SERPL-SCNC: 142 MMOL/L (ref 136–145)
WBC NRBC COR # BLD: 9.94 10*3/MM3 (ref 3.4–10.8)

## 2023-05-31 PROCEDURE — 99284 EMERGENCY DEPT VISIT MOD MDM: CPT | Performed by: NURSE PRACTITIONER

## 2023-05-31 PROCEDURE — 12052 INTMD RPR FACE/MM 2.6-5.0 CM: CPT | Performed by: NURSE PRACTITIONER

## 2023-05-31 PROCEDURE — 85025 COMPLETE CBC W/AUTO DIFF WBC: CPT | Performed by: NURSE PRACTITIONER

## 2023-05-31 PROCEDURE — 70450 CT HEAD/BRAIN W/O DYE: CPT

## 2023-05-31 PROCEDURE — 99282 EMERGENCY DEPT VISIT SF MDM: CPT

## 2023-05-31 PROCEDURE — 36415 COLL VENOUS BLD VENIPUNCTURE: CPT

## 2023-05-31 PROCEDURE — 70486 CT MAXILLOFACIAL W/O DYE: CPT

## 2023-05-31 PROCEDURE — 82077 ASSAY SPEC XCP UR&BREATH IA: CPT | Performed by: NURSE PRACTITIONER

## 2023-05-31 PROCEDURE — 12011 RPR F/E/E/N/L/M 2.5 CM/<: CPT | Performed by: NURSE PRACTITIONER

## 2023-05-31 PROCEDURE — 72125 CT NECK SPINE W/O DYE: CPT

## 2023-05-31 PROCEDURE — 73000 X-RAY EXAM OF COLLAR BONE: CPT

## 2023-05-31 PROCEDURE — 71045 X-RAY EXAM CHEST 1 VIEW: CPT

## 2023-05-31 PROCEDURE — 80053 COMPREHEN METABOLIC PANEL: CPT | Performed by: NURSE PRACTITIONER

## 2023-05-31 RX ORDER — LIDOCAINE HYDROCHLORIDE AND EPINEPHRINE 10; 10 MG/ML; UG/ML
10 INJECTION, SOLUTION INFILTRATION; PERINEURAL ONCE
Status: COMPLETED | OUTPATIENT
Start: 2023-05-31 | End: 2023-05-31

## 2023-05-31 RX ORDER — DIAPER,BRIEF,INFANT-TODD,DISP
1 EACH MISCELLANEOUS ONCE
Status: COMPLETED | OUTPATIENT
Start: 2023-05-31 | End: 2023-05-31

## 2023-05-31 RX ORDER — CEPHALEXIN 500 MG/1
500 CAPSULE ORAL 2 TIMES DAILY
Qty: 14 CAPSULE | Refills: 0 | Status: SHIPPED | OUTPATIENT
Start: 2023-05-31 | End: 2023-06-07

## 2023-05-31 RX ADMIN — LIDOCAINE HYDROCHLORIDE AND EPINEPHRINE 10 ML: 10; 10 INJECTION, SOLUTION INFILTRATION; PERINEURAL at 22:20

## 2023-05-31 RX ADMIN — BACITRACIN 0.9 G: 500 OINTMENT TOPICAL at 22:23

## 2023-05-31 NOTE — ED TRIAGE NOTES
Was getting off his tractor when his dog got in the way causing him to fall face first. Multiple wounds on his face, and complain of neck pain. Admits to drinking alcohol today.
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
## 2023-06-01 NOTE — DISCHARGE INSTRUCTIONS
Laceration care:    1.  Keep initial dressing in place for 24 hours if able.  (if blood seeps through, then remove this dressing, wash gently with antibacterial soap and pat dry)    2.  After initial 24 hours wash the wound twice a day with antibacterial soap and apply thin film of over the counter triple antibiotic ointment (bacitracin or neosporin ointment)    3.  Cover with bandaid while at work    4.  Sutures out in 5-7 days.    Please expect some oozing of blood from the lacerations    Wash face twice a day and apply thin film of over the counter triple antibiotic ointment twice a day    Follow up with Ear, Nose and Throat Provider    Do not drink alcohol and get on the tractor    Return Precautions    Although you are being discharged from the ED today, I encourage you to return for worsening symptoms.  Things can, and do, change such that treatment at home with medication may not be adequate.      Specifically, return for any of the following:    Chest pain, shortness of breath, pain or nausea and vomiting not controlled by medications provided.    Please make a follow up with your Primary Care Provider for a blood pressure recheck.

## 2023-06-06 ENCOUNTER — TELEPHONE (OUTPATIENT)
Dept: FAMILY MEDICINE CLINIC | Facility: CLINIC | Age: 68
End: 2023-06-06
Payer: MEDICARE

## 2023-06-06 NOTE — TELEPHONE ENCOUNTER
LVM for patient to call back to office. Inquiring if can change his appt in Oct to a physical exam. Might need to change time- possibly date to accommodate for 30 min timeframe.    Ok for HUB to read and reschedule.

## 2023-06-08 ENCOUNTER — HOSPITAL ENCOUNTER (OUTPATIENT)
Facility: HOSPITAL | Age: 68
Discharge: HOME | End: 2023-06-08
Attending: EMERGENCY MEDICINE | Admitting: EMERGENCY MEDICINE
Payer: MEDICARE

## 2023-06-08 VITALS
HEART RATE: 66 BPM | BODY MASS INDEX: 34.65 KG/M2 | OXYGEN SATURATION: 98 % | RESPIRATION RATE: 16 BRPM | TEMPERATURE: 98 F | SYSTOLIC BLOOD PRESSURE: 143 MMHG | DIASTOLIC BLOOD PRESSURE: 70 MMHG | WEIGHT: 270 LBS | HEIGHT: 74 IN

## 2023-06-08 DIAGNOSIS — Z48.02 ENCOUNTER FOR REMOVAL OF SUTURES: Primary | ICD-10-CM

## 2023-06-08 PROCEDURE — G0463 HOSPITAL OUTPT CLINIC VISIT: HCPCS | Performed by: NURSE PRACTITIONER

## 2023-06-08 NOTE — FSED PROVIDER NOTE
EMERGENCY DEPARTMENT ENCOUNTER    Room Number:  05/05  Date seen:  6/8/2023  Time seen: 10:14 EDT  PCP: Kerri Cruz APRN  Historian: patient    HPI:  Chief complaint:needs sutures removed  A complete HPI/ROS/PMH/PSH/SH/FH are unobtainable due to: n/a  Context:Leonid Diehl is a 68 y.o. male with past medical history significant for asthma, BPH, chronic diastolic heart failure, alcohol abuse who presents to the ED with c/o needing sutures removed.  He was seen here by myself on May 31 after he had fallen off of his tractor while intoxicated.  He has been doing well since then and has no complaints today.    Social determinants of health which may impact assessment: Not applicable    Review of prior external notes (non-ED): Not applicable    Review of prior external test results outside of this encounter: Not applicable    ALLERGIES  Patient has no known allergies.    PAST MEDICAL HISTORY  Active Ambulatory Problems     Diagnosis Date Noted    Iron deficiency anemia 01/11/2019    Asthma 01/28/2016    Benign prostatic hyperplasia 06/20/2017    Chronic diastolic congestive heart failure (HCC) 02/04/2019    Encounter for general adult medical examination without abnormal findings 06/20/2017    Hay fever 01/28/2016    Biliary disease 04/18/2019    Hepatic disease 04/18/2019    History of alcohol abuse 04/18/2019    Hx of pancreatitis 04/18/2019    Primary hypertension 01/28/2016    Systolic murmur 06/20/2017    Bilateral primary osteoarthritis of knee 03/02/2022    Aortic stenosis, moderate 04/05/2022    Gout     Insomnia     Thrombocytopenia     Hx of total knee replacement, left 04/13/2022    Morbid obesity 04/13/2022    Wound dehiscence, surgical 05/05/2022    Infection of superficial incisional surgical site after procedure 05/05/2022    S/P total knee arthroplasty, right 09/20/2022    Status post total knee replacement, right 11/23/2022     Resolved Ambulatory Problems     Diagnosis Date Noted    Acute  sinusitis 05/07/2018    Hyperglycemia 01/29/2016    Hypoxemia 01/28/2016    Tongue swelling 01/23/2018    Osteoarthritis of right glenohumeral joint 10/09/2019     Past Medical History:   Diagnosis Date    Arthritis     COPD (chronic obstructive pulmonary disease)     Heart murmur     Infection of left knee 05/2022    Obesity (BMI 35.0-39.9 without comorbidity)     Peripheral edema     Seasonal allergies        PAST SURGICAL HISTORY  Past Surgical History:   Procedure Laterality Date    CATARACT EXTRACTION, BILATERAL      KNEE INCISION AND DRAINAGE Left 06/07/2022    Procedure: KNEE INCISION AND DRAINAGE;  Surgeon: Yash Celis MD;  Location: [facility] MAIN OR;  Service: Orthopedics;  Laterality: Left;    TOTAL KNEE ARTHROPLASTY Left 04/05/2022    Procedure: TOTAL KNEE ARTHROPLASTY;  Surgeon: Yash Celis MD;  Location: [facility] MAIN OR;  Service: Orthopedics;  Laterality: Left;    TOTAL KNEE ARTHROPLASTY Right 9/20/2022    Procedure: TOTAL KNEE ARTHROPLASTY WITH JARED ROBOT;  Surgeon: Yash Celis MD;  Location: [facility] MAIN OR;  Service: Robotics - Ortho;  Laterality: Right;    TOTAL SHOULDER ARTHROPLASTY W/ DISTAL CLAVICLE EXCISION Right 10/22/2019    Procedure: TOTAL SHOULDER REVERSE ARTHROPLASTY with Biceps Tenodesis;  Surgeon: Chris Lafleur MD;  Location: [facility] MAIN OR;  Service: Orthopedics       FAMILY HISTORY  Family History   Problem Relation Age of Onset    Cancer Father     Heart disease Sister     Hypertension Brother        SOCIAL HISTORY  Social History     Socioeconomic History    Marital status:    Tobacco Use    Smoking status: Never    Smokeless tobacco: Never   Vaping Use    Vaping Use: Never used   Substance and Sexual Activity    Alcohol use: Yes     Comment: occasionally    Drug use: No    Sexual activity: Defer       REVIEW OF SYSTEMS  Review of Systems    All systems reviewed and negative except for those discussed in HPI.     PHYSICAL EXAM    I have reviewed the triage  vital signs and nursing notes.  Vitals:    06/08/23 1004   BP: 143/70   Pulse:    Resp:    Temp:    SpO2:      Physical Exam  HENT:      Head:        Comments: Area where sutures were removed.      GENERAL: not distressed  HENT: nares patent  EYES: no scleral icterus  NECK: no ROM limitations  CV: regular rhythm, regular rate  RESPIRATORY: normal effort  ABDOMEN: soft  : deferred  MUSCULOSKELETAL: no deformity  NEURO: alert, moves all extremities, follows commands  SKIN: warm, dry    I removed sutures from the forehead, right lateral brow and nose.  Spent quite a bit of time removing the dried scabs that were in place.  Beneath the scabs was nice pink tissue.  I did discuss sunblock which the patient states he would not use but he does wear a hat every day.  Wounds are healing as expected and look quite well.        PROGRESS, DATA ANALYSIS, CONSULTS AND MEDICAL DECISION MAKING    Please note that this section constitutes my independent interpretation of clinical data including lab results, radiology, EKG's.  This constitutes my independent professional opinion regarding differential diagnosis and management of this patient.  It may include any factors such as history from outside sources, review of external records, social determinants of health, management of medications, response to those treatments, and discussions with other providers.           Orders placed during this visit:  No orders of the defined types were placed in this encounter.             Medical Decision Making    Patient here for simple suture removal.  He has no complaints.    DIAGNOSIS  Final diagnoses:   Encounter for removal of sutures       FOLLOW-UP  Kerri Cruz, APRN  [address]  [place] [address]  [phone]    Schedule an appointment as soon as possible for a visit in 1 week  As needed        Latest Documented Vital Signs:  As of 10:31 EDT  BP- 143/70 HR- 66 Temp- 98 °F (36.7 °C) (Temporal) O2 sat- 98%    Appropriate PPE  utilized throughout this patient encounter to include mask, if indicated, per current protocol. Hand hygiene was performed before donning PPE and after removal when leaving the room.    Please note that portions of this were completed with a voice recognition program.     Note Disclaimer: At [facility], we believe that sharing information builds trust and better relationships. You are receiving this note because you are receiving care at [facility] or recently visited. It is possible you will see health information before a provider has talked with you about it. This kind of information can be easy to misunderstand. To help you fully understand what it means for your health, we urge you to discuss this note with your provider.

## 2023-06-08 NOTE — DISCHARGE INSTRUCTIONS
You can apply a thin film of Vaseline once a night for the next 5 nights to the areas of suture removal.    Wear your head every day and sunblock    Return Precautions    Although you are being discharged from the ED today, I encourage you to return for worsening symptoms.  Things can, and do, change such that treatment at home with medication may not be adequate.      Specifically, return for any of the following:    Chest pain, shortness of breath, pain or nausea and vomiting not controlled by medications provided.    Please make a follow up with your Primary Care Provider for a blood pressure recheck.

## 2023-06-09 ENCOUNTER — TELEPHONE (OUTPATIENT)
Dept: FAMILY MEDICINE CLINIC | Facility: CLINIC | Age: 68
End: 2023-06-09
Payer: MEDICARE

## 2023-06-09 NOTE — TELEPHONE ENCOUNTER
Spoke with spouse. Stated have received packet but not completed yet. Informed of delay of scheduling.

## 2023-08-18 RX ORDER — LISINOPRIL 10 MG/1
TABLET ORAL
Qty: 90 TABLET | Refills: 1 | Status: SHIPPED | OUTPATIENT
Start: 2023-08-18

## 2023-08-21 RX ORDER — POTASSIUM CHLORIDE 750 MG/1
TABLET, FILM COATED, EXTENDED RELEASE ORAL
Qty: 90 TABLET | Refills: 1 | Status: SHIPPED | OUTPATIENT
Start: 2023-08-21

## 2023-08-21 NOTE — TELEPHONE ENCOUNTER
Rx Refill Note  Requested Prescriptions     Pending Prescriptions Disp Refills    potassium chloride 10 MEQ CR tablet [Pharmacy Med Name: POTASSIUM CHLORIDE ER 10MEQ TABLET] 90 tablet 1     Sig: TAKE ONE TABLET BY MOUTH DAILY ALONG WITH LASIX      Last office visit with prescribing clinician: 4/4/2023   Last telemedicine visit with prescribing clinician: Visit date not found   Next office visit with prescribing clinician: 10/5/2023                         Would you like a call back once the refill request has been completed: [] Yes [] No    If the office needs to give you a call back, can they leave a voicemail: [] Yes [] No    Julio Sunshine MA  08/21/23, 10:00 EDT

## 2023-09-20 DIAGNOSIS — I10 ESSENTIAL HYPERTENSION: Primary | ICD-10-CM

## 2023-09-20 DIAGNOSIS — M10.9 GOUT, UNSPECIFIED CAUSE, UNSPECIFIED CHRONICITY, UNSPECIFIED SITE: Chronic | ICD-10-CM

## 2023-10-09 DIAGNOSIS — I10 ESSENTIAL HYPERTENSION: ICD-10-CM

## 2023-10-10 RX ORDER — CARVEDILOL 3.12 MG/1
3.12 TABLET ORAL DAILY
Qty: 90 TABLET | Refills: 1 | Status: SHIPPED | OUTPATIENT
Start: 2023-10-10

## 2023-10-12 ENCOUNTER — CLINICAL SUPPORT (OUTPATIENT)
Dept: FAMILY MEDICINE CLINIC | Facility: CLINIC | Age: 68
End: 2023-10-12
Payer: MEDICARE

## 2023-10-12 DIAGNOSIS — I10 PRIMARY HYPERTENSION: Primary | Chronic | ICD-10-CM

## 2023-10-12 LAB
DEPRECATED RDW RBC AUTO: 42.4 FL (ref 37–54)
ERYTHROCYTE [DISTWIDTH] IN BLOOD BY AUTOMATED COUNT: 12.7 % (ref 12.3–15.4)
HCT VFR BLD AUTO: 43.7 % (ref 37.5–51)
HGB BLD-MCNC: 15.2 G/DL (ref 13–17.7)
MCH RBC QN AUTO: 32.4 PG (ref 26.6–33)
MCHC RBC AUTO-ENTMCNC: 34.8 G/DL (ref 31.5–35.7)
MCV RBC AUTO: 93.2 FL (ref 79–97)
PLATELET # BLD AUTO: 126 10*3/MM3 (ref 140–450)
PMV BLD AUTO: 11.5 FL (ref 6–12)
RBC # BLD AUTO: 4.69 10*6/MM3 (ref 4.14–5.8)
WBC NRBC COR # BLD: 8.49 10*3/MM3 (ref 3.4–10.8)

## 2023-10-12 PROCEDURE — 80061 LIPID PANEL: CPT | Performed by: NURSE PRACTITIONER

## 2023-10-12 PROCEDURE — 80053 COMPREHEN METABOLIC PANEL: CPT | Performed by: NURSE PRACTITIONER

## 2023-10-12 PROCEDURE — 84443 ASSAY THYROID STIM HORMONE: CPT | Performed by: NURSE PRACTITIONER

## 2023-10-12 PROCEDURE — 36415 COLL VENOUS BLD VENIPUNCTURE: CPT | Performed by: NURSE PRACTITIONER

## 2023-10-12 PROCEDURE — 84550 ASSAY OF BLOOD/URIC ACID: CPT | Performed by: NURSE PRACTITIONER

## 2023-10-12 PROCEDURE — 85027 COMPLETE CBC AUTOMATED: CPT | Performed by: NURSE PRACTITIONER

## 2023-10-12 NOTE — PROGRESS NOTES
Venipuncture Blood Specimen Collection  Venipuncture performed in the right arm by Mili Blank MA with good hemostasis. Patient tolerated the procedure well without complications.   10/12/23   Mili Blank MA

## 2023-10-13 LAB
ALBUMIN SERPL-MCNC: 3.9 G/DL (ref 3.5–5.2)
ALBUMIN/GLOB SERPL: 1 G/DL
ALP SERPL-CCNC: 90 U/L (ref 39–117)
ALT SERPL W P-5'-P-CCNC: 25 U/L (ref 1–41)
ANION GAP SERPL CALCULATED.3IONS-SCNC: 10.7 MMOL/L (ref 5–15)
AST SERPL-CCNC: 67 U/L (ref 1–40)
BILIRUB SERPL-MCNC: 1.4 MG/DL (ref 0–1.2)
BUN SERPL-MCNC: 13 MG/DL (ref 8–23)
BUN/CREAT SERPL: 15.1 (ref 7–25)
CALCIUM SPEC-SCNC: 9.1 MG/DL (ref 8.6–10.5)
CHLORIDE SERPL-SCNC: 108 MMOL/L (ref 98–107)
CHOLEST SERPL-MCNC: 163 MG/DL (ref 0–200)
CO2 SERPL-SCNC: 23.3 MMOL/L (ref 22–29)
CREAT SERPL-MCNC: 0.86 MG/DL (ref 0.76–1.27)
EGFRCR SERPLBLD CKD-EPI 2021: 94.9 ML/MIN/1.73
GLOBULIN UR ELPH-MCNC: 3.8 GM/DL
GLUCOSE SERPL-MCNC: 116 MG/DL (ref 65–99)
HDLC SERPL-MCNC: 59 MG/DL (ref 40–60)
LDLC SERPL CALC-MCNC: 84 MG/DL (ref 0–100)
LDLC/HDLC SERPL: 1.39 {RATIO}
POTASSIUM SERPL-SCNC: 4.3 MMOL/L (ref 3.5–5.2)
PROT SERPL-MCNC: 7.7 G/DL (ref 6–8.5)
SODIUM SERPL-SCNC: 142 MMOL/L (ref 136–145)
TRIGL SERPL-MCNC: 110 MG/DL (ref 0–150)
TSH SERPL DL<=0.05 MIU/L-ACNC: 2.25 UIU/ML (ref 0.27–4.2)
URATE SERPL-MCNC: 4.4 MG/DL (ref 3.4–7)
VLDLC SERPL-MCNC: 20 MG/DL (ref 5–40)

## 2023-10-18 ENCOUNTER — OFFICE VISIT (OUTPATIENT)
Dept: FAMILY MEDICINE CLINIC | Facility: CLINIC | Age: 68
End: 2023-10-18
Payer: MEDICARE

## 2023-10-18 VITALS
OXYGEN SATURATION: 94 % | SYSTOLIC BLOOD PRESSURE: 140 MMHG | WEIGHT: 303 LBS | DIASTOLIC BLOOD PRESSURE: 86 MMHG | BODY MASS INDEX: 38.89 KG/M2 | HEIGHT: 74 IN | HEART RATE: 76 BPM

## 2023-10-18 DIAGNOSIS — Z78.9 ALCOHOL USE: ICD-10-CM

## 2023-10-18 DIAGNOSIS — Z23 INFLUENZA VACCINE ADMINISTERED: ICD-10-CM

## 2023-10-18 DIAGNOSIS — I35.0 NONRHEUMATIC AORTIC VALVE STENOSIS: ICD-10-CM

## 2023-10-18 DIAGNOSIS — G47.09 OTHER INSOMNIA: ICD-10-CM

## 2023-10-18 DIAGNOSIS — J44.9 CHRONIC OBSTRUCTIVE PULMONARY DISEASE, UNSPECIFIED COPD TYPE: ICD-10-CM

## 2023-10-18 DIAGNOSIS — R06.09 DYSPNEA ON EXERTION: ICD-10-CM

## 2023-10-18 DIAGNOSIS — I10 ESSENTIAL HYPERTENSION: ICD-10-CM

## 2023-10-18 DIAGNOSIS — D69.6 THROMBOCYTOPENIA: ICD-10-CM

## 2023-10-18 DIAGNOSIS — R74.8 ELEVATED LIVER ENZYMES: Primary | ICD-10-CM

## 2023-10-18 DIAGNOSIS — M1A.39X0 CHRONIC GOUT DUE TO RENAL IMPAIRMENT OF MULTIPLE SITES WITHOUT TOPHUS: ICD-10-CM

## 2023-10-18 RX ORDER — TRAZODONE HYDROCHLORIDE 50 MG/1
50 TABLET ORAL NIGHTLY
Qty: 90 TABLET | Refills: 1 | Status: SHIPPED | OUTPATIENT
Start: 2023-10-18

## 2023-10-18 RX ORDER — ALLOPURINOL 300 MG/1
300 TABLET ORAL DAILY
Qty: 90 TABLET | Refills: 1 | Status: SHIPPED | OUTPATIENT
Start: 2023-10-18

## 2023-10-18 RX ORDER — BUDESONIDE AND FORMOTEROL FUMARATE DIHYDRATE 160; 4.5 UG/1; UG/1
2 AEROSOL RESPIRATORY (INHALATION)
Qty: 10.2 G | Refills: 11 | Status: SHIPPED | OUTPATIENT
Start: 2023-10-18

## 2023-10-18 RX ORDER — ALBUTEROL SULFATE 1.25 MG/3ML
1 SOLUTION RESPIRATORY (INHALATION) EVERY 6 HOURS PRN
Qty: 100 EACH | Refills: 2 | Status: SHIPPED | OUTPATIENT
Start: 2023-10-18

## 2023-10-18 RX ORDER — FUROSEMIDE 20 MG/1
20 TABLET ORAL DAILY
Qty: 90 TABLET | Refills: 1 | Status: SHIPPED | OUTPATIENT
Start: 2023-10-18
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
## 2023-11-16 ENCOUNTER — OFFICE (OUTPATIENT)
Dept: URBAN - METROPOLITAN AREA PATHOLOGY 4 | Facility: PATHOLOGY | Age: 68
End: 2023-11-16
Payer: COMMERCIAL

## 2023-11-16 ENCOUNTER — OFFICE (OUTPATIENT)
Dept: URBAN - METROPOLITAN AREA PATHOLOGY 4 | Facility: PATHOLOGY | Age: 68
End: 2023-11-16

## 2023-11-16 ENCOUNTER — ON CAMPUS - OUTPATIENT (OUTPATIENT)
Dept: URBAN - METROPOLITAN AREA HOSPITAL 2 | Facility: HOSPITAL | Age: 68
End: 2023-11-16

## 2023-11-16 VITALS
DIASTOLIC BLOOD PRESSURE: 84 MMHG | OXYGEN SATURATION: 95 % | RESPIRATION RATE: 17 BRPM | RESPIRATION RATE: 21 BRPM | SYSTOLIC BLOOD PRESSURE: 103 MMHG | SYSTOLIC BLOOD PRESSURE: 96 MMHG | DIASTOLIC BLOOD PRESSURE: 58 MMHG | RESPIRATION RATE: 16 BRPM | OXYGEN SATURATION: 96 % | SYSTOLIC BLOOD PRESSURE: 135 MMHG | SYSTOLIC BLOOD PRESSURE: 107 MMHG | DIASTOLIC BLOOD PRESSURE: 51 MMHG | HEART RATE: 75 BPM | RESPIRATION RATE: 15 BRPM | HEART RATE: 69 BPM | DIASTOLIC BLOOD PRESSURE: 75 MMHG | SYSTOLIC BLOOD PRESSURE: 105 MMHG | TEMPERATURE: 98.4 F | HEART RATE: 73 BPM | OXYGEN SATURATION: 97 % | DIASTOLIC BLOOD PRESSURE: 100 MMHG | DIASTOLIC BLOOD PRESSURE: 63 MMHG | HEART RATE: 63 BPM | HEART RATE: 76 BPM | SYSTOLIC BLOOD PRESSURE: 147 MMHG | SYSTOLIC BLOOD PRESSURE: 148 MMHG | WEIGHT: 288 LBS | SYSTOLIC BLOOD PRESSURE: 151 MMHG | OXYGEN SATURATION: 93 % | DIASTOLIC BLOOD PRESSURE: 85 MMHG | DIASTOLIC BLOOD PRESSURE: 94 MMHG | HEART RATE: 65 BPM | RESPIRATION RATE: 18 BRPM | HEIGHT: 74 IN

## 2023-11-16 DIAGNOSIS — K63.5 POLYP OF COLON: ICD-10-CM

## 2023-11-16 DIAGNOSIS — D12.2 BENIGN NEOPLASM OF ASCENDING COLON: ICD-10-CM

## 2023-11-16 DIAGNOSIS — D12.0 BENIGN NEOPLASM OF CECUM: ICD-10-CM

## 2023-11-16 DIAGNOSIS — Z12.11 ENCOUNTER FOR SCREENING FOR MALIGNANT NEOPLASM OF COLON: ICD-10-CM

## 2023-11-16 DIAGNOSIS — K57.30 DIVERTICULOSIS OF LARGE INTESTINE WITHOUT PERFORATION OR ABS: ICD-10-CM

## 2023-11-16 LAB
GI HISTOLOGY: A. UNSPECIFIED: (no result)
GI HISTOLOGY: B. UNSPECIFIED: (no result)
GI HISTOLOGY: C. UNSPECIFIED: (no result)
GI HISTOLOGY: PDF REPORT: (no result)

## 2023-11-16 PROCEDURE — 45385 COLONOSCOPY W/LESION REMOVAL: CPT | Mod: PT | Performed by: INTERNAL MEDICINE

## 2023-11-16 PROCEDURE — 88305 TISSUE EXAM BY PATHOLOGIST: CPT | Performed by: INTERNAL MEDICINE

## 2023-11-20 DIAGNOSIS — J44.9 CHRONIC OBSTRUCTIVE PULMONARY DISEASE, UNSPECIFIED COPD TYPE: ICD-10-CM

## 2023-11-20 RX ORDER — ALBUTEROL SULFATE 90 UG/1
2 AEROSOL, METERED RESPIRATORY (INHALATION) EVERY 6 HOURS PRN
Qty: 8.5 G | Refills: 2 | Status: SHIPPED | OUTPATIENT
Start: 2023-11-20

## 2023-11-24 ENCOUNTER — HOSPITAL ENCOUNTER (OUTPATIENT)
Dept: CARDIOLOGY | Facility: HOSPITAL | Age: 68
Discharge: HOME | End: 2023-11-24
Payer: MEDICARE

## 2023-11-24 ENCOUNTER — HOSPITAL ENCOUNTER (OUTPATIENT)
Dept: ULTRASOUND IMAGING | Facility: HOSPITAL | Age: 68
Discharge: HOME | End: 2023-11-24
Payer: MEDICARE

## 2023-11-24 VITALS
BODY MASS INDEX: 38.89 KG/M2 | HEIGHT: 74 IN | SYSTOLIC BLOOD PRESSURE: 142 MMHG | WEIGHT: 303 LBS | DIASTOLIC BLOOD PRESSURE: 64 MMHG

## 2023-11-24 DIAGNOSIS — I35.0 NONRHEUMATIC AORTIC VALVE STENOSIS: ICD-10-CM

## 2023-11-24 DIAGNOSIS — R74.8 ELEVATED LIVER ENZYMES: ICD-10-CM

## 2023-11-24 DIAGNOSIS — Z78.9 ALCOHOL USE: ICD-10-CM

## 2023-11-24 DIAGNOSIS — R06.09 DYSPNEA ON EXERTION: ICD-10-CM

## 2023-11-24 DIAGNOSIS — D69.6 THROMBOCYTOPENIA: ICD-10-CM

## 2023-11-24 LAB
BH CV ECHO MEAS - AO MAX PG: 38.2 MMHG
BH CV ECHO MEAS - AO MEAN PG: 21 MMHG
BH CV ECHO MEAS - AO V2 MAX: 309 CM/SEC
BH CV ECHO MEAS - AO V2 VTI: 63.7 CM
BH CV ECHO MEAS - AVA(I,D): 1.21 CM2
BH CV ECHO MEAS - EDV(CUBED): 117.6 ML
BH CV ECHO MEAS - EDV(MOD-SP4): 90.3 ML
BH CV ECHO MEAS - EF(MOD-BP): 61 %
BH CV ECHO MEAS - EF(MOD-SP4): 61 %
BH CV ECHO MEAS - ESV(CUBED): 35.9 ML
BH CV ECHO MEAS - ESV(MOD-SP4): 35.2 ML
BH CV ECHO MEAS - FS: 32.7 %
BH CV ECHO MEAS - IVS/LVPW: 0.92 CM
BH CV ECHO MEAS - IVSD: 1.1 CM
BH CV ECHO MEAS - LA DIMENSION: 5.6 CM
BH CV ECHO MEAS - LAT PEAK E' VEL: 8.8 CM/SEC
BH CV ECHO MEAS - LV DIASTOLIC VOL/BSA (35-75): 34.8 CM2
BH CV ECHO MEAS - LV MASS(C)D: 213.3 GRAMS
BH CV ECHO MEAS - LV MAX PG: 8.1 MMHG
BH CV ECHO MEAS - LV MEAN PG: 4 MMHG
BH CV ECHO MEAS - LV SYSTOLIC VOL/BSA (12-30): 13.6 CM2
BH CV ECHO MEAS - LV V1 MAX: 142 CM/SEC
BH CV ECHO MEAS - LV V1 VTI: 30.2 CM
BH CV ECHO MEAS - LVIDD: 4.9 CM
BH CV ECHO MEAS - LVIDS: 3.3 CM
BH CV ECHO MEAS - LVOT AREA: 2.5 CM2
BH CV ECHO MEAS - LVOT DIAM: 1.8 CM
BH CV ECHO MEAS - LVPWD: 1.2 CM
BH CV ECHO MEAS - MED PEAK E' VEL: 6.8 CM/SEC
BH CV ECHO MEAS - MV A MAX VEL: 59.2 CM/SEC
BH CV ECHO MEAS - MV DEC SLOPE: 177 CM/SEC2
BH CV ECHO MEAS - MV DEC TIME: 0.31 SEC
BH CV ECHO MEAS - MV E MAX VEL: 40.9 CM/SEC
BH CV ECHO MEAS - MV E/A: 0.69
BH CV ECHO MEAS - MV MAX PG: 2.7 MMHG
BH CV ECHO MEAS - MV MEAN PG: 1 MMHG
BH CV ECHO MEAS - MV P1/2T: 110 MSEC
BH CV ECHO MEAS - MV V2 VTI: 27.2 CM
BH CV ECHO MEAS - MVA(P1/2T): 2 CM2
BH CV ECHO MEAS - MVA(VTI): 2.8 CM2
BH CV ECHO MEAS - PA ACC TIME: 0.12 SEC
BH CV ECHO MEAS - PA V2 MAX: 125 CM/SEC
BH CV ECHO MEAS - RV MAX PG: 4.2 MMHG
BH CV ECHO MEAS - RV V1 MAX: 102 CM/SEC
BH CV ECHO MEAS - RV V1 VTI: 22.6 CM
BH CV ECHO MEAS - RVDD: 4.6 CM
BH CV ECHO MEAS - SI(MOD-SP4): 21.3 ML/M2
BH CV ECHO MEAS - SV(LVOT): 76.8 ML
BH CV ECHO MEAS - SV(MOD-SP4): 55.1 ML
BH CV ECHO MEAS - TAPSE (>1.6): 2.34 CM
BH CV ECHO MEASUREMENTS AVERAGE E/E' RATIO: 5.24
BH CV XLRA - TDI S': 15.3 CM/SEC
LEFT ATRIUM VOLUME INDEX: 26.5 ML/M2
SINUS: 2.8 CM
STJ: 1.8 CM

## 2023-11-24 PROCEDURE — 76981 USE PARENCHYMA: CPT

## 2023-11-24 PROCEDURE — 93306 TTE W/DOPPLER COMPLETE: CPT

## 2023-11-24 PROCEDURE — 76705 ECHO EXAM OF ABDOMEN: CPT

## 2023-11-27 DIAGNOSIS — R74.8 ELEVATED LIVER ENZYMES: Primary | ICD-10-CM

## 2023-11-27 DIAGNOSIS — K70.30 ALCOHOLIC CIRRHOSIS, UNSPECIFIED WHETHER ASCITES PRESENT: ICD-10-CM

## 2023-11-27 DIAGNOSIS — Z78.9 ALCOHOL USE: ICD-10-CM

## 2023-11-27 DIAGNOSIS — R06.09 DYSPNEA ON EXERTION: ICD-10-CM

## 2023-11-27 DIAGNOSIS — K76.6 PORTAL HYPERTENSION: ICD-10-CM

## 2023-11-27 DIAGNOSIS — I35.0 NONRHEUMATIC AORTIC VALVE STENOSIS: Primary | ICD-10-CM

## 2023-12-06 NOTE — PROGRESS NOTES
Date of Office Visit: 2023  Encounter Provider: Dr. Pola Caputo  Place of Service: [facility] [place]  Patient Name: Leonid Diehl  :1955  Kerri Cruz APRN    Chief Complaint   Patient presents with    Heart Murmur    Hypertension    Consult     History of Present Illness:    I am pleased to see Mr. Diehl in my office today as a new consultation.    As you know, patient is 68-year-old white gentleman whose past medical history is significant for hypertension, COPD, hyperlipidemia, who is referred to me for abnormal echo and cardiac murmur.    In 2023, patient was seen by PCP and was noted to have cardiac murmur.  Patient was referred to echocardiogram.  Echocardiogram showed normal left ventricular size and function with EF of 60 to 65%.  Mild right atrial enlargement noted.  Patient was noted to have calcified aortic valve with aortic valve area of 1.2 cm².  Peak velocity was 3.0 with gradient of 22 and peak gradient of 38 mmHg across the aortic valve.    Patient came today and he is fairly active.  He does horse farming and breathing.  Patient denies any symptom of chest pain.  Patient does have shortness of breath.  Patient denies any syncope or presyncope.  No leg edema noted.    EKG showed normal sinus rhythm.  Isolated PACs noted.    Patient does not have previous history of CAD, PCI or MI.  He does not smoke or abuse alcohol.    Patient has mild to moderate aortic stenosis.  At this stage I will recommend to observe.  Repeat echocardiogram in 6 to 12 months.  I will proceed with stress test to assess underlying obstructive CAD.  Blood pressure monitoring is recommended.        Past Medical History:   Diagnosis Date    Aortic stenosis, moderate 2022    Arthritis     Asthma     Benign prostatic hyperplasia 2017    Chronic diastolic congestive heart failure 2019    states didn't really have.  no sx's present    COPD (chronic obstructive pulmonary  disease)     Gout     Hay fever 01/28/2016    Heart murmur     History of alcohol abuse 04/18/2019    Hx of pancreatitis 04/18/2019    Infection of left knee 05/2022    Insomnia     Iron deficiency anemia 01/11/2019    Obesity (BMI 35.0-39.9 without comorbidity)     Peripheral edema     none presently    Primary hypertension 01/28/2016    Seasonal allergies     Thrombocytopenia     when had PNA         Past Surgical History:   Procedure Laterality Date    CATARACT EXTRACTION, BILATERAL      KNEE INCISION AND DRAINAGE Left 06/07/2022    Procedure: KNEE INCISION AND DRAINAGE;  Surgeon: Yash Celis MD;  Location: [facility];  Service: Orthopedics;  Laterality: Left;    TOTAL KNEE ARTHROPLASTY Left 04/05/2022    Procedure: TOTAL KNEE ARTHROPLASTY;  Surgeon: Yash Celis MD;  Location: [facility];  Service: Orthopedics;  Laterality: Left;    TOTAL KNEE ARTHROPLASTY Right 9/20/2022    Procedure: TOTAL KNEE ARTHROPLASTY WITH JARED ROBOT;  Surgeon: Yash Celis MD;  Location: [facility];  Service: Robotics - Ortho;  Laterality: Right;    TOTAL SHOULDER ARTHROPLASTY W/ DISTAL CLAVICLE EXCISION Right 10/22/2019    Procedure: TOTAL SHOULDER REVERSE ARTHROPLASTY with Biceps Tenodesis;  Surgeon: Chris Lafleur MD;  Location: [facility];  Service: Orthopedics           Current Outpatient Medications:     albuterol (ACCUNEB) 1.25 MG/3ML nebulizer solution, Take 3 mL by nebulization Every 6 (Six) Hours As Needed for Wheezing or Shortness of Air., Disp: 100 each, Rfl: 2    albuterol sulfate  (90 Base) MCG/ACT inhaler, INHALE TWO PUFFS BY MOUTH EVERY 6 HOURS AS NEEDED FOR WHEEZING OR SHORTNESS OF AIR, Disp: 8.5 g, Rfl: 2    allopurinol (ZYLOPRIM) 300 MG tablet, Take 1 tablet by mouth Daily., Disp: 90 tablet, Rfl: 1    budesonide-formoterol (Symbicort) 160-4.5 MCG/ACT inhaler, Inhale 2 puffs 2 (Two) Times a Day., Disp: 10.2 g, Rfl: 11    carvedilol (COREG) 3.125 MG tablet, TAKE ONE TABLET BY MOUTH  DAILY, Disp: 90 tablet, Rfl: 1    Cetirizine HCl (ZYRTEC PO), Take 10 mg by mouth Daily., Disp: , Rfl:     Dextromethorphan-guaiFENesin (MUCINEX DM PO), Take 1 tablet by mouth 2 (Two) Times a Day., Disp: , Rfl:     furosemide (LASIX) 20 MG tablet, Take 1 tablet by mouth Daily., Disp: 90 tablet, Rfl: 1    lisinopril (PRINIVIL,ZESTRIL) 10 MG tablet, TAKE ONE TABLET BY MOUTH DAILY, Disp: 90 tablet, Rfl: 1    meloxicam (MOBIC) 15 MG tablet, Take 1 tablet by mouth Daily. Prn joint pain, Disp: 90 tablet, Rfl: 3    potassium chloride 10 MEQ CR tablet, TAKE ONE TABLET BY MOUTH DAILY ALONG WITH LASIX, Disp: 90 tablet, Rfl: 1    traZODone (DESYREL) 50 MG tablet, Take 1 tablet by mouth Every Night., Disp: 90 tablet, Rfl: 1      Social History     Socioeconomic History    Marital status:    Tobacco Use    Smoking status: Never    Smokeless tobacco: Never   Vaping Use    Vaping Use: Never used   Substance and Sexual Activity    Alcohol use: Not Currently     Comment: occasionally    Drug use: No    Sexual activity: Defer         Review of Systems   Constitutional: Negative for chills and fever.   HENT:  Negative for ear discharge and nosebleeds.    Eyes:  Negative for discharge and redness.   Cardiovascular:  Negative for chest pain, orthopnea, palpitations, paroxysmal nocturnal dyspnea and syncope.   Respiratory:  Positive for shortness of breath. Negative for cough and wheezing.    Endocrine: Negative for heat intolerance.   Skin:  Negative for rash.   Musculoskeletal:  Negative for arthritis and myalgias.   Gastrointestinal:  Negative for abdominal pain, melena, nausea and vomiting.   Genitourinary:  Negative for dysuria and hematuria.   Neurological:  Negative for dizziness, light-headedness, numbness and tremors.   Psychiatric/Behavioral:  Negative for depression. The patient is not nervous/anxious.        Procedures      ECG 12 Lead    Date/Time: 12/7/2023 5:39 PM  Performed by: Pola Caputo MD    Authorized by:  "Pola Caputo MD  Previous ECG: no previous ECG available  Rhythm: sinus rhythm  Ectopy: atrial premature contractions    Clinical impression: normal ECG          ECG 12 Lead    (Results Pending)           Objective:    /71 (BP Location: Right arm, Patient Position: Sitting, Cuff Size: Large Adult)   Pulse 65   Resp 16   Ht 188 cm (74.02\")   Wt (!) 137 kg (303 lb)   SpO2 93%   BMI 38.89 kg/m²         Constitutional:       Appearance: Well-developed.   Eyes:      General: No scleral icterus.        Right eye: No discharge.   HENT:      Head: Normocephalic and atraumatic.   Neck:      Thyroid: No thyromegaly.      Lymphadenopathy: No cervical adenopathy.   Pulmonary:      Effort: Pulmonary effort is normal. No respiratory distress.      Breath sounds: Normal breath sounds. No wheezing. No rales.   Cardiovascular:      Normal rate. Regular rhythm.      No gallop.    Edema:     Peripheral edema absent.   Abdominal:      Tenderness: There is no abdominal tenderness.   Skin:     Findings: No erythema or rash.   Neurological:      Mental Status: Alert and oriented to person, place, and time.             Assessment:       Diagnosis Plan   1. Primary hypertension  ECG 12 Lead    Stress Test With Myocardial Perfusion One Day      2. Systolic murmur  ECG 12 Lead    Stress Test With Myocardial Perfusion One Day      3. Aortic valve stenosis, etiology of cardiac valve disease unspecified  Stress Test With Myocardial Perfusion One Day      4. Shortness of breath  Stress Test With Myocardial Perfusion One Day               Plan:       MDM:    1.  Shortness of breath:    Patient complain of shortness of breath.  I will recommend that patient should proceed with stress test with Cardiolite imaging    2.  Aortic stenosis:    Patient has mild to moderate aortic stenosis with peak gradient of 38 mmHg with aortic valve area 1.2 cm².  I will repeat echocardiogram in 6 to 12 months    3.  Hypertension:    Blood pressure is " controlled continue lisinopril.  Blood pressure monitoring at home    4.  Cardiac murmur:    Most likely cause of murmur is underlying aortic stenosis.

## 2023-12-07 ENCOUNTER — OFFICE VISIT (OUTPATIENT)
Dept: CARDIOLOGY | Facility: CLINIC | Age: 68
End: 2023-12-07
Payer: MEDICARE

## 2023-12-07 VITALS
OXYGEN SATURATION: 93 % | DIASTOLIC BLOOD PRESSURE: 71 MMHG | RESPIRATION RATE: 16 BRPM | BODY MASS INDEX: 38.89 KG/M2 | SYSTOLIC BLOOD PRESSURE: 137 MMHG | WEIGHT: 303 LBS | HEIGHT: 74 IN | HEART RATE: 65 BPM

## 2023-12-07 DIAGNOSIS — R06.02 SHORTNESS OF BREATH: ICD-10-CM

## 2023-12-07 DIAGNOSIS — I10 PRIMARY HYPERTENSION: Primary | Chronic | ICD-10-CM

## 2023-12-07 DIAGNOSIS — I35.0 AORTIC VALVE STENOSIS, ETIOLOGY OF CARDIAC VALVE DISEASE UNSPECIFIED: ICD-10-CM

## 2023-12-07 DIAGNOSIS — R01.1 SYSTOLIC MURMUR: ICD-10-CM

## 2023-12-07 PROCEDURE — 1160F RVW MEDS BY RX/DR IN RCRD: CPT | Performed by: INTERNAL MEDICINE

## 2023-12-07 PROCEDURE — 93000 ELECTROCARDIOGRAM COMPLETE: CPT | Performed by: INTERNAL MEDICINE

## 2023-12-07 PROCEDURE — 3078F DIAST BP <80 MM HG: CPT | Performed by: INTERNAL MEDICINE

## 2023-12-07 PROCEDURE — 1159F MED LIST DOCD IN RCRD: CPT | Performed by: INTERNAL MEDICINE

## 2023-12-07 PROCEDURE — 99204 OFFICE O/P NEW MOD 45 MIN: CPT | Performed by: INTERNAL MEDICINE

## 2023-12-07 PROCEDURE — 3075F SYST BP GE 130 - 139MM HG: CPT | Performed by: INTERNAL MEDICINE

## 2023-12-11 ENCOUNTER — PATIENT ROUNDING (BHMG ONLY) (OUTPATIENT)
Dept: CARDIOLOGY | Facility: CLINIC | Age: 68
End: 2023-12-11
Payer: MEDICARE

## 2023-12-11 NOTE — PROGRESS NOTES
A My-Chart message has been sent to the patient for PATIENT ROUNDING with [facility].

## 2023-12-22 ENCOUNTER — HOSPITAL ENCOUNTER (OUTPATIENT)
Dept: NUCLEAR MEDICINE | Facility: HOSPITAL | Age: 68
Discharge: HOME | End: 2023-12-22
Payer: MEDICARE

## 2023-12-22 DIAGNOSIS — I10 PRIMARY HYPERTENSION: Chronic | ICD-10-CM

## 2023-12-22 DIAGNOSIS — R01.1 SYSTOLIC MURMUR: ICD-10-CM

## 2023-12-22 DIAGNOSIS — R06.02 SHORTNESS OF BREATH: ICD-10-CM

## 2023-12-22 DIAGNOSIS — I35.0 AORTIC VALVE STENOSIS, ETIOLOGY OF CARDIAC VALVE DISEASE UNSPECIFIED: ICD-10-CM

## 2023-12-22 LAB
BH CV REST NUCLEAR ISOTOPE DOSE: 11 MCI
BH CV STRESS BP STAGE 1: NORMAL
BH CV STRESS BP STAGE 2: NORMAL
BH CV STRESS COMMENTS STAGE 1: NORMAL
BH CV STRESS COMMENTS STAGE 2: NORMAL
BH CV STRESS DOSE REGADENOSON STAGE 1: 0.4
BH CV STRESS DURATION MIN STAGE 1: 0
BH CV STRESS DURATION MIN STAGE 2: 4
BH CV STRESS DURATION SEC STAGE 1: 10
BH CV STRESS DURATION SEC STAGE 2: 0
BH CV STRESS HR STAGE 1: 57
BH CV STRESS HR STAGE 2: 62
BH CV STRESS NUCLEAR ISOTOPE DOSE: 33 MCI
BH CV STRESS PROTOCOL 1: NORMAL
BH CV STRESS RECOVERY BP: NORMAL MMHG
BH CV STRESS RECOVERY HR: 59 BPM
BH CV STRESS STAGE 1: 1
BH CV STRESS STAGE 2: 2
LV EF NUC BP: 62 %
MAXIMAL PREDICTED HEART RATE: 153 BPM
PERCENT MAX PREDICTED HR: 45.1 %
STRESS BASELINE BP: NORMAL MMHG
STRESS BASELINE HR: 57 BPM
STRESS PERCENT HR: 53 %
STRESS POST PEAK BP: NORMAL MMHG
STRESS POST PEAK HR: 69 BPM
STRESS TARGET HR: 130 BPM

## 2023-12-22 PROCEDURE — 93017 CV STRESS TEST TRACING ONLY: CPT

## 2023-12-22 PROCEDURE — 25010000002 REGADENOSON 0.4 MG/5ML SOLUTION: Performed by: INTERNAL MEDICINE

## 2023-12-22 PROCEDURE — 0 TECHNETIUM TETROFOSMIN KIT: Performed by: INTERNAL MEDICINE

## 2023-12-22 PROCEDURE — 78452 HT MUSCLE IMAGE SPECT MULT: CPT

## 2023-12-22 PROCEDURE — A9502 TC99M TETROFOSMIN: HCPCS | Performed by: INTERNAL MEDICINE

## 2023-12-22 RX ORDER — REGADENOSON 0.08 MG/ML
0.4 INJECTION, SOLUTION INTRAVENOUS
Status: COMPLETED | OUTPATIENT
Start: 2023-12-22 | End: 2023-12-22

## 2023-12-22 RX ADMIN — TETROFOSMIN 1 DOSE: 1.38 INJECTION, POWDER, LYOPHILIZED, FOR SOLUTION INTRAVENOUS at 09:23

## 2023-12-22 RX ADMIN — REGADENOSON 0.4 MG: 0.08 INJECTION, SOLUTION INTRAVENOUS at 09:23

## 2023-12-22 RX ADMIN — TETROFOSMIN 1 DOSE: 1.38 INJECTION, POWDER, LYOPHILIZED, FOR SOLUTION INTRAVENOUS at 08:34

## 2024-01-03 ENCOUNTER — OFFICE VISIT (OUTPATIENT)
Dept: ORTHOPEDIC SURGERY | Facility: CLINIC | Age: 69
End: 2024-01-03
Payer: MEDICARE

## 2024-01-03 VITALS — BODY MASS INDEX: 38.89 KG/M2 | OXYGEN SATURATION: 99 % | HEIGHT: 74 IN | RESPIRATION RATE: 20 BRPM | WEIGHT: 303 LBS

## 2024-01-03 DIAGNOSIS — Z96.652 HX OF TOTAL KNEE REPLACEMENT, LEFT: ICD-10-CM

## 2024-01-03 DIAGNOSIS — G89.29 CHRONIC PAIN OF LEFT KNEE: ICD-10-CM

## 2024-01-03 DIAGNOSIS — Z96.651 STATUS POST TOTAL KNEE REPLACEMENT, RIGHT: ICD-10-CM

## 2024-01-03 DIAGNOSIS — M25.561 CHRONIC PAIN OF RIGHT KNEE: Primary | ICD-10-CM

## 2024-01-03 DIAGNOSIS — G89.29 CHRONIC PAIN OF RIGHT KNEE: Primary | ICD-10-CM

## 2024-01-03 DIAGNOSIS — M25.562 CHRONIC PAIN OF LEFT KNEE: ICD-10-CM

## 2024-01-05 ENCOUNTER — OFFICE (OUTPATIENT)
Dept: URBAN - METROPOLITAN AREA CLINIC 64 | Facility: CLINIC | Age: 69
End: 2024-01-05

## 2024-01-05 VITALS
HEIGHT: 74 IN | WEIGHT: 305 LBS | HEART RATE: 54 BPM | SYSTOLIC BLOOD PRESSURE: 135 MMHG | DIASTOLIC BLOOD PRESSURE: 78 MMHG

## 2024-01-05 DIAGNOSIS — Z86.010 PERSONAL HISTORY OF COLONIC POLYPS: ICD-10-CM

## 2024-01-05 DIAGNOSIS — K70.30 ALCOHOLIC CIRRHOSIS OF LIVER WITHOUT ASCITES: ICD-10-CM

## 2024-01-05 DIAGNOSIS — R74.8 ABNORMAL LEVELS OF OTHER SERUM ENZYMES: ICD-10-CM

## 2024-01-05 PROCEDURE — 99213 OFFICE O/P EST LOW 20 MIN: CPT | Performed by: NURSE PRACTITIONER

## 2024-01-05 NOTE — PROGRESS NOTES
FOLLOW UP VISIT        Patient Name: Leonid Diehl  : 1955  Primary Care Physician: Kerri Cruz APRN        Chief Complaint:  annual follow up bilateral TKA    HPI:   Leonid Diehl is a 68 y.o. year old who presents today for follow up of TKA.     He is doing well overall with his knees. The right knee has somewhat more discomfort than the left, but overall, he has minimal symptoms and has restored mobility. No recent injuries or changes with his knee.       Past Medical/Surgical, Social and Family History:  I have reviewed and/or updated pertinent history as noted in the medical record including:  Past Medical History:   Diagnosis Date    Aortic stenosis, moderate 2022    Arthritis     Asthma     Benign prostatic hyperplasia 2017    Chronic diastolic congestive heart failure 2019    states didn't really have.  no sx's present    COPD (chronic obstructive pulmonary disease)     Gout     Hay fever 2016    Heart murmur     History of alcohol abuse 2019    Hx of pancreatitis 2019    Infection of left knee 2022    Insomnia     Iron deficiency anemia 2019    Obesity (BMI 35.0-39.9 without comorbidity)     Peripheral edema     none presently    Primary hypertension 2016    Seasonal allergies     Thrombocytopenia     when had PNA     Past Surgical History:   Procedure Laterality Date    CATARACT EXTRACTION, BILATERAL      KNEE INCISION AND DRAINAGE Left 2022    Procedure: KNEE INCISION AND DRAINAGE;  Surgeon: Yash Celis MD;  Location: [facility];  Service: Orthopedics;  Laterality: Left;    TOTAL KNEE ARTHROPLASTY Left 2022    Procedure: TOTAL KNEE ARTHROPLASTY;  Surgeon: Yash Celis MD;  Location: [facility] MAIN OR;  Service: Orthopedics;  Laterality: Left;    TOTAL KNEE ARTHROPLASTY Right 2022    Procedure: TOTAL KNEE ARTHROPLASTY WITH JARED ROBOT;  Surgeon: Yash Celis MD;  Location: [facility] OR;  Service: Robotics - Ortho;   Laterality: Right;    TOTAL SHOULDER ARTHROPLASTY W/ DISTAL CLAVICLE EXCISION Right 10/22/2019    Procedure: TOTAL SHOULDER REVERSE ARTHROPLASTY with Biceps Tenodesis;  Surgeon: Chris Lafleur MD;  Location: [facility] MAIN OR;  Service: Orthopedics     Social History     Occupational History    Not on file   Tobacco Use    Smoking status: Never    Smokeless tobacco: Never   Vaping Use    Vaping Use: Never used   Substance and Sexual Activity    Alcohol use: Not Currently     Comment: occasionally    Drug use: No    Sexual activity: Defer      Social History     Social History Narrative    Not on file     Family History   Problem Relation Age of Onset    Heart attack Mother     Heart disease Mother     Cancer Father     Heart disease Sister     Hypertension Brother        Allergies: No Known Allergies    Medications:   Home Medications:  Current Outpatient Medications on File Prior to Visit   Medication Sig    albuterol (ACCUNEB) 1.25 MG/3ML nebulizer solution Take 3 mL by nebulization Every 6 (Six) Hours As Needed for Wheezing or Shortness of Air.    albuterol sulfate  (90 Base) MCG/ACT inhaler INHALE TWO PUFFS BY MOUTH EVERY 6 HOURS AS NEEDED FOR WHEEZING OR SHORTNESS OF AIR    allopurinol (ZYLOPRIM) 300 MG tablet Take 1 tablet by mouth Daily.    budesonide-formoterol (Symbicort) 160-4.5 MCG/ACT inhaler Inhale 2 puffs 2 (Two) Times a Day.    carvedilol (COREG) 3.125 MG tablet TAKE ONE TABLET BY MOUTH DAILY    Cetirizine HCl (ZYRTEC PO) Take 10 mg by mouth Daily.    Dextromethorphan-guaiFENesin (MUCINEX DM PO) Take 1 tablet by mouth 2 (Two) Times a Day.    furosemide (LASIX) 20 MG tablet Take 1 tablet by mouth Daily.    lisinopril (PRINIVIL,ZESTRIL) 10 MG tablet TAKE ONE TABLET BY MOUTH DAILY    meloxicam (MOBIC) 15 MG tablet Take 1 tablet by mouth Daily. Prn joint pain    potassium chloride 10 MEQ CR tablet TAKE ONE TABLET BY MOUTH DAILY ALONG WITH LASIX    traZODone (DESYREL) 50 MG tablet Take 1  tablet by mouth Every Night.     No current facility-administered medications on file prior to visit.         ROS:  14 point review of systems was negative except as listed in the HPI     Physical Exam:   68 y.o. male  Body mass index is 38.9 kg/m²., (!) 137 kg (303 lb)  Vitals:    01/03/24 1402   Resp: 20   SpO2: 99%         General: Alert, cooperative, appears well and in no observable distress.   HEENT: Normocephalic, atraumatic on external visual inspection. No icterus.   CV: No significant peripheral edema.   Respiratory: Normal respiratory effort.   Skin: Warm & well perfused; appropriate skin turgor.  Psych: Appropriate mood & affect.  Neuro: Gross sensation and motor intact in affected extremity/extremities.  Vascular: Peripheral pulses palpable in affected extremity/extremities. Calves/compartments soft and non tender, no evidence of DVT or compartment syndrome.    Right Knee Exam   Right knee exam is normal.    Muscle Strength   The patient has normal right knee strength.    Other   Erythema: absent  Sensation: normal  Swelling: none  Effusion: no effusion present      Left Knee Exam   Left knee exam is normal.    Muscle Strength   The patient has normal left knee strength.    Other   Erythema: absent  Sensation: normal  Swelling: none  Effusion: no effusion present                    Investigations:  XR KNEE 3 VW BILATERAL     Date of Exam: 1/3/2024 2:08 PM EST     Indication: left knee replacement april 2023, right knee replacement october 2023 room 16     Comparison: Bilateral knee radiographs 1/4/2023.     Findings:  Bilateral knee replacement changes are again seen. Each prosthesis appears appropriately seated. No evidence of periprosthetic fracture or prosthesis loosening. Each knee appears appropriately aligned, in each patella appears appropriate position. No   significant right or left joint effusion. Surrounding soft tissues within the limits     IMPRESSION:  Impression:  Stable appearance of  bilateral knee prosthesis without evidence of hardware complication. No significant change from 1/4/2023.      Electronically Signed: Azucena Barros MD    1/4/2024 12:48 PM EST    Workstation ID: [id number]             Assessment:  S/p bilateral TKA  Body mass index is 38.9 kg/m².  BMI consistent with Obese Class II: 35-39.9kg/m2        Plan:  Mr Diehl is doing very well today and has no concerns or complaints regarding his bilateral knees  Will discharge him today. He was encouraged to call with any questions or concerns in the future   BMI reviewed  Follow up PRBERKLEY Ovalles

## 2024-01-15 ENCOUNTER — CLINICAL SUPPORT (OUTPATIENT)
Dept: FAMILY MEDICINE CLINIC | Facility: CLINIC | Age: 69
End: 2024-01-15
Payer: MEDICARE

## 2024-01-15 DIAGNOSIS — I10 ESSENTIAL HYPERTENSION: ICD-10-CM

## 2024-01-15 DIAGNOSIS — M1A.39X0 CHRONIC GOUT DUE TO RENAL IMPAIRMENT OF MULTIPLE SITES WITHOUT TOPHUS: ICD-10-CM

## 2024-01-15 LAB
ALBUMIN SERPL-MCNC: 3.9 G/DL (ref 3.5–5.2)
ALBUMIN/GLOB SERPL: 1.1 G/DL
ALP SERPL-CCNC: 100 U/L (ref 39–117)
ALT SERPL W P-5'-P-CCNC: 15 U/L (ref 1–41)
ANION GAP SERPL CALCULATED.3IONS-SCNC: 8 MMOL/L (ref 5–15)
AST SERPL-CCNC: 24 U/L (ref 1–40)
BILIRUB SERPL-MCNC: 0.6 MG/DL (ref 0–1.2)
BUN SERPL-MCNC: 11 MG/DL (ref 8–23)
BUN/CREAT SERPL: 10.3 (ref 7–25)
CALCIUM SPEC-SCNC: 9.1 MG/DL (ref 8.6–10.5)
CHLORIDE SERPL-SCNC: 107 MMOL/L (ref 98–107)
CO2 SERPL-SCNC: 24 MMOL/L (ref 22–29)
CREAT SERPL-MCNC: 1.07 MG/DL (ref 0.76–1.27)
DEPRECATED RDW RBC AUTO: 43 FL (ref 37–54)
EGFRCR SERPLBLD CKD-EPI 2021: 76.1 ML/MIN/1.73
ERYTHROCYTE [DISTWIDTH] IN BLOOD BY AUTOMATED COUNT: 12.6 % (ref 12.3–15.4)
GLOBULIN UR ELPH-MCNC: 3.6 GM/DL
GLUCOSE SERPL-MCNC: 104 MG/DL (ref 65–99)
HCT VFR BLD AUTO: 45.3 % (ref 37.5–51)
HGB BLD-MCNC: 15.1 G/DL (ref 13–17.7)
MCH RBC QN AUTO: 31.1 PG (ref 26.6–33)
MCHC RBC AUTO-ENTMCNC: 33.3 G/DL (ref 31.5–35.7)
MCV RBC AUTO: 93.2 FL (ref 79–97)
PLATELET # BLD AUTO: 121 10*3/MM3 (ref 140–450)
PMV BLD AUTO: 11.3 FL (ref 6–12)
POTASSIUM SERPL-SCNC: 4.5 MMOL/L (ref 3.5–5.2)
PROT SERPL-MCNC: 7.5 G/DL (ref 6–8.5)
RBC # BLD AUTO: 4.86 10*6/MM3 (ref 4.14–5.8)
SODIUM SERPL-SCNC: 139 MMOL/L (ref 136–145)
URATE SERPL-MCNC: 4.1 MG/DL (ref 3.4–7)
WBC NRBC COR # BLD AUTO: 7.41 10*3/MM3 (ref 3.4–10.8)

## 2024-01-15 PROCEDURE — 80053 COMPREHEN METABOLIC PANEL: CPT | Performed by: NURSE PRACTITIONER

## 2024-01-15 PROCEDURE — 84550 ASSAY OF BLOOD/URIC ACID: CPT | Performed by: NURSE PRACTITIONER

## 2024-01-15 PROCEDURE — 36415 COLL VENOUS BLD VENIPUNCTURE: CPT | Performed by: NURSE PRACTITIONER

## 2024-01-15 PROCEDURE — 85027 COMPLETE CBC AUTOMATED: CPT | Performed by: NURSE PRACTITIONER

## 2024-01-15 NOTE — PROGRESS NOTES
Venipuncture Blood Specimen Collection  Venipuncture performed in the right arm by Mili Blank MA with good hemostasis. Patient tolerated the procedure well without complications.   01/15/24   Mili Blank MA

## 2024-01-22 ENCOUNTER — OFFICE VISIT (OUTPATIENT)
Dept: FAMILY MEDICINE CLINIC | Facility: CLINIC | Age: 69
End: 2024-01-22
Payer: MEDICARE

## 2024-01-22 VITALS
BODY MASS INDEX: 38.63 KG/M2 | RESPIRATION RATE: 18 BRPM | TEMPERATURE: 99.1 F | HEIGHT: 74 IN | HEART RATE: 63 BPM | OXYGEN SATURATION: 96 % | DIASTOLIC BLOOD PRESSURE: 85 MMHG | SYSTOLIC BLOOD PRESSURE: 142 MMHG | WEIGHT: 301 LBS

## 2024-01-22 DIAGNOSIS — D69.6 THROMBOCYTOPENIA: ICD-10-CM

## 2024-01-22 DIAGNOSIS — I10 ESSENTIAL HYPERTENSION: Primary | ICD-10-CM

## 2024-01-22 DIAGNOSIS — K70.30 ALCOHOLIC CIRRHOSIS OF LIVER WITHOUT ASCITES: ICD-10-CM

## 2024-01-22 DIAGNOSIS — J44.9 CHRONIC OBSTRUCTIVE PULMONARY DISEASE, UNSPECIFIED COPD TYPE: ICD-10-CM

## 2024-01-22 DIAGNOSIS — G47.09 OTHER INSOMNIA: ICD-10-CM

## 2024-01-22 DIAGNOSIS — M1A.39X0 CHRONIC GOUT DUE TO RENAL IMPAIRMENT OF MULTIPLE SITES WITHOUT TOPHUS: ICD-10-CM

## 2024-01-22 PROCEDURE — 99214 OFFICE O/P EST MOD 30 MIN: CPT | Performed by: NURSE PRACTITIONER

## 2024-01-22 PROCEDURE — 3079F DIAST BP 80-89 MM HG: CPT | Performed by: NURSE PRACTITIONER

## 2024-01-22 PROCEDURE — 1160F RVW MEDS BY RX/DR IN RCRD: CPT | Performed by: NURSE PRACTITIONER

## 2024-01-22 PROCEDURE — 3077F SYST BP >= 140 MM HG: CPT | Performed by: NURSE PRACTITIONER

## 2024-01-22 PROCEDURE — 1159F MED LIST DOCD IN RCRD: CPT | Performed by: NURSE PRACTITIONER

## 2024-01-22 RX ORDER — LISINOPRIL 20 MG/1
20 TABLET ORAL DAILY
Qty: 90 TABLET | Refills: 1 | Status: SHIPPED | OUTPATIENT
Start: 2024-01-22

## 2024-01-22 NOTE — PROGRESS NOTES
Chief Complaint  Chief Complaint   Patient presents with    Follow-up     HTN, Gout.           Subjective          Leonid Diehl presents to [facility] for   History of Present Illness    HTN, stable on meds and takes as directed, denies chest pain, headache, shortness of air, palpitations and swelling of extremities.     Patient was referred to gastro for elevated liver enzymes, alcohol abuse, ultrasound showed compensated advanced chronic liver disease, portal hypertension, cirrhotic nodular liver surface. Also with thrombocytopenia.  He had been drinking up to a liter of vodka every 2 days, he states he quit drinking alcohol in December. Colonoscopy completed 11/16/2023 moderate diverticulosis    Gout, taking allopurinol daily    Insomnia, taking trazodone 50mg only prn now.     Here to review labs      The following portions of the patient's history were reviewed and updated as appropriate: allergies, current medications, past family history, past medical history, past social history, past surgical history and problem list.    Past Medical History:   Diagnosis Date    Aortic stenosis, moderate 04/05/2022    Arthritis     Asthma     Benign prostatic hyperplasia 06/20/2017    Chronic diastolic congestive heart failure 02/04/2019    COPD (chronic obstructive pulmonary disease)     Gout     Hay fever 01/28/2016    Heart murmur     History of alcohol abuse 04/18/2019    Hx of pancreatitis 04/18/2019    Infection of left knee 05/2022    Insomnia     Iron deficiency anemia 01/11/2019    Obesity (BMI 35.0-39.9 without comorbidity)     Peripheral edema     Primary hypertension 01/28/2016    Seasonal allergies     Thrombocytopenia      Past Surgical History:   Procedure Laterality Date    CATARACT EXTRACTION, BILATERAL      KNEE INCISION AND DRAINAGE Left 06/07/2022    Procedure: KNEE INCISION AND DRAINAGE;  Surgeon: Yash Celis MD;  Location: [facility] MAIN OR;  Service: Orthopedics;   Laterality: Left;    TOTAL KNEE ARTHROPLASTY Left 04/05/2022    Procedure: TOTAL KNEE ARTHROPLASTY;  Surgeon: Yash Celis MD;  Location: [facility] MAIN OR;  Service: Orthopedics;  Laterality: Left;    TOTAL KNEE ARTHROPLASTY Right 9/20/2022    Procedure: TOTAL KNEE ARTHROPLASTY WITH JARED ROBOT;  Surgeon: Yash Celis MD;  Location: [facility] MAIN OR;  Service: Robotics - Ortho;  Laterality: Right;    TOTAL SHOULDER ARTHROPLASTY W/ DISTAL CLAVICLE EXCISION Right 10/22/2019    Procedure: TOTAL SHOULDER REVERSE ARTHROPLASTY with Biceps Tenodesis;  Surgeon: Chris Lafleur MD;  Location: [facility] MAIN OR;  Service: Orthopedics     Family History   Problem Relation Age of Onset    Heart attack Mother     Heart disease Mother     Cancer Father     Heart disease Sister     Hypertension Brother      Social History     Tobacco Use    Smoking status: Never    Smokeless tobacco: Never   Substance Use Topics    Alcohol use: Not Currently     Comment: occasionally       Current Outpatient Medications:     albuterol (ACCUNEB) 1.25 MG/3ML nebulizer solution, Take 3 mL by nebulization Every 6 (Six) Hours As Needed for Wheezing or Shortness of Air., Disp: 100 each, Rfl: 2    albuterol sulfate  (90 Base) MCG/ACT inhaler, INHALE TWO PUFFS BY MOUTH EVERY 6 HOURS AS NEEDED FOR WHEEZING OR SHORTNESS OF AIR, Disp: 8.5 g, Rfl: 2    allopurinol (ZYLOPRIM) 300 MG tablet, Take 1 tablet by mouth Daily., Disp: 90 tablet, Rfl: 1    budesonide-formoterol (Symbicort) 160-4.5 MCG/ACT inhaler, Inhale 2 puffs 2 (Two) Times a Day., Disp: 10.2 g, Rfl: 11    carvedilol (COREG) 3.125 MG tablet, TAKE ONE TABLET BY MOUTH DAILY, Disp: 90 tablet, Rfl: 1    Cetirizine HCl (ZYRTEC PO), Take 10 mg by mouth Daily., Disp: , Rfl:     Dextromethorphan-guaiFENesin (MUCINEX DM PO), Take 1 tablet by mouth 2 (Two) Times a Day., Disp: , Rfl:     furosemide (LASIX) 20 MG tablet, Take 1 tablet by mouth Daily., Disp: 90 tablet, Rfl: 1    lisinopril  "(PRINIVIL,ZESTRIL) 20 MG tablet, Take 1 tablet by mouth Daily., Disp: 90 tablet, Rfl: 1    potassium chloride 10 MEQ CR tablet, TAKE ONE TABLET BY MOUTH DAILY ALONG WITH LASIX, Disp: 90 tablet, Rfl: 1    Objective   Vital Signs:   /85 (BP Location: Right arm, Patient Position: Sitting, Cuff Size: Large Adult)   Pulse 63   Temp 99.1 °F (37.3 °C) (Temporal)   Resp 18   Ht 188 cm (74\")   Wt (!) 137 kg (301 lb)   SpO2 96%   BMI 38.65 kg/m²           Physical Exam  Constitutional:       General: He is not in acute distress.     Appearance: Normal appearance. He is well-developed. He is not ill-appearing or diaphoretic.   HENT:      Head: Normocephalic.   Eyes:      Conjunctiva/sclera: Conjunctivae normal.      Pupils: Pupils are equal, round, and reactive to light.   Neck:      Thyroid: No thyromegaly.      Vascular: No JVD.   Cardiovascular:      Rate and Rhythm: Normal rate and regular rhythm.      Heart sounds: Murmur heard.   Pulmonary:      Effort: Pulmonary effort is normal. No respiratory distress.      Breath sounds: Normal breath sounds. No wheezing or rhonchi.   Abdominal:      General: Bowel sounds are normal. There is no distension.      Palpations: Abdomen is soft.      Tenderness: There is no abdominal tenderness.   Musculoskeletal:         General: No swelling or tenderness. Normal range of motion.      Cervical back: Normal range of motion and neck supple. No tenderness.   Lymphadenopathy:      Cervical: No cervical adenopathy.   Skin:     General: Skin is warm and dry.      Coloration: Skin is not jaundiced.      Findings: No erythema or rash.   Neurological:      General: No focal deficit present.      Mental Status: He is alert and oriented to person, place, and time. Mental status is at baseline.      Sensory: No sensory deficit.   Psychiatric:         Mood and Affect: Mood normal.         Behavior: Behavior normal.         Thought Content: Thought content normal.         Judgment: " Judgment normal.          Result Review :     No visits with results within 7 Day(s) from this visit.   Latest known visit with results is:   Clinical Support on 01/15/2024   Component Date Value Ref Range Status    Glucose 01/15/2024 104 (H)  65 - 99 mg/dL Final    BUN 01/15/2024 11  8 - 23 mg/dL Final    Creatinine 01/15/2024 1.07  0.76 - 1.27 mg/dL Final    Sodium 01/15/2024 139  136 - 145 mmol/L Final    Potassium 01/15/2024 4.5  3.5 - 5.2 mmol/L Final    Chloride 01/15/2024 107  98 - 107 mmol/L Final    CO2 01/15/2024 24.0  22.0 - 29.0 mmol/L Final    Calcium 01/15/2024 9.1  8.6 - 10.5 mg/dL Final    Total Protein 01/15/2024 7.5  6.0 - 8.5 g/dL Final    Albumin 01/15/2024 3.9  3.5 - 5.2 g/dL Final    ALT (SGPT) 01/15/2024 15  1 - 41 U/L Final    AST (SGOT) 01/15/2024 24  1 - 40 U/L Final    Alkaline Phosphatase 01/15/2024 100  39 - 117 U/L Final    Total Bilirubin 01/15/2024 0.6  0.0 - 1.2 mg/dL Final    Globulin 01/15/2024 3.6  gm/dL Final    A/G Ratio 01/15/2024 1.1  g/dL Final    BUN/Creatinine Ratio 01/15/2024 10.3  7.0 - 25.0 Final    Anion Gap 01/15/2024 8.0  5.0 - 15.0 mmol/L Final    eGFR 01/15/2024 76.1  >60.0 mL/min/1.73 Final    WBC 01/15/2024 7.41  3.40 - 10.80 10*3/mm3 Final    RBC 01/15/2024 4.86  4.14 - 5.80 10*6/mm3 Final    Hemoglobin 01/15/2024 15.1  13.0 - 17.7 g/dL Final    Hematocrit 01/15/2024 45.3  37.5 - 51.0 % Final    MCV 01/15/2024 93.2  79.0 - 97.0 fL Final    MCH 01/15/2024 31.1  26.6 - 33.0 pg Final    MCHC 01/15/2024 33.3  31.5 - 35.7 g/dL Final    RDW 01/15/2024 12.6  12.3 - 15.4 % Final    RDW-SD 01/15/2024 43.0  37.0 - 54.0 fl Final    MPV 01/15/2024 11.3  6.0 - 12.0 fL Final    Platelets 01/15/2024 121 (L)  140 - 450 10*3/mm3 Final    Uric Acid 01/15/2024 4.1  3.4 - 7.0 mg/dL Final                              Assessment and Plan    Diagnoses and all orders for this visit:    1. Essential hypertension (Primary)  Comments:  increase lisinopril to 20mg    2. Chronic obstructive  pulmonary disease, unspecified COPD type  Comments:  Continue Symbicort 2 puffs twice daily every day  cont albuterol inhaler or nebulizer tx as needed    3. Chronic gout due to renal impairment of multiple sites without tophus    4. Alcoholic cirrhosis of liver without ascites  Comments:  rec start vitamin E  f/u with gastro  praised efforts of alcohol cessation    5. Thrombocytopenia  Comments:  Reviewed labs, stable, will continue to monitor    6. Other insomnia  Comments:  Stable currently, okay to use trazodone as needed    Other orders  -     lisinopril (PRINIVIL,ZESTRIL) 20 MG tablet; Take 1 tablet by mouth Daily.  Dispense: 90 tablet; Refill: 1        I spent 30 minutes caring for Leonid Diehl on this date of service. This time includes time spent by me in the following activities: preparing for the visit, reviewing tests, performing a medically appropriate examination and/or evaluation , counseling and educating the patient/family/caregiver, ordering medications, tests, or procedures and documenting information in the medical record        Follow Up     Return in about 6 months (around 7/22/2024) for Recheck. HTN panel and PSA prior to appt.  Patient was given instructions and counseling regarding his condition or for health maintenance advice. Please see specific information pulled into the AVS if appropriate.        Part of this note may be an electronic transcription/translation of spoken language to printed text using the Dragon Dictation System

## 2024-01-24 DIAGNOSIS — J44.9 CHRONIC OBSTRUCTIVE PULMONARY DISEASE, UNSPECIFIED COPD TYPE: ICD-10-CM

## 2024-01-24 NOTE — TELEPHONE ENCOUNTER
Caller: Dorita Diehl    Relationship: Emergency Contact    Best call back number: [phone]     Requested Prescriptions:   Requested Prescriptions     Pending Prescriptions Disp Refills    budesonide-formoterol (Symbicort) 160-4.5 MCG/ACT inhaler 10.2 g 11     Sig: Inhale 2 puffs 2 (Two) Times a Day.        Pharmacy where request should be sent: [facility] [id number] [address] AT [facility] [address] [place] [address] - [phone] [facility] [phone]      Last office visit with prescribing clinician: 1/22/2024   Last telemedicine visit with prescribing clinician: Visit date not found   Next office visit with prescribing clinician: 7/22/2024     Additional details provided by patient: PATIENT HAS TO HAVE GENERIC FORM AS OF 1/1/24.    Does the patient have less than a 3 day supply:  [] Yes  [x] No    Would you like a call back once the refill request has been completed: [] Yes [] No    If the office needs to give you a call back, can they leave a voicemail: [] Yes [] No    April Pipo Matias Rep   01/24/24 16:00 EST

## 2024-01-26 RX ORDER — BUDESONIDE AND FORMOTEROL FUMARATE DIHYDRATE 160; 4.5 UG/1; UG/1
2 AEROSOL RESPIRATORY (INHALATION)
Qty: 10.2 G | Refills: 11 | Status: SHIPPED | OUTPATIENT
Start: 2024-01-26

## 2024-02-13 RX ORDER — LISINOPRIL 10 MG/1
10 TABLET ORAL DAILY
Qty: 90 TABLET | Refills: 1 | OUTPATIENT
Start: 2024-02-13

## 2024-02-16 RX ORDER — POTASSIUM CHLORIDE 750 MG/1
TABLET, FILM COATED, EXTENDED RELEASE ORAL
Qty: 90 TABLET | Refills: 1 | Status: SHIPPED | OUTPATIENT
Start: 2024-02-16

## 2024-02-22 ENCOUNTER — TRANSCRIBE ORDERS (OUTPATIENT)
Dept: ADMINISTRATIVE | Facility: HOSPITAL | Age: 69
End: 2024-02-22
Payer: MEDICARE

## 2024-02-22 ENCOUNTER — OFFICE (OUTPATIENT)
Dept: URBAN - METROPOLITAN AREA CLINIC 64 | Facility: CLINIC | Age: 69
End: 2024-02-22

## 2024-02-22 VITALS
DIASTOLIC BLOOD PRESSURE: 82 MMHG | SYSTOLIC BLOOD PRESSURE: 146 MMHG | HEIGHT: 74 IN | HEART RATE: 55 BPM | WEIGHT: 300 LBS

## 2024-02-22 DIAGNOSIS — Z86.010 PERSONAL HISTORY OF COLONIC POLYPS: ICD-10-CM

## 2024-02-22 DIAGNOSIS — K70.30 ALCOHOLIC CIRRHOSIS OF LIVER WITHOUT ASCITES: Primary | ICD-10-CM

## 2024-02-22 DIAGNOSIS — R74.01 ELEVATION OF LEVELS OF LIVER TRANSAMINASE LEVELS: ICD-10-CM

## 2024-02-22 DIAGNOSIS — R74.01 ELEVATED TRANSAMINASE LEVEL: ICD-10-CM

## 2024-02-22 DIAGNOSIS — K70.30 ALCOHOLIC CIRRHOSIS OF LIVER WITHOUT ASCITES: ICD-10-CM

## 2024-02-22 PROCEDURE — 99213 OFFICE O/P EST LOW 20 MIN: CPT | Performed by: NURSE PRACTITIONER

## 2024-03-04 ENCOUNTER — HOSPITAL ENCOUNTER (OUTPATIENT)
Dept: ULTRASOUND IMAGING | Facility: HOSPITAL | Age: 69
Discharge: HOME | End: 2024-03-04
Admitting: NURSE PRACTITIONER
Payer: MEDICARE

## 2024-03-04 DIAGNOSIS — R74.01 ELEVATED TRANSAMINASE LEVEL: ICD-10-CM

## 2024-03-04 DIAGNOSIS — Z86.010 PERSONAL HISTORY OF COLONIC POLYPS: ICD-10-CM

## 2024-03-04 DIAGNOSIS — K70.30 ALCOHOLIC CIRRHOSIS OF LIVER WITHOUT ASCITES: ICD-10-CM

## 2024-03-04 PROCEDURE — 76705 ECHO EXAM OF ABDOMEN: CPT

## 2024-03-20 DIAGNOSIS — I10 ESSENTIAL HYPERTENSION: ICD-10-CM

## 2024-03-21 DIAGNOSIS — J44.9 CHRONIC OBSTRUCTIVE PULMONARY DISEASE, UNSPECIFIED COPD TYPE: ICD-10-CM

## 2024-03-21 RX ORDER — CARVEDILOL 3.12 MG/1
3.12 TABLET ORAL DAILY
Qty: 90 TABLET | Refills: 1 | Status: SHIPPED | OUTPATIENT
Start: 2024-03-21

## 2024-03-21 RX ORDER — ALBUTEROL SULFATE 90 UG/1
2 AEROSOL, METERED RESPIRATORY (INHALATION) EVERY 6 HOURS PRN
Qty: 8.5 G | Refills: 2 | Status: SHIPPED | OUTPATIENT
Start: 2024-03-21

## 2024-03-21 RX ORDER — LISINOPRIL 10 MG/1
10 TABLET ORAL DAILY
Qty: 90 TABLET | Refills: 1 | OUTPATIENT
Start: 2024-03-21

## 2024-04-18 DIAGNOSIS — I35.0 NONRHEUMATIC AORTIC VALVE STENOSIS: ICD-10-CM

## 2024-04-19 RX ORDER — FUROSEMIDE 20 MG/1
20 TABLET ORAL DAILY
Qty: 90 TABLET | Refills: 1 | Status: SHIPPED | OUTPATIENT
Start: 2024-04-19

## 2024-05-31 ENCOUNTER — OFFICE (OUTPATIENT)
Dept: URBAN - METROPOLITAN AREA CLINIC 64 | Facility: CLINIC | Age: 69
End: 2024-05-31

## 2024-05-31 DIAGNOSIS — K70.30 ALCOHOLIC CIRRHOSIS OF LIVER WITHOUT ASCITES: ICD-10-CM

## 2024-05-31 PROCEDURE — 99426 PRIN CARE MGMT STAFF 1ST 30: CPT | Performed by: NURSE PRACTITIONER

## 2024-06-30 ENCOUNTER — OFFICE (OUTPATIENT)
Dept: URBAN - METROPOLITAN AREA CLINIC 64 | Facility: CLINIC | Age: 69
End: 2024-06-30
Payer: COMMERCIAL

## 2024-06-30 DIAGNOSIS — K70.30 ALCOHOLIC CIRRHOSIS OF LIVER WITHOUT ASCITES: ICD-10-CM

## 2024-06-30 PROCEDURE — 99426 PRIN CARE MGMT STAFF 1ST 30: CPT | Performed by: NURSE PRACTITIONER

## 2024-07-10 DIAGNOSIS — Z13.220 SCREENING FOR HYPERLIPIDEMIA: ICD-10-CM

## 2024-07-10 DIAGNOSIS — Z12.5 SCREENING PSA (PROSTATE SPECIFIC ANTIGEN): ICD-10-CM

## 2024-07-10 DIAGNOSIS — I10 ESSENTIAL HYPERTENSION: Primary | ICD-10-CM

## 2024-07-12 DIAGNOSIS — J44.9 CHRONIC OBSTRUCTIVE PULMONARY DISEASE, UNSPECIFIED COPD TYPE: ICD-10-CM

## 2024-07-12 RX ORDER — ALBUTEROL SULFATE 90 UG/1
2 AEROSOL, METERED RESPIRATORY (INHALATION) EVERY 6 HOURS PRN
Qty: 8.5 G | Refills: 2 | Status: SHIPPED | OUTPATIENT
Start: 2024-07-12

## 2024-07-15 ENCOUNTER — LAB (OUTPATIENT)
Dept: FAMILY MEDICINE CLINIC | Facility: CLINIC | Age: 69
End: 2024-07-15
Payer: MEDICARE

## 2024-07-15 PROCEDURE — G0103 PSA SCREENING: HCPCS | Performed by: NURSE PRACTITIONER

## 2024-07-15 PROCEDURE — 80053 COMPREHEN METABOLIC PANEL: CPT | Performed by: NURSE PRACTITIONER

## 2024-07-15 PROCEDURE — 84443 ASSAY THYROID STIM HORMONE: CPT | Performed by: NURSE PRACTITIONER

## 2024-07-15 PROCEDURE — 85027 COMPLETE CBC AUTOMATED: CPT | Performed by: NURSE PRACTITIONER

## 2024-07-15 PROCEDURE — 80061 LIPID PANEL: CPT | Performed by: NURSE PRACTITIONER

## 2024-07-15 PROCEDURE — 36415 COLL VENOUS BLD VENIPUNCTURE: CPT | Performed by: NURSE PRACTITIONER

## 2024-07-16 LAB
ALBUMIN SERPL-MCNC: 3.7 G/DL (ref 3.5–5.2)
ALBUMIN/GLOB SERPL: 1.1 G/DL
ALP SERPL-CCNC: 78 U/L (ref 39–117)
ALT SERPL W P-5'-P-CCNC: 23 U/L (ref 1–41)
ANION GAP SERPL CALCULATED.3IONS-SCNC: 11 MMOL/L (ref 5–15)
AST SERPL-CCNC: 35 U/L (ref 1–40)
BILIRUB SERPL-MCNC: 0.9 MG/DL (ref 0–1.2)
BUN SERPL-MCNC: 17 MG/DL (ref 8–23)
BUN/CREAT SERPL: 21.5 (ref 7–25)
CALCIUM SPEC-SCNC: 8.8 MG/DL (ref 8.6–10.5)
CHLORIDE SERPL-SCNC: 108 MMOL/L (ref 98–107)
CHOLEST SERPL-MCNC: 130 MG/DL (ref 0–200)
CO2 SERPL-SCNC: 20 MMOL/L (ref 22–29)
CREAT SERPL-MCNC: 0.79 MG/DL (ref 0.76–1.27)
DEPRECATED RDW RBC AUTO: 43.8 FL (ref 37–54)
EGFRCR SERPLBLD CKD-EPI 2021: 96.8 ML/MIN/1.73
ERYTHROCYTE [DISTWIDTH] IN BLOOD BY AUTOMATED COUNT: 12.6 % (ref 12.3–15.4)
GLOBULIN UR ELPH-MCNC: 3.5 GM/DL
GLUCOSE SERPL-MCNC: 101 MG/DL (ref 65–99)
HCT VFR BLD AUTO: 41.2 % (ref 37.5–51)
HDLC SERPL-MCNC: 46 MG/DL (ref 40–60)
HGB BLD-MCNC: 14.3 G/DL (ref 13–17.7)
LDLC SERPL CALC-MCNC: 66 MG/DL (ref 0–100)
LDLC/HDLC SERPL: 1.41 {RATIO}
MCH RBC QN AUTO: 33.5 PG (ref 26.6–33)
MCHC RBC AUTO-ENTMCNC: 34.7 G/DL (ref 31.5–35.7)
MCV RBC AUTO: 96.5 FL (ref 79–97)
PLATELET # BLD AUTO: ABNORMAL 10*3/UL
PMV BLD AUTO: 11.5 FL (ref 6–12)
POTASSIUM SERPL-SCNC: 4.4 MMOL/L (ref 3.5–5.2)
PROT SERPL-MCNC: 7.2 G/DL (ref 6–8.5)
PSA SERPL-MCNC: 0.19 NG/ML (ref 0–4)
RBC # BLD AUTO: 4.27 10*6/MM3 (ref 4.14–5.8)
SODIUM SERPL-SCNC: 139 MMOL/L (ref 136–145)
TRIGL SERPL-MCNC: 96 MG/DL (ref 0–150)
TSH SERPL DL<=0.05 MIU/L-ACNC: 2.55 UIU/ML (ref 0.27–4.2)
VLDLC SERPL-MCNC: 18 MG/DL (ref 5–40)
WBC NRBC COR # BLD AUTO: 8.32 10*3/MM3 (ref 3.4–10.8)

## 2024-07-22 ENCOUNTER — OFFICE VISIT (OUTPATIENT)
Dept: FAMILY MEDICINE CLINIC | Facility: CLINIC | Age: 69
End: 2024-07-22
Payer: MEDICARE

## 2024-07-22 VITALS
RESPIRATION RATE: 18 BRPM | HEIGHT: 74 IN | OXYGEN SATURATION: 97 % | HEART RATE: 58 BPM | DIASTOLIC BLOOD PRESSURE: 85 MMHG | SYSTOLIC BLOOD PRESSURE: 144 MMHG | TEMPERATURE: 97.7 F | WEIGHT: 300.2 LBS | BODY MASS INDEX: 38.53 KG/M2

## 2024-07-22 DIAGNOSIS — M17.0 BILATERAL PRIMARY OSTEOARTHRITIS OF KNEE: ICD-10-CM

## 2024-07-22 DIAGNOSIS — M1A.39X0 CHRONIC GOUT DUE TO RENAL IMPAIRMENT OF MULTIPLE SITES WITHOUT TOPHUS: ICD-10-CM

## 2024-07-22 DIAGNOSIS — Z00.00 MEDICARE ANNUAL WELLNESS VISIT, SUBSEQUENT: Primary | ICD-10-CM

## 2024-07-22 DIAGNOSIS — I10 ESSENTIAL HYPERTENSION: ICD-10-CM

## 2024-07-22 DIAGNOSIS — J44.9 CHRONIC OBSTRUCTIVE PULMONARY DISEASE, UNSPECIFIED COPD TYPE: ICD-10-CM

## 2024-07-22 DIAGNOSIS — G47.09 OTHER INSOMNIA: ICD-10-CM

## 2024-07-22 PROCEDURE — 3079F DIAST BP 80-89 MM HG: CPT | Performed by: NURSE PRACTITIONER

## 2024-07-22 PROCEDURE — 99213 OFFICE O/P EST LOW 20 MIN: CPT | Performed by: NURSE PRACTITIONER

## 2024-07-22 PROCEDURE — 1160F RVW MEDS BY RX/DR IN RCRD: CPT | Performed by: NURSE PRACTITIONER

## 2024-07-22 PROCEDURE — 1170F FXNL STATUS ASSESSED: CPT | Performed by: NURSE PRACTITIONER

## 2024-07-22 PROCEDURE — 1159F MED LIST DOCD IN RCRD: CPT | Performed by: NURSE PRACTITIONER

## 2024-07-22 PROCEDURE — G0439 PPPS, SUBSEQ VISIT: HCPCS | Performed by: NURSE PRACTITIONER

## 2024-07-22 PROCEDURE — 1126F AMNT PAIN NOTED NONE PRSNT: CPT | Performed by: NURSE PRACTITIONER

## 2024-07-22 PROCEDURE — 3077F SYST BP >= 140 MM HG: CPT | Performed by: NURSE PRACTITIONER

## 2024-07-22 RX ORDER — BUDESONIDE, GLYCOPYRROLATE, AND FORMOTEROL FUMARATE 160; 9; 4.8 UG/1; UG/1; UG/1
2 AEROSOL, METERED RESPIRATORY (INHALATION) 2 TIMES DAILY
Qty: 10.7 G | Refills: 5 | Status: SHIPPED | OUTPATIENT
Start: 2024-07-22

## 2024-07-22 RX ORDER — LISINOPRIL 20 MG/1
20 TABLET ORAL DAILY
Qty: 90 TABLET | Refills: 1 | Status: SHIPPED | OUTPATIENT
Start: 2024-07-22

## 2024-07-22 RX ORDER — POTASSIUM CHLORIDE 750 MG/1
10 TABLET, FILM COATED, EXTENDED RELEASE ORAL DAILY
Qty: 90 TABLET | Refills: 1 | Status: SHIPPED | OUTPATIENT
Start: 2024-07-22

## 2024-07-22 RX ORDER — ALLOPURINOL 300 MG/1
300 TABLET ORAL DAILY
Qty: 90 TABLET | Refills: 1 | Status: SHIPPED | OUTPATIENT
Start: 2024-07-22

## 2024-07-22 NOTE — PROGRESS NOTES
The ABCs of the Annual Wellness Visit  Subsequent Medicare Wellness Visit    Chief Complaint   Patient presents with    Medicare Wellness-subsequent     Subjective     History of Present Illness:  Leonid Diehl is a 69 y.o. male who presents for a subsequent Medicare Wellness Visit and follow up on chronic conditions.  HPI    HTN, stable on meds and takes as directed, denies chest pain, headache, shortness of air, palpitations and swelling of extremities.      Elevated liver enzymes, alcohol abuse, ultrasound showed compensated advanced chronic liver disease, portal hypertension, cirrhotic nodular liver surface along with thrombocytopenia.  He is following with gastroenterology.  He had been drinking up to a liter of vodka every 2 days, he states he is no longer drinking alcohol.  Colonoscopy completed 11/16/2023 moderate diverticulosis     Gout, taking allopurinol daily     Insomnia, stable, no longer using trazodone     COPD, significantly improved with Symbicort, however costing ~$800/mo. He reports wheezing has resolved, still c/o ROCHA    He is active, owns a farm, walks anywhere from 2 to 5 miles per day     Here to review labs      The following portions of the patient's history were reviewed and   updated as appropriate: allergies, current medications, past family history, past medical history, past social history, past surgical history, and problem list.      Compared to one year ago, the patient feels his physical   health is better.    Compared to one year ago, the patient feels his mental   health is the same.    Recent Hospitalizations:  He was not admitted to the hospital during the last year.       Current Medical Providers:  Patient Care Team:  Kerri Cruz APRN as PCP - General (Nurse Practitioner)  Yash Celis MD as Surgeon (Orthopedic Surgery)  FREDERICK Ford DPM as Consulting Physician (Podiatry)    Outpatient Medications Prior to Visit   Medication Sig Dispense Refill    albuterol  (ACCUNEB) 1.25 MG/3ML nebulizer solution Take 3 mL by nebulization Every 6 (Six) Hours As Needed for Wheezing or Shortness of Air. 100 each 2    albuterol sulfate  (90 Base) MCG/ACT inhaler INHALE 2 PUFFS BY MOUTH EVERY 6 HOURS AS NEEDED FOR WHEEZING OR SHORTNESS OF AIR 8.5 g 2    carvedilol (COREG) 3.125 MG tablet TAKE 1 TABLET BY MOUTH DAILY 90 tablet 1    Cetirizine HCl (ZYRTEC PO) Take 10 mg by mouth Daily.      Dextromethorphan-guaiFENesin (MUCINEX DM PO) Take 1 tablet by mouth 2 (Two) Times a Day.      furosemide (LASIX) 20 MG tablet TAKE 1 TABLET BY MOUTH DAILY 90 tablet 1    vitamin E 200 UNIT capsule Take 1 capsule by mouth Daily.      allopurinol (ZYLOPRIM) 300 MG tablet Take 1 tablet by mouth Daily. 90 tablet 1    budesonide-formoterol (Symbicort) 160-4.5 MCG/ACT inhaler Inhale 2 puffs 2 (Two) Times a Day. 10.2 g 11    lisinopril (PRINIVIL,ZESTRIL) 20 MG tablet Take 1 tablet by mouth Daily. 90 tablet 1    potassium chloride 10 MEQ CR tablet TAKE ONE TABLET BY MOUTH DAILY ALONG WITH LASIX 90 tablet 1     No facility-administered medications prior to visit.       No opioid medication identified on active medication list. I have reviewed chart for other potential  high risk medication/s and harmful drug interactions in the elderly.        Aspirin is not on active medication list.  Aspirin use is not indicated based on review of current medical condition/s. Risk of harm outweighs potential benefits.  .    Patient Active Problem List   Diagnosis    Iron deficiency anemia    Asthma    Benign prostatic hyperplasia    Chronic diastolic congestive heart failure    Encounter for general adult medical examination without abnormal findings    Hay fever    Biliary disease    Hepatic disease    History of alcohol abuse    Hx of pancreatitis    Primary hypertension    Systolic murmur    Bilateral primary osteoarthritis of knee    Aortic stenosis, moderate    Gout    Insomnia    Thrombocytopenia    Hx of total  "knee replacement, left    Morbid obesity    Wound dehiscence, surgical    Infection of superficial incisional surgical site after procedure    S/P total knee arthroplasty, right    Status post total knee replacement, right     Advance Care Planning   Advance Directive is not on file.  ACP discussion was held with the patient during this visit. Patient does not have an advance directive, information provided.         Objective       Vitals:    07/22/24 0935   BP: 144/85   BP Location: Left arm   Patient Position: Sitting   Cuff Size: Adult   Pulse: 58   Resp: 18   Temp: 97.7 °F (36.5 °C)   SpO2: 97%   Weight: (!) 136 kg (300 lb 3.2 oz)   Height: 188 cm (74\")     BMI Readings from Last 1 Encounters:   07/22/24 38.54 kg/m²   BMI is within normal parameters. No follow-up required.  BMI Readings from Last 1 Encounters:   07/22/24 38.54 kg/m²   BMI is above normal parameters. Recommendations include: exercise counseling    Does the patient have evidence of cognitive impairment? No    No results found.    Physical Exam  Constitutional:       General: He is not in acute distress.     Appearance: Normal appearance. He is well-developed. He is obese. He is not ill-appearing or diaphoretic.   HENT:      Head: Normocephalic.   Eyes:      Conjunctiva/sclera: Conjunctivae normal.      Pupils: Pupils are equal, round, and reactive to light.   Neck:      Thyroid: No thyromegaly.      Vascular: No JVD.   Cardiovascular:      Rate and Rhythm: Normal rate and regular rhythm.      Heart sounds: Normal heart sounds. Murmur heard.   Pulmonary:      Effort: Pulmonary effort is normal. No respiratory distress.      Breath sounds: Normal breath sounds. No wheezing or rhonchi.   Abdominal:      General: Bowel sounds are normal. There is no distension.      Palpations: Abdomen is soft.      Tenderness: There is no abdominal tenderness.   Musculoskeletal:         General: Tenderness (chronic L shoulder, B knee OA, mild baird rom) present. No " swelling. Normal range of motion.      Cervical back: Normal range of motion and neck supple. No tenderness.   Lymphadenopathy:      Cervical: No cervical adenopathy.   Skin:     General: Skin is warm and dry.      Coloration: Skin is not jaundiced.      Findings: No erythema or rash.   Neurological:      General: No focal deficit present.      Mental Status: He is alert and oriented to person, place, and time. Mental status is at baseline.      Sensory: No sensory deficit.      Motor: No weakness.      Gait: Gait normal.   Psychiatric:         Mood and Affect: Mood normal.         Behavior: Behavior normal.         Thought Content: Thought content normal.         Judgment: Judgment normal.         Lab Results   Component Value Date    TRIG 96 07/15/2024    HDL 46 07/15/2024    LDL 66 07/15/2024    VLDL 18 07/15/2024          HEALTH RISK ASSESSMENT    Smoking Status:  Social History     Tobacco Use   Smoking Status Never    Passive exposure: Never   Smokeless Tobacco Never     Alcohol Consumption:  Social History     Substance and Sexual Activity   Alcohol Use Not Currently    Comment: occasionally     Fall Risk Screen:    [place] Fall Risk Assessment was completed, and patient is at LOW risk for falls.Assessment completed on:2024    Depression Screen:       2024     9:00 AM   PHQ-2/PHQ-9 Depression Screening   Little Interest or Pleasure in Doing Things 0-->not at all   Feeling Down, Depressed or Hopeless 0-->not at all   PHQ-9: Brief Depression Severity Measure Score 0       Health Habits and Functional and Cognitive Screenin/22/2024     9:00 AM   Functional & Cognitive Status   Do you have difficulty preparing food and eating? No   Do you have difficulty bathing yourself, getting dressed or grooming yourself? No   Do you have difficulty using the toilet? No   Do you have difficulty moving around from place to place? No   Do you have trouble with steps or getting out of a bed or a chair? No    Current Diet Well Balanced Diet   Dental Exam Up to date   Eye Exam Up to date   Exercise (times per week) 7 times per week   Current Exercises Include Cardiovascular Workout   Do you need help using the phone?  No   Are you deaf or do you have serious difficulty hearing?  No   Do you need help to go to places out of walking distance? No   Do you need help shopping? No   Do you need help preparing meals?  No   Do you need help with housework?  No   Do you need help with laundry? No   Do you need help taking your medications? No   Do you need help managing money? No   Do you ever drive or ride in a car without wearing a seat belt? Yes   Have you felt unusual stress, anger or loneliness in the last month? No   Who do you live with? Spouse   If you need help, do you have trouble finding someone available to you? No   Have you been bothered in the last four weeks by sexual problems? No   Do you have difficulty concentrating, remembering or making decisions? No       ATTENTION  What is the year: correct  What is the month of the year: correct  What is the day of the week?: correct  What is the date?: correct  MEMORY  Repeat address three times, only score third attempt: [address] [place], [place]: 7  HOW MANY ANIMALS DID THE PATIENT NAME  Verbal Fluency -- Animal Names (0-25): 14-16  CLOCK DRAWING  Clock Drawing: All Correct  MEMORY RECALL  Tell me what you remember about that name and address we were repeating at the beginnin  ACE TOTAL SCORE  Total ACE Score - <25/30 strongly suggests cognitive impairment; <21/30 almost certainly shows dementia: 27      Age-appropriate Screening Schedule:  Refer to the list below for future screening recommendations based on patient's age, sex and/or medical conditions. Orders for these recommended tests are listed in the plan section. The patient has been provided with a written plan.    Health Maintenance   Topic Date Due    ANNUAL WELLNESS VISIT   08/22/2023    COVID-19 Vaccine (7 - 2023-24 season) 07/24/2024 (Originally 3/6/2024)    Hepatitis B (1 of 3 - Risk 3-dose series) 07/22/2025 (Originally 2/24/2016)    INFLUENZA VACCINE  08/01/2024    BMI FOLLOWUP  07/22/2025    COLORECTAL CANCER SCREENING  04/25/2026    TDAP/TD VACCINES (2 - Td or Tdap) 04/05/2033    HEPATITIS C SCREENING  Completed    Pneumococcal Vaccine 65+  Completed    ZOSTER VACCINE  Completed            Assessment & Plan      CMS Preventative Services Quick Reference  Risk Factors Identified During Encounter  Chronic Pain: Natural history and expected course discussed. Questions answered.  The above risks/problems have been discussed with the patient.  Follow up actions/plans if indicated are seen below in the Assessment/Plan Section.  Pertinent information has been shared with the patient in the After Visit Summary.    Diagnoses and all orders for this visit:    1. Medicare annual wellness visit, subsequent (Primary)    2. Chronic gout due to renal impairment of multiple sites without tophus  Comments:  stable, refill allopurinol  Orders:  -     allopurinol (ZYLOPRIM) 300 MG tablet; Take 1 tablet by mouth Daily.  Dispense: 90 tablet; Refill: 1    3. Essential hypertension  Comments:  stable    4. Chronic obstructive pulmonary disease, unspecified COPD type  Comments:  switch to Breztri d/t cost of symbicort  Orders:  -     Budeson-Glycopyrrol-Formoterol (Breztri Aerosphere) 160-9-4.8 MCG/ACT aerosol inhaler; Inhale 2 puffs 2 (Two) Times a Day.  Dispense: 10.7 g; Refill: 5    5. Other insomnia  Comments:  stable, no longer using trazodone    6. Bilateral primary osteoarthritis of knee    Other orders  -     lisinopril (PRINIVIL,ZESTRIL) 20 MG tablet; Take 1 tablet by mouth Daily.  Dispense: 90 tablet; Refill: 1  -     potassium chloride 10 MEQ CR tablet; Take 1 tablet by mouth Daily.  Dispense: 90 tablet; Refill: 1      Labs reviewed, all stable improved  Praised efforts of alcohol  cessation  F/u with gastro as directed   See ortho prn for B knees/shoulder   Age appropriate preventative counseling provided, including healthy lifestyle modifications and exercise      Follow Up:   Return in about 6 months (around 1/22/2025) for Recheck HTN panel and hgba1c prior to appt .     An After Visit Summary and PPPS were given to the patient.          I spent 30 minutes caring for Leonid on this date of service. This time includes time spent by me in the following activities:preparing for the visit, reviewing tests, obtaining and/or reviewing a separately obtained history, performing a medically appropriate examination and/or evaluation , counseling and educating the patient/family/caregiver, ordering medications, tests, or procedures, documenting information in the medical record, and independently interpreting results and communicating that information with the patient/family/caregiver         EMR Dragon transcription disclaimer:  Part of this note may be an electronic transcription/translation of spoken language to printed text using the Dragon Dictation System.

## 2024-07-31 ENCOUNTER — OFFICE (OUTPATIENT)
Dept: URBAN - METROPOLITAN AREA CLINIC 64 | Facility: CLINIC | Age: 69
End: 2024-07-31
Payer: MEDICARE

## 2024-07-31 DIAGNOSIS — K70.30 ALCOHOLIC CIRRHOSIS OF LIVER WITHOUT ASCITES: ICD-10-CM

## 2024-07-31 PROCEDURE — 99426 PRIN CARE MGMT STAFF 1ST 30: CPT | Performed by: NURSE PRACTITIONER

## 2024-08-23 ENCOUNTER — OFFICE (OUTPATIENT)
Age: 69
End: 2024-08-23
Payer: COMMERCIAL

## 2024-08-23 ENCOUNTER — OFFICE (OUTPATIENT)
Dept: URBAN - METROPOLITAN AREA CLINIC 64 | Facility: CLINIC | Age: 69
End: 2024-08-23
Payer: COMMERCIAL

## 2024-08-23 VITALS
DIASTOLIC BLOOD PRESSURE: 88 MMHG | HEART RATE: 53 BPM | DIASTOLIC BLOOD PRESSURE: 90 MMHG | DIASTOLIC BLOOD PRESSURE: 90 MMHG | HEIGHT: 74 IN | WEIGHT: 305 LBS | HEART RATE: 53 BPM | WEIGHT: 305 LBS | HEIGHT: 74 IN | DIASTOLIC BLOOD PRESSURE: 88 MMHG | HEART RATE: 53 BPM | HEIGHT: 74 IN | SYSTOLIC BLOOD PRESSURE: 154 MMHG | HEART RATE: 53 BPM | SYSTOLIC BLOOD PRESSURE: 154 MMHG | HEIGHT: 74 IN | SYSTOLIC BLOOD PRESSURE: 150 MMHG | DIASTOLIC BLOOD PRESSURE: 90 MMHG | HEIGHT: 74 IN | HEIGHT: 74 IN | SYSTOLIC BLOOD PRESSURE: 150 MMHG | SYSTOLIC BLOOD PRESSURE: 150 MMHG | WEIGHT: 305 LBS | DIASTOLIC BLOOD PRESSURE: 88 MMHG | SYSTOLIC BLOOD PRESSURE: 150 MMHG | HEIGHT: 74 IN | SYSTOLIC BLOOD PRESSURE: 150 MMHG | WEIGHT: 305 LBS | WEIGHT: 305 LBS | DIASTOLIC BLOOD PRESSURE: 90 MMHG | DIASTOLIC BLOOD PRESSURE: 88 MMHG | DIASTOLIC BLOOD PRESSURE: 88 MMHG | SYSTOLIC BLOOD PRESSURE: 150 MMHG | DIASTOLIC BLOOD PRESSURE: 88 MMHG | SYSTOLIC BLOOD PRESSURE: 154 MMHG | DIASTOLIC BLOOD PRESSURE: 90 MMHG | SYSTOLIC BLOOD PRESSURE: 154 MMHG | HEART RATE: 53 BPM | HEART RATE: 53 BPM | DIASTOLIC BLOOD PRESSURE: 90 MMHG | WEIGHT: 305 LBS | SYSTOLIC BLOOD PRESSURE: 154 MMHG | SYSTOLIC BLOOD PRESSURE: 154 MMHG | DIASTOLIC BLOOD PRESSURE: 88 MMHG | SYSTOLIC BLOOD PRESSURE: 154 MMHG | HEART RATE: 53 BPM | DIASTOLIC BLOOD PRESSURE: 90 MMHG | SYSTOLIC BLOOD PRESSURE: 150 MMHG | WEIGHT: 305 LBS

## 2024-08-23 DIAGNOSIS — R74.01 ELEVATION OF LEVELS OF LIVER TRANSAMINASE LEVELS: ICD-10-CM

## 2024-08-23 DIAGNOSIS — K70.30 ALCOHOLIC CIRRHOSIS OF LIVER WITHOUT ASCITES: ICD-10-CM

## 2024-08-23 DIAGNOSIS — Z86.010 PERSONAL HISTORY OF COLON POLYPS: ICD-10-CM

## 2024-08-23 PROCEDURE — 99213 OFFICE O/P EST LOW 20 MIN: CPT | Performed by: NURSE PRACTITIONER

## 2024-08-26 ENCOUNTER — TRANSCRIBE ORDERS (OUTPATIENT)
Dept: ADMINISTRATIVE | Facility: HOSPITAL | Age: 69
End: 2024-08-26
Payer: MEDICARE

## 2024-08-26 DIAGNOSIS — R74.01 ELEVATED TRANSAMINASE LEVEL: ICD-10-CM

## 2024-08-26 DIAGNOSIS — K70.30 ALCOHOLIC CIRRHOSIS OF LIVER WITHOUT ASCITES: Primary | ICD-10-CM

## 2024-08-26 DIAGNOSIS — Z86.010 PERSONAL HISTORY OF COLONIC POLYPS: ICD-10-CM

## 2024-08-31 ENCOUNTER — OFFICE (OUTPATIENT)
Age: 69
End: 2024-08-31
Payer: COMMERCIAL

## 2024-08-31 ENCOUNTER — OFFICE (OUTPATIENT)
Dept: URBAN - METROPOLITAN AREA CLINIC 64 | Facility: CLINIC | Age: 69
End: 2024-08-31
Payer: COMMERCIAL

## 2024-08-31 DIAGNOSIS — K70.30 ALCOHOLIC CIRRHOSIS OF LIVER WITHOUT ASCITES: ICD-10-CM

## 2024-08-31 PROCEDURE — 99426 PRIN CARE MGMT STAFF 1ST 30: CPT | Performed by: NURSE PRACTITIONER

## 2024-09-10 ENCOUNTER — HOSPITAL ENCOUNTER (OUTPATIENT)
Dept: ULTRASOUND IMAGING | Facility: HOSPITAL | Age: 69
Discharge: HOME | End: 2024-09-10
Admitting: NURSE PRACTITIONER
Payer: MEDICARE

## 2024-09-10 DIAGNOSIS — K70.30 ALCOHOLIC CIRRHOSIS OF LIVER WITHOUT ASCITES: ICD-10-CM

## 2024-09-10 DIAGNOSIS — Z86.010 PERSONAL HISTORY OF COLONIC POLYPS: ICD-10-CM

## 2024-09-10 DIAGNOSIS — R74.01 ELEVATED TRANSAMINASE LEVEL: ICD-10-CM

## 2024-09-10 PROCEDURE — 76705 ECHO EXAM OF ABDOMEN: CPT

## 2024-09-24 DIAGNOSIS — J44.9 CHRONIC OBSTRUCTIVE PULMONARY DISEASE, UNSPECIFIED COPD TYPE: ICD-10-CM

## 2024-09-24 RX ORDER — ALBUTEROL SULFATE 90 UG/1
2 INHALANT RESPIRATORY (INHALATION) EVERY 6 HOURS PRN
Qty: 8.5 G | Refills: 2 | Status: SHIPPED | OUTPATIENT
Start: 2024-09-24

## 2024-09-30 ENCOUNTER — OFFICE (OUTPATIENT)
Age: 69
End: 2024-09-30
Payer: COMMERCIAL

## 2024-09-30 ENCOUNTER — OFFICE (OUTPATIENT)
Dept: URBAN - METROPOLITAN AREA CLINIC 64 | Facility: CLINIC | Age: 69
End: 2024-09-30
Payer: COMMERCIAL

## 2024-09-30 DIAGNOSIS — K70.30 ALCOHOLIC CIRRHOSIS OF LIVER WITHOUT ASCITES: ICD-10-CM

## 2024-09-30 PROCEDURE — 99426 PRIN CARE MGMT STAFF 1ST 30: CPT | Performed by: NURSE PRACTITIONER

## 2024-10-21 ENCOUNTER — OFFICE VISIT (OUTPATIENT)
Dept: ORTHOPEDIC SURGERY | Facility: CLINIC | Age: 69
End: 2024-10-21
Payer: MEDICARE

## 2024-10-21 ENCOUNTER — PREP FOR SURGERY (OUTPATIENT)
Dept: OTHER | Facility: HOSPITAL | Age: 69
End: 2024-10-21
Payer: MEDICARE

## 2024-10-21 VITALS — HEIGHT: 74 IN | HEART RATE: 73 BPM | BODY MASS INDEX: 38.5 KG/M2 | WEIGHT: 300 LBS

## 2024-10-21 DIAGNOSIS — M19.012 OSTEOARTHRITIS OF LEFT GLENOHUMERAL JOINT: Primary | ICD-10-CM

## 2024-10-21 DIAGNOSIS — R79.1 ABNORMAL COAGULATION PROFILE: ICD-10-CM

## 2024-10-21 DIAGNOSIS — R94.5 ABNORMAL RESULTS OF LIVER FUNCTION STUDIES: ICD-10-CM

## 2024-10-21 DIAGNOSIS — R79.9 ABNORMAL FINDING OF BLOOD CHEMISTRY, UNSPECIFIED: ICD-10-CM

## 2024-10-21 DIAGNOSIS — M19.012 OSTEOARTHRITIS OF LEFT GLENOHUMERAL JOINT: ICD-10-CM

## 2024-10-21 DIAGNOSIS — R94.120 ABNORMAL AUDITORY FUNCTION STUDY: ICD-10-CM

## 2024-10-21 DIAGNOSIS — M25.512 ACUTE PAIN OF LEFT SHOULDER: Primary | ICD-10-CM

## 2024-10-21 PROCEDURE — 99214 OFFICE O/P EST MOD 30 MIN: CPT | Performed by: ORTHOPAEDIC SURGERY

## 2024-10-21 PROCEDURE — 97760 ORTHOTIC MGMT&TRAING 1ST ENC: CPT | Performed by: ORTHOPAEDIC SURGERY

## 2024-10-21 RX ORDER — MELOXICAM 15 MG/1
15 TABLET ORAL ONCE
OUTPATIENT
Start: 2024-10-21 | End: 2024-10-21

## 2024-10-21 RX ORDER — TRANEXAMIC ACID 10 MG/ML
1000 INJECTION, SOLUTION INTRAVENOUS ONCE
OUTPATIENT
Start: 2024-10-21 | End: 2024-10-21

## 2024-10-21 RX ORDER — OXYCODONE HCL 10 MG/1
10 TABLET, FILM COATED, EXTENDED RELEASE ORAL ONCE
OUTPATIENT
Start: 2024-10-21 | End: 2024-10-21

## 2024-10-21 RX ORDER — PREGABALIN 75 MG/1
150 CAPSULE ORAL ONCE
OUTPATIENT
Start: 2024-10-21 | End: 2024-10-21

## 2024-10-21 NOTE — PROGRESS NOTES
Patient ID: Leonid Diehl is a 69 y.o. male.    Chief Complaint:    Chief Complaint   Patient presents with    Left Shoulder - Pain, Initial Evaluation     Pain 2-3        HPI:  This is a 69-year-old gentleman here for longstanding left shoulder pain.  No prior surgery he has taken over-the-counter medication try to modify his activities but his shoulder is increasing locking up and giving him trouble at night  Past Medical History:   Diagnosis Date    Aortic stenosis, moderate 04/05/2022    Arthritis     Asthma     Benign prostatic hyperplasia 06/20/2017    Chronic diastolic congestive heart failure 02/04/2019    states didn't really have.  no sx's present    COPD (chronic obstructive pulmonary disease)     Gout     Hay fever 01/28/2016    Heart murmur     History of alcohol abuse 04/18/2019    Hx of pancreatitis 04/18/2019    Infection of left knee 05/2022    Insomnia     Iron deficiency anemia 01/11/2019    Obesity (BMI 35.0-39.9 without comorbidity)     Peripheral edema     none presently    Primary hypertension 01/28/2016    Seasonal allergies     Thrombocytopenia     when had PNA       Past Surgical History:   Procedure Laterality Date    CATARACT EXTRACTION, BILATERAL      KNEE INCISION AND DRAINAGE Left 06/07/2022    Procedure: KNEE INCISION AND DRAINAGE;  Surgeon: Yash Celis MD;  Location: [facility] MAIN OR;  Service: Orthopedics;  Laterality: Left;    TOTAL KNEE ARTHROPLASTY Left 04/05/2022    Procedure: TOTAL KNEE ARTHROPLASTY;  Surgeon: Yash Celis MD;  Location: [facility] MAIN OR;  Service: Orthopedics;  Laterality: Left;    TOTAL KNEE ARTHROPLASTY Right 9/20/2022    Procedure: TOTAL KNEE ARTHROPLASTY WITH JARED ROBOT;  Surgeon: Yash Celis MD;  Location: [facility] MAIN OR;  Service: Robotics - Ortho;  Laterality: Right;    TOTAL SHOULDER ARTHROPLASTY W/ DISTAL CLAVICLE EXCISION Right 10/22/2019    Procedure: TOTAL SHOULDER REVERSE ARTHROPLASTY with Biceps Tenodesis;  Surgeon: Chris Lafleur  "MD Nathanael;  Location: [facility] MAIN OR;  Service: Orthopedics       Family History   Problem Relation Age of Onset    Heart attack Mother     Heart disease Mother     Cancer Father     Heart disease Sister     Hypertension Brother           Social History     Occupational History    Not on file   Tobacco Use    Smoking status: Never     Passive exposure: Never    Smokeless tobacco: Never   Vaping Use    Vaping status: Never Used   Substance and Sexual Activity    Alcohol use: Not Currently     Comment: occasionally    Drug use: No    Sexual activity: Defer      Review of Systems   Cardiovascular:  Negative for chest pain.   Musculoskeletal:  Positive for arthralgias.       Objective:    Pulse 73   Ht 188 cm (74\")   Wt 136 kg (300 lb)   BMI 38.52 kg/m²     Physical Examination:  Left shoulder demonstrates intact skin mild to moderate pain over the bicep groove passive elevation 160 abduction 130 external rotation 20 internal rotation left hip with mild pain and weakness on Speed [place] and supraspinatus testing.  Belly press and liftoff are 4/5.  Sensory and motor exam are intact all distributions. Radial pulse is palpable and capillary refill is less than two seconds to all digits.    Imaging:  left Shoulder X-Ray  Indication: Shoulder pain  AP Y and Lateral views  Findings: End-stage degenerative joint disease no significant glenoid deformity  no bony lesion  Soft tissues normal  decreased joint spaces  Hardware appropriately positioned not applicable      no prior studies available for comparison.    Assessment:  Left shoulder degenerative joint disease    Plan:  Conservative or surgical options discussed he would like to proceed with left reverse total shoulder arthroplasty  Discussed the risks including bleeding, scar, infection, stiffness, nerve, artery, vein and tendon damage, instability, fracture, DVT, loss of life or limb. Issues of scapular notching and component revision were discussed. Questions were " answered and addressed.  Postop sling dispensed  Greater than 15 minutes was spent demonstrating proper fit and use of the device and signs to monitor for complications    Disclaimer: Part of this note may be an electronic transcription/translation of spoken language to printed text using the Dragon Dictation System

## 2024-10-24 ENCOUNTER — PATIENT ROUNDING (BHMG ONLY) (OUTPATIENT)
Dept: ORTHOPEDIC SURGERY | Facility: CLINIC | Age: 69
End: 2024-10-24
Payer: MEDICARE

## 2024-10-25 PROBLEM — M19.012 OSTEOARTHRITIS OF LEFT GLENOHUMERAL JOINT: Status: ACTIVE | Noted: 2024-10-21

## 2024-10-26 DIAGNOSIS — I35.0 NONRHEUMATIC AORTIC VALVE STENOSIS: ICD-10-CM

## 2024-10-28 RX ORDER — FUROSEMIDE 20 MG/1
20 TABLET ORAL DAILY
Qty: 90 TABLET | Refills: 1 | Status: SHIPPED | OUTPATIENT
Start: 2024-10-28

## 2024-10-31 ENCOUNTER — OFFICE (OUTPATIENT)
Age: 69
End: 2024-10-31
Payer: COMMERCIAL

## 2024-10-31 ENCOUNTER — OFFICE (OUTPATIENT)
Dept: URBAN - METROPOLITAN AREA CLINIC 64 | Facility: CLINIC | Age: 69
End: 2024-10-31
Payer: COMMERCIAL

## 2024-10-31 DIAGNOSIS — K70.30 ALCOHOLIC CIRRHOSIS OF LIVER WITHOUT ASCITES: ICD-10-CM

## 2024-10-31 PROCEDURE — 99426 PRIN CARE MGMT STAFF 1ST 30: CPT | Performed by: NURSE PRACTITIONER

## 2024-11-14 ENCOUNTER — HOSPITAL ENCOUNTER (OUTPATIENT)
Dept: CT IMAGING | Facility: HOSPITAL | Age: 69
Discharge: HOME | End: 2024-11-14
Admitting: ORTHOPAEDIC SURGERY
Payer: MEDICARE

## 2024-11-14 DIAGNOSIS — M19.012 OSTEOARTHRITIS OF LEFT GLENOHUMERAL JOINT: ICD-10-CM

## 2024-11-14 PROCEDURE — 73200 CT UPPER EXTREMITY W/O DYE: CPT

## 2024-11-15 DIAGNOSIS — I10 ESSENTIAL HYPERTENSION: ICD-10-CM

## 2024-11-15 RX ORDER — CARVEDILOL 3.12 MG/1
3.12 TABLET ORAL DAILY
Qty: 90 TABLET | Refills: 1 | Status: SHIPPED | OUTPATIENT
Start: 2024-11-15

## 2024-11-30 ENCOUNTER — OFFICE (OUTPATIENT)
Dept: URBAN - METROPOLITAN AREA CLINIC 64 | Facility: CLINIC | Age: 69
End: 2024-11-30
Payer: COMMERCIAL

## 2024-11-30 DIAGNOSIS — K70.30 ALCOHOLIC CIRRHOSIS OF LIVER WITHOUT ASCITES: ICD-10-CM

## 2024-11-30 PROCEDURE — 99426 PRIN CARE MGMT STAFF 1ST 30: CPT | Performed by: INTERNAL MEDICINE

## 2024-12-05 ENCOUNTER — PRE-ADMISSION TESTING (OUTPATIENT)
Dept: PREADMISSION TESTING | Facility: HOSPITAL | Age: 69
End: 2024-12-05
Payer: MEDICARE

## 2024-12-05 ENCOUNTER — HOSPITAL ENCOUNTER (OUTPATIENT)
Dept: CARDIOLOGY | Facility: HOSPITAL | Age: 69
Discharge: HOME | End: 2024-12-05
Payer: MEDICARE

## 2024-12-05 ENCOUNTER — LAB (OUTPATIENT)
Dept: LAB | Facility: HOSPITAL | Age: 69
End: 2024-12-05
Payer: MEDICARE

## 2024-12-05 ENCOUNTER — HOSPITAL ENCOUNTER (OUTPATIENT)
Dept: GENERAL RADIOLOGY | Facility: HOSPITAL | Age: 69
Discharge: HOME | End: 2024-12-05
Payer: MEDICARE

## 2024-12-05 VITALS
TEMPERATURE: 97.9 F | DIASTOLIC BLOOD PRESSURE: 65 MMHG | HEART RATE: 55 BPM | WEIGHT: 311.6 LBS | OXYGEN SATURATION: 95 % | RESPIRATION RATE: 20 BRPM | HEIGHT: 73 IN | SYSTOLIC BLOOD PRESSURE: 119 MMHG | BODY MASS INDEX: 41.3 KG/M2

## 2024-12-05 DIAGNOSIS — R79.9 ABNORMAL FINDING OF BLOOD CHEMISTRY, UNSPECIFIED: ICD-10-CM

## 2024-12-05 DIAGNOSIS — R94.120 ABNORMAL AUDITORY FUNCTION STUDY: ICD-10-CM

## 2024-12-05 DIAGNOSIS — J44.9 CHRONIC OBSTRUCTIVE PULMONARY DISEASE, UNSPECIFIED COPD TYPE: ICD-10-CM

## 2024-12-05 DIAGNOSIS — R79.1 ABNORMAL COAGULATION PROFILE: ICD-10-CM

## 2024-12-05 DIAGNOSIS — M19.012 OSTEOARTHRITIS OF LEFT GLENOHUMERAL JOINT: ICD-10-CM

## 2024-12-05 DIAGNOSIS — R94.5 ABNORMAL RESULTS OF LIVER FUNCTION STUDIES: ICD-10-CM

## 2024-12-05 LAB
ABO GROUP BLD: NORMAL
ANION GAP SERPL CALCULATED.3IONS-SCNC: 6.1 MMOL/L (ref 5–15)
APTT PPP: 27.5 SECONDS (ref 22.7–35.4)
BASOPHILS # BLD AUTO: 0.04 10*3/MM3 (ref 0–0.2)
BASOPHILS NFR BLD AUTO: 0.7 % (ref 0–1.5)
BLD GP AB SCN SERPL QL: NEGATIVE
BUN SERPL-MCNC: 14 MG/DL (ref 8–23)
BUN/CREAT SERPL: 14.3 (ref 7–25)
CALCIUM SPEC-SCNC: 8.9 MG/DL (ref 8.6–10.5)
CHLORIDE SERPL-SCNC: 110 MMOL/L (ref 98–107)
CO2 SERPL-SCNC: 23.9 MMOL/L (ref 22–29)
CREAT SERPL-MCNC: 0.98 MG/DL (ref 0.76–1.27)
DEPRECATED RDW RBC AUTO: 49.1 FL (ref 37–54)
EGFRCR SERPLBLD CKD-EPI 2021: 84 ML/MIN/1.73
EOSINOPHIL # BLD AUTO: 0.41 10*3/MM3 (ref 0–0.4)
EOSINOPHIL NFR BLD AUTO: 7.1 % (ref 0.3–6.2)
ERYTHROCYTE [DISTWIDTH] IN BLOOD BY AUTOMATED COUNT: 13.5 % (ref 12.3–15.4)
GLUCOSE SERPL-MCNC: 106 MG/DL (ref 65–99)
HBA1C MFR BLD: 5.48 % (ref 4.8–5.6)
HCT VFR BLD AUTO: 43.4 % (ref 37.5–51)
HGB BLD-MCNC: 14.2 G/DL (ref 13–17.7)
IMM GRANULOCYTES # BLD AUTO: 0.01 10*3/MM3 (ref 0–0.05)
IMM GRANULOCYTES NFR BLD AUTO: 0.2 % (ref 0–0.5)
INR PPP: 1.21 (ref 0.9–1.1)
LARGE PLATELETS: NORMAL
LYMPHOCYTES # BLD AUTO: 1.28 10*3/MM3 (ref 0.7–3.1)
LYMPHOCYTES NFR BLD AUTO: 22.2 % (ref 19.6–45.3)
MCH RBC QN AUTO: 32.3 PG (ref 26.6–33)
MCHC RBC AUTO-ENTMCNC: 32.7 G/DL (ref 31.5–35.7)
MCV RBC AUTO: 98.9 FL (ref 79–97)
MONOCYTES # BLD AUTO: 0.7 10*3/MM3 (ref 0.1–0.9)
MONOCYTES NFR BLD AUTO: 12.1 % (ref 5–12)
MRSA DNA SPEC QL NAA+PROBE: NORMAL
NEUTROPHILS NFR BLD AUTO: 3.33 10*3/MM3 (ref 1.7–7)
NEUTROPHILS NFR BLD AUTO: 57.7 % (ref 42.7–76)
NRBC BLD AUTO-RTO: 0 /100 WBC (ref 0–0.2)
PLATELET # BLD AUTO: 100 10*3/MM3 (ref 140–450)
PMV BLD AUTO: 10.4 FL (ref 6–12)
POTASSIUM SERPL-SCNC: 4.6 MMOL/L (ref 3.5–5.2)
PROTHROMBIN TIME: 15.4 SECONDS (ref 11.7–14.2)
RBC # BLD AUTO: 4.39 10*6/MM3 (ref 4.14–5.8)
RBC MORPH BLD: NORMAL
RH BLD: POSITIVE
SMALL PLATELETS BLD QL SMEAR: NORMAL
SODIUM SERPL-SCNC: 140 MMOL/L (ref 136–145)
T&S EXPIRATION DATE: NORMAL
WBC MORPH BLD: NORMAL
WBC NRBC COR # BLD AUTO: 5.77 10*3/MM3 (ref 3.4–10.8)

## 2024-12-05 PROCEDURE — 80048 BASIC METABOLIC PNL TOTAL CA: CPT

## 2024-12-05 PROCEDURE — 83036 HEMOGLOBIN GLYCOSYLATED A1C: CPT

## 2024-12-05 PROCEDURE — 85730 THROMBOPLASTIN TIME PARTIAL: CPT

## 2024-12-05 PROCEDURE — 85610 PROTHROMBIN TIME: CPT

## 2024-12-05 PROCEDURE — 86901 BLOOD TYPING SEROLOGIC RH(D): CPT

## 2024-12-05 PROCEDURE — 86900 BLOOD TYPING SEROLOGIC ABO: CPT

## 2024-12-05 PROCEDURE — 93005 ELECTROCARDIOGRAM TRACING: CPT | Performed by: ORTHOPAEDIC SURGERY

## 2024-12-05 PROCEDURE — 85007 BL SMEAR W/DIFF WBC COUNT: CPT

## 2024-12-05 PROCEDURE — 87641 MR-STAPH DNA AMP PROBE: CPT | Performed by: ORTHOPAEDIC SURGERY

## 2024-12-05 PROCEDURE — 71046 X-RAY EXAM CHEST 2 VIEWS: CPT

## 2024-12-05 PROCEDURE — 85025 COMPLETE CBC W/AUTO DIFF WBC: CPT

## 2024-12-05 PROCEDURE — 36415 COLL VENOUS BLD VENIPUNCTURE: CPT

## 2024-12-05 PROCEDURE — 86850 RBC ANTIBODY SCREEN: CPT

## 2024-12-05 RX ORDER — BUDESONIDE AND FORMOTEROL FUMARATE DIHYDRATE 80; 4.5 UG/1; UG/1
2 AEROSOL RESPIRATORY (INHALATION)
COMMUNITY

## 2024-12-05 RX ORDER — ALBUTEROL SULFATE 90 UG/1
INHALANT RESPIRATORY (INHALATION)
Qty: 8.5 G | Refills: 2 | Status: SHIPPED | OUTPATIENT
Start: 2024-12-05

## 2024-12-06 LAB
QT INTERVAL: 403 MS
QTC INTERVAL: 422 MS

## 2024-12-16 NOTE — H&P
[facility]   HISTORY AND PHYSICAL    Patient Name: Leonid Diehl  : 1955  MRN: [id number]  Primary Care Physician:  Kerri Cruz APRN  Date of admission: (Not on file)    Subjective   Subjective     Chief Complaint: Left shoulder pain    This is a 69-year-old gentleman with left shoulder pain presenting for shoulder arthroplasty        Review of Systems   Cardiovascular:  Negative for chest pain.   Musculoskeletal:  Positive for arthralgias.        Personal History     Past Medical History:   Diagnosis Date    Aortic stenosis, moderate 2022    Arthritis     Asthma     Benign prostatic hyperplasia 2017    Chronic diastolic congestive heart failure 2019    states didn't really have.  no sx's present    COPD (chronic obstructive pulmonary disease)     Gout     Hay fever 2016    Heart murmur     History of alcohol abuse 2019    Hx of pancreatitis 2019    Infection of left knee 2022    Insomnia     Iron deficiency anemia 2019    Obesity (BMI 35.0-39.9 without comorbidity)     Peripheral edema     none presently    Primary hypertension 2016    Seasonal allergies     Thrombocytopenia     when had PNA       Past Surgical History:   Procedure Laterality Date    CATARACT EXTRACTION, BILATERAL      KNEE INCISION AND DRAINAGE Left 2022    Procedure: KNEE INCISION AND DRAINAGE;  Surgeon: Yash Celis MD;  Location: [facility] MAIN OR;  Service: Orthopedics;  Laterality: Left;    TOTAL KNEE ARTHROPLASTY Left 2022    Procedure: TOTAL KNEE ARTHROPLASTY;  Surgeon: Yash Celis MD;  Location: [facility] MAIN OR;  Service: Orthopedics;  Laterality: Left;    TOTAL KNEE ARTHROPLASTY Right 2022    Procedure: TOTAL KNEE ARTHROPLASTY WITH JARED ROBOT;  Surgeon: Yash Celis MD;  Location: [facility] MAIN OR;  Service: Robotics - Ortho;  Laterality: Right;    TOTAL SHOULDER ARTHROPLASTY W/ DISTAL CLAVICLE EXCISION Right 10/22/2019    Procedure: TOTAL SHOULDER  REVERSE ARTHROPLASTY with Biceps Tenodesis;  Surgeon: Chris Lafleur MD;  Location: [facility] MAIN OR;  Service: Orthopedics       Family History: family history includes Cancer in his father; Heart attack in his mother; Heart disease in his mother and sister; Hypertension in his brother. Otherwise pertinent FHx was reviewed and not pertinent to current issue.    Social History:  reports that he has never smoked. He has never been exposed to tobacco smoke. He has never used smokeless tobacco. He reports that he does not currently use alcohol. He reports that he does not use drugs.    Home Medications:  Budeson-Glycopyrrol-Formoterol, Cetirizine HCl, Dextromethorphan-guaiFENesin, albuterol, albuterol sulfate HFA, allopurinol, budesonide-formoterol, carvedilol, furosemide, lisinopril, potassium chloride, and vitamin E    Allergies:  No Known Allergies    Objective    Objective   Left shoulder demonstrates intact skin mild to moderate pain over the bicep groove passive elevation 160 abduction 130 external rotation 20 internal rotation left hip with mild pain and weakness on Speed [place] and supraspinatus testing.  Belly press and liftoff are 4/5.  Sensory and motor exam are intact all distributions. Radial pulse is palpable and capillary refill is less than two seconds to all digits.     Imaging:  left Shoulder X-Ray  Indication: Shoulder pain  AP Y and Lateral views  Findings: End-stage degenerative joint disease no significant glenoid deformity  no bony lesion  Soft tissues normal  decreased joint spaces  Hardware appropriately positioned not applicable        no prior studies available for comparison.     Assessment:  Left shoulder degenerative joint disease     Plan:  Conservative or surgical options discussed he would like to proceed with left reverse total shoulder arthroplasty  Discussed the risks including bleeding, scar, infection, stiffness, nerve, artery, vein and tendon damage, instability, fracture,  DVT, loss of life or limb. Issues of scapular notching and component revision were discussed. Questions were answered and addressed.  Postop sling dispensed  Greater than 15 minutes was spent demonstrating proper fit and use of the device and signs to monitor for complications    Chris Lafleur MD

## 2024-12-17 ENCOUNTER — ANESTHESIA EVENT (OUTPATIENT)
Dept: PERIOP | Facility: HOSPITAL | Age: 69
End: 2024-12-17
Payer: MEDICARE

## 2024-12-18 ENCOUNTER — ANESTHESIA (OUTPATIENT)
Dept: PERIOP | Facility: HOSPITAL | Age: 69
End: 2024-12-18
Payer: MEDICARE

## 2024-12-18 ENCOUNTER — APPOINTMENT (OUTPATIENT)
Dept: GENERAL RADIOLOGY | Facility: HOSPITAL | Age: 69
End: 2024-12-18
Payer: MEDICARE

## 2024-12-18 ENCOUNTER — HOSPITAL ENCOUNTER (OUTPATIENT)
Facility: HOSPITAL | Age: 69
Discharge: HOME | End: 2024-12-19
Attending: ORTHOPAEDIC SURGERY | Admitting: ORTHOPAEDIC SURGERY
Payer: MEDICARE

## 2024-12-18 DIAGNOSIS — M19.012 PRIMARY OSTEOARTHRITIS OF LEFT SHOULDER: Primary | ICD-10-CM

## 2024-12-18 DIAGNOSIS — J44.9 CHRONIC OBSTRUCTIVE PULMONARY DISEASE, UNSPECIFIED COPD TYPE: ICD-10-CM

## 2024-12-18 DIAGNOSIS — M19.012 OSTEOARTHRITIS OF LEFT GLENOHUMERAL JOINT: ICD-10-CM

## 2024-12-18 PROCEDURE — 73030 X-RAY EXAM OF SHOULDER: CPT

## 2024-12-18 PROCEDURE — 94799 UNLISTED PULMONARY SVC/PX: CPT

## 2024-12-18 PROCEDURE — C9290 INJ, BUPIVACAINE LIPOSOME: HCPCS | Performed by: ANESTHESIOLOGY

## 2024-12-18 PROCEDURE — 94640 AIRWAY INHALATION TREATMENT: CPT

## 2024-12-18 PROCEDURE — 25010000002 PROPOFOL 10 MG/ML EMULSION: Performed by: NURSE ANESTHETIST, CERTIFIED REGISTERED

## 2024-12-18 PROCEDURE — A9270 NON-COVERED ITEM OR SERVICE: HCPCS | Performed by: ORTHOPAEDIC SURGERY

## 2024-12-18 PROCEDURE — 25810000003 SODIUM CHLORIDE 0.9 % SOLUTION: Performed by: ORTHOPAEDIC SURGERY

## 2024-12-18 PROCEDURE — C1776 JOINT DEVICE (IMPLANTABLE): HCPCS | Performed by: ORTHOPAEDIC SURGERY

## 2024-12-18 PROCEDURE — 25010000002 FENTANYL CITRATE (PF) 50 MCG/ML SOLUTION: Performed by: ANESTHESIOLOGY

## 2024-12-18 PROCEDURE — 25010000002 LIDOCAINE 1% - EPINEPHRINE 1:100000 1 %-1:100000 SOLUTION 20 ML VIAL: Performed by: ORTHOPAEDIC SURGERY

## 2024-12-18 PROCEDURE — 25010000002 CEFTRIAXONE PER 250 MG: Performed by: ORTHOPAEDIC SURGERY

## 2024-12-18 PROCEDURE — 25010000002 ONDANSETRON PER 1 MG: Performed by: NURSE ANESTHETIST, CERTIFIED REGISTERED

## 2024-12-18 PROCEDURE — 63710000001 MELOXICAM 15 MG TABLET: Performed by: ORTHOPAEDIC SURGERY

## 2024-12-18 PROCEDURE — 25010000002 PHENYLEPHRINE 10 MG/ML SOLUTION 5 ML VIAL: Performed by: NURSE ANESTHETIST, CERTIFIED REGISTERED

## 2024-12-18 PROCEDURE — 63710000001 POTASSIUM CHLORIDE 10 MEQ TABLET CONTROLLED-RELEASE: Performed by: ORTHOPAEDIC SURGERY

## 2024-12-18 PROCEDURE — 25010000002 SUGAMMADEX 200 MG/2ML SOLUTION: Performed by: NURSE ANESTHETIST, CERTIFIED REGISTERED

## 2024-12-18 PROCEDURE — 23472 RECONSTRUCT SHOULDER JOINT: CPT | Performed by: ORTHOPAEDIC SURGERY

## 2024-12-18 PROCEDURE — 23472 RECONSTRUCT SHOULDER JOINT: CPT | Performed by: PHYSICIAN ASSISTANT

## 2024-12-18 PROCEDURE — G0378 HOSPITAL OBSERVATION PER HR: HCPCS

## 2024-12-18 PROCEDURE — 25010000002 BUPIVACAINE (PF) 0.25 % SOLUTION 30 ML VIAL: Performed by: ORTHOPAEDIC SURGERY

## 2024-12-18 PROCEDURE — 25010000002 CEFAZOLIN PER 500 MG: Performed by: ORTHOPAEDIC SURGERY

## 2024-12-18 PROCEDURE — 25010000002 LIDOCAINE PF 2% 2 % SOLUTION: Performed by: NURSE ANESTHETIST, CERTIFIED REGISTERED

## 2024-12-18 PROCEDURE — C1713 ANCHOR/SCREW BN/BN,TIS/BN: HCPCS | Performed by: ORTHOPAEDIC SURGERY

## 2024-12-18 PROCEDURE — 25010000002 VANCOMYCIN 1 G RECONSTITUTED SOLUTION 1 EACH VIAL: Performed by: ORTHOPAEDIC SURGERY

## 2024-12-18 PROCEDURE — 25010000002 GLYCOPYRROLATE 0.2 MG/ML SOLUTION: Performed by: NURSE ANESTHETIST, CERTIFIED REGISTERED

## 2024-12-18 PROCEDURE — 25010000002 BUPIVACAINE (PF) 0.5 % SOLUTION: Performed by: ANESTHESIOLOGY

## 2024-12-18 PROCEDURE — 25010000002 DEXAMETHASONE PER 1 MG: Performed by: NURSE ANESTHETIST, CERTIFIED REGISTERED

## 2024-12-18 PROCEDURE — 63710000001 PREGABALIN 75 MG CAPSULE: Performed by: ORTHOPAEDIC SURGERY

## 2024-12-18 PROCEDURE — 63710000001 OXYCODONE 10 MG TABLET EXTENDED-RELEASE 12 HOUR: Performed by: ORTHOPAEDIC SURGERY

## 2024-12-18 PROCEDURE — 63710000001 ALLOPURINOL 300 MG TABLET: Performed by: ORTHOPAEDIC SURGERY

## 2024-12-18 PROCEDURE — 25810000003 LACTATED RINGERS PER 1000 ML: Performed by: ORTHOPAEDIC SURGERY

## 2024-12-18 PROCEDURE — 25010000003 BUPIVACAINE LIPOSOME 1.3 % SUSPENSION: Performed by: ANESTHESIOLOGY

## 2024-12-18 PROCEDURE — 63710000001 FUROSEMIDE 20 MG TABLET: Performed by: ORTHOPAEDIC SURGERY

## 2024-12-18 PROCEDURE — 25810000003 SODIUM CHLORIDE 0.9 % SOLUTION 250 ML FLEX CONT: Performed by: NURSE ANESTHETIST, CERTIFIED REGISTERED

## 2024-12-18 PROCEDURE — 63710000001 BUDESONIDE-FORMOTEROL 160-4.5 MCG/ACT AEROSOL 6 G INHALER: Performed by: ORTHOPAEDIC SURGERY

## 2024-12-18 PROCEDURE — 25810000003 LACTATED RINGERS PER 1000 ML: Performed by: NURSE ANESTHETIST, CERTIFIED REGISTERED

## 2024-12-18 DEVICE — SPACR HUM TRABECULAR REV PLS12MM: Type: IMPLANTABLE DEVICE | Site: SHOULDER | Status: FUNCTIONAL

## 2024-12-18 DEVICE — SUT NONABS MAXBRAID/PE NMBR2 HC5 38IN BLU 900334: Type: IMPLANTABLE DEVICE | Site: SHOULDER | Status: FUNCTIONAL

## 2024-12-18 DEVICE — INVERSE/REVERSE SCREW SYSTEM, 4.5-30
Type: IMPLANTABLE DEVICE | Site: SHOULDER | Status: FUNCTIONAL
Brand: INVERSE/REVERSE

## 2024-12-18 DEVICE — STEM HUM NONPOR REV 15X130MM: Type: IMPLANTABLE DEVICE | Site: SHOULDER | Status: FUNCTIONAL

## 2024-12-18 DEVICE — GLENSPHR TRABECULARMETAL REV 40MM: Type: IMPLANTABLE DEVICE | Site: SHOULDER | Status: FUNCTIONAL

## 2024-12-18 DEVICE — INVERSE/REVERSE SCREW SYSTEM, 4.5-36
Type: IMPLANTABLE DEVICE | Site: SHOULDER | Status: FUNCTIONAL
Brand: INVERSE/REVERSE

## 2024-12-18 DEVICE — LINER HUM/SHLDR TRABECULARMETAL REV POLY 60DEG 40MM PLS0: Type: IMPLANTABLE DEVICE | Site: SHOULDER | Status: FUNCTIONAL

## 2024-12-18 DEVICE — TOTL SHLDER REV: Type: IMPLANTABLE DEVICE | Site: SHOULDER | Status: FUNCTIONAL

## 2024-12-18 DEVICE — BASEPLT GLEN TRABECULARMETAL REV 15MM: Type: IMPLANTABLE DEVICE | Site: SHOULDER | Status: FUNCTIONAL

## 2024-12-18 RX ORDER — ONDANSETRON 4 MG/1
4 TABLET, ORALLY DISINTEGRATING ORAL EVERY 6 HOURS PRN
Status: DISCONTINUED | OUTPATIENT
Start: 2024-12-18 | End: 2024-12-19 | Stop reason: HOSPADM

## 2024-12-18 RX ORDER — CARVEDILOL 3.12 MG/1
3.12 TABLET ORAL DAILY
Status: DISCONTINUED | OUTPATIENT
Start: 2024-12-19 | End: 2024-12-19 | Stop reason: HOSPADM

## 2024-12-18 RX ORDER — DIPHENHYDRAMINE HYDROCHLORIDE 50 MG/ML
12.5 INJECTION INTRAMUSCULAR; INTRAVENOUS ONCE AS NEEDED
Status: DISCONTINUED | OUTPATIENT
Start: 2024-12-18 | End: 2024-12-18 | Stop reason: HOSPADM

## 2024-12-18 RX ORDER — DIPHENHYDRAMINE HCL 25 MG
25 CAPSULE ORAL NIGHTLY PRN
Status: DISCONTINUED | OUTPATIENT
Start: 2024-12-18 | End: 2024-12-19 | Stop reason: HOSPADM

## 2024-12-18 RX ORDER — ALBUTEROL SULFATE 1.25 MG/3ML
1 SOLUTION RESPIRATORY (INHALATION) EVERY 6 HOURS PRN
Status: DISCONTINUED | OUTPATIENT
Start: 2024-12-18 | End: 2024-12-18

## 2024-12-18 RX ORDER — TRANEXAMIC ACID 100 MG/ML
INJECTION, SOLUTION INTRAVENOUS AS NEEDED
Status: DISCONTINUED | OUTPATIENT
Start: 2024-12-18 | End: 2024-12-18 | Stop reason: SURG

## 2024-12-18 RX ORDER — ALBUTEROL SULFATE 90 UG/1
2 INHALANT RESPIRATORY (INHALATION) EVERY 6 HOURS PRN
Status: DISCONTINUED | OUTPATIENT
Start: 2024-12-18 | End: 2024-12-19 | Stop reason: HOSPADM

## 2024-12-18 RX ORDER — FLUMAZENIL 0.1 MG/ML
0.2 INJECTION INTRAVENOUS AS NEEDED
Status: DISCONTINUED | OUTPATIENT
Start: 2024-12-18 | End: 2024-12-18 | Stop reason: HOSPADM

## 2024-12-18 RX ORDER — FENTANYL CITRATE 50 UG/ML
INJECTION, SOLUTION INTRAMUSCULAR; INTRAVENOUS
Status: COMPLETED | OUTPATIENT
Start: 2024-12-18 | End: 2024-12-18

## 2024-12-18 RX ORDER — SODIUM CHLORIDE, SODIUM LACTATE, POTASSIUM CHLORIDE, CALCIUM CHLORIDE 600; 310; 30; 20 MG/100ML; MG/100ML; MG/100ML; MG/100ML
INJECTION, SOLUTION INTRAVENOUS CONTINUOUS PRN
Status: DISCONTINUED | OUTPATIENT
Start: 2024-12-18 | End: 2024-12-18 | Stop reason: SURG

## 2024-12-18 RX ORDER — BUPIVACAINE HYDROCHLORIDE 5 MG/ML
INJECTION, SOLUTION EPIDURAL; INTRACAUDAL
Status: COMPLETED | OUTPATIENT
Start: 2024-12-18 | End: 2024-12-18

## 2024-12-18 RX ORDER — OXYCODONE HYDROCHLORIDE 5 MG/1
5 TABLET ORAL ONCE AS NEEDED
Status: DISCONTINUED | OUTPATIENT
Start: 2024-12-18 | End: 2024-12-18 | Stop reason: HOSPADM

## 2024-12-18 RX ORDER — IPRATROPIUM BROMIDE AND ALBUTEROL SULFATE 2.5; .5 MG/3ML; MG/3ML
3 SOLUTION RESPIRATORY (INHALATION)
Status: DISCONTINUED | OUTPATIENT
Start: 2024-12-18 | End: 2024-12-19 | Stop reason: HOSPADM

## 2024-12-18 RX ORDER — ONDANSETRON 2 MG/ML
4 INJECTION INTRAMUSCULAR; INTRAVENOUS ONCE AS NEEDED
Status: DISCONTINUED | OUTPATIENT
Start: 2024-12-18 | End: 2024-12-18 | Stop reason: HOSPADM

## 2024-12-18 RX ORDER — ONDANSETRON 2 MG/ML
INJECTION INTRAMUSCULAR; INTRAVENOUS AS NEEDED
Status: DISCONTINUED | OUTPATIENT
Start: 2024-12-18 | End: 2024-12-18 | Stop reason: SURG

## 2024-12-18 RX ORDER — NALOXONE HCL 0.4 MG/ML
0.4 VIAL (ML) INJECTION AS NEEDED
Status: DISCONTINUED | OUTPATIENT
Start: 2024-12-18 | End: 2024-12-18 | Stop reason: HOSPADM

## 2024-12-18 RX ORDER — EPHEDRINE SULFATE 5 MG/ML
5 INJECTION INTRAVENOUS ONCE AS NEEDED
Status: DISCONTINUED | OUTPATIENT
Start: 2024-12-18 | End: 2024-12-18 | Stop reason: HOSPADM

## 2024-12-18 RX ORDER — HYDRALAZINE HYDROCHLORIDE 20 MG/ML
5 INJECTION INTRAMUSCULAR; INTRAVENOUS
Status: DISCONTINUED | OUTPATIENT
Start: 2024-12-18 | End: 2024-12-18 | Stop reason: HOSPADM

## 2024-12-18 RX ORDER — DIPHENHYDRAMINE HYDROCHLORIDE 50 MG/ML
25 INJECTION INTRAMUSCULAR; INTRAVENOUS NIGHTLY PRN
Status: DISCONTINUED | OUTPATIENT
Start: 2024-12-18 | End: 2024-12-19 | Stop reason: HOSPADM

## 2024-12-18 RX ORDER — SODIUM CHLORIDE 9 MG/ML
75 INJECTION, SOLUTION INTRAVENOUS CONTINUOUS
Status: DISPENSED | OUTPATIENT
Start: 2024-12-18 | End: 2024-12-19

## 2024-12-18 RX ORDER — IPRATROPIUM BROMIDE AND ALBUTEROL SULFATE 2.5; .5 MG/3ML; MG/3ML
3 SOLUTION RESPIRATORY (INHALATION) ONCE AS NEEDED
Status: COMPLETED | OUTPATIENT
Start: 2024-12-18 | End: 2024-12-18

## 2024-12-18 RX ORDER — ASPIRIN 325 MG
325 TABLET, DELAYED RELEASE (ENTERIC COATED) ORAL DAILY
Status: DISCONTINUED | OUTPATIENT
Start: 2024-12-19 | End: 2024-12-19 | Stop reason: HOSPADM

## 2024-12-18 RX ORDER — MELOXICAM 15 MG/1
15 TABLET ORAL ONCE
Status: COMPLETED | OUTPATIENT
Start: 2024-12-18 | End: 2024-12-18

## 2024-12-18 RX ORDER — TRAMADOL HYDROCHLORIDE 50 MG/1
50 TABLET ORAL EVERY 6 HOURS PRN
Status: DISCONTINUED | OUTPATIENT
Start: 2024-12-18 | End: 2024-12-19 | Stop reason: HOSPADM

## 2024-12-18 RX ORDER — DIPHENHYDRAMINE HYDROCHLORIDE 50 MG/ML
12.5 INJECTION INTRAMUSCULAR; INTRAVENOUS
Status: DISCONTINUED | OUTPATIENT
Start: 2024-12-18 | End: 2024-12-18 | Stop reason: HOSPADM

## 2024-12-18 RX ORDER — ALLOPURINOL 300 MG/1
300 TABLET ORAL DAILY
Status: DISCONTINUED | OUTPATIENT
Start: 2024-12-18 | End: 2024-12-19 | Stop reason: HOSPADM

## 2024-12-18 RX ORDER — POTASSIUM CHLORIDE 750 MG/1
10 TABLET, FILM COATED, EXTENDED RELEASE ORAL DAILY
Status: DISCONTINUED | OUTPATIENT
Start: 2024-12-18 | End: 2024-12-19 | Stop reason: HOSPADM

## 2024-12-18 RX ORDER — TRANEXAMIC ACID 10 MG/ML
1000 INJECTION, SOLUTION INTRAVENOUS ONCE
Status: DISCONTINUED | OUTPATIENT
Start: 2024-12-18 | End: 2024-12-18 | Stop reason: HOSPADM

## 2024-12-18 RX ORDER — SODIUM CHLORIDE, SODIUM LACTATE, POTASSIUM CHLORIDE, CALCIUM CHLORIDE 600; 310; 30; 20 MG/100ML; MG/100ML; MG/100ML; MG/100ML
20 INJECTION, SOLUTION INTRAVENOUS CONTINUOUS
Status: DISCONTINUED | OUTPATIENT
Start: 2024-12-18 | End: 2024-12-18

## 2024-12-18 RX ORDER — EPHEDRINE SULFATE 5 MG/ML
INJECTION INTRAVENOUS AS NEEDED
Status: DISCONTINUED | OUTPATIENT
Start: 2024-12-18 | End: 2024-12-18 | Stop reason: SURG

## 2024-12-18 RX ORDER — LIDOCAINE HYDROCHLORIDE 10 MG/ML
0.5 INJECTION, SOLUTION EPIDURAL; INFILTRATION; INTRACAUDAL; PERINEURAL ONCE AS NEEDED
Status: DISCONTINUED | OUTPATIENT
Start: 2024-12-18 | End: 2024-12-18 | Stop reason: HOSPADM

## 2024-12-18 RX ORDER — DIPHENHYDRAMINE HCL 25 MG
25 CAPSULE ORAL EVERY 6 HOURS PRN
Status: DISCONTINUED | OUTPATIENT
Start: 2024-12-18 | End: 2024-12-19 | Stop reason: HOSPADM

## 2024-12-18 RX ORDER — LABETALOL HYDROCHLORIDE 5 MG/ML
5 INJECTION, SOLUTION INTRAVENOUS
Status: DISCONTINUED | OUTPATIENT
Start: 2024-12-18 | End: 2024-12-18 | Stop reason: HOSPADM

## 2024-12-18 RX ORDER — SODIUM CHLORIDE 0.9 % (FLUSH) 0.9 %
10 SYRINGE (ML) INJECTION AS NEEDED
Status: DISCONTINUED | OUTPATIENT
Start: 2024-12-18 | End: 2024-12-18 | Stop reason: HOSPADM

## 2024-12-18 RX ORDER — ONDANSETRON 2 MG/ML
4 INJECTION INTRAMUSCULAR; INTRAVENOUS EVERY 6 HOURS PRN
Status: DISCONTINUED | OUTPATIENT
Start: 2024-12-18 | End: 2024-12-19 | Stop reason: HOSPADM

## 2024-12-18 RX ORDER — LISINOPRIL 20 MG/1
20 TABLET ORAL DAILY
Status: DISCONTINUED | OUTPATIENT
Start: 2024-12-18 | End: 2024-12-19

## 2024-12-18 RX ORDER — OXYCODONE HYDROCHLORIDE 5 MG/1
10 TABLET ORAL EVERY 4 HOURS PRN
Status: DISCONTINUED | OUTPATIENT
Start: 2024-12-18 | End: 2024-12-19 | Stop reason: HOSPADM

## 2024-12-18 RX ORDER — PREGABALIN 75 MG/1
150 CAPSULE ORAL ONCE
Status: COMPLETED | OUTPATIENT
Start: 2024-12-18 | End: 2024-12-18

## 2024-12-18 RX ORDER — DEXAMETHASONE SODIUM PHOSPHATE 4 MG/ML
INJECTION, SOLUTION INTRA-ARTICULAR; INTRALESIONAL; INTRAMUSCULAR; INTRAVENOUS; SOFT TISSUE AS NEEDED
Status: DISCONTINUED | OUTPATIENT
Start: 2024-12-18 | End: 2024-12-18 | Stop reason: SURG

## 2024-12-18 RX ORDER — GLYCOPYRROLATE 0.2 MG/ML
INJECTION INTRAMUSCULAR; INTRAVENOUS AS NEEDED
Status: DISCONTINUED | OUTPATIENT
Start: 2024-12-18 | End: 2024-12-18 | Stop reason: SURG

## 2024-12-18 RX ORDER — OXYCODONE HCL 10 MG/1
10 TABLET, FILM COATED, EXTENDED RELEASE ORAL ONCE
Status: COMPLETED | OUTPATIENT
Start: 2024-12-18 | End: 2024-12-18

## 2024-12-18 RX ORDER — IPRATROPIUM BROMIDE AND ALBUTEROL SULFATE 2.5; .5 MG/3ML; MG/3ML
3 SOLUTION RESPIRATORY (INHALATION)
Status: DISCONTINUED | OUTPATIENT
Start: 2024-12-18 | End: 2024-12-18

## 2024-12-18 RX ORDER — MELOXICAM 15 MG/1
15 TABLET ORAL DAILY
Status: DISCONTINUED | OUTPATIENT
Start: 2024-12-19 | End: 2024-12-19 | Stop reason: HOSPADM

## 2024-12-18 RX ORDER — MORPHINE SULFATE 2 MG/ML
2 INJECTION, SOLUTION INTRAMUSCULAR; INTRAVENOUS EVERY 4 HOURS PRN
Status: DISCONTINUED | OUTPATIENT
Start: 2024-12-18 | End: 2024-12-19 | Stop reason: HOSPADM

## 2024-12-18 RX ORDER — PROPOFOL 10 MG/ML
VIAL (ML) INTRAVENOUS CONTINUOUS PRN
Status: DISCONTINUED | OUTPATIENT
Start: 2024-12-18 | End: 2024-12-18 | Stop reason: SURG

## 2024-12-18 RX ORDER — NALOXONE HCL 0.4 MG/ML
0.4 VIAL (ML) INJECTION
Status: DISCONTINUED | OUTPATIENT
Start: 2024-12-18 | End: 2024-12-19 | Stop reason: HOSPADM

## 2024-12-18 RX ORDER — BUDESONIDE AND FORMOTEROL FUMARATE DIHYDRATE 160; 4.5 UG/1; UG/1
2 AEROSOL RESPIRATORY (INHALATION)
Status: DISCONTINUED | OUTPATIENT
Start: 2024-12-18 | End: 2024-12-19 | Stop reason: HOSPADM

## 2024-12-18 RX ORDER — LIDOCAINE HYDROCHLORIDE 20 MG/ML
INJECTION, SOLUTION EPIDURAL; INFILTRATION; INTRACAUDAL; PERINEURAL AS NEEDED
Status: DISCONTINUED | OUTPATIENT
Start: 2024-12-18 | End: 2024-12-18 | Stop reason: SURG

## 2024-12-18 RX ORDER — ACETAMINOPHEN 650 MG/1
650 SUPPOSITORY RECTAL EVERY 4 HOURS PRN
Status: DISCONTINUED | OUTPATIENT
Start: 2024-12-18 | End: 2024-12-19 | Stop reason: HOSPADM

## 2024-12-18 RX ORDER — ROCURONIUM BROMIDE 10 MG/ML
INJECTION, SOLUTION INTRAVENOUS AS NEEDED
Status: DISCONTINUED | OUTPATIENT
Start: 2024-12-18 | End: 2024-12-18 | Stop reason: SURG

## 2024-12-18 RX ORDER — OXYCODONE HYDROCHLORIDE 5 MG/1
10 TABLET ORAL EVERY 4 HOURS PRN
Status: DISCONTINUED | OUTPATIENT
Start: 2024-12-18 | End: 2024-12-18 | Stop reason: HOSPADM

## 2024-12-18 RX ORDER — FUROSEMIDE 20 MG/1
20 TABLET ORAL DAILY
Status: DISCONTINUED | OUTPATIENT
Start: 2024-12-18 | End: 2024-12-19 | Stop reason: HOSPADM

## 2024-12-18 RX ORDER — ACETAMINOPHEN 325 MG/1
650 TABLET ORAL EVERY 4 HOURS PRN
Status: DISCONTINUED | OUTPATIENT
Start: 2024-12-18 | End: 2024-12-19 | Stop reason: HOSPADM

## 2024-12-18 RX ORDER — BISACODYL 10 MG
10 SUPPOSITORY, RECTAL RECTAL DAILY PRN
Status: DISCONTINUED | OUTPATIENT
Start: 2024-12-18 | End: 2024-12-19 | Stop reason: HOSPADM

## 2024-12-18 RX ADMIN — PROPOFOL 125 MCG/KG/MIN: 10 INJECTION, EMULSION INTRAVENOUS at 12:27

## 2024-12-18 RX ADMIN — GLYCOPYRROLATE 0.2 MG: 0.2 INJECTION, SOLUTION INTRAMUSCULAR; INTRAVENOUS at 13:13

## 2024-12-18 RX ADMIN — MELOXICAM 15 MG: 15 TABLET ORAL at 10:18

## 2024-12-18 RX ADMIN — SODIUM CHLORIDE, SODIUM LACTATE, POTASSIUM CHLORIDE, CALCIUM CHLORIDE 20 ML/HR: 20; 30; 600; 310 INJECTION, SOLUTION INTRAVENOUS at 10:25

## 2024-12-18 RX ADMIN — EPHEDRINE SULFATE 10 MG: 5 INJECTION INTRAVENOUS at 12:35

## 2024-12-18 RX ADMIN — ROCURONIUM BROMIDE 20 MG: 10 INJECTION, SOLUTION INTRAVENOUS at 13:05

## 2024-12-18 RX ADMIN — SUGAMMADEX 100 MG: 100 INJECTION, SOLUTION INTRAVENOUS at 13:55

## 2024-12-18 RX ADMIN — ALLOPURINOL 300 MG: 300 TABLET ORAL at 20:12

## 2024-12-18 RX ADMIN — BUPIVACAINE HYDROCHLORIDE 10 ML: 5 INJECTION, SOLUTION EPIDURAL; INTRACAUDAL; PERINEURAL at 11:32

## 2024-12-18 RX ADMIN — IPRATROPIUM BROMIDE AND ALBUTEROL SULFATE 3 ML: .5; 3 SOLUTION RESPIRATORY (INHALATION) at 21:29

## 2024-12-18 RX ADMIN — SODIUM CHLORIDE, SODIUM LACTATE, POTASSIUM CHLORIDE, AND CALCIUM CHLORIDE: .6; .31; .03; .02 INJECTION, SOLUTION INTRAVENOUS at 12:20

## 2024-12-18 RX ADMIN — EPHEDRINE SULFATE 10 MG: 5 INJECTION INTRAVENOUS at 12:40

## 2024-12-18 RX ADMIN — BUDESONIDE AND FORMOTEROL FUMARATE DIHYDRATE 2 PUFF: 160; 4.5 AEROSOL RESPIRATORY (INHALATION) at 20:52

## 2024-12-18 RX ADMIN — POTASSIUM CHLORIDE 10 MEQ: 750 TABLET, EXTENDED RELEASE ORAL at 20:12

## 2024-12-18 RX ADMIN — CEFAZOLIN 2 G: 2 INJECTION, POWDER, FOR SOLUTION INTRAMUSCULAR; INTRAVENOUS at 20:12

## 2024-12-18 RX ADMIN — EPHEDRINE SULFATE 5 MG: 5 INJECTION INTRAVENOUS at 12:44

## 2024-12-18 RX ADMIN — SUGAMMADEX 200 MG: 100 INJECTION, SOLUTION INTRAVENOUS at 13:45

## 2024-12-18 RX ADMIN — BUPIVACAINE 10 ML: 13.3 INJECTION, SUSPENSION, LIPOSOMAL INFILTRATION at 11:32

## 2024-12-18 RX ADMIN — OXYCODONE HYDROCHLORIDE 10 MG: 10 TABLET, FILM COATED, EXTENDED RELEASE ORAL at 10:18

## 2024-12-18 RX ADMIN — PREGABALIN 150 MG: 75 CAPSULE ORAL at 10:18

## 2024-12-18 RX ADMIN — PHENYLEPHRINE HYDROCHLORIDE 0.5 MCG/KG/MIN: 10 INJECTION INTRAVENOUS at 12:45

## 2024-12-18 RX ADMIN — TRANEXAMIC ACID 1000 MG: 100 INJECTION, SOLUTION INTRAVENOUS at 12:32

## 2024-12-18 RX ADMIN — IPRATROPIUM BROMIDE AND ALBUTEROL SULFATE 3 ML: .5; 3 SOLUTION RESPIRATORY (INHALATION) at 14:11

## 2024-12-18 RX ADMIN — LIDOCAINE HYDROCHLORIDE 100 MG: 20 INJECTION, SOLUTION EPIDURAL; INFILTRATION; INTRACAUDAL; PERINEURAL at 12:20

## 2024-12-18 RX ADMIN — FENTANYL CITRATE 100 MCG: 50 INJECTION, SOLUTION INTRAMUSCULAR; INTRAVENOUS at 11:25

## 2024-12-18 RX ADMIN — DEXAMETHASONE SODIUM PHOSPHATE 4 MG: 4 INJECTION, SOLUTION INTRAMUSCULAR; INTRAVENOUS at 12:35

## 2024-12-18 RX ADMIN — FUROSEMIDE 20 MG: 20 TABLET ORAL at 20:12

## 2024-12-18 RX ADMIN — ROCURONIUM BROMIDE 50 MG: 10 INJECTION, SOLUTION INTRAVENOUS at 12:20

## 2024-12-18 RX ADMIN — ONDANSETRON 4 MG: 2 INJECTION, SOLUTION INTRAMUSCULAR; INTRAVENOUS at 12:35

## 2024-12-18 RX ADMIN — SODIUM CHLORIDE 75 ML/HR: 9 INJECTION, SOLUTION INTRAVENOUS at 20:12

## 2024-12-18 NOTE — OP NOTE
TOTAL SHOULDER REVERSE ARTHROPLASTY  Procedure Report    Patient Name:  Leonid Diehl  YOB: 1955    Date of Surgery:  12/18/2024     Indications: This is a 69 y.o. male with left shoulder pain.  Imaging demonstrated degenerative joint disease.  Treatment options were discussed.  They desired to proceed with reverse shoulder arthroplasty after discussing the risks including bleeding, scarring, infection, stiffness, nerve damage, tendon damage, artery damage, continued  pain, DVT, loss of life or limb, fracture, dislocation, and a need for further surgery.      Pre-op Diagnosis:   Osteoarthritis of left glenohumeral joint [M19.012]       Post-op Diagnosis:    Post-Op Diagnosis Codes:     * Osteoarthritis of left glenohumeral joint [M19.012]    Procedure/CPT® Codes: 52815    Procedure(s):left   TOTAL SHOULDER REVERSE ARTHROPLASTY with subscapularis repair    Staff: Stalin Shields physician assistant    was responsible for performing the following activities: Retraction, Suction, Irrigation, Suturing, Closing, and Placing Dressing and their skilled assistance was necessary for the success of this case.       Anesthesia: General with Block    Estimated Blood Loss: 200ml    Implants:    Implant Name Type Inv. Item Serial No.  Lot No. LRB No. Used Action   SUT NONABS MAXBRAID/PE NMBR2 HC5 38IN MENA [id number] - [id number] Implant SUT NONABS MAXBRAID/PE NMBR2 HC5 38IN MENA [id number]  [employer] [id number] Left 3 Implanted   SUT NONABS MAXBRAID/PE NMBR2 HC5 38IN MENA [id number] - [id number] Implant SUT NONABS MAXBRAID/PE NMBR2 HC5 38IN MENA [id number]  [employer] [id number] Left 1 Implanted   LINER HUM/SHLDR TRABECULARMETAL REV POLY 60DEG 40MM PLS0 - [id number] Implant LINER HUM/SHLDR TRABECULARMETAL REV POLY 60DEG 40MM PLS0  [employer] [id number] Left 1 Implanted   SPACR HUM TRABECULAR REV [id number] - [id number] Implant SPACR HUM TRABECULAR REV [id number]  [employer] INC [id number] Left 1 Implanted   SCRW CANC  INV/REV 4.5X30MM - [id number] Implant SCRW CANC INV/REV 4.5X30MM  [employer] INC [id number] Left 1 Implanted   SCRW CANC INV/REV 4.5X36MM - [id number] Implant SCRW CANC INV/REV 4.5X36MM  [employer] [id number] Left 1 Implanted   GLENSPHR TRABECULARMETAL REV 40MM - [id number] Implant GLENSPHR TRABECULARMETAL REV 40MM  [employer] INC [id number] Left 1 Implanted   BASEPLT REGGIE TRABECULARMETAL REV 15MM - [id number] Implant BASEPLT REGGIE TRABECULARMETAL REV 15MM  [employer] [id number] Left 1 Implanted   STEM HUM NONPOR REV [id number] - [id number] Implant STEM HUM NONPOR REV [id number]  [employer] INC [id number] Left 1 Implanted       IVF: see anesthesia      Complications: none    Specimens:none    Description of Procedure: The patient's operative site was marked in the  preoperative holding area.  They received regional anesthesia and were brought to  the operating room and placed supine on a well-padded operating room table.  General anesthesia was administered.  Antibiotics were dosed.  A timeout was  taken, confirming the correct operative site and procedure.  They were placed in  the beach chair position.  The left shoulder was prepped and draped in the  standard surgical fashion.  SCDs were placed. A deltopectoral incision was marked and injected with local anesthetic.  The skin was opened.  Flaps were raised.  The interval was opened and the cephalic vein was protected.  Adhesions were released from the deltoid.  The circumflex vessels were identified and ligated with suture and cautery.  The rotator interval was opened and a retractor was placed.  The subscapularis was  Tenotomized and the capsule was released down to the 6 o'clock position on the  humeral head protecting the axillary nerve.  A Fukuda retractor was placed and an inferior and anterior capsular release was performed palpating and protecting the axillary  nerve with my finger at all times.  The shoulder was dislocated.  The biceps  was tenodesed to the upper  biceps groove and circumferential osteophytes  were removed to identify the native head-neck junction.  Reaming was started  behind the biceps groove up to a size 15.  The cut was made in 20  degrees of retroversion and the final two reamers were used to prepare the  proximal humerus.  A trial was placed.  The glenoid was exposed after placing retractors.  The soft tissue was removed circumferentially.  The center point was marked and the glenoid was prepared in standard fashion.  The base plate was placed with good press-fit.  Two screws were placed with good purchase.  The locking caps were  placed.  The glenosphere was placed with good purchase.  The shoulder was  dislocated and the trial was removed from the canal and irrigated.  The true  stem was placed with good press-fit and trialing demonstrated 12/0 thickness to offer the best restoration of joint stability, muscle tension and range of motion.  The true polyethylene was placed with similar range of motion and stability.  A 3-minute Betadine wash performed and the subscap repaired side to side.  The wound was closed in layers with absorbable suture and skin glue.  A sterile dressing and sling were applied.  They were awakened and taken to the recovery room.  There were no complications.  I was present for all portions.  All counts were correct.   Good capillary refill was noted to the hand.      Chris Lafleur MD     Date: 12/18/2024  Time: 13:52 EST

## 2024-12-18 NOTE — ANESTHESIA PROCEDURE NOTES
Peripheral Block      Patient reassessed immediately prior to procedure    Patient location during procedure: pre-op  Start time: 12/18/2024 11:26 AM  Stop time: 12/18/2024 11:32 AM  Reason for block: at surgeon's request and post-op pain management  Performed by  Anesthesiologist: Emily Adame MD  Preanesthetic Checklist  Completed: patient identified, IV checked, site marked, risks and benefits discussed, surgical consent, monitors and equipment checked, pre-op evaluation and timeout performed  Prep:  Pt Position: supine  Sterile barriers:alcohol skin prep, cap, gloves, mask and washed/disinfected hands  Prep: ChloraPrep  Patient monitoring: blood pressure monitoring, continuous pulse oximetry and EKG  Procedure    Sedation: yes  Performed under: local infiltration  Guidance:ultrasound guided    ULTRASOUND INTERPRETATION.  Using ultrasound guidance a 20 G gauge needle was placed in close proximity to the brachial plexus nerve, at which point, under ultrasound guidance anesthetic was injected in the area of the nerve and spread of the anesthesia was seen on ultrasound in close proximity thereto.  There were no abnormalities seen on ultrasound; a digital image was taken; and the patient tolerated the procedure with no complications. Images:still images obtained, printed/placed on chart    Laterality:left  Block Type:interscalene  Injection Technique:single-shot  Needle Type:echogenic  Needle Gauge:20 G    Sedation medications used: fentaNYL citrate (PF) (SUBLIMAZE) injection - Intravenous   100 mcg - 12/18/2024 11:25:00 AM  Medications Used: bupivacaine liposome (EXPAREL) injection 1.3% - Perineural   10 mL - 12/18/2024 11:32:00 AM  bupivacaine PF (MARCAINE) injection 0.5% - Perineural   10 mL - 12/18/2024 11:32:00 AM      Post Assessment  Injection Assessment: negative aspiration for heme, no paresthesia on injection and incremental injection  Patient Tolerance:comfortable throughout  block  Complications:no  Additional Notes  Skin anesthetized with 1% Lidocaine.  Using 20 G, 4 inch stimuplex echogenic needle,  Local was injected with serial aspirations under continuous ultrasound guidance around brachial plexus.  No heme aspirated.  Needle tip visualized throughout.  Good perineural spread noted.  No complications.  No bleeding noted.  Performed by: Emily Adame MD

## 2024-12-18 NOTE — ANESTHESIA POSTPROCEDURE EVALUATION
Patient: Leonid Diehl    Procedure Summary       Date: 12/18/24 Room / Location: [facility] OR 02 / [facility] MAIN OR    Anesthesia Start: 1215 Anesthesia Stop: 1424    Procedure: TOTAL SHOULDER REVERSE ARTHROPLASTY (Left: Shoulder) Diagnosis:       Osteoarthritis of left glenohumeral joint      (Osteoarthritis of left glenohumeral joint [M19.012])    Surgeons: Chris Lafleur MD Provider: Maria Khan CRNA    Anesthesia Type: general with block, ERAS Protocol ASA Status: 3            Anesthesia Type: general with block, ERAS Protocol    Vitals  Vitals Value Taken Time   /65 12/18/24 1619   Temp 97.4 °F (36.3 °C) 12/18/24 1600   Pulse 64 12/18/24 1619   Resp 13 12/18/24 1600   SpO2 92 % 12/18/24 1619   Vitals shown include unfiled device data.        Post Anesthesia Care and Evaluation    Patient location during evaluation: PACU  Patient participation: complete - patient participated  Level of consciousness: awake  Pain scale: See nurse's notes for pain score.  Pain management: adequate    Airway patency: patent  Anesthetic complications: No anesthetic complications  PONV Status: none  Cardiovascular status: acceptable  Respiratory status: acceptable and spontaneous ventilation  Hydration status: acceptable    Comments: Patient seen and examined postoperatively; vital signs stable; SpO2 greater than or equal to 90%; cardiopulmonary status stable; nausea/vomiting adequately controlled; pain adequately controlled; no apparent anesthesia complications; patient discharged from anesthesia care when discharge criteria were met

## 2024-12-18 NOTE — ANESTHESIA PROCEDURE NOTES
Airway  Date/Time: 12/18/2024 12:22 PM  End Time:12/18/2024 12:25 PM  Airway not difficult    General Information and Staff    Patient location during procedure: OR  Anesthesiologist: Emily Adame MD  CRNA/CAA: Azucena Cooney CRNA    Indications and Patient Condition  Indications for airway management: airway protection    Preoxygenated: yes  MILS maintained throughout  Mask difficulty assessment: 1 - vent by mask    Final Airway Details  Final airway type: endotracheal airway      Successful airway: ETT  Cuffed: yes   Successful intubation technique: video laryngoscopy  Facilitating devices/methods: intubating stylet  Endotracheal tube insertion site: oral  Blade: Priest  Blade size: 3  ETT size (mm): 7.5  Cormack-Lehane Classification: grade I - full view of glottis  Placement verified by: capnometry and palpation of cuff   Measured from: lips  ETT/EBT  to lips (cm): 21  Number of attempts at approach: 1  Assessment: lips, teeth, and gum same as pre-op and atraumatic intubation    Additional Comments  Ett-MOP

## 2024-12-18 NOTE — PERIOPERATIVE NURSING NOTE
Pt unable to maintain O2 sat greater than 90% on RA. Dr. Galicia notified of same and that pt has rec'd Duoneb, cough/deep breathing and IS without success and has no c/o SOA.. Dr. Galicia to bedside a few moments later to eval. VO for additional Duoneb and CXR if no improvement. Awaiting arrival of RT.

## 2024-12-18 NOTE — ANESTHESIA PREPROCEDURE EVALUATION
Anesthesia Evaluation     Patient summary reviewed and Nursing notes reviewed   no history of anesthetic complications:   NPO Solid Status: > 8 hours  NPO Liquid Status: > 2 hours           Airway   Mallampati: II  TM distance: >3 FB  Neck ROM: full  Dental - normal exam     Pulmonary - normal exam   (+) COPD, asthma,  Cardiovascular - normal exam    ECG reviewed  Patient on routine beta blocker    (+) hypertension, valvular problems/murmurs murmur, AS and MR, CHF Diastolic >=55%, hyperlipidemia      Neuro/Psych- negative ROS  GI/Hepatic/Renal/Endo    (+) obesity, morbid obesity, liver disease    Musculoskeletal     Abdominal    Substance History      OB/GYN          Other   arthritis, blood dyscrasia thrombocytopenia,     ROS/Med Hx Other: BPH, allergies, h/o alcohol abuse, h/o pancreatitis, hypoxemia, hyperglycemia, tongue swelling, biliary dz, hepatic dz, edema, gout, Raynaud's, insomnia, wound dehiscence    Echocardiogram Findings    Left Ventricle Calculated left ventricular EF = 66% Left ventricular ejection fraction appears to be 61 - 65%.  Right Ventricle The right ventricular cavity is mildly dilated.  Left Atrium The left atrial cavity is moderately dilated.  Right Atrium Normal right atrial cavity size noted.  Aortic Valve The aortic valve is abnormal in structure. There is calcification of the aortic valve. Moderate aortic valve stenosis is present.  Mitral Valve Mild mitral valve regurgitation is present.  Pulmonic Valve The pulmonic valve is not well visualized.  Greater Vessels No dilation of the aortic root is present.  Pericardium There is no evidence of pericardial effusion. .    Stress  No evidence for reversible myocardial ischemia noted.  Normal left ventricular ejection fraction of  58 %.      PSH  CATARACT EXTRACTION, BILATERAL TOTAL SHOULDER ARTHROPLASTY W/ DISTAL CLAVICLE EXCISION  TOTAL KNEE ARTHROPLASTY KNEE INCISION AND DRAINAGE                Anesthesia Plan    ASA 3     general with  block and ERAS Protocol   total IV anesthesia  (Patient identified; pre-operative vital signs, all relevant labs/studies, complete medical/surgical/anesthetic history, full medication list, full allergy list, and NPO status obtained/reviewed; physical assessment performed; anesthetic options, side effects, potential complications, risks, and benefits discussed; questions answered; written anesthesia consent obtained; patient cleared for procedure; anesthesia machine and equipment checked and functioning)  intravenous induction     Anesthetic plan, risks, benefits, and alternatives have been provided, discussed and informed consent has been obtained with: patient.    Plan discussed with CRNA and CAA.      CODE STATUS:

## 2024-12-19 ENCOUNTER — TELEPHONE (OUTPATIENT)
Dept: ORTHOPEDIC SURGERY | Facility: CLINIC | Age: 69
End: 2024-12-19
Payer: MEDICARE

## 2024-12-19 VITALS
HEART RATE: 78 BPM | BODY MASS INDEX: 39.65 KG/M2 | TEMPERATURE: 98 F | RESPIRATION RATE: 18 BRPM | SYSTOLIC BLOOD PRESSURE: 148 MMHG | HEIGHT: 73 IN | WEIGHT: 299.2 LBS | OXYGEN SATURATION: 92 % | DIASTOLIC BLOOD PRESSURE: 83 MMHG

## 2024-12-19 LAB
ALBUMIN SERPL-MCNC: 3.7 G/DL (ref 3.5–5.2)
ALBUMIN/GLOB SERPL: 1.1 G/DL
ALP SERPL-CCNC: 71 U/L (ref 39–117)
ALT SERPL W P-5'-P-CCNC: 16 U/L (ref 1–41)
ANION GAP SERPL CALCULATED.3IONS-SCNC: 11.7 MMOL/L (ref 5–15)
AST SERPL-CCNC: 36 U/L (ref 1–40)
BILIRUB SERPL-MCNC: 0.7 MG/DL (ref 0–1.2)
BUN SERPL-MCNC: 14 MG/DL (ref 8–23)
BUN/CREAT SERPL: 14.1 (ref 7–25)
CALCIUM SPEC-SCNC: 8.7 MG/DL (ref 8.6–10.5)
CHLORIDE SERPL-SCNC: 107 MMOL/L (ref 98–107)
CO2 SERPL-SCNC: 19.3 MMOL/L (ref 22–29)
CREAT SERPL-MCNC: 0.99 MG/DL (ref 0.76–1.27)
EGFRCR SERPLBLD CKD-EPI 2021: 83 ML/MIN/1.73
GLOBULIN UR ELPH-MCNC: 3.3 GM/DL
GLUCOSE SERPL-MCNC: 145 MG/DL (ref 65–99)
HCT VFR BLD AUTO: 38.9 % (ref 37.5–51)
HGB BLD-MCNC: 13.5 G/DL (ref 13–17.7)
POTASSIUM SERPL-SCNC: 4.3 MMOL/L (ref 3.5–5.2)
PROT SERPL-MCNC: 7 G/DL (ref 6–8.5)
SODIUM SERPL-SCNC: 138 MMOL/L (ref 136–145)

## 2024-12-19 PROCEDURE — A9270 NON-COVERED ITEM OR SERVICE: HCPCS | Performed by: ORTHOPAEDIC SURGERY

## 2024-12-19 PROCEDURE — 63710000001 CARVEDILOL 3.125 MG TABLET: Performed by: ORTHOPAEDIC SURGERY

## 2024-12-19 PROCEDURE — 63710000001 POTASSIUM CHLORIDE 10 MEQ TABLET CONTROLLED-RELEASE: Performed by: ORTHOPAEDIC SURGERY

## 2024-12-19 PROCEDURE — 94799 UNLISTED PULMONARY SVC/PX: CPT

## 2024-12-19 PROCEDURE — 80053 COMPREHEN METABOLIC PANEL: CPT | Performed by: STUDENT IN AN ORGANIZED HEALTH CARE EDUCATION/TRAINING PROGRAM

## 2024-12-19 PROCEDURE — 94664 DEMO&/EVAL PT USE INHALER: CPT

## 2024-12-19 PROCEDURE — 63710000001 ASPIRIN 325 MG TABLET DELAYED-RELEASE: Performed by: ORTHOPAEDIC SURGERY

## 2024-12-19 PROCEDURE — 94761 N-INVAS EAR/PLS OXIMETRY MLT: CPT

## 2024-12-19 PROCEDURE — 85018 HEMOGLOBIN: CPT | Performed by: ORTHOPAEDIC SURGERY

## 2024-12-19 PROCEDURE — 25010000002 CEFAZOLIN PER 500 MG: Performed by: ORTHOPAEDIC SURGERY

## 2024-12-19 PROCEDURE — 63710000001 ALLOPURINOL 300 MG TABLET: Performed by: ORTHOPAEDIC SURGERY

## 2024-12-19 PROCEDURE — 97165 OT EVAL LOW COMPLEX 30 MIN: CPT

## 2024-12-19 PROCEDURE — 63710000001 MELOXICAM 15 MG TABLET: Performed by: ORTHOPAEDIC SURGERY

## 2024-12-19 PROCEDURE — 63710000001 FUROSEMIDE 20 MG TABLET: Performed by: ORTHOPAEDIC SURGERY

## 2024-12-19 PROCEDURE — 63710000001 LOSARTAN 50 MG TABLET: Performed by: INTERNAL MEDICINE

## 2024-12-19 PROCEDURE — A9270 NON-COVERED ITEM OR SERVICE: HCPCS | Performed by: INTERNAL MEDICINE

## 2024-12-19 PROCEDURE — 85014 HEMATOCRIT: CPT | Performed by: ORTHOPAEDIC SURGERY

## 2024-12-19 RX ORDER — DAPAGLIFLOZIN 10 MG/1
10 TABLET, FILM COATED ORAL DAILY
Qty: 30 TABLET | Refills: 1 | Status: SHIPPED | OUTPATIENT
Start: 2024-12-19

## 2024-12-19 RX ORDER — LOSARTAN POTASSIUM 50 MG/1
50 TABLET ORAL
Qty: 30 TABLET | Refills: 0 | Status: SHIPPED | OUTPATIENT
Start: 2024-12-19

## 2024-12-19 RX ORDER — LOSARTAN POTASSIUM 50 MG/1
50 TABLET ORAL
Status: DISCONTINUED | OUTPATIENT
Start: 2024-12-19 | End: 2024-12-19 | Stop reason: HOSPADM

## 2024-12-19 RX ORDER — IPRATROPIUM BROMIDE AND ALBUTEROL 20; 100 UG/1; UG/1
1 SPRAY, METERED RESPIRATORY (INHALATION) 4 TIMES DAILY PRN
Qty: 4 G | Refills: 11 | Status: SHIPPED | OUTPATIENT
Start: 2024-12-19

## 2024-12-19 RX ORDER — OXYCODONE AND ACETAMINOPHEN 5; 325 MG/1; MG/1
1 TABLET ORAL EVERY 6 HOURS PRN
Qty: 28 TABLET | Refills: 0 | Status: SHIPPED | OUTPATIENT
Start: 2024-12-19

## 2024-12-19 RX ORDER — MAGNESIUM OXIDE 400 MG/1
400 TABLET ORAL DAILY
Qty: 30 TABLET | Refills: 0 | Status: SHIPPED | OUTPATIENT
Start: 2024-12-19

## 2024-12-19 RX ORDER — FLUTICASONE PROPIONATE AND SALMETEROL 250; 50 UG/1; UG/1
2 POWDER RESPIRATORY (INHALATION)
Qty: 60 EACH | Refills: 11 | Status: SHIPPED | OUTPATIENT
Start: 2024-12-19

## 2024-12-19 RX ADMIN — FUROSEMIDE 20 MG: 20 TABLET ORAL at 10:06

## 2024-12-19 RX ADMIN — BUDESONIDE AND FORMOTEROL FUMARATE DIHYDRATE 2 PUFF: 160; 4.5 AEROSOL RESPIRATORY (INHALATION) at 07:28

## 2024-12-19 RX ADMIN — ASPIRIN 325 MG: 325 TABLET, COATED ORAL at 10:06

## 2024-12-19 RX ADMIN — POTASSIUM CHLORIDE 10 MEQ: 750 TABLET, EXTENDED RELEASE ORAL at 10:06

## 2024-12-19 RX ADMIN — MELOXICAM 15 MG: 15 TABLET ORAL at 10:06

## 2024-12-19 RX ADMIN — ALLOPURINOL 300 MG: 300 TABLET ORAL at 10:06

## 2024-12-19 RX ADMIN — LOSARTAN POTASSIUM 50 MG: 50 TABLET, FILM COATED ORAL at 10:56

## 2024-12-19 RX ADMIN — CEFAZOLIN 2 G: 2 INJECTION, POWDER, FOR SOLUTION INTRAMUSCULAR; INTRAVENOUS at 03:25

## 2024-12-19 RX ADMIN — IPRATROPIUM BROMIDE AND ALBUTEROL SULFATE 3 ML: .5; 3 SOLUTION RESPIRATORY (INHALATION) at 07:23

## 2024-12-19 RX ADMIN — CARVEDILOL 3.12 MG: 3.12 TABLET, FILM COATED ORAL at 10:06

## 2024-12-19 NOTE — DISCHARGE SUMMARY
Date of Discharge:  12/19/2024    Discharge Diagnosis: Osteoarthritis of left glenohumeral    Presenting Problem/History of Present Illness  Active Hospital Problems    Diagnosis  POA    **Osteoarthritis of left glenohumeral joint [M19.012]  Unknown    DJD of left shoulder [M19.012]  Yes      Resolved Hospital Problems   No resolved problems to display.        Hospital Course  Patient is a 69 y.o. male presented with left shoulder degenerative joint disease.  They underwent shoulder arthroplasty during admission and postop day 1 were tolerating diet and oral medication and pain was controlled.  -Upon entering the room the patient was sitting upright in his bed watching TV and in no acute distress.    Procedures Performed: Left reverse total shoulder      Consults:   Consults       Date and Time Order Name Status Description    12/18/2024  6:58 PM Inpatient Hospitalist Consult Completed             Condition on Discharge:  Improved    Vital Signs  Vitals:    12/19/24 0723 12/19/24 0727 12/19/24 0728 12/19/24 0730   BP:       BP Location:       Patient Position:       Pulse: 64 65 65 65   Resp: 17 17 18 18   Temp:       TempSrc:       SpO2: 94% 94% 94% 95%   Weight:       Height:           Physical Exam:  Dry dressing, Sensory and motor exam are intact all distributions. Radial pulse is palpable and capillary refill is less than two seconds to all digits       Discharge Disposition  Home    Discharge Medications     Discharge Medications        New Medications        Instructions Start Date   oxyCODONE-acetaminophen 5-325 MG per tablet  Commonly known as: Percocet   1 tablet, Oral, Every 6 Hours PRN             Continue These Medications        Instructions Start Date   albuterol 1.25 MG/3ML nebulizer solution  Commonly known as: ACCUNEB   1.25 mg, Nebulization, Every 6 Hours PRN      albuterol sulfate  (90 Base) MCG/ACT inhaler  Commonly known as: PROVENTIL HFA;VENTOLIN HFA;PROAIR HFA   INHALE 2 PUFFS BY  MOUTH EVERY 6 HOURS AS NEEDED FOR SHORTNESS OF AIR OR WHEEZING      allopurinol 300 MG tablet  Commonly known as: ZYLOPRIM   300 mg, Oral, Daily      Breztri Aerosphere 160-9-4.8 MCG/ACT aerosol inhaler  Generic drug: Budeson-Glycopyrrol-Formoterol   2 puffs, Inhalation, 2 Times Daily      budesonide-formoterol 80-4.5 MCG/ACT inhaler  Commonly known as: SYMBICORT   2 puffs, 2 Times Daily - RT      carvedilol 3.125 MG tablet  Commonly known as: COREG   3.125 mg, Oral, Daily      furosemide 20 MG tablet  Commonly known as: LASIX   20 mg, Oral, Daily      lisinopril 20 MG tablet  Commonly known as: PRINIVIL,ZESTRIL   20 mg, Oral, Daily      MUCINEX DM PO   1 tablet, 2 Times Daily      potassium chloride 10 MEQ CR tablet   10 mEq, Oral, Daily      vitamin E 200 UNIT capsule   200 Units, Daily      ZYRTEC PO   10 mg, Daily               Discharge instructions:  Continue wearing sling.  Keep dressing on.  Okay to shower and perform home exercises.  Continue polar care.    Follow-up Appointments  Future Appointments   Date Time Provider Department Center   1/2/2025 12:30 PM Chris Lafleur MD [facility]   1/15/2025  8:10 AM [facility]   1/22/2025  8:45 AM Kerri Cruz APRN [facility]        Stalin Shields PA-C  12/19/24  08:02 EST      Disclaimer: Part of this note may be an electronic transcription/translation of spoken language to printed text using the Dragon Dictation System

## 2024-12-19 NOTE — CONSULTS
"[facility] Medicine Services  Consult Note    Patient Name: Leonid Diehl  : 1955  MRN: [id number]  Primary Care Physician:  Kerri Cruz APRN  Referring Physician: Chris Lafleur, *  Date of admission: 2024  Date and Time of Care: 24 at 2000    Inpatient Hospitalist Consult  Consult performed by: Llanes Alvarez, Carlos, MD  Consult ordered by: Chris Lafleur MD  Reason for consult: Medical management            Reason for Consult/ Chief Complaint: \"medical management\"    Consult Requested By: Dr. Chris Huffman    Subjective:     History of Present Illness:   Per the documentation by Dr. Ciarra Huffman, dated 2024,    \"This is a 69 y.o. male with left shoulder pain.  Imaging demonstrated degenerative joint disease.  Treatment options were discussed.  They desired to proceed with reverse shoulder arthroplasty after discussing the risks including bleeding, scarring, infection, stiffness, nerve damage, tendon damage, artery damage, continued  pain, DVT, loss of life or limb, fracture, dislocation, and a need for further surgery.   \"        Patient seen and examined at bedside. His left shoulder pain is well controlled. Reports mild SOB and wheezing. Denies chest pain. He states he has outpatient PT already arranged starting on Friday.        Review of Systems   Constitutional:  Negative for fatigue.   Respiratory:  Positive for cough, shortness of breath and wheezing. Negative for chest tightness and stridor.    Cardiovascular:  Negative for chest pain, palpitations and leg swelling.   Musculoskeletal:  Positive for arthralgias and joint swelling.   Skin:  Negative for pallor.   Neurological:  Negative for headaches.   Psychiatric/Behavioral:  Negative for confusion.        Personal History:     Past Medical History:   Diagnosis Date    Aortic stenosis, moderate 2022    Arthritis     Asthma     Benign prostatic hyperplasia 2017    Chronic " diastolic congestive heart failure 02/04/2019    states didn't really have.  no sx's present    COPD (chronic obstructive pulmonary disease)     Gout     Hay fever 01/28/2016    Heart murmur     History of alcohol abuse 04/18/2019    Hx of pancreatitis 04/18/2019    Infection of left knee 05/2022    Insomnia     Iron deficiency anemia 01/11/2019    Obesity (BMI 35.0-39.9 without comorbidity)     Peripheral edema     none presently    Primary hypertension 01/28/2016    Seasonal allergies     Thrombocytopenia     when had PNA       Past Surgical History:   Procedure Laterality Date    CATARACT EXTRACTION, BILATERAL      KNEE INCISION AND DRAINAGE Left 06/07/2022    Procedure: KNEE INCISION AND DRAINAGE;  Surgeon: Yash Celis MD;  Location: [facility] MAIN OR;  Service: Orthopedics;  Laterality: Left;    TOTAL KNEE ARTHROPLASTY Left 04/05/2022    Procedure: TOTAL KNEE ARTHROPLASTY;  Surgeon: Yash Celis MD;  Location: [facility] MAIN OR;  Service: Orthopedics;  Laterality: Left;    TOTAL KNEE ARTHROPLASTY Right 9/20/2022    Procedure: TOTAL KNEE ARTHROPLASTY WITH JARED ROBOT;  Surgeon: Yash Celis MD;  Location: [facility] OR;  Service: Robotics - Ortho;  Laterality: Right;    TOTAL SHOULDER ARTHROPLASTY W/ DISTAL CLAVICLE EXCISION Right 10/22/2019    Procedure: TOTAL SHOULDER REVERSE ARTHROPLASTY with Biceps Tenodesis;  Surgeon: Chris Lafleur MD;  Location: [facility] OR;  Service: Orthopedics       Family History: family history includes Cancer in his father; Heart attack in his mother; Heart disease in his mother and sister; Hypertension in his brother. Otherwise pertinent FHx was reviewed and not pertinent to current issue.    Social History:  reports that he has never smoked. He has never been exposed to tobacco smoke. He has never used smokeless tobacco. He reports that he does not currently use alcohol. He reports that he does not use drugs.    Home Medications:   Budeson-Glycopyrrol-Formoterol,  Cetirizine HCl, Dextromethorphan-guaiFENesin, albuterol, albuterol sulfate HFA, allopurinol, budesonide-formoterol, carvedilol, furosemide, lisinopril, potassium chloride, and vitamin E    Allergies:  No Known Allergies      Objective:     Vital Signs  Temp:  [96.4 °F (35.8 °C)-97.9 °F (36.6 °C)] 97.4 °F (36.3 °C)  Heart Rate:  [57-84] 70  Resp:  [10-25] 16  BP: (106-162)/(44-79) 149/69  Flow (L/min) (Oxygen Therapy):  [2-10] 6   Body mass index is 39.47 kg/m².    Physical Exam  Constitutional:       General: He is not in acute distress.     Appearance: He is obese. He is not ill-appearing.   HENT:      Head: Normocephalic.      Nose: Nose normal.      Mouth/Throat:      Mouth: Mucous membranes are moist.   Eyes:      Extraocular Movements: Extraocular movements intact.      Pupils: Pupils are equal, round, and reactive to light.   Cardiovascular:      Rate and Rhythm: Normal rate and regular rhythm.      Pulses: Normal pulses.      Heart sounds: Normal heart sounds. No murmur heard.  Pulmonary:      Effort: Pulmonary effort is normal. No respiratory distress.      Breath sounds: Wheezing present. No rhonchi.   Abdominal:      General: Abdomen is flat. Bowel sounds are normal. There is no distension.      Palpations: Abdomen is soft.      Tenderness: There is no abdominal tenderness.   Musculoskeletal:         General: Swelling present. Normal range of motion.      Cervical back: Neck supple.      Comments: Left shoulder surgical site intact, dressing clean. Some bruising extending to arm. Sling in place. Compartments are soft, restricted ROM due to pain. Radial pulses LUE 2 +, sensation intact.    Skin:     General: Skin is warm.      Capillary Refill: Capillary refill takes less than 2 seconds.      Findings: Bruising present.   Psychiatric:         Behavior: Behavior normal.         Scheduled Meds   allopurinol, 300 mg, Oral, Daily  [START ON 12/19/2024] aspirin, 325 mg, Oral, Daily  benzoyl peroxide, , Topical,  Nightly  budesonide-formoterol, 2 puff, Inhalation, BID - RT  [START ON 12/19/2024] carvedilol, 3.125 mg, Oral, Daily  ceFAZolin, 2 g, Intravenous, Q8H  furosemide, 20 mg, Oral, Daily  lisinopril, 20 mg, Oral, Daily  [START ON 12/19/2024] meloxicam, 15 mg, Oral, Daily  potassium chloride, 10 mEq, Oral, Daily       PRN Meds     acetaminophen **OR** acetaminophen    albuterol sulfate HFA    bisacodyl    diphenhydrAMINE **OR** diphenhydrAMINE    diphenhydrAMINE    magnesium hydroxide    Morphine **AND** naloxone    ondansetron    ondansetron ODT    oxyCODONE    traMADol   Infusions  sodium chloride, 75 mL/hr, Last Rate: 75 mL/hr (12/18/24 2012)          Diagnostic Data          XR Shoulder 2+ View Left    Result Date: 12/18/2024  Impression: Status post a reverse left shoulder arthroplasty with no radiographic complications. Electronically Signed: Quirino Hess MD  12/18/2024 2:51 PM EST  Workstation ID: [id number]       I reviewed the patient's new clinical results.  I reviewed the patient's new imaging results and agree with the interpretation.  I reviewed the patient's other test results and agree with the interpretation  I personally viewed and interpreted the patient's EKG/Telemetry data    Assessment/Plan:     Active and Resolved Problems  Active Hospital Problems    Diagnosis  POA    **Osteoarthritis of left glenohumeral joint [M19.012]  Unknown    DJD of left shoulder [M19.012]  Yes      Resolved Hospital Problems   No resolved problems to display.       DJD left shoulder S/P total shoulder arthroplasty with subcapsularis repair  Continue with post op sling  Pain is currently well controlled  Continue Mobic 15 mg tab once daily for mild pain   Oxycodone 10 mg q 4 PRN for severe pain, tramadol 50 mg tab q 6 PRN for moderate pain  Patient states he already arranged for post op PT outpatient starting on Friday   Management as per Orthopedic team    History of COPD/Asthma  Has some wheezing left lower lung  Start  Duo-Nebs q 6 hours while inpatient  Encourage IS  Will hold on systemic steroids for now  Continue Symbicort inhalation bid    History of HTN  Continue coreg 3.125 mg bid, lisinopril resumed post operatively      History of Gout  Continue allopurinol 300 mg once daily    VTE Prophylaxis:  Mechanical VTE prophylaxis orders are present.         Code status is   Code Status and Medical Interventions: CPR (Attempt to Resuscitate); Full   Ordered at: 12/18/24 8642     Code Status (Patient has no pulse and is not breathing):    CPR (Attempt to Resuscitate)     Medical Interventions (Patient has pulse or is breathing):    Full       Plan for disposition:home in 1 days    Time: 30 minutes        Signature: Electronically signed by Carlos Llanes Alvarez, MD, 12/18/24, 20:15 EST.  [facility]ist [facility]

## 2024-12-19 NOTE — PLAN OF CARE
Goal Outcome Evaluation:      Pt is alert and orientated x4. Pt is able to make needs known. Pt reports no pain. Plan to discharge today. Pt has no complaints at this time.

## 2024-12-19 NOTE — PLAN OF CARE
Goal Outcome Evaluation:              Outcome Evaluation: Pt denies need for skilled IP PT at this time. Noted to be up ambulating independently in room. Plans home w/ wife and OP PT at d/c. Will sign off.

## 2024-12-19 NOTE — PROGRESS NOTES
"    [facility] MEDICINE SERVICE  DAILY PROGRESS NOTE    NAME: Leonid Diehl  : 1955  MRN: [id number]      LOS: 0 days     PROVIDER OF SERVICE: Scott Weber MD    Chief Complaint: Osteoarthritis of left glenohumeral joint    Subjective:     Interval History:  History taken from: patient      \"This is a 69 y.o. male with left shoulder pain.  Imaging demonstrated degenerative joint disease.  Treatment options were discussed.  They desired to proceed with reverse shoulder arthroplasty after discussing the risks including bleeding, scarring, infection, stiffness, nerve damage, tendon damage, artery damage, continued  pain, DVT, loss of life or limb, fracture, dislocation, and a need for further surgery.   \"     -Patient seen and examined at bedside. His left shoulder pain is well controlled. Reports mild SOB and wheezing. Denies chest pain. He states he has outpatient PT already arranged starting on Friday.         seen in bed NAD, VSS, c/o cough     Review of Systems  Constitutional:  Negative for fatigue.   Respiratory:  Positive for cough, shortness of breath and wheezing. Negative for chest tightness and stridor.    Cardiovascular:  Negative for chest pain, palpitations and leg swelling.   Musculoskeletal:  Positive for arthralgias and joint swelling.   Skin:  Negative for pallor.   Neurological:  Negative for headaches.   Psychiatric/Behavioral:  Negative for confusion.    Objective:     Vital Signs  Temp:  [96.4 °F (35.8 °C)-98 °F (36.7 °C)] 98 °F (36.7 °C)  Heart Rate:  [57-84] 78  Resp:  [10-22] 18  BP: (106-167)/(44-83) 148/83  Flow (L/min) (Oxygen Therapy):  [2-10] 6   Body mass index is 39.47 kg/m².    Physical Exam     General: He is not in acute distress.     Appearance: He is obese. He is not ill-appearing.   HENT:      Head: Normocephalic.      Nose: Nose normal.      Mouth/Throat:      Mouth: Mucous membranes are moist.   Eyes:      Extraocular Movements: Extraocular " movements intact.      Pupils: Pupils are equal, round, and reactive to light.   Cardiovascular:      Rate and Rhythm: Normal rate and regular rhythm.      Pulses: Normal pulses.      Heart sounds: Normal heart sounds. No murmur heard.  Pulmonary:      Effort: Pulmonary effort is normal. No respiratory distress.      Breath sounds: Wheezing present. No rhonchi.   Abdominal:      General: Abdomen is flat. Bowel sounds are normal. There is no distension.      Palpations: Abdomen is soft.      Tenderness: There is no abdominal tenderness.   Musculoskeletal:         General: Swelling present. Normal range of motion.      Cervical back: Neck supple.      Comments: Left shoulder surgical site intact, dressing clean. Some bruising extending to arm. Sling in place. Compartments are soft, restricted ROM due to pain. Radial pulses LUE 2 +, sensation intact.    Skin:     General: Skin is warm.      Capillary Refill: Capillary refill takes less than 2 seconds.      Findings: Bruising present.   Psychiatric:         Behavior: Behavior normal.         Diagnostic Data    Results from last 7 days   Lab Units 12/19/24  0155   HEMOGLOBIN g/dL 13.5   HEMATOCRIT % 38.9   GLUCOSE mg/dL 145*   CREATININE mg/dL 0.99   BUN mg/dL 14   SODIUM mmol/L 138   POTASSIUM mmol/L 4.3   AST (SGOT) U/L 36   ALT (SGPT) U/L 16   ALK PHOS U/L 71   BILIRUBIN mg/dL 0.7   ANION GAP mmol/L 11.7       XR Shoulder 2+ View Left    Result Date: 12/18/2024  Impression: Status post a reverse left shoulder arthroplasty with no radiographic complications. Electronically Signed: Quirino Hess MD  12/18/2024 2:51 PM EST  Workstation ID: [id number]       I reviewed the patient's new clinical results.    Assessment/Plan:     Active and Resolved Problems  Active Hospital Problems    Diagnosis  POA    **Osteoarthritis of left glenohumeral joint [M19.012]  Unknown    DJD of left shoulder [M19.012]  Yes      Resolved Hospital Problems   No resolved problems to display.     DJD  left shoulder S/P total shoulder arthroplasty with subcapsularis repair  Continue with post op sling  Pain is currently well controlled  Continue Mobic 15 mg tab once daily for mild pain   Oxycodone 10 mg q 4 PRN for severe pain, tramadol 50 mg tab q 6 PRN for moderate pain  Patient states he already arranged for post op PT outpatient starting on Friday   Management as per Orthopedic team     History of COPD/Asthma  Has some wheezing left lower lung  Start Duo-Nebs q 6 hours while inpatient  Encourage IS  Will hold on systemic steroids for now  Continue Symbicort inhalation bid  CXR No acute process.      History of HTN  Continue coreg 3.125 mg bid, lisinopril resumed post operatively      History of Gout  Continue allopurinol 300 mg once daily    VTE Prophylaxis:  Mechanical VTE prophylaxis orders are present.      Disposition Planning:     Barriers to Discharge:SHOULDER SURGERY  Anticipated Date of Discharge: 12/20  Place of Discharge: HOME      Time: 35 minutes     Code Status and Medical Interventions: CPR (Attempt to Resuscitate); Full   Ordered at: 12/18/24 3880     Code Status (Patient has no pulse and is not breathing):    CPR (Attempt to Resuscitate)     Medical Interventions (Patient has pulse or is breathing):    Full       Signature: Electronically signed by Scott Weber MD, 12/19/24, 09:42 EST.  [facility], operated by [facility] Hospitalist Team

## 2024-12-19 NOTE — TELEPHONE ENCOUNTER
Torres from [facility] PT @[phone] is asking to have referral and OP report faxed to [phone].

## 2024-12-19 NOTE — THERAPY EVALUATION
Patient Name: Leonid Diehl  : 1955    MRN: [id number]                              Today's Date: 2024       Admit Date: 2024    Visit Dx:     ICD-10-CM ICD-9-CM   1. Primary osteoarthritis of left shoulder  M19.012 715.11   2. Osteoarthritis of left glenohumeral joint  M19.012 715.91   3. Chronic obstructive pulmonary disease, unspecified COPD type  J44.9 496     Patient Active Problem List   Diagnosis    Iron deficiency anemia    Asthma    Benign prostatic hyperplasia    Chronic diastolic congestive heart failure    Encounter for general adult medical examination without abnormal findings    Hay fever    Biliary disease    Hepatic disease    History of alcohol abuse    Hx of pancreatitis    Primary hypertension    Systolic murmur    Bilateral primary osteoarthritis of knee    Aortic stenosis, moderate    Gout    Insomnia    Thrombocytopenia    Hx of total knee replacement, left    Morbid obesity    Wound dehiscence, surgical    Infection of superficial incisional surgical site after procedure    S/P total knee arthroplasty, right    Status post total knee replacement, right    Osteoarthritis of left glenohumeral joint    DJD of left shoulder     Past Medical History:   Diagnosis Date    Aortic stenosis, moderate 2022    Arthritis     Asthma     Benign prostatic hyperplasia 2017    Chronic diastolic congestive heart failure 2019    states didn't really have.  no sx's present    COPD (chronic obstructive pulmonary disease)     Gout     Hay fever 2016    Heart murmur     History of alcohol abuse 2019    Hx of pancreatitis 2019    Infection of left knee 2022    Insomnia     Iron deficiency anemia 2019    Obesity (BMI 35.0-39.9 without comorbidity)     Peripheral edema     none presently    Primary hypertension 2016    Seasonal allergies     Thrombocytopenia     when had PNA     Past Surgical History:   Procedure Laterality Date    CATARACT  EXTRACTION, BILATERAL      KNEE INCISION AND DRAINAGE Left 06/07/2022    Procedure: KNEE INCISION AND DRAINAGE;  Surgeon: Yash Celis MD;  Location: [facility] MAIN OR;  Service: Orthopedics;  Laterality: Left;    TOTAL KNEE ARTHROPLASTY Left 04/05/2022    Procedure: TOTAL KNEE ARTHROPLASTY;  Surgeon: Yash Celis MD;  Location: [facility] MAIN OR;  Service: Orthopedics;  Laterality: Left;    TOTAL KNEE ARTHROPLASTY Right 9/20/2022    Procedure: TOTAL KNEE ARTHROPLASTY WITH JARED ROBOT;  Surgeon: Yash Celis MD;  Location: [facility] MAIN OR;  Service: Robotics - Ortho;  Laterality: Right;    TOTAL SHOULDER ARTHROPLASTY W/ DISTAL CLAVICLE EXCISION Right 10/22/2019    Procedure: TOTAL SHOULDER REVERSE ARTHROPLASTY with Biceps Tenodesis;  Surgeon: Chris Lafleur MD;  Location: [facility] MAIN OR;  Service: Orthopedics    TOTAL SHOULDER ARTHROPLASTY W/ DISTAL CLAVICLE EXCISION Left 12/18/2024    Procedure: TOTAL SHOULDER REVERSE ARTHROPLASTY;  Surgeon: Chris Lafleur MD;  Location: [facility] MAIN OR;  Service: Orthopedics;  Laterality: Left;      General Information       Row Name 12/19/24 1645          OT Time and Intention    Subjective Information no complaints  -MP     Document Type evaluation  -MP     Mode of Treatment individual therapy  -MP     Patient Effort good  -MP       Row Name 12/19/24 1645          General Information    Patient Profile Reviewed yes  -MP     Prior Level of Function independent:;ADL's  -MP     Existing Precautions/Restrictions shoulder  -MP       Row Name 12/19/24 1645          Living Environment    People in Home spouse  -MP       Row Name 12/19/24 1645          Cognition    Orientation Status (Cognition) oriented x 3  -MP       Row Name 12/19/24 1645          Safety Issues/Impairments Affecting Functional Mobility    Impairments Affecting Function (Mobility) strength;range of motion (ROM)  -MP               User Key  (r) = Recorded By, (t) = Taken By, (c) = Cosigned By       Initials Name Provider Type    Artem Cavanaugh OT Occupational Therapist                     Mobility/ADL's       Row Name 12/19/24 1645          Bed Mobility    Bed Mobility bed mobility (all) activities  -MP     All Activities, [place] (Bed Mobility) modified independence  -MP       Row Name 12/19/24 1645          Transfers    Transfers sit-stand transfer  -MP       Row Name 12/19/24 1645          Sit-Stand Transfer    Sit-Stand [place] (Transfers) independent  -MP       Row Name 12/19/24 1645          Activities of Daily Living    BADL Assessment/Intervention lower body dressing;upper body dressing  -MP       Row Name 12/19/24 1645          Upper Body Dressing Assessment/Training    [place] Level (Upper Body Dressing) upper body dressing skills;moderate assist (50% patient effort)  -MP               User Key  (r) = Recorded By, (t) = Taken By, (c) = Cosigned By      Initials Name Provider Type    Artem Cavanaugh OT Occupational Therapist                   Obj/Interventions       Row Name 12/19/24 1646          Range of Motion Comprehensive    Comment, General Range of Motion RUE WFL  -MP       Row Name 12/19/24 1646          Strength Comprehensive (MMT)    Comment, General Manual Muscle Testing (MMT) Assessment RUE WFL  -MP       Row Name 12/19/24 1646          Balance    Balance Interventions sitting;standing;sit to stand;supported;static;dynamic  -MP               User Key  (r) = Recorded By, (t) = Taken By, (c) = Cosigned By      Initials Name Provider Type    Artem Cavanaugh OT Occupational Therapist                   Goals/Plan    No documentation.                  Clinical Impression       Row Name 12/19/24 1646          Pain Assessment    Pretreatment Pain Rating 0/10 - no pain  -MP     Posttreatment Pain Rating 0/10 - no pain  -MP       Row Name 12/19/24 1646          Plan of Care Review    Plan of Care Reviewed With patient  -MP     Progress no change  -MP     Outcome  Evaluation Pt. is a 70 y/o male seen POD # 1 LUE rTSA. Pt. and spouse educated on precautions, hemidressing technique, and HEP. Pt. lives at home w/ spouse and maintains I/ADL independence at baseline. Pt. requires mod A for donning/doffing sling this date and all UB dressing.  Pt. completes SROM HEP 10 reps x 1 set w/ min VCs for technique. Handout provided regarding all aforementioned. Recomend d/c home w/ continued skilled therapy to progress shoulder ROM.  -MP       Row Name 12/19/24 1646          Therapy Assessment/Plan (OT)    Rehab Potential (OT) good  -MP     Criteria for Skilled Therapeutic Interventions Met (OT) no  -MP     Therapy Frequency (OT) evaluation only  -MP       Row Name 12/19/24 1646          Therapy Plan Review/Discharge Plan (OT)    Anticipated Discharge Disposition (OT) home with assist  -MP       Row Name 12/19/24 1646          Vital Signs    Pre Patient Position Sitting  -MP     Intra Patient Position Standing  -MP     Post Patient Position Sitting  -MP       Row Name 12/19/24 1646          Positioning and Restraints    Pre-Treatment Position in bed  -MP     Post Treatment Position bed  -MP     In Bed sitting EOB  -MP               User Key  (r) = Recorded By, (t) = Taken By, (c) = Cosigned By      Initials Name Provider Type    Artem Cavanaugh, AMBER Occupational Therapist                   Outcome Measures       Row Name 12/19/24 0800          How much help from another person do you currently need...    Turning from your back to your side while in flat bed without using bedrails? 4  -RQ     Moving from lying on back to sitting on the side of a flat bed without bedrails? 4  -RQ     Moving to and from a bed to a chair (including a wheelchair)? 4  -RQ     Standing up from a chair using your arms (e.g., wheelchair, bedside chair)? 4  -RQ     Climbing 3-5 steps with a railing? 4  -RQ     To walk in hospital room? 4  -RQ     AM-PAC 6 Clicks Score (PT) 24  -RQ               User Key  (r) =  Recorded By, (t) = Taken By, (c) = Cosigned By      Initials Name Provider Type    RQ Gin Cruz, RN Registered Nurse                    Occupational Therapy Education       Title: PT OT SLP Therapies (Resolved)       Topic: Occupational Therapy (Resolved)       Point: ADL training (Resolved)       Description:   Instruct learner(s) on proper safety adaptation and remediation techniques during self care or transfers.   Instruct in proper use of assistive devices.                  Learner Progress:  Not documented in this visit.              Point: Home exercise program (Resolved)       Description:   Instruct learner(s) on appropriate technique for monitoring, assisting and/or progressing therapeutic exercises/activities.                  Learner Progress:  Not documented in this visit.              Point: Precautions (Resolved)       Description:   Instruct learner(s) on prescribed precautions during self-care and functional transfers.                  Learner Progress:  Not documented in this visit.              Point: Body mechanics (Resolved)       Description:   Instruct learner(s) on proper positioning and spine alignment during self-care, functional mobility activities and/or exercises.                  Learner Progress:  Not documented in this visit.                                  OT Recommendation and Plan  Therapy Frequency (OT): evaluation only  Plan of Care Review  Plan of Care Reviewed With: patient  Progress: no change  Outcome Evaluation: Pt. is a 70 y/o male seen POD # 1 LUE rTSA. Pt. and spouse educated on precautions, hemidressing technique, and HEP. Pt. lives at home w/ spouse and maintains I/ADL independence at baseline. Pt. requires mod A for donning/doffing sling this date and all UB dressing.  Pt. completes SROM HEP 10 reps x 1 set w/ min VCs for technique. Handout provided regarding all aforementioned. Recomend d/c home w/ continued skilled therapy to progress shoulder ROM.     Time  Calculation:         Time Calculation- OT       Row Name 12/19/24 1648             Time Calculation- OT    OT Start Time 1100  -MP      OT Stop Time 1132  -MP      OT Time Calculation (min) 32 min  -MP      Total Timed Code Minutes- OT 0 minute(s)  -MP      OT Received On 12/19/24  -                User Key  (r) = Recorded By, (t) = Taken By, (c) = Cosigned By      Initials Name Provider Type    MP Artem Mayen OT Occupational Therapist                  Therapy Charges for Today       Code Description Service Date Service Provider Modifiers Qty    20992070628 HC OT EVAL LOW COMPLEXITY 4 12/19/2024 Artem Mayen OT GO 1                 Artem Mayen OT  12/19/2024

## 2024-12-19 NOTE — PLAN OF CARE
Goal Outcome Evaluation:  Plan of Care Reviewed With: patient        Progress: no change  Outcome Evaluation: Pt VSS, No pain reported this shift, IV fluids and antibiotic, Pt in shoulder sling and ice packs on, Pt is able to make needs known, Call light in reach,Plan on going

## 2024-12-19 NOTE — PLAN OF CARE
Goal Outcome Evaluation:  Plan of Care Reviewed With: patient        Progress: no change  Outcome Evaluation: Pt. is a 70 y/o male seen POD # 1 LUE rTSA. Pt. and spouse educated on precautions, hemidressing technique, and HEP. Pt. lives at home w/ spouse and maintains I/ADL independence at baseline. Pt. requires mod A for donning/doffing sling this date and all UB dressing.  Pt. completes SROM HEP 10 reps x 1 set w/ min VCs for technique. Handout provided regarding all aforementioned. Recomend d/c home w/ continued skilled therapy to progress shoulder ROM.    Anticipated Discharge Disposition (OT): home with assist

## 2024-12-19 NOTE — CASE MANAGEMENT/SOCIAL WORK
Case Management Discharge Note      Final Note: Routine home with OPPT         Selected Continued Care - Discharged on 12/19/2024 Admission date: 12/18/2024 - Discharge disposition: Home or Self Care       Transportation Services  Private: Car    Final Discharge Disposition Code: 01 - home or self-care

## 2024-12-31 ENCOUNTER — OFFICE (OUTPATIENT)
Dept: URBAN - METROPOLITAN AREA CLINIC 64 | Facility: CLINIC | Age: 69
End: 2024-12-31
Payer: MEDICARE

## 2024-12-31 DIAGNOSIS — K70.30 ALCOHOLIC CIRRHOSIS OF LIVER WITHOUT ASCITES: ICD-10-CM

## 2024-12-31 PROCEDURE — 99426 PRIN CARE MGMT STAFF 1ST 30: CPT | Performed by: INTERNAL MEDICINE

## 2025-01-02 ENCOUNTER — OFFICE VISIT (OUTPATIENT)
Dept: ORTHOPEDIC SURGERY | Facility: CLINIC | Age: 70
End: 2025-01-02
Payer: MEDICARE

## 2025-01-02 VITALS — WEIGHT: 299 LBS | BODY MASS INDEX: 39.63 KG/M2 | HEART RATE: 65 BPM | HEIGHT: 73 IN

## 2025-01-02 DIAGNOSIS — Z47.89 ORTHOPEDIC AFTERCARE: Primary | ICD-10-CM

## 2025-01-02 PROCEDURE — 99024 POSTOP FOLLOW-UP VISIT: CPT | Performed by: ORTHOPAEDIC SURGERY

## 2025-01-02 NOTE — PATIENT INSTRUCTIONS
Shoulder Arthroplasty: Post- Operative Visit Objectives    Review the operative findings, procedures and photos.  Make sure medications are effective and not causing problems.  Pain: Oxycodone or hydrocodone is for pain. You may take 1 tablet every 6 hours as necessary.  Some patients don’t require the use of these…in those circumstances just use Tylenol extra-strength 1 or 2 tablets every 4-6 hours.   NSAIDs. For pain and inflammation.  You can take an over the counter anti-inflammatory of choice such as Aleve, Ibuprofen, Motrin or Advil during this time.  If you have had any problems stop taking these medicines and please tell us!  Wound Care:  Today we will remove your dressings.  There may be some residual glue on your incision.  It is now ok to shower without covering it. Please avoid submerging the incision for another two weeks.  Continue ice pack as needed.  Exercises and Physical Therapy   Continue your physical therapy and daily home exercises focusing especially on overhead motion.  Continue the sling and remove for activities such as showering and bringing you hand to your face or hair, no motion behind your back for the next 4 weeks.  Some shoulder replacements with a rotator cuff repair will have more restrictions and we will go over those.   Follow Up appointments Schedule Follow up visits as directed usually in 4 weeks..  Notes  Make sure you have all necessary notes and documentation for school or work.  Issues: Remember our goal is to make this process smooth and easy if there is any thing you need please ask us or call back [phone]

## 2025-01-02 NOTE — PROGRESS NOTES
"     Patient ID: Leonid Diehl is a 69 y.o. male.  12/18/24 left reverse total shoulder  Pain controlled    Objective:    Pulse 65   Ht 185.4 cm (73\")   Wt 136 kg (299 lb)   BMI 39.45 kg/m²     Physical Examination:    Incision healing well no sign of infection  Sensory and motor exam are intact all distributions. Radial pulse is palpable and capillary refill is less than two seconds to all digits.    Imaging:  left Shoulder X-Ray  Indication: Postop total shoulder  AP Y and Lateral views  Findings: Reverse total shoulder in position  no bony lesion  Soft tissues normal  normal joint spaces  Hardware appropriately positioned yes      yes prior studies available for comparison.    Assessment:  Doing well after shoulder replacement    Plan:  Continue sling and therapy see me in a month  "

## 2025-01-08 DIAGNOSIS — I10 ESSENTIAL HYPERTENSION: Primary | ICD-10-CM

## 2025-01-08 DIAGNOSIS — Z13.220 SCREENING FOR HYPERLIPIDEMIA: ICD-10-CM

## 2025-01-08 DIAGNOSIS — R73.09 ABNORMAL GLUCOSE: ICD-10-CM

## 2025-01-12 DIAGNOSIS — G47.09 OTHER INSOMNIA: ICD-10-CM

## 2025-01-13 RX ORDER — TRAZODONE HYDROCHLORIDE 50 MG/1
50 TABLET, FILM COATED ORAL NIGHTLY
Qty: 90 TABLET | Refills: 1 | OUTPATIENT
Start: 2025-01-13

## 2025-01-15 ENCOUNTER — LAB (OUTPATIENT)
Dept: FAMILY MEDICINE CLINIC | Facility: CLINIC | Age: 70
End: 2025-01-15
Payer: MEDICARE

## 2025-01-15 LAB
DEPRECATED RDW RBC AUTO: 40.5 FL (ref 37–54)
ERYTHROCYTE [DISTWIDTH] IN BLOOD BY AUTOMATED COUNT: 11.8 % (ref 12.3–15.4)
HBA1C MFR BLD: 5.4 % (ref 4.8–5.6)
HCT VFR BLD AUTO: 41.9 % (ref 37.5–51)
HGB BLD-MCNC: 14.2 G/DL (ref 13–17.7)
MCH RBC QN AUTO: 31.8 PG (ref 26.6–33)
MCHC RBC AUTO-ENTMCNC: 33.9 G/DL (ref 31.5–35.7)
MCV RBC AUTO: 93.7 FL (ref 79–97)
PLATELET # BLD AUTO: 128 10*3/MM3 (ref 140–450)
PMV BLD AUTO: 11.4 FL (ref 6–12)
RBC # BLD AUTO: 4.47 10*6/MM3 (ref 4.14–5.8)
WBC NRBC COR # BLD AUTO: 5.51 10*3/MM3 (ref 3.4–10.8)

## 2025-01-15 PROCEDURE — 85027 COMPLETE CBC AUTOMATED: CPT | Performed by: NURSE PRACTITIONER

## 2025-01-15 PROCEDURE — 80053 COMPREHEN METABOLIC PANEL: CPT | Performed by: NURSE PRACTITIONER

## 2025-01-15 PROCEDURE — 84443 ASSAY THYROID STIM HORMONE: CPT | Performed by: NURSE PRACTITIONER

## 2025-01-15 PROCEDURE — 36415 COLL VENOUS BLD VENIPUNCTURE: CPT | Performed by: NURSE PRACTITIONER

## 2025-01-15 PROCEDURE — 80061 LIPID PANEL: CPT | Performed by: NURSE PRACTITIONER

## 2025-01-15 PROCEDURE — 83036 HEMOGLOBIN GLYCOSYLATED A1C: CPT | Performed by: NURSE PRACTITIONER

## 2025-01-16 LAB
ALBUMIN SERPL-MCNC: 3.5 G/DL (ref 3.5–5.2)
ALBUMIN/GLOB SERPL: 1 G/DL
ALP SERPL-CCNC: 85 U/L (ref 39–117)
ALT SERPL W P-5'-P-CCNC: 14 U/L (ref 1–41)
ANION GAP SERPL CALCULATED.3IONS-SCNC: 9.9 MMOL/L (ref 5–15)
AST SERPL-CCNC: 24 U/L (ref 1–40)
BILIRUB SERPL-MCNC: 0.8 MG/DL (ref 0–1.2)
BUN SERPL-MCNC: 14 MG/DL (ref 8–23)
BUN/CREAT SERPL: 15.4 (ref 7–25)
CALCIUM SPEC-SCNC: 9.1 MG/DL (ref 8.6–10.5)
CHLORIDE SERPL-SCNC: 106 MMOL/L (ref 98–107)
CHOLEST SERPL-MCNC: 155 MG/DL (ref 0–200)
CO2 SERPL-SCNC: 23.1 MMOL/L (ref 22–29)
CREAT SERPL-MCNC: 0.91 MG/DL (ref 0.76–1.27)
EGFRCR SERPLBLD CKD-EPI 2021: 91.8 ML/MIN/1.73
GLOBULIN UR ELPH-MCNC: 3.6 GM/DL
GLUCOSE SERPL-MCNC: 105 MG/DL (ref 65–99)
HDLC SERPL-MCNC: 52 MG/DL (ref 40–60)
LDLC SERPL CALC-MCNC: 83 MG/DL (ref 0–100)
LDLC/HDLC SERPL: 1.56 {RATIO}
POTASSIUM SERPL-SCNC: 4.4 MMOL/L (ref 3.5–5.2)
PROT SERPL-MCNC: 7.1 G/DL (ref 6–8.5)
SODIUM SERPL-SCNC: 139 MMOL/L (ref 136–145)
TRIGL SERPL-MCNC: 109 MG/DL (ref 0–150)
TSH SERPL DL<=0.05 MIU/L-ACNC: 2.24 UIU/ML (ref 0.27–4.2)
VLDLC SERPL-MCNC: 20 MG/DL (ref 5–40)

## 2025-01-22 ENCOUNTER — OFFICE VISIT (OUTPATIENT)
Dept: FAMILY MEDICINE CLINIC | Facility: CLINIC | Age: 70
End: 2025-01-22
Payer: MEDICARE

## 2025-01-22 VITALS
HEIGHT: 73 IN | BODY MASS INDEX: 41.22 KG/M2 | DIASTOLIC BLOOD PRESSURE: 83 MMHG | OXYGEN SATURATION: 97 % | WEIGHT: 311 LBS | HEART RATE: 62 BPM | SYSTOLIC BLOOD PRESSURE: 138 MMHG

## 2025-01-22 DIAGNOSIS — M1A.39X0 CHRONIC GOUT DUE TO RENAL IMPAIRMENT OF MULTIPLE SITES WITHOUT TOPHUS: ICD-10-CM

## 2025-01-22 DIAGNOSIS — D69.6 THROMBOCYTOPENIA: ICD-10-CM

## 2025-01-22 DIAGNOSIS — J44.9 CHRONIC OBSTRUCTIVE PULMONARY DISEASE, UNSPECIFIED COPD TYPE: ICD-10-CM

## 2025-01-22 DIAGNOSIS — I10 ESSENTIAL HYPERTENSION: Primary | ICD-10-CM

## 2025-01-22 DIAGNOSIS — G47.09 OTHER INSOMNIA: ICD-10-CM

## 2025-01-22 PROCEDURE — 1159F MED LIST DOCD IN RCRD: CPT | Performed by: NURSE PRACTITIONER

## 2025-01-22 PROCEDURE — 3079F DIAST BP 80-89 MM HG: CPT | Performed by: NURSE PRACTITIONER

## 2025-01-22 PROCEDURE — 3075F SYST BP GE 130 - 139MM HG: CPT | Performed by: NURSE PRACTITIONER

## 2025-01-22 PROCEDURE — 1160F RVW MEDS BY RX/DR IN RCRD: CPT | Performed by: NURSE PRACTITIONER

## 2025-01-22 PROCEDURE — 99214 OFFICE O/P EST MOD 30 MIN: CPT | Performed by: NURSE PRACTITIONER

## 2025-01-22 PROCEDURE — 1126F AMNT PAIN NOTED NONE PRSNT: CPT | Performed by: NURSE PRACTITIONER

## 2025-01-22 RX ORDER — ALLOPURINOL 300 MG/1
300 TABLET ORAL DAILY
Qty: 90 TABLET | Refills: 1 | Status: SHIPPED | OUTPATIENT
Start: 2025-01-22

## 2025-01-22 RX ORDER — TRAZODONE HYDROCHLORIDE 50 MG/1
TABLET, FILM COATED ORAL
COMMUNITY
Start: 2024-10-14

## 2025-01-22 RX ORDER — LOSARTAN POTASSIUM 50 MG/1
50 TABLET ORAL
Qty: 90 TABLET | Refills: 1 | Status: SHIPPED | OUTPATIENT
Start: 2025-01-22

## 2025-01-22 RX ORDER — POTASSIUM CHLORIDE 750 MG/1
10 TABLET, EXTENDED RELEASE ORAL DAILY
Qty: 90 TABLET | Refills: 1 | Status: SHIPPED | OUTPATIENT
Start: 2025-01-22

## 2025-01-22 RX ORDER — BUDESONIDE, GLYCOPYRROLATE, AND FORMOTEROL FUMARATE 160; 9; 4.8 UG/1; UG/1; UG/1
2 AEROSOL, METERED RESPIRATORY (INHALATION) 2 TIMES DAILY
COMMUNITY

## 2025-01-22 NOTE — PROGRESS NOTES
Chief Complaint  Chief Complaint   Patient presents with    Follow-up     3 month follow up- pt had shoulder replacement 12/18/24  Pt doing well. Dr Weber changed medications in hospital   Labs 1/15/25    Hypertension    COPD           Subjective          Leonid MASSEY Shanita presents to [facility] for   History of Present Illness    Patient underwent L total shoulder replacement on 12/18/2024 per Dr. Lafleur, has recovered and doing well, following up with Ortho as directed, doing PT with improvement in mobility.     HTN, now on losartan, lisinopril stopped d/t cough, he feels stable on meds and takes as directed, denies chest pain, headache, shortness of air, palpitations and swelling of extremities.      History of elevated liver enzymes, alcohol abuse, ultrasound showed compensated advanced chronic liver disease, portal hypertension, cirrhotic nodular liver surface along with thrombocytopenia.  He is following with gastroenterology.  He had been drinking up to a liter of vodka every 2 days and no longer drinking alcohol. Colonoscopy completed 11/16/2023 moderate diverticulosis     Gout, taking allopurinol daily     Insomnia, stable, no longer using trazodone      COPD, Symbicort too costly, he is now on Advair and Combivent but doesn't like the powder, he did very well with breztri and wants to try breztri pt assist. He reports wheezing has resolved, still c/o ROCHA     He is active, owns a farm, walks anywhere from 2 to 5 miles per day     Here to review labs      The following portions of the patient's history were reviewed and updated as appropriate: allergies, current medications, past family history, past medical history, past social history, past surgical history and problem list.    Past Medical History:   Diagnosis Date    Aortic stenosis, moderate 04/05/2022    Arthritis     Asthma     Benign prostatic hyperplasia 06/20/2017    Chronic diastolic congestive heart failure 02/04/2019     COPD (chronic obstructive pulmonary disease)     Gout     Hay fever 01/28/2016    Heart murmur     History of alcohol abuse 04/18/2019    Hx of pancreatitis 04/18/2019    Infection of left knee 05/2022    Insomnia     Iron deficiency anemia 01/11/2019    Obesity (BMI 35.0-39.9 without comorbidity)     Peripheral edema     Primary hypertension 01/28/2016    Seasonal allergies     Thrombocytopenia      Past Surgical History:   Procedure Laterality Date    CATARACT EXTRACTION, BILATERAL      KNEE INCISION AND DRAINAGE Left 06/07/2022    Procedure: KNEE INCISION AND DRAINAGE;  Surgeon: Yash Celis MD;  Location: [facility] MA[facility] OR;  Service: Orthopedics;  Laterality: Left;    TOTAL KNEE ARTHROPLASTY Left 04/05/2022    Procedure: TOTAL KNEE ARTHROPLASTY;  Surgeon: Yash Celis MD;  Location: [facility] MA[facility] OR;  Service: Orthopedics;  Laterality: Left;    TOTAL KNEE ARTHROPLASTY Right 9/20/2022    Procedure: TOTAL KNEE ARTHROPLASTY WITH JARED ROBOT;  Surgeon: Yash Celis MD;  Location: [facility] MAIN OR;  Service: Robotics - Ortho;  Laterality: Right;    TOTAL SHOULDER ARTHROPLASTY W/ DISTAL CLAVICLE EXCISION Right 10/22/2019    Procedure: TOTAL SHOULDER REVERSE ARTHROPLASTY with Biceps Tenodesis;  Surgeon: Chris Lafleur MD;  Location: [facility] MA[facility] OR;  Service: Orthopedics    TOTAL SHOULDER ARTHROPLASTY W/ DISTAL CLAVICLE EXCISION Left 12/18/2024    Procedure: TOTAL SHOULDER REVERSE ARTHROPLASTY;  Surgeon: Chris Lafleur MD;  Location: [facility] MA[facility] OR;  Service: Orthopedics;  Laterality: Left;     Family History   Problem Relation Age of Onset    Heart attack Mother     Heart disease Mother     Cancer Father     Heart disease Sister     Hypertension Brother      Social History     Tobacco Use    Smoking status: Never     Passive exposure: Never    Smokeless tobacco: Never   Substance Use Topics    Alcohol use: Not Currently     Comment: occasionally       Current Outpatient Medications:     albuterol  "(ACCUNEB) 1.25 MG/3ML nebulizer solution, Take 3 mL by nebulization Every 6 (Six) Hours As Needed for Wheezing or Shortness of Air., Disp: 100 each, Rfl: 2    allopurinol (ZYLOPRIM) 300 MG tablet, Take 1 tablet by mouth Daily., Disp: 90 tablet, Rfl: 1    carvedilol (COREG) 3.125 MG tablet, TAKE 1 TABLET BY MOUTH DAILY, Disp: 90 tablet, Rfl: 1    Cetirizine HCl (ZYRTEC PO), Take 10 mg by mouth Daily., Disp: , Rfl:     furosemide (LASIX) 20 MG tablet, TAKE 1 TABLET BY MOUTH DAILY, Disp: 90 tablet, Rfl: 1    losartan (COZAAR) 50 MG tablet, Take 1 tablet by mouth Daily., Disp: 90 tablet, Rfl: 1    magnesium oxide (MAG-OX) 400 MG tablet, Take 1 tablet by mouth Daily., Disp: 30 tablet, Rfl: 0    potassium chloride 10 MEQ CR tablet, Take 1 tablet by mouth Daily., Disp: 90 tablet, Rfl: 1    traZODone (DESYREL) 50 MG tablet, , Disp: , Rfl:     vitamin E 200 UNIT capsule, Take 1 capsule by mouth Daily., Disp: , Rfl:     Budeson-Glycopyrrol-Formoterol (Breztri Aerosphere) 160-9-4.8 MCG/ACT aerosol inhaler, Inhale 2 puffs 2 (Two) Times a Day., Disp: , Rfl:     Objective   Vital Signs:   Vitals:    01/22/25 0856   BP: 138/83   BP Location: Left arm   Patient Position: Sitting   Cuff Size: Large Adult   Pulse: 62   SpO2: 97%   Weight: (!) 141 kg (311 lb)   Height: 185.4 cm (73\")   PainSc: 0-No pain       Body mass index is 41.03 kg/m².    Physical Exam  Constitutional:       General: He is not in acute distress.     Appearance: Normal appearance. He is well-developed. He is obese. He is not ill-appearing or diaphoretic.   HENT:      Head: Normocephalic.   Eyes:      Conjunctiva/sclera: Conjunctivae normal.      Pupils: Pupils are equal, round, and reactive to light.   Neck:      Thyroid: No thyromegaly.      Vascular: No JVD.   Cardiovascular:      Rate and Rhythm: Normal rate and regular rhythm.      Heart sounds: Murmur heard.   Pulmonary:      Effort: Pulmonary effort is normal. No respiratory distress.      Breath sounds: " Normal breath sounds. No wheezing or rhonchi.   Abdominal:      General: Bowel sounds are normal. There is no distension.      Palpations: Abdomen is soft.      Tenderness: There is no abdominal tenderness.   Musculoskeletal:         General: No swelling or tenderness (L shoulder s/p total replacement, incision healed, mobility improving, no pain). Normal range of motion.      Cervical back: Normal range of motion and neck supple. No tenderness.   Lymphadenopathy:      Cervical: No cervical adenopathy.   Skin:     General: Skin is warm and dry.      Coloration: Skin is not jaundiced.      Findings: No erythema or rash.   Neurological:      General: No focal deficit present.      Mental Status: He is alert and oriented to person, place, and time. Mental status is at baseline.      Sensory: No sensory deficit.      Motor: No weakness.      Gait: Gait normal.   Psychiatric:         Mood and Affect: Mood normal.         Behavior: Behavior normal.         Thought Content: Thought content normal.         Judgment: Judgment normal.          Result Review :     No visits with results within 7 Day(s) from this visit.   Latest known visit with results is:   Orders Only on 01/08/2025   Component Date Value Ref Range Status    Total Cholesterol 01/15/2025 155  0 - 200 mg/dL Final    Triglycerides 01/15/2025 109  0 - 150 mg/dL Final    HDL Cholesterol 01/15/2025 52  40 - 60 mg/dL Final    LDL Cholesterol  01/15/2025 83  0 - 100 mg/dL Final    VLDL Cholesterol 01/15/2025 20  5 - 40 mg/dL Final    LDL/HDL Ratio 01/15/2025 1.56   Final    Glucose 01/15/2025 105 (H)  65 - 99 mg/dL Final    BUN 01/15/2025 14  8 - 23 mg/dL Final    Creatinine 01/15/2025 0.91  0.76 - 1.27 mg/dL Final    Sodium 01/15/2025 139  136 - 145 mmol/L Final    Potassium 01/15/2025 4.4  3.5 - 5.2 mmol/L Final    Chloride 01/15/2025 106  98 - 107 mmol/L Final    CO2 01/15/2025 23.1  22.0 - 29.0 mmol/L Final    Calcium 01/15/2025 9.1  8.6 - 10.5 mg/dL Final     Total Protein 01/15/2025 7.1  6.0 - 8.5 g/dL Final    Albumin 01/15/2025 3.5  3.5 - 5.2 g/dL Final    ALT (SGPT) 01/15/2025 14  1 - 41 U/L Final    AST (SGOT) 01/15/2025 24  1 - 40 U/L Final    Alkaline Phosphatase 01/15/2025 85  39 - 117 U/L Final    Total Bilirubin 01/15/2025 0.8  0.0 - 1.2 mg/dL Final    Globulin 01/15/2025 3.6  gm/dL Final    A/G Ratio 01/15/2025 1.0  g/dL Final    BUN/Creatinine Ratio 01/15/2025 15.4  7.0 - 25.0 Final    Anion Gap 01/15/2025 9.9  5.0 - 15.0 mmol/L Final    eGFR 01/15/2025 91.8  >60.0 mL/min/1.73 Final    WBC 01/15/2025 5.51  3.40 - 10.80 10*3/mm3 Final    RBC 01/15/2025 4.47  4.14 - 5.80 10*6/mm3 Final    Hemoglobin 01/15/2025 14.2  13.0 - 17.7 g/dL Final    Hematocrit 01/15/2025 41.9  37.5 - 51.0 % Final    MCV 01/15/2025 93.7  79.0 - 97.0 fL Final    MCH 01/15/2025 31.8  26.6 - 33.0 pg Final    MCHC 01/15/2025 33.9  31.5 - 35.7 g/dL Final    RDW 01/15/2025 11.8 (L)  12.3 - 15.4 % Final    RDW-SD 01/15/2025 40.5  37.0 - 54.0 fl Final    MPV 01/15/2025 11.4  6.0 - 12.0 fL Final    Platelets 01/15/2025 128 (L)  140 - 450 10*3/mm3 Final    TSH 01/15/2025 2.240  0.270 - 4.200 uIU/mL Final    Hemoglobin A1C 01/15/2025 5.40  4.80 - 5.60 % Final                              Assessment and Plan    Diagnoses and all orders for this visit:    1. Essential hypertension (Primary)    2. Chronic gout due to renal impairment of multiple sites without tophus  Comments:  stable, refill allopurinol  Orders:  -     allopurinol (ZYLOPRIM) 300 MG tablet; Take 1 tablet by mouth Daily.  Dispense: 90 tablet; Refill: 1    3. Other insomnia    4. Chronic obstructive pulmonary disease, unspecified COPD type    5. Thrombocytopenia    Other orders  -     losartan (COZAAR) 50 MG tablet; Take 1 tablet by mouth Daily.  Dispense: 90 tablet; Refill: 1  -     potassium chloride 10 MEQ CR tablet; Take 1 tablet by mouth Daily.  Dispense: 90 tablet; Refill: 1      Labs reviewed, stable  Cont current med  regimen  Jamee pt assist paperwork provided for pt to complete his part  Cont with ortho as directed  Cont PT  Ace cough resolved, cont losartan  D/c farxiga, not diabetic and rft's normal  Work on HHD      I spent 30 minutes caring for Leonid Diehl on this date of service. This time includes time spent by me in the following activities: preparing for the visit, reviewing tests, performing a medically appropriate examination and/or evaluation , counseling and educating the patient/family/caregiver, ordering medications, tests, or procedures and documenting information in the medical record        Follow Up     Return in about 6 months (around 7/22/2025) for Recheck HTN panel, PSA prior to appt .  Patient was given instructions and counseling regarding his condition or for health maintenance advice. Please see specific information pulled into the AVS if appropriate.        Part of this note may be an electronic transcription/translation of spoken language to printed text using the Dragon Dictation System

## 2025-01-30 ENCOUNTER — OFFICE VISIT (OUTPATIENT)
Dept: ORTHOPEDIC SURGERY | Facility: CLINIC | Age: 70
End: 2025-01-30
Payer: MEDICARE

## 2025-01-30 VITALS — HEART RATE: 71 BPM | BODY MASS INDEX: 41.22 KG/M2 | HEIGHT: 73 IN | WEIGHT: 311 LBS

## 2025-01-30 DIAGNOSIS — Z47.89 ORTHOPEDIC AFTERCARE: Primary | ICD-10-CM

## 2025-01-30 PROCEDURE — 99024 POSTOP FOLLOW-UP VISIT: CPT | Performed by: ORTHOPAEDIC SURGERY

## 2025-01-30 NOTE — PROGRESS NOTES
"     Patient ID: Leonid Diehl is a 69 y.o. male.    12/18/24 left reverse total shoulder   Pain minimal   Objective:    Pulse 71   Ht 185.4 cm (73\")   Wt (!) 141 kg (311 lb)   BMI 41.03 kg/m²     Physical Examination:     incision is healed passive elevation 130 external rotation 30    Imaging:      Assessment:  Doing well after shoulder replacement    Plan:  Discontinue sling finish out formal therapy gradual activity as tolerated x-ray in 4 months  "

## 2025-02-18 DIAGNOSIS — J44.9 CHRONIC OBSTRUCTIVE PULMONARY DISEASE, UNSPECIFIED COPD TYPE: ICD-10-CM

## 2025-02-18 RX ORDER — ALBUTEROL SULFATE 90 UG/1
INHALANT RESPIRATORY (INHALATION)
Qty: 18 G | Refills: 2 | Status: SHIPPED | OUTPATIENT
Start: 2025-02-18

## 2025-02-28 ENCOUNTER — OFFICE (OUTPATIENT)
Dept: URBAN - METROPOLITAN AREA CLINIC 64 | Facility: CLINIC | Age: 70
End: 2025-02-28
Payer: MEDICARE

## 2025-02-28 DIAGNOSIS — K70.30 ALCOHOLIC CIRRHOSIS OF LIVER WITHOUT ASCITES: ICD-10-CM

## 2025-02-28 PROCEDURE — 99426 PRIN CARE MGMT STAFF 1ST 30: CPT | Performed by: INTERNAL MEDICINE

## 2025-02-28 NOTE — TELEPHONE ENCOUNTER
Doing well.  Had first OP PT session yesterday - went well.  Goes again tomorrow.  No needs at this time.  
Detail Level: Zone
Include Location In Plan?: No

## 2025-03-10 RX ORDER — LISINOPRIL 20 MG/1
20 TABLET ORAL DAILY
Qty: 90 TABLET | Refills: 1 | OUTPATIENT
Start: 2025-03-10

## 2025-03-12 ENCOUNTER — OFFICE (OUTPATIENT)
Dept: URBAN - METROPOLITAN AREA CLINIC 64 | Facility: CLINIC | Age: 70
End: 2025-03-12
Payer: MEDICARE

## 2025-03-12 VITALS
HEIGHT: 74 IN | SYSTOLIC BLOOD PRESSURE: 136 MMHG | WEIGHT: 312 LBS | HEART RATE: 56 BPM | DIASTOLIC BLOOD PRESSURE: 77 MMHG

## 2025-03-12 DIAGNOSIS — K70.30 ALCOHOLIC CIRRHOSIS OF LIVER WITHOUT ASCITES: ICD-10-CM

## 2025-03-12 PROCEDURE — 99214 OFFICE O/P EST MOD 30 MIN: CPT | Performed by: NURSE PRACTITIONER

## 2025-04-15 ENCOUNTER — ON CAMPUS - OUTPATIENT (AMBULATORY)
Dept: URBAN - METROPOLITAN AREA HOSPITAL 2 | Facility: HOSPITAL | Age: 70
End: 2025-04-15
Payer: MEDICARE

## 2025-04-15 VITALS
HEART RATE: 66 BPM | SYSTOLIC BLOOD PRESSURE: 118 MMHG | TEMPERATURE: 97 F | OXYGEN SATURATION: 99 % | OXYGEN SATURATION: 98 % | RESPIRATION RATE: 15 BRPM | HEART RATE: 60 BPM | WEIGHT: 315 LBS | SYSTOLIC BLOOD PRESSURE: 135 MMHG | DIASTOLIC BLOOD PRESSURE: 86 MMHG | SYSTOLIC BLOOD PRESSURE: 189 MMHG | HEART RATE: 57 BPM | HEIGHT: 74 IN | DIASTOLIC BLOOD PRESSURE: 76 MMHG | DIASTOLIC BLOOD PRESSURE: 67 MMHG | DIASTOLIC BLOOD PRESSURE: 91 MMHG | DIASTOLIC BLOOD PRESSURE: 80 MMHG | OXYGEN SATURATION: 97 % | HEART RATE: 58 BPM | OXYGEN SATURATION: 95 % | SYSTOLIC BLOOD PRESSURE: 196 MMHG | RESPIRATION RATE: 18 BRPM | SYSTOLIC BLOOD PRESSURE: 123 MMHG | HEART RATE: 67 BPM | RESPIRATION RATE: 20 BRPM | RESPIRATION RATE: 16 BRPM

## 2025-04-15 DIAGNOSIS — K44.9 DIAPHRAGMATIC HERNIA WITHOUT OBSTRUCTION OR GANGRENE: ICD-10-CM

## 2025-04-15 DIAGNOSIS — K20.80 OTHER ESOPHAGITIS WITHOUT BLEEDING: ICD-10-CM

## 2025-04-15 DIAGNOSIS — K70.30 ALCOHOLIC CIRRHOSIS OF LIVER WITHOUT ASCITES: ICD-10-CM

## 2025-04-15 PROCEDURE — 43235 EGD DIAGNOSTIC BRUSH WASH: CPT | Performed by: INTERNAL MEDICINE

## 2025-04-15 RX ORDER — PANTOPRAZOLE SODIUM 40 MG/1
40 TABLET, DELAYED RELEASE ORAL
Qty: 90 | Refills: 1 | Status: ACTIVE
Start: 2025-04-15

## 2025-04-20 DIAGNOSIS — I35.0 NONRHEUMATIC AORTIC VALVE STENOSIS: ICD-10-CM

## 2025-04-21 RX ORDER — FUROSEMIDE 20 MG/1
20 TABLET ORAL DAILY
Qty: 90 TABLET | Refills: 1 | Status: SHIPPED | OUTPATIENT
Start: 2025-04-21

## 2025-04-30 ENCOUNTER — OFFICE (AMBULATORY)
Dept: URBAN - METROPOLITAN AREA CLINIC 64 | Facility: CLINIC | Age: 70
End: 2025-04-30
Payer: MEDICARE

## 2025-04-30 DIAGNOSIS — K70.30 ALCOHOLIC CIRRHOSIS OF LIVER WITHOUT ASCITES: ICD-10-CM

## 2025-04-30 PROCEDURE — 99426 PRIN CARE MGMT STAFF 1ST 30: CPT | Performed by: INTERNAL MEDICINE

## 2025-05-08 DIAGNOSIS — J44.9 CHRONIC OBSTRUCTIVE PULMONARY DISEASE, UNSPECIFIED COPD TYPE: ICD-10-CM

## 2025-05-08 RX ORDER — ALBUTEROL SULFATE 90 UG/1
INHALANT RESPIRATORY (INHALATION)
Qty: 18 G | Refills: 2 | Status: SHIPPED | OUTPATIENT
Start: 2025-05-08

## 2025-05-29 ENCOUNTER — OFFICE VISIT (OUTPATIENT)
Dept: ORTHOPEDIC SURGERY | Facility: CLINIC | Age: 70
End: 2025-05-29
Payer: MEDICARE

## 2025-05-29 VITALS — WEIGHT: 311 LBS | BODY MASS INDEX: 41.22 KG/M2 | HEART RATE: 57 BPM | HEIGHT: 73 IN

## 2025-05-29 DIAGNOSIS — M25.511 ACUTE PAIN OF RIGHT SHOULDER: ICD-10-CM

## 2025-05-29 DIAGNOSIS — M19.012 OSTEOARTHRITIS OF LEFT GLENOHUMERAL JOINT: Primary | ICD-10-CM

## 2025-05-29 PROCEDURE — 99212 OFFICE O/P EST SF 10 MIN: CPT | Performed by: ORTHOPAEDIC SURGERY

## 2025-05-29 NOTE — PROGRESS NOTES
Patient ID: Leonid Diehl is a 70 y.o. male.  12/18/24 left reverse total shoulder     Doing well, still having some episodes of instability which he can self reduce on the right.  Arthroplasty was in 2019    Review of Systems:        Objective:    There were no vitals taken for this visit.    Physical Examination:  Both shoulders are examined healed incisions no bony tenderness passive elevation bilateral 160 abduction 130 external rotation 40       Imaging:   bilateral Shoulder X-Ray  Indication: Bilateral shoulder replacement  AP Y and Lateral views  Findings: Reverse total shoulder in position bilateral without complication  no bony lesion  Soft tissues normal  normal joint spaces  Hardware appropriately positioned yes      yes prior studies available for comparison.    Assessment:    Doing well after shoulder arthroplasty left side  Unstable right reverse arthroplasty    Plan:     Activity as tolerated see me with x-ray of the left side in 6 months discussed options for the right side currently he would like to continue observation he can self reduce it when occasionally dislocates can see me as needed    Procedures          Disclaimer: Part of this note may be an electronic transcription/translation of spoken language to printed text using the Dragon Dictation System

## 2025-06-04 ENCOUNTER — HOSPITAL ENCOUNTER (OUTPATIENT)
Facility: HOSPITAL | Age: 70
Discharge: HOME | End: 2025-06-04
Attending: EMERGENCY MEDICINE
Payer: MEDICARE

## 2025-06-04 ENCOUNTER — APPOINTMENT (OUTPATIENT)
Dept: GENERAL RADIOLOGY | Facility: HOSPITAL | Age: 70
End: 2025-06-04
Payer: MEDICARE

## 2025-06-04 VITALS
HEIGHT: 74 IN | WEIGHT: 310 LBS | DIASTOLIC BLOOD PRESSURE: 58 MMHG | OXYGEN SATURATION: 96 % | HEART RATE: 61 BPM | SYSTOLIC BLOOD PRESSURE: 127 MMHG | RESPIRATION RATE: 18 BRPM | BODY MASS INDEX: 39.78 KG/M2 | TEMPERATURE: 97.5 F

## 2025-06-04 DIAGNOSIS — S82.64XA NONDISPLACED FRACTURE OF LATERAL MALLEOLUS OF RIGHT FIBULA, INITIAL ENCOUNTER FOR CLOSED FRACTURE: Primary | ICD-10-CM

## 2025-06-04 PROCEDURE — G0463 HOSPITAL OUTPT CLINIC VISIT: HCPCS

## 2025-06-04 PROCEDURE — 99212 OFFICE O/P EST SF 10 MIN: CPT

## 2025-06-04 PROCEDURE — 73610 X-RAY EXAM OF ANKLE: CPT

## 2025-06-04 PROCEDURE — 29515 APPLICATION SHORT LEG SPLINT: CPT

## 2025-06-04 NOTE — DISCHARGE INSTRUCTIONS
Continue to ice and elevate your foot 20-minute increments several times a day.    Use crutches when you ambulate.  You should be nonweightbearing.    Call and schedule follow-up appointment with orthopedic.    Keep splint in place until you have followed up with an orthopedic.    Return to the ER with any new or worsening symptoms.

## 2025-06-04 NOTE — FSED PROVIDER NOTE
Subjective   History of Present Illness  Patient is a 70-year-old male with history of asthma, COPD, gout, heart murmur, obesity, hypertension, and thrombocytopenia who presents today with right ankle pain that began on Monday.  He reports a 4 sosa rolled over his foot.  He denies numbness, tingling, or loss of sensation.  He denies fever, nausea, vomiting, diarrhea, chest pain, shortness of air.        Review of Systems    Past Medical History:   Diagnosis Date    Aortic stenosis, moderate 04/05/2022    Arthritis     Asthma     Benign prostatic hyperplasia 06/20/2017    Chronic diastolic congestive heart failure 02/04/2019    states didn't really have.  no sx's present    COPD (chronic obstructive pulmonary disease)     Gout     Hay fever 01/28/2016    Heart murmur     History of alcohol abuse 04/18/2019    Hx of pancreatitis 04/18/2019    Infection of left knee 05/2022    Insomnia     Iron deficiency anemia 01/11/2019    Obesity (BMI 35.0-39.9 without comorbidity)     Peripheral edema     none presently    Primary hypertension 01/28/2016    Seasonal allergies     Thrombocytopenia     when had PNA       Allergies   Allergen Reactions    Lisinopril Cough       Past Surgical History:   Procedure Laterality Date    CATARACT EXTRACTION, BILATERAL      KNEE INCISION AND DRAINAGE Left 06/07/2022    Procedure: KNEE INCISION AND DRAINAGE;  Surgeon: Yash Celis MD;  Location: [facility] MAIN OR;  Service: Orthopedics;  Laterality: Left;    TOTAL KNEE ARTHROPLASTY Left 04/05/2022    Procedure: TOTAL KNEE ARTHROPLASTY;  Surgeon: Yash Celis MD;  Location: [facility] MAIN OR;  Service: Orthopedics;  Laterality: Left;    TOTAL KNEE ARTHROPLASTY Right 9/20/2022    Procedure: TOTAL KNEE ARTHROPLASTY WITH JRAED ROBOT;  Surgeon: Yash Celis MD;  Location: [facility] MAIN OR;  Service: Robotics - Ortho;  Laterality: Right;    TOTAL SHOULDER ARTHROPLASTY W/ DISTAL CLAVICLE EXCISION Right 10/22/2019    Procedure: TOTAL SHOULDER REVERSE  ARTHROPLASTY with Biceps Tenodesis;  Surgeon: Chris Lafleur MD;  Location: [facility] MAIN OR;  Service: Orthopedics    TOTAL SHOULDER ARTHROPLASTY W/ DISTAL CLAVICLE EXCISION Left 12/18/2024    Procedure: TOTAL SHOULDER REVERSE ARTHROPLASTY;  Surgeon: Chris Lafleur MD;  Location: [facility] MAIN OR;  Service: Orthopedics;  Laterality: Left;       Family History   Problem Relation Age of Onset    Heart attack Mother     Heart disease Mother     Cancer Father     Heart disease Sister     Hypertension Brother        Social History     Socioeconomic History    Marital status:    Tobacco Use    Smoking status: Never     Passive exposure: Never    Smokeless tobacco: Never   Vaping Use    Vaping status: Never Used   Substance and Sexual Activity    Alcohol use: Not Currently     Comment: occasionally    Drug use: No    Sexual activity: Defer           Objective   Physical Exam  Vitals and nursing note reviewed.   Constitutional:       General: He is not in acute distress.     Appearance: Normal appearance. He is not ill-appearing, toxic-appearing or diaphoretic.   HENT:      Head: Normocephalic.      Nose: Nose normal.      Mouth/Throat:      Mouth: Mucous membranes are moist.   Eyes:      Conjunctiva/sclera: Conjunctivae normal.   Cardiovascular:      Rate and Rhythm: Normal rate and regular rhythm.      Pulses: Normal pulses.      Heart sounds: Normal heart sounds.   Pulmonary:      Effort: Pulmonary effort is normal.      Breath sounds: Normal breath sounds.   Musculoskeletal:         General: Swelling, tenderness and signs of injury present. No deformity.      Cervical back: Normal range of motion.   Skin:     General: Skin is warm.      Capillary Refill: Capillary refill takes less than 2 seconds.      Findings: Bruising present.   Neurological:      General: No focal deficit present.      Mental Status: He is alert.   Psychiatric:         Mood and Affect: Mood normal.         Behavior: Behavior  normal.         Thought Content: Thought content normal.         Judgment: Judgment normal.         Procedures           ED Course                                           Medical Decision Making  Patient is a 70-year-old male with history of asthma, COPD, gout, heart murmur, obesity, hypertension, and thrombocytopenia who presents today with right ankle pain that began on Monday.  He reports a 4 sosa rolled over his foot.  He denies numbness, tingling, or loss of sensation.  He denies fever, nausea, vomiting, diarrhea, chest pain, shortness of air.    Upon exam patient is awake and alert, nontoxic-appearing, appears in no acute distress, and is answering questions appropriately.  Lung sounds are clear and equal bilaterally.  Heart is normal rate and rhythm.  Mucous membranes are moist.  Patient has a moderate amount of edema to his right ankle.  There is bruising present.  He has a strong pedal pulse.  He denies loss of sensation, numbness or tingling.  Cap refills less than 2.  An x-ray was obtained and showed Oblique nondisplaced fracture of the right lateral malleolus. No ankle dislocation.  I short leg splint with stirrup was placed, and crutches were provided. I Advised him to stay nonweightbearing, and to call and schedule follow-up appointment with orthopedic.  We discussed return precautions.  I advised him to return to the ER with any new or worsening symptoms.    Amount and/or Complexity of Data Reviewed  Radiology: ordered.        Final diagnoses:   Nondisplaced fracture of lateral malleolus of right fibula, initial encounter for closed fracture       ED Disposition  ED Disposition       ED Disposition   Discharge    Condition   Stable    Comment   --               [facility]  [address] [facility] [address]  [phone]  Schedule an appointment as soon as possible for a visit       FREDERICK Ford DPM  [address] [place]  [address]  [phone]    Schedule an appointment as soon as possible for a visit in 1 day      Carlos Jean DPM  [address] [facility] [address]  [phone]    Schedule an appointment as soon as possible for a visit in 1 day           Medication List      No changes were made to your prescriptions during this visit.

## 2025-06-09 ENCOUNTER — OFFICE VISIT (OUTPATIENT)
Age: 70
End: 2025-06-09
Payer: MEDICARE

## 2025-06-09 VITALS — BODY MASS INDEX: 39.78 KG/M2 | RESPIRATION RATE: 20 BRPM | HEIGHT: 74 IN | WEIGHT: 310 LBS

## 2025-06-09 DIAGNOSIS — M79.671 RIGHT FOOT PAIN: Primary | ICD-10-CM

## 2025-06-09 DIAGNOSIS — S82.61XA CLOSED DISPLACED FRACTURE OF LATERAL MALLEOLUS OF RIGHT FIBULA, INITIAL ENCOUNTER: ICD-10-CM

## 2025-06-09 RX ORDER — PANTOPRAZOLE SODIUM 40 MG/1
90 TABLET, DELAYED RELEASE ORAL
COMMUNITY
Start: 2025-04-15

## 2025-06-10 ENCOUNTER — PATIENT ROUNDING (BHMG ONLY) (OUTPATIENT)
Age: 70
End: 2025-06-10
Payer: MEDICARE

## 2025-06-10 PROBLEM — S82.61XA CLOSED DISPLACED FRACTURE OF LATERAL MALLEOLUS OF RIGHT FIBULA: Status: ACTIVE | Noted: 2025-06-09

## 2025-06-10 NOTE — PROGRESS NOTES
06/09/2025  Foot and Ankle Surgery - Established Patient/Follow-up  Provider: Dr. Maen Ford DPM  Location: [facility]    Subjective:  Leonid Diehl is a 70 y.o. male.     Chief Complaint   Patient presents with    Right Ankle - Pain, Injury, Initial Evaluation    Follow-up     PCP: Kerri Cruz APRN  Last PCP Visit:   1/22/25       History of Present Illness  Patient is a 70-year-old male that presents after injury involving his right ankle.  Patient injured his ankle last week by rolling 4 sosa over his leg.  Patient reported to the hospital where imaging was performed showing it mildly displaced lateral malleolus fracture and he was placed into a splint.  Patient continues to have discomfort and states that he has been weightbearing with a walker.  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      Allergies   Allergen Reactions    Lisinopril Cough       Current Outpatient Medications on File Prior to Visit   Medication Sig Dispense Refill    albuterol (ACCUNEB) 1.25 MG/3ML nebulizer solution Take 3 mL by nebulization Every 6 (Six) Hours As Needed for Wheezing or Shortness of Air. 100 each 2    albuterol sulfate  (90 Base) MCG/ACT inhaler INHALE 2 PUFFS BY MOUTH EVERY 6 HOURS AS NEEDED FOR SHORTNESS OF BREATH OR WHEEZING 18 g 2    allopurinol (ZYLOPRIM) 300 MG tablet Take 1 tablet by mouth Daily. 90 tablet 1    Budeson-Glycopyrrol-Formoterol (Breztri Aerosphere) 160-9-4.8 MCG/ACT aerosol inhaler Inhale 2 puffs 2 (Two) Times a Day.      carvedilol (COREG) 3.125 MG tablet TAKE 1 TABLET BY MOUTH DAILY 90 tablet 1    Cetirizine HCl (ZYRTEC PO) Take 10 mg by mouth Daily.      furosemide (LASIX) 20 MG tablet TAKE 1 TABLET BY MOUTH DAILY 90 tablet 1    losartan (COZAAR) 50 MG tablet Take 1 tablet by mouth Daily. 90 tablet 1    magnesium oxide (MAG-OX) 400 MG tablet Take 1 tablet by mouth Daily. 30 tablet 0    pantoprazole (PROTONIX) 40 MG EC tablet 90 tablets.       "potassium chloride 10 MEQ CR tablet Take 1 tablet by mouth Daily. 90 tablet 1    traZODone (DESYREL) 50 MG tablet       vitamin E 200 UNIT capsule Take 1 capsule by mouth Daily.       No current facility-administered medications on file prior to visit.       Objective   Resp 20   Ht 188 cm (74\")   Wt (!) 141 kg (310 lb)   BMI 39.80 kg/m²     Foot/Ankle Exam  Physical Exam  General:   Appearance: appears stated age and healthy and obesity    Orientation: AAOx3    Affect: appropriate    Gait: antalgic       VASCULAR       Right Foot Vascularity   Normal vascular exam    Dorsalis pedis:  2+  Posterior tibial:  2+  Skin Temperature: warm    Edema Gradin+  CFT:  < 3 seconds  Pedal Hair Growth:  Absent  Varicosities: moderate varicosities        Left Foot Vascularity   Normal vascular exam    Dorsalis pedis:  2+  Posterior tibial:  2+  Skin Temperature: warm    Edema Gradin+  CFT:  < 3 seconds  Pedal Hair Growth:  Absent  Varicosities: moderate varicosities        NEUROLOGIC      Right Foot Neurologic   Light touch sensation:  Normal  Hot/Cold sensation: normal    Achilles reflex:  2+      Left Foot Neurologic   Light touch sensation:  Normal  Hot/cold sensation: normal    Achilles reflex:  2+      MUSCULOSKELETAL       Right Foot Musculoskeletal   Ecchymosis:  None  Tenderness: great toe metatarsophalangeal joint, posterior heel, arch and dorsal foot    Hallux limitus: Yes        Left Foot Musculoskeletal   Ecchymosis:  None  Tenderness: posterior heel, arch and dorsal foot        MUSCLE STRENGTH      Right Foot Muscle Strength   Normal strength    Foot dorsiflexion:  5  Foot plantar flexion:  5  Foot inversion:  5  Foot eversion:  5      Left Foot Muscle Strength   Normal strength    Foot dorsiflexion:  5  Foot plantar flexion:  5  Foot inversion:  5  Foot eversion:  5      DERMATOLOGIC      Right Foot Dermatologic   Skin: skin intact        Left Foot Dermatologic   Skin: skin intact        TESTS      Right " Foot Tests   Anterior drawer: negative    Varus tilt: negative        Left Foot Tests   Anterior drawer: negative    Varus tilt: negative        Right Foot Additional Comments Prominent soft tissue and osseous rigidity involving the feet.  Moderate equinus contracture with knee extended and flexed.  Point discomfort involving the posterior aspect of both heels as well as the right first metatarsophalangeal joint with significant loss of range of motion and large bony prominence.     6/9/25: Splint attached to the right lower extremity.  Moderate swelling involving the ankle.  Range of motion and muscle strength intact to the digits.  No gross deformity present.  No signs of infection.      Results      Assessment & Plan   Diagnoses and all orders for this visit:    1. Right foot pain (Primary)    2. Closed displaced fracture of lateral malleolus of right fibula, initial encounter  -     Case Request; Standing  -     CBC (No Diff); Future  -     Basic Metabolic Panel; Future  -     XR Chest 2 View; Future  -     ECG 12 Lead; Future  -     ceFAZolin (ANCEF) 3,000 mg in sodium chloride 0.9 % 100 mL IVPB  -     Case Request    Other orders  -     Follow Anesthesia Guidelines / Protocol; Future  -     Follow Anesthesia Guidelines / Protocol; Standing  -     Verify / Perform Chlorhexidine Skin Prep; Standing  -     Provide NPO Instructions to Patient; Future  -     Chlorhexidine Skin Prep; Future  -     Place Sequential Compression Device; Standing  -     Maintain Sequential Compression Device; Standing      Assessment & Plan    Patient was seen in the office for right ankle injury.  Imaging was reviewed showing mildly displaced lateral malleolus fracture.  I reviewed the imaging, diagnosis, and treatment options with him at length.  We discussed operative versus nonoperative management.  Given his profession and overall activity, I do feel that the most appropriate option would be to proceed with operative intervention.   We discussed the procedure, risk, goals, and recovery with him at length.  He understands that he will require off weight.  Decreased overall activity after surgery.  We did discuss risks of wound healing complications, infection, and potential additional surgeries.  The patient understands and agrees and would like to proceed with surgical planning.Reviewed proper basic stretching and manual therapy exercises along with appropriate shoes and activity.  Discussed proper use and/or avoidance of OTC anti-inflammatories.  Patient is to call with any additional issues or concerns.  Greater than 45 minutes was spent before, during, and after evaluation for patient care.    The encounter note is created with the use of AI technology.  I do apologize if there are typos and/or confusion within the note.  Please feel free to contact me or my office with any questions or concerns.         Patient or patient representative verbalized consent for the use of Ambient Listening during the visit with  FREDERICK Ford DPM for chart documentation. 6/10/2025  07:43 EDT    FREDERICK Ford DPM

## 2025-06-10 NOTE — H&P (VIEW-ONLY)
06/09/2025  Foot and Ankle Surgery - Established Patient/Follow-up  Provider: Dr. Mane Ford DPM  Location: [facility]    Subjective:  Leonid Diehl is a 70 y.o. male.     Chief Complaint   Patient presents with    Right Ankle - Pain, Injury, Initial Evaluation    Follow-up     PCP: Kerri Cruz APRN  Last PCP Visit:   1/22/25       History of Present Illness  Patient is a 70-year-old male that presents after injury involving his right ankle.  Patient injured his ankle last week by rolling 4 ossa over his leg.  Patient reported to the hospital where imaging was performed showing it mildly displaced lateral malleolus fracture and he was placed into a splint.  Patient continues to have discomfort and states that he has been weightbearing with a walker.  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      Allergies   Allergen Reactions    Lisinopril Cough       Current Outpatient Medications on File Prior to Visit   Medication Sig Dispense Refill    albuterol (ACCUNEB) 1.25 MG/3ML nebulizer solution Take 3 mL by nebulization Every 6 (Six) Hours As Needed for Wheezing or Shortness of Air. 100 each 2    albuterol sulfate  (90 Base) MCG/ACT inhaler INHALE 2 PUFFS BY MOUTH EVERY 6 HOURS AS NEEDED FOR SHORTNESS OF BREATH OR WHEEZING 18 g 2    allopurinol (ZYLOPRIM) 300 MG tablet Take 1 tablet by mouth Daily. 90 tablet 1    Budeson-Glycopyrrol-Formoterol (Breztri Aerosphere) 160-9-4.8 MCG/ACT aerosol inhaler Inhale 2 puffs 2 (Two) Times a Day.      carvedilol (COREG) 3.125 MG tablet TAKE 1 TABLET BY MOUTH DAILY 90 tablet 1    Cetirizine HCl (ZYRTEC PO) Take 10 mg by mouth Daily.      furosemide (LASIX) 20 MG tablet TAKE 1 TABLET BY MOUTH DAILY 90 tablet 1    losartan (COZAAR) 50 MG tablet Take 1 tablet by mouth Daily. 90 tablet 1    magnesium oxide (MAG-OX) 400 MG tablet Take 1 tablet by mouth Daily. 30 tablet 0    pantoprazole (PROTONIX) 40 MG EC tablet 90 tablets.       "potassium chloride 10 MEQ CR tablet Take 1 tablet by mouth Daily. 90 tablet 1    traZODone (DESYREL) 50 MG tablet       vitamin E 200 UNIT capsule Take 1 capsule by mouth Daily.       No current facility-administered medications on file prior to visit.       Objective   Resp 20   Ht 188 cm (74\")   Wt (!) 141 kg (310 lb)   BMI 39.80 kg/m²     Foot/Ankle Exam  Physical Exam  General:   Appearance: appears stated age and healthy and obesity    Orientation: AAOx3    Affect: appropriate    Gait: antalgic       VASCULAR       Right Foot Vascularity   Normal vascular exam    Dorsalis pedis:  2+  Posterior tibial:  2+  Skin Temperature: warm    Edema Gradin+  CFT:  < 3 seconds  Pedal Hair Growth:  Absent  Varicosities: moderate varicosities        Left Foot Vascularity   Normal vascular exam    Dorsalis pedis:  2+  Posterior tibial:  2+  Skin Temperature: warm    Edema Gradin+  CFT:  < 3 seconds  Pedal Hair Growth:  Absent  Varicosities: moderate varicosities        NEUROLOGIC      Right Foot Neurologic   Light touch sensation:  Normal  Hot/Cold sensation: normal    Achilles reflex:  2+      Left Foot Neurologic   Light touch sensation:  Normal  Hot/cold sensation: normal    Achilles reflex:  2+      MUSCULOSKELETAL       Right Foot Musculoskeletal   Ecchymosis:  None  Tenderness: great toe metatarsophalangeal joint, posterior heel, arch and dorsal foot    Hallux limitus: Yes        Left Foot Musculoskeletal   Ecchymosis:  None  Tenderness: posterior heel, arch and dorsal foot        MUSCLE STRENGTH      Right Foot Muscle Strength   Normal strength    Foot dorsiflexion:  5  Foot plantar flexion:  5  Foot inversion:  5  Foot eversion:  5      Left Foot Muscle Strength   Normal strength    Foot dorsiflexion:  5  Foot plantar flexion:  5  Foot inversion:  5  Foot eversion:  5      DERMATOLOGIC      Right Foot Dermatologic   Skin: skin intact        Left Foot Dermatologic   Skin: skin intact        TESTS      Right " Foot Tests   Anterior drawer: negative    Varus tilt: negative        Left Foot Tests   Anterior drawer: negative    Varus tilt: negative        Right Foot Additional Comments Prominent soft tissue and osseous rigidity involving the feet.  Moderate equinus contracture with knee extended and flexed.  Point discomfort involving the posterior aspect of both heels as well as the right first metatarsophalangeal joint with significant loss of range of motion and large bony prominence.     6/9/25: Splint attached to the right lower extremity.  Moderate swelling involving the ankle.  Range of motion and muscle strength intact to the digits.  No gross deformity present.  No signs of infection.      Results      Assessment & Plan   Diagnoses and all orders for this visit:    1. Right foot pain (Primary)    2. Closed displaced fracture of lateral malleolus of right fibula, initial encounter  -     Case Request; Standing  -     CBC (No Diff); Future  -     Basic Metabolic Panel; Future  -     XR Chest 2 View; Future  -     ECG 12 Lead; Future  -     ceFAZolin (ANCEF) 3,000 mg in sodium chloride 0.9 % 100 mL IVPB  -     Case Request    Other orders  -     Follow Anesthesia Guidelines / Protocol; Future  -     Follow Anesthesia Guidelines / Protocol; Standing  -     Verify / Perform Chlorhexidine Skin Prep; Standing  -     Provide NPO Instructions to Patient; Future  -     Chlorhexidine Skin Prep; Future  -     Place Sequential Compression Device; Standing  -     Maintain Sequential Compression Device; Standing      Assessment & Plan    Patient was seen in the office for right ankle injury.  Imaging was reviewed showing mildly displaced lateral malleolus fracture.  I reviewed the imaging, diagnosis, and treatment options with him at length.  We discussed operative versus nonoperative management.  Given his profession and overall activity, I do feel that the most appropriate option would be to proceed with operative intervention.   We discussed the procedure, risk, goals, and recovery with him at length.  He understands that he will require off weight.  Decreased overall activity after surgery.  We did discuss risks of wound healing complications, infection, and potential additional surgeries.  The patient understands and agrees and would like to proceed with surgical planning.Reviewed proper basic stretching and manual therapy exercises along with appropriate shoes and activity.  Discussed proper use and/or avoidance of OTC anti-inflammatories.  Patient is to call with any additional issues or concerns.  Greater than 45 minutes was spent before, during, and after evaluation for patient care.    The encounter note is created with the use of AI technology.  I do apologize if there are typos and/or confusion within the note.  Please feel free to contact me or my office with any questions or concerns.         Patient or patient representative verbalized consent for the use of Ambient Listening during the visit with  FREDERICK Ford DPM for chart documentation. 6/10/2025  07:43 EDT    FREDERICK Ford DPM

## 2025-06-11 ENCOUNTER — HOSPITAL ENCOUNTER (OUTPATIENT)
Dept: GENERAL RADIOLOGY | Facility: HOSPITAL | Age: 70
Discharge: HOME | End: 2025-06-11
Payer: MEDICARE

## 2025-06-11 ENCOUNTER — LAB (OUTPATIENT)
Dept: LAB | Facility: HOSPITAL | Age: 70
End: 2025-06-11
Payer: MEDICARE

## 2025-06-11 ENCOUNTER — HOSPITAL ENCOUNTER (OUTPATIENT)
Dept: CARDIOLOGY | Facility: HOSPITAL | Age: 70
Discharge: HOME | End: 2025-06-11
Payer: MEDICARE

## 2025-06-11 ENCOUNTER — ANESTHESIA EVENT (OUTPATIENT)
Dept: PERIOP | Facility: HOSPITAL | Age: 70
End: 2025-06-11
Payer: MEDICARE

## 2025-06-11 DIAGNOSIS — S82.61XA CLOSED DISPLACED FRACTURE OF LATERAL MALLEOLUS OF RIGHT FIBULA, INITIAL ENCOUNTER: ICD-10-CM

## 2025-06-11 LAB
ANION GAP SERPL CALCULATED.3IONS-SCNC: 9.5 MMOL/L (ref 5–15)
BUN SERPL-MCNC: 12.3 MG/DL (ref 8–23)
BUN/CREAT SERPL: 12.6 (ref 7–25)
CALCIUM SPEC-SCNC: 9 MG/DL (ref 8.6–10.5)
CHLORIDE SERPL-SCNC: 105 MMOL/L (ref 98–107)
CO2 SERPL-SCNC: 25.5 MMOL/L (ref 22–29)
CREAT SERPL-MCNC: 0.98 MG/DL (ref 0.76–1.27)
EGFRCR SERPLBLD CKD-EPI 2021: 83.5 ML/MIN/1.73
GLUCOSE SERPL-MCNC: 122 MG/DL (ref 65–99)
POTASSIUM SERPL-SCNC: 4.5 MMOL/L (ref 3.5–5.2)
QT INTERVAL: 410 MS
QTC INTERVAL: 422 MS
SODIUM SERPL-SCNC: 140 MMOL/L (ref 136–145)

## 2025-06-11 PROCEDURE — 80048 BASIC METABOLIC PNL TOTAL CA: CPT

## 2025-06-11 PROCEDURE — 93005 ELECTROCARDIOGRAM TRACING: CPT | Performed by: PODIATRIST

## 2025-06-11 PROCEDURE — 36415 COLL VENOUS BLD VENIPUNCTURE: CPT

## 2025-06-11 PROCEDURE — 71046 X-RAY EXAM CHEST 2 VIEWS: CPT

## 2025-06-12 NOTE — ANESTHESIA PREPROCEDURE EVALUATION
Anesthesia Evaluation     NPO Solid Status: > 8 hours  NPO Liquid Status: > 8 hours           Airway   Mallampati: I  TM distance: >3 FB  Neck ROM: full  No difficulty expected  Dental - normal exam         Pulmonary - normal exam   (+) COPD, asthma,  Cardiovascular - normal exam    (+) hypertension, valvular problems/murmurs, CHF       Neuro/Psych  GI/Hepatic/Renal/Endo    (+) obesity, morbid obesity, liver disease    Musculoskeletal     Abdominal  - normal exam    Bowel sounds: normal.   Substance History      OB/GYN          Other   arthritis,     ROS/Med Hx Other: TOTAL SHOULDER 12/2024 MCGRATH3 GRI  STRESS TEST 12/2023 NEG. NORMAL EF  TTE 2/2022 MOD AS PK SOPHIA 2.98, MEAN GRAD 19.8 MELANIE 1.38  THROMBOCYTOPENIA 128              Anesthesia Plan    ASA 3     general     intravenous induction     Anesthetic plan, risks, benefits, and alternatives have been provided, discussed and informed consent has been obtained with: patient.  Pre-procedure education provided  Plan discussed with CRNA.    CODE STATUS:

## 2025-06-13 ENCOUNTER — APPOINTMENT (OUTPATIENT)
Dept: GENERAL RADIOLOGY | Facility: HOSPITAL | Age: 70
End: 2025-06-13
Payer: MEDICARE

## 2025-06-13 ENCOUNTER — ANESTHESIA (OUTPATIENT)
Dept: PERIOP | Facility: HOSPITAL | Age: 70
End: 2025-06-13
Payer: MEDICARE

## 2025-06-13 ENCOUNTER — ANESTHESIA EVENT CONVERTED (OUTPATIENT)
Dept: ANESTHESIOLOGY | Facility: HOSPITAL | Age: 70
End: 2025-06-13
Payer: MEDICARE

## 2025-06-13 ENCOUNTER — HOSPITAL ENCOUNTER (OUTPATIENT)
Facility: HOSPITAL | Age: 70
Setting detail: HOSPITAL OUTPATIENT SURGERY
Discharge: HOME | End: 2025-06-13
Attending: PODIATRIST | Admitting: PODIATRIST
Payer: MEDICARE

## 2025-06-13 VITALS
TEMPERATURE: 97.6 F | DIASTOLIC BLOOD PRESSURE: 61 MMHG | BODY MASS INDEX: 40.3 KG/M2 | HEIGHT: 74 IN | WEIGHT: 314 LBS | SYSTOLIC BLOOD PRESSURE: 126 MMHG | RESPIRATION RATE: 14 BRPM | OXYGEN SATURATION: 96 % | HEART RATE: 59 BPM

## 2025-06-13 DIAGNOSIS — S82.61XA CLOSED DISPLACED FRACTURE OF LATERAL MALLEOLUS OF RIGHT FIBULA, INITIAL ENCOUNTER: ICD-10-CM

## 2025-06-13 LAB
BASOPHILS # BLD AUTO: 0.05 10*3/MM3 (ref 0–0.2)
BASOPHILS NFR BLD AUTO: 0.8 % (ref 0–1.5)
DEPRECATED RDW RBC AUTO: 50.1 FL (ref 37–54)
EOSINOPHIL # BLD AUTO: 0.28 10*3/MM3 (ref 0–0.4)
EOSINOPHIL NFR BLD AUTO: 4.4 % (ref 0.3–6.2)
ERYTHROCYTE [DISTWIDTH] IN BLOOD BY AUTOMATED COUNT: 14.1 % (ref 12.3–15.4)
HCT VFR BLD AUTO: 42 % (ref 37.5–51)
HGB BLD-MCNC: 13.9 G/DL (ref 13–17.7)
IMM GRANULOCYTES # BLD AUTO: 0.02 10*3/MM3 (ref 0–0.05)
IMM GRANULOCYTES NFR BLD AUTO: 0.3 % (ref 0–0.5)
LYMPHOCYTES # BLD AUTO: 1.06 10*3/MM3 (ref 0.7–3.1)
LYMPHOCYTES NFR BLD AUTO: 16.5 % (ref 19.6–45.3)
MCH RBC QN AUTO: 32 PG (ref 26.6–33)
MCHC RBC AUTO-ENTMCNC: 33.1 G/DL (ref 31.5–35.7)
MCV RBC AUTO: 96.6 FL (ref 79–97)
MONOCYTES # BLD AUTO: 0.89 10*3/MM3 (ref 0.1–0.9)
MONOCYTES NFR BLD AUTO: 13.9 % (ref 5–12)
NEUTROPHILS NFR BLD AUTO: 4.11 10*3/MM3 (ref 1.7–7)
NEUTROPHILS NFR BLD AUTO: 64.1 % (ref 42.7–76)
NEUTS VAC BLD QL SMEAR: NORMAL
NRBC BLD AUTO-RTO: 0 /100 WBC (ref 0–0.2)
PLAT MORPH BLD: NORMAL
PLATELET # BLD AUTO: 107 10*3/MM3 (ref 140–450)
PMV BLD AUTO: 10.8 FL (ref 6–12)
RBC # BLD AUTO: 4.35 10*6/MM3 (ref 4.14–5.8)
RBC MORPH BLD: NORMAL
WBC NRBC COR # BLD AUTO: 6.41 10*3/MM3 (ref 3.4–10.8)

## 2025-06-13 PROCEDURE — 25010000002 FENTANYL CITRATE (PF) 100 MCG/2ML SOLUTION: Performed by: NURSE ANESTHETIST, CERTIFIED REGISTERED

## 2025-06-13 PROCEDURE — 25010000002 BUPIVACAINE (PF) 0.5 % SOLUTION 10 ML VIAL: Performed by: PODIATRIST

## 2025-06-13 PROCEDURE — C1713 ANCHOR/SCREW BN/BN,TIS/BN: HCPCS | Performed by: PODIATRIST

## 2025-06-13 PROCEDURE — 25810000003 LACTATED RINGERS PER 1000 ML: Performed by: NURSE ANESTHETIST, CERTIFIED REGISTERED

## 2025-06-13 PROCEDURE — 25010000002 ONDANSETRON PER 1 MG: Performed by: NURSE ANESTHETIST, CERTIFIED REGISTERED

## 2025-06-13 PROCEDURE — 73600 X-RAY EXAM OF ANKLE: CPT

## 2025-06-13 PROCEDURE — 25010000002 LIDOCAINE PF 2% 2 % SOLUTION: Performed by: NURSE ANESTHETIST, CERTIFIED REGISTERED

## 2025-06-13 PROCEDURE — 25010000002 LIDOCAINE 1 % SOLUTION 20 ML VIAL: Performed by: PODIATRIST

## 2025-06-13 PROCEDURE — 25810000003 LACTATED RINGERS PER 1000 ML: Performed by: PODIATRIST

## 2025-06-13 PROCEDURE — 25010000002 DEXAMETHASONE PER 1 MG: Performed by: NURSE ANESTHETIST, CERTIFIED REGISTERED

## 2025-06-13 PROCEDURE — 25010000002 CEFAZOLIN 3 G RECONSTITUTED SOLUTION 1 EACH VIAL: Performed by: PODIATRIST

## 2025-06-13 PROCEDURE — 25010000002 SUGAMMADEX 200 MG/2ML SOLUTION: Performed by: NURSE ANESTHETIST, CERTIFIED REGISTERED

## 2025-06-13 PROCEDURE — 25010000002 ROPIVACAINE PER 1 MG: Performed by: ANESTHESIOLOGY

## 2025-06-13 PROCEDURE — 25010000002 PROPOFOL 10 MG/ML EMULSION: Performed by: NURSE ANESTHETIST, CERTIFIED REGISTERED

## 2025-06-13 PROCEDURE — 85025 COMPLETE CBC W/AUTO DIFF WBC: CPT | Performed by: PODIATRIST

## 2025-06-13 PROCEDURE — 85007 BL SMEAR W/DIFF WBC COUNT: CPT | Performed by: PODIATRIST

## 2025-06-13 PROCEDURE — 76000 FLUOROSCOPY <1 HR PHYS/QHP: CPT

## 2025-06-13 PROCEDURE — 25010000002 HYDROMORPHONE 1 MG/ML SOLUTION: Performed by: NURSE ANESTHETIST, CERTIFIED REGISTERED

## 2025-06-13 DEVICE — SCREW, NON-LOCKING, 3.5 X 16MM
Type: IMPLANTABLE DEVICE | Site: ANKLE | Status: FUNCTIONAL
Brand: MEDLINE UNITE

## 2025-06-13 DEVICE — PLATE, LATERAL FIBULA, SML, RIGHT
Type: IMPLANTABLE DEVICE | Site: ANKLE | Status: FUNCTIONAL
Brand: MEDLINE UNITE

## 2025-06-13 DEVICE — SCREW, LOCKING, 3.5 X 18MM
Type: IMPLANTABLE DEVICE | Site: ANKLE | Status: FUNCTIONAL
Brand: MEDLINE UNITE

## 2025-06-13 DEVICE — SCREW, NON-LOCKING, 3.5 X 12MM
Type: IMPLANTABLE DEVICE | Site: ANKLE | Status: FUNCTIONAL
Brand: MEDLINE UNITE

## 2025-06-13 DEVICE — SCREW, NON-LOCKING, 3.5 X 14MM
Type: IMPLANTABLE DEVICE | Site: ANKLE | Status: FUNCTIONAL
Brand: MEDLINE UNITE

## 2025-06-13 DEVICE — SCREW, LOCKING, 3.5 X 16MM
Type: IMPLANTABLE DEVICE | Site: ANKLE | Status: FUNCTIONAL
Brand: MEDLINE UNITE

## 2025-06-13 RX ORDER — PROPOFOL 10 MG/ML
VIAL (ML) INTRAVENOUS AS NEEDED
Status: DISCONTINUED | OUTPATIENT
Start: 2025-06-13 | End: 2025-06-13 | Stop reason: SURG

## 2025-06-13 RX ORDER — ROCURONIUM BROMIDE 10 MG/ML
INJECTION, SOLUTION INTRAVENOUS AS NEEDED
Status: DISCONTINUED | OUTPATIENT
Start: 2025-06-13 | End: 2025-06-13 | Stop reason: SURG

## 2025-06-13 RX ORDER — LIDOCAINE HYDROCHLORIDE 20 MG/ML
INJECTION, SOLUTION EPIDURAL; INFILTRATION; INTRACAUDAL; PERINEURAL AS NEEDED
Status: DISCONTINUED | OUTPATIENT
Start: 2025-06-13 | End: 2025-06-13 | Stop reason: SURG

## 2025-06-13 RX ORDER — PHENYLEPHRINE HCL IN 0.9% NACL 1 MG/10 ML
SYRINGE (ML) INTRAVENOUS AS NEEDED
Status: DISCONTINUED | OUTPATIENT
Start: 2025-06-13 | End: 2025-06-13 | Stop reason: SURG

## 2025-06-13 RX ORDER — OXYCODONE HYDROCHLORIDE 5 MG/1
10 TABLET ORAL EVERY 4 HOURS PRN
Status: DISCONTINUED | OUTPATIENT
Start: 2025-06-13 | End: 2025-06-13 | Stop reason: HOSPADM

## 2025-06-13 RX ORDER — ONDANSETRON 2 MG/ML
INJECTION INTRAMUSCULAR; INTRAVENOUS AS NEEDED
Status: DISCONTINUED | OUTPATIENT
Start: 2025-06-13 | End: 2025-06-13 | Stop reason: SURG

## 2025-06-13 RX ORDER — DEXAMETHASONE SODIUM PHOSPHATE 4 MG/ML
INJECTION, SOLUTION INTRA-ARTICULAR; INTRALESIONAL; INTRAMUSCULAR; INTRAVENOUS; SOFT TISSUE
Status: DISCONTINUED | OUTPATIENT
Start: 2025-06-13 | End: 2025-06-13 | Stop reason: SURG

## 2025-06-13 RX ORDER — OXYCODONE HYDROCHLORIDE 5 MG/1
5 TABLET ORAL ONCE AS NEEDED
Status: DISCONTINUED | OUTPATIENT
Start: 2025-06-13 | End: 2025-06-13 | Stop reason: HOSPADM

## 2025-06-13 RX ORDER — ROPIVACAINE HYDROCHLORIDE 5 MG/ML
INJECTION, SOLUTION EPIDURAL; INFILTRATION; PERINEURAL
Status: DISCONTINUED | OUTPATIENT
Start: 2025-06-13 | End: 2025-06-13 | Stop reason: SURG

## 2025-06-13 RX ORDER — IPRATROPIUM BROMIDE AND ALBUTEROL SULFATE 2.5; .5 MG/3ML; MG/3ML
3 SOLUTION RESPIRATORY (INHALATION) ONCE AS NEEDED
Status: DISCONTINUED | OUTPATIENT
Start: 2025-06-13 | End: 2025-06-13 | Stop reason: HOSPADM

## 2025-06-13 RX ORDER — DEXAMETHASONE SODIUM PHOSPHATE 4 MG/ML
INJECTION, SOLUTION INTRA-ARTICULAR; INTRALESIONAL; INTRAMUSCULAR; INTRAVENOUS; SOFT TISSUE AS NEEDED
Status: DISCONTINUED | OUTPATIENT
Start: 2025-06-13 | End: 2025-06-13 | Stop reason: SURG

## 2025-06-13 RX ORDER — DOXYCYCLINE HYCLATE 100 MG
100 TABLET ORAL 2 TIMES DAILY
Qty: 20 TABLET | Refills: 0 | Status: SHIPPED | OUTPATIENT
Start: 2025-06-13 | End: 2025-06-23

## 2025-06-13 RX ORDER — HYDROMORPHONE HYDROCHLORIDE 1 MG/ML
0.5 INJECTION, SOLUTION INTRAMUSCULAR; INTRAVENOUS; SUBCUTANEOUS
Status: DISCONTINUED | OUTPATIENT
Start: 2025-06-13 | End: 2025-06-13 | Stop reason: HOSPADM

## 2025-06-13 RX ORDER — SODIUM CHLORIDE, SODIUM LACTATE, POTASSIUM CHLORIDE, CALCIUM CHLORIDE 600; 310; 30; 20 MG/100ML; MG/100ML; MG/100ML; MG/100ML
20 INJECTION, SOLUTION INTRAVENOUS CONTINUOUS
Status: DISCONTINUED | OUTPATIENT
Start: 2025-06-13 | End: 2025-06-13 | Stop reason: HOSPADM

## 2025-06-13 RX ORDER — HYDRALAZINE HYDROCHLORIDE 20 MG/ML
5 INJECTION INTRAMUSCULAR; INTRAVENOUS
Status: DISCONTINUED | OUTPATIENT
Start: 2025-06-13 | End: 2025-06-13 | Stop reason: HOSPADM

## 2025-06-13 RX ORDER — ONDANSETRON 2 MG/ML
4 INJECTION INTRAMUSCULAR; INTRAVENOUS ONCE AS NEEDED
Status: DISCONTINUED | OUTPATIENT
Start: 2025-06-13 | End: 2025-06-13 | Stop reason: HOSPADM

## 2025-06-13 RX ORDER — HYDROCODONE BITARTRATE AND ACETAMINOPHEN 7.5; 325 MG/1; MG/1
1 TABLET ORAL EVERY 6 HOURS PRN
Qty: 28 TABLET | Refills: 0 | Status: SHIPPED | OUTPATIENT
Start: 2025-06-13

## 2025-06-13 RX ORDER — SODIUM CHLORIDE 0.9 % (FLUSH) 0.9 %
10 SYRINGE (ML) INJECTION AS NEEDED
Status: DISCONTINUED | OUTPATIENT
Start: 2025-06-13 | End: 2025-06-13 | Stop reason: HOSPADM

## 2025-06-13 RX ORDER — FENTANYL CITRATE 50 UG/ML
INJECTION, SOLUTION INTRAMUSCULAR; INTRAVENOUS AS NEEDED
Status: DISCONTINUED | OUTPATIENT
Start: 2025-06-13 | End: 2025-06-13 | Stop reason: SURG

## 2025-06-13 RX ORDER — NALOXONE HCL 0.4 MG/ML
0.4 VIAL (ML) INJECTION AS NEEDED
Status: DISCONTINUED | OUTPATIENT
Start: 2025-06-13 | End: 2025-06-13 | Stop reason: HOSPADM

## 2025-06-13 RX ORDER — SODIUM CHLORIDE, SODIUM LACTATE, POTASSIUM CHLORIDE, CALCIUM CHLORIDE 600; 310; 30; 20 MG/100ML; MG/100ML; MG/100ML; MG/100ML
INJECTION, SOLUTION INTRAVENOUS CONTINUOUS PRN
Status: DISCONTINUED | OUTPATIENT
Start: 2025-06-13 | End: 2025-06-13 | Stop reason: SURG

## 2025-06-13 RX ORDER — LABETALOL HYDROCHLORIDE 5 MG/ML
5 INJECTION, SOLUTION INTRAVENOUS
Status: DISCONTINUED | OUTPATIENT
Start: 2025-06-13 | End: 2025-06-13 | Stop reason: HOSPADM

## 2025-06-13 RX ORDER — EPHEDRINE SULFATE 5 MG/ML
5 INJECTION INTRAVENOUS ONCE AS NEEDED
Status: DISCONTINUED | OUTPATIENT
Start: 2025-06-13 | End: 2025-06-13 | Stop reason: HOSPADM

## 2025-06-13 RX ORDER — DIPHENHYDRAMINE HYDROCHLORIDE 50 MG/ML
12.5 INJECTION, SOLUTION INTRAMUSCULAR; INTRAVENOUS
Status: DISCONTINUED | OUTPATIENT
Start: 2025-06-13 | End: 2025-06-13 | Stop reason: HOSPADM

## 2025-06-13 RX ORDER — LIDOCAINE HYDROCHLORIDE 10 MG/ML
0.5 INJECTION, SOLUTION EPIDURAL; INFILTRATION; INTRACAUDAL; PERINEURAL ONCE AS NEEDED
Status: DISCONTINUED | OUTPATIENT
Start: 2025-06-13 | End: 2025-06-13 | Stop reason: HOSPADM

## 2025-06-13 RX ORDER — DIPHENHYDRAMINE HYDROCHLORIDE 50 MG/ML
12.5 INJECTION, SOLUTION INTRAMUSCULAR; INTRAVENOUS ONCE AS NEEDED
Status: DISCONTINUED | OUTPATIENT
Start: 2025-06-13 | End: 2025-06-13 | Stop reason: HOSPADM

## 2025-06-13 RX ADMIN — ROCURONIUM BROMIDE 30 MG: 10 INJECTION, SOLUTION INTRAVENOUS at 11:32

## 2025-06-13 RX ADMIN — Medication 100 MCG: at 11:22

## 2025-06-13 RX ADMIN — Medication 100 MCG: at 11:36

## 2025-06-13 RX ADMIN — PROPOFOL 50 MG: 10 INJECTION, EMULSION INTRAVENOUS at 10:43

## 2025-06-13 RX ADMIN — ROCURONIUM BROMIDE 50 MG: 10 INJECTION, SOLUTION INTRAVENOUS at 10:44

## 2025-06-13 RX ADMIN — FENTANYL CITRATE 100 MCG: 50 INJECTION, SOLUTION INTRAMUSCULAR; INTRAVENOUS at 10:41

## 2025-06-13 RX ADMIN — Medication 100 MCG: at 11:18

## 2025-06-13 RX ADMIN — Medication 100 MCG: at 10:44

## 2025-06-13 RX ADMIN — OXYCODONE 10 MG: 5 TABLET ORAL at 12:36

## 2025-06-13 RX ADMIN — ONDANSETRON 4 MG: 2 INJECTION, SOLUTION INTRAMUSCULAR; INTRAVENOUS at 11:11

## 2025-06-13 RX ADMIN — DEXMEDETOMIDINE HYDROCHLORIDE 8 MCG: 400 INJECTION INTRAVENOUS at 11:58

## 2025-06-13 RX ADMIN — DEXMEDETOMIDINE HYDROCHLORIDE 8 MCG: 400 INJECTION INTRAVENOUS at 11:32

## 2025-06-13 RX ADMIN — DEXAMETHASONE SODIUM PHOSPHATE 4 MG: 4 INJECTION, SOLUTION INTRA-ARTICULAR; INTRALESIONAL; INTRAMUSCULAR; INTRAVENOUS; SOFT TISSUE at 14:19

## 2025-06-13 RX ADMIN — HYDROMORPHONE HYDROCHLORIDE 1 MG: 1 INJECTION, SOLUTION INTRAMUSCULAR; INTRAVENOUS; SUBCUTANEOUS at 13:02

## 2025-06-13 RX ADMIN — PROPOFOL 100 MG: 10 INJECTION, EMULSION INTRAVENOUS at 10:44

## 2025-06-13 RX ADMIN — HYDROMORPHONE HYDROCHLORIDE 1 MG: 1 INJECTION, SOLUTION INTRAMUSCULAR; INTRAVENOUS; SUBCUTANEOUS at 12:02

## 2025-06-13 RX ADMIN — DEXAMETHASONE SODIUM PHOSPHATE 4 MG: 4 INJECTION, SOLUTION INTRAMUSCULAR; INTRAVENOUS at 11:11

## 2025-06-13 RX ADMIN — DEXMEDETOMIDINE HYDROCHLORIDE 4 MCG: 400 INJECTION INTRAVENOUS at 11:50

## 2025-06-13 RX ADMIN — HYDROMORPHONE HYDROCHLORIDE 1 MG: 1 INJECTION, SOLUTION INTRAMUSCULAR; INTRAVENOUS; SUBCUTANEOUS at 12:20

## 2025-06-13 RX ADMIN — SODIUM CHLORIDE, SODIUM LACTATE, POTASSIUM CHLORIDE, AND CALCIUM CHLORIDE: .6; .31; .03; .02 INJECTION, SOLUTION INTRAVENOUS at 10:40

## 2025-06-13 RX ADMIN — LIDOCAINE HYDROCHLORIDE 100 MG: 20 INJECTION, SOLUTION EPIDURAL; INFILTRATION; INTRACAUDAL; PERINEURAL at 10:43

## 2025-06-13 RX ADMIN — SUGAMMADEX 400 MG: 100 INJECTION, SOLUTION INTRAVENOUS at 11:58

## 2025-06-13 RX ADMIN — SODIUM CHLORIDE 3000 MG: 900 INJECTION INTRAVENOUS at 10:34

## 2025-06-13 RX ADMIN — SODIUM CHLORIDE, POTASSIUM CHLORIDE, SODIUM LACTATE AND CALCIUM CHLORIDE 20 ML/HR: 600; 310; 30; 20 INJECTION, SOLUTION INTRAVENOUS at 10:34

## 2025-06-13 RX ADMIN — ROPIVACAINE HYDROCHLORIDE 30 ML: 5 INJECTION EPIDURAL; INFILTRATION; PERINEURAL at 14:19

## 2025-06-13 NOTE — ANESTHESIA POSTPROCEDURE EVALUATION
Patient: Leonid Diehl    Procedure Summary       Date: 06/13/25 Room / Location: [facility] OR 07 / [facility] MAIN OR    Anesthesia Start: 1040 Anesthesia Stop: 1206    Procedure: Open reduction and internal fixation of lateral malleolus fracture (Right: Ankle) Diagnosis:       Closed displaced fracture of lateral malleolus of right fibula, initial encounter      (Closed displaced fracture of lateral malleolus of right fibula, initial encounter [S82.61XA])    Surgeons: FREDERICK Ford DPM Provider: Dimas Cowan MD    Anesthesia Type: general ASA Status: 3            Anesthesia Type: general    Vitals  Vitals Value Taken Time   /63 06/13/25 13:38   Temp 97.6 °F (36.4 °C) 06/13/25 13:38   Pulse 60 06/13/25 13:40   Resp 20 06/13/25 13:38   SpO2 71 % 06/13/25 13:40   Vitals shown include unfiled device data.        Post Anesthesia Care and Evaluation    Patient location during evaluation: PACU  Patient participation: complete - patient participated  Level of consciousness: awake  Pain scale: See nurse's notes for pain score.  Pain management: adequate    Airway patency: patent  Anesthetic complications: No anesthetic complications  PONV Status: none  Cardiovascular status: acceptable  Respiratory status: acceptable and spontaneous ventilation  Hydration status: acceptable    Comments: Patient seen and examined postoperatively; vital signs stable; SpO2 greater than or equal to 90%; cardiopulmonary status stable; nausea/vomiting adequately controlled; pain adequately controlled; no apparent anesthesia complications; patient discharged from anesthesia care when discharge criteria were met

## 2025-06-13 NOTE — ANESTHESIA PROCEDURE NOTES
Peripheral Block      Patient location during procedure: post-op  Start time: 6/13/2025 2:10 PM  Stop time: 6/13/2025 2:19 PM  Reason for block: at surgeon's request and post-op pain management  Performed by  Anesthesiologist: Dimas Cowan MD  Preanesthetic Checklist  Completed: patient identified, IV checked, site marked, risks and benefits discussed, surgical consent, monitors and equipment checked, pre-op evaluation and timeout performed  Prep:  Pt Position: supine  Sterile barriers:cap, gloves, sterile barriers, mask and partial drape  Prep: ChloraPrep  Patient monitoring: blood pressure monitoring, continuous pulse oximetry and EKG  Procedure    Sedation: no  Performed under: local infiltration  Guidance:ultrasound guided    ULTRASOUND INTERPRETATION.  Using ultrasound guidance a 20 G gauge needle was placed in close proximity to the sciatic nerve, at which point, under ultrasound guidance anesthetic was injected in the area of the nerve and spread of the anesthesia was seen on ultrasound in close proximity thereto.  There were no abnormalities seen on ultrasound; a digital image was taken; and the patient tolerated the procedure with no complications. Images:still images obtained, printed/placed on chart    Laterality:right  Block Type:sciatic and popliteal    Needle Type:echogenic  Resistance on Injection: none    Medications Used: dexamethasone (DECADRON) injection - Injection   4 mg - 6/13/2025 2:19:00 PM  ropivacaine (NAROPIN) 0.5 % injection - Perineural   30 mL - 6/13/2025 2:19:00 PM      Post Assessment  Injection Assessment: negative aspiration for heme, no paresthesia on injection and incremental injection  Patient Tolerance:comfortable throughout block  Complications:no  Additional Notes  PT AWAKE WITH MEANINGFUL CONVERSATION THROUGHOUT NO PAIN OR PARESTHESIA WITH NEEDLE PLACEMENT/INJECTION US VERIFICATION NEEDLE LOCATION MEDICATION DISBURSEMENT. US GUIDED X1 WITH EASE.  Performed by: Chapo  Dimas CALLE MD

## 2025-06-13 NOTE — ANESTHESIA PROCEDURE NOTES
Airway  Reason: elective    Date/Time: 6/13/2025 10:46 AM  Airway not difficult    General Information and Staff    Patient location during procedure: OR  Anesthesiologist: Dimas Cowan MD CRNA/CAA: Ganesh Rodgers CRNA  SRNA: Sharon Feliz SRNA  Indications and Patient Condition  Indications for airway management: airway protection    Preoxygenated: yes  MILS maintained throughout    Mask difficulty assessment: 2 - vent by mask + OA or adjuvant +/- NMBA    Final Airway Details    Final airway type: endotracheal airway      Successful airway: ETT  Cuffed: yes   Successful intubation technique: video laryngoscopy  Adjuncts used in placement: intubating stylet and OPA  Endotracheal tube insertion site: oral  Blade: Priest  Blade size: 4  ETT size (mm): 7.5  Cormack-Lehane Classification: grade I - full view of glottis  Placement verified by: chest auscultation and capnometry   Measured from: lips  ETT/EBT  to lips (cm): 23  Number of attempts at approach: 1  Assessment: lips, teeth, and gum same as pre-op and atraumatic intubation

## 2025-06-13 NOTE — OP NOTE
Operative Note   Foot and Ankle Surgery   Provider: Dr. Mane Ford   Location: [facility]      Procedure:  1.  Open reduction internal fixation of lateral malleolus fracture, right ankle  2.  Intraoperative syndesmotic stress testing, right    Pre-operative Diagnosis:   1.  Closed, mildly displaced lateral malleolus fracture, right ankle  2.  Morbid obesity  3.  Chronic venous hypertension without ulceration, right lower extremity    Post-operative Diagnosis: Same    Surgeon: Mane Ford    Assistant: Odilon Jay PGY3    Anesthesia: General    Implants: Medline distal contoured fibular plate with locking and nonlocking 3.5 millimeter screws    Findings: No unexpected findings    Specimen: None    Blood Loss: Less than 5cc    Complications: None    Post Op Plan: Discharge home.  Partial weightbearing as needed for transfers with cam boot and walker assist.  Follow-up with me in 2 weeks    Summary:    Patient is a 70-year-old male that has been seen in office after injury involving his right ankle.  Patient describes inversion injury.  Imaging was reviewed showing a mildly displaced lateral malleolus fracture.  I discussed the imaging, diagnosis, and treatment options with him at length.  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  Given the situation, I do feel that he would do best with operative management of the fracture to prevent further issues.  Patient does have moderate swelling involving the right lower extremity and has had previous postoperative infections with lower extremity surgery.  He understands that he will require decreased overall activity after surgery.    Procedure, risks, complications, and goals were discussed with the patient at bedside.  Risks include but are not limited to infection, complications from anesthesia (including death), chronic pain or numbness, hematoma/seroma, deep vein thrombosis, wound complications, and potential for additional surgical  procedures.  Patient understands and elects to proceed with surgery at this time. Informed consent was obtained before proceeding to the operating suite.  All questions were answered to the patient's satisfaction. No guarantees or assurances were given or implied.    Procedure:    Patient was brought to the operating room and placed on the operative table in the supine position.  Once adequate general anesthesia was administered, a pneumatic tourniquet was placed about the patient's right thigh.  The right lower extremity was scrubbed prepped and draped in usual sterile fashion.  A formal timeout was conducted prior skin incision.  The limb was elevated and exsanguinated and the pneumatic tourniquet was inflated to 300 mmHg.    Attention was then directed to the lateral aspect of the ankle.  A linear longitudinal incision was performed over the midline of the distal fibula.  Dissection was performed to the level of the fracture site preserving vital structures and cauterizing bleeders as needed.  The fracture hematoma was evacuated and irrigated with normal saline.  The fracture was manipulated, reduced, and maintained and then checked under fluoroscopy.  Once appropriate reduction was achieved, definitive fixation was performed with a distal contoured fibular plate which was secured to bone with locking and nonlocking 3.5 millimeter screws under fluoroscopic guidance.  Final images were performed and live fluoroscopy was performed with syndesmotic stress testing showing no medial gapping or instability about the syndesmosis.    The lateral incision site was irrigated with copious amounts of normal saline.  The deep structures are closed with a 2-0 Vicryl in a simple interrupted manner.  The subcutaneous tissues were closed with 3-0 Vicryl and the skin was repaired with staples.  The incision site was dressed with Xeroform and sterile compressive dressings.  The tourniquet was released and a prompt hyperemic  response was noted to all digits of the right lower extremity.  Patient tolerated the procedure and anesthesia well.  He was transferred from the operating room to the recovery room with vital signs stable and neurovascular status unchanged to the right lower extremity.      Dr. Mane Ford DPM  [facility] - Podiatry/Orthopedics  [phone]    Note is dictated utilizing voice recognition software. Unfortunately this leads to occasional typographical errors. I apologize in advance if the situation occurs. If questions occur please do not hesitate to call our office.

## 2025-06-23 ENCOUNTER — TELEPHONE (OUTPATIENT)
Dept: FAMILY MEDICINE CLINIC | Facility: CLINIC | Age: 70
End: 2025-06-23

## 2025-06-23 ENCOUNTER — TELEPHONE (OUTPATIENT)
Dept: FAMILY MEDICINE CLINIC | Facility: CLINIC | Age: 70
End: 2025-06-23
Payer: MEDICARE

## 2025-06-23 NOTE — TELEPHONE ENCOUNTER
CLARKE called, he decided he does NOT want to do the [employer]. So disregard paperwork sent     Spoke with [facility]- Called and informed declining service.

## 2025-06-23 NOTE — TELEPHONE ENCOUNTER
Caller: ANGELA [email or web address]  [phone]      Who are you requesting to speak with (clinical staff, provider,  specific staff member): CLINICAL        What was the call regarding: PAPERWORK FAXED TO PROVIDER. PLEASE VALIDATE DIAGNOSES ON PAGE 2 AND 3. MAKE DECISION IF TEST IS NECESSARY FOR PATIENT AND FAX BACK

## 2025-06-26 ENCOUNTER — OFFICE VISIT (OUTPATIENT)
Age: 70
End: 2025-06-26
Payer: MEDICARE

## 2025-06-26 VITALS — WEIGHT: 314 LBS | BODY MASS INDEX: 40.3 KG/M2 | HEIGHT: 74 IN | RESPIRATION RATE: 20 BRPM

## 2025-06-26 DIAGNOSIS — M79.671 RIGHT FOOT PAIN: Primary | ICD-10-CM

## 2025-06-26 DIAGNOSIS — S82.61XD CLOSED DISPLACED FRACTURE OF LATERAL MALLEOLUS OF RIGHT FIBULA WITH ROUTINE HEALING, SUBSEQUENT ENCOUNTER: ICD-10-CM

## 2025-06-26 DIAGNOSIS — M20.21 HALLUX RIGIDUS, RIGHT FOOT: ICD-10-CM

## 2025-06-26 PROCEDURE — 99024 POSTOP FOLLOW-UP VISIT: CPT | Performed by: PODIATRIST

## 2025-06-27 NOTE — PROGRESS NOTES
"Chief Complaint   Patient presents with     Urgent Care     Sinus Problem     Sick since last week mild but became worse on Sunday.      SUBJECTIVE:  Esmer Smith is a 51 year old female who presents to the clinic today with sinus pain pressure runny stuffy nose headache mild postnasal drip cough for about a week worsening on Sunday.  She is prone to sinus infections and double ear infections.  She is a teacher so gets sick often.  Had negative COVID testing.    Past Medical History:   Diagnosis Date     Hypertension      acetaminophen (TYLENOL) 325 MG tablet, Take 650 mg by mouth as needed  amLODIPine (NORVASC) 10 MG tablet, Take 1 tablet (10 mg) by mouth daily  cholecalciferol 50 MCG (2000 UT) tablet, Take 1 tablet by mouth daily  hydrochlorothiazide (HYDRODIURIL) 12.5 MG tablet, Take 1 tablet (12.5 mg) by mouth daily  KLOR-CON 20 MEQ CR tablet, Take 1 tablet (20 mEq) by mouth daily  cyclobenzaprine (FLEXERIL) 5 MG tablet, Take 1-2 tablets (5-10 mg) by mouth 3 times daily as needed for muscle spasms  hydrochlorothiazide (MICROZIDE) 12.5 MG capsule, Take 1 capsule (12.5 mg) by mouth daily  hydrOXYzine (ATARAX) 10 MG tablet, Take 10-20 mg by mouth    No current facility-administered medications on file prior to visit.    Social History     Tobacco Use     Smoking status: Never     Smokeless tobacco: Never   Substance Use Topics     Alcohol use: Not on file     Allergies   Allergen Reactions     Chocolate Unknown     itching       Review of Systems   All systems negative except for those listed above in HPI.    EXAM:   /70   Pulse 86   Temp 98.7  F (37.1  C) (Temporal)   Resp 16   Ht 1.753 m (5' 9\")   Wt 104.3 kg (230 lb)   SpO2 97%   BMI 33.97 kg/m      Physical Exam  Vitals reviewed.   Constitutional:       General: She is not in acute distress.     Appearance: Normal appearance. She is not ill-appearing, toxic-appearing or diaphoretic.   HENT:      Head: Normocephalic and atraumatic.      Ears:    " 06/26/2025  Foot and Ankle Surgery - Established Patient/Follow-up  Provider: Dr. Mane Ford DPM  Location: [facility]    Subjective:  Leonid Diehl is a 70 y.o. male.     Chief Complaint   Patient presents with    Right Ankle - Post-op     6/13/25 EZEQUIEL  Open reduction internal fixation of lateral malleolus fracture, right ankle    Intraoperative syndesmotic stress testing, right      Right Foot - Pain, Initial Evaluation    Post-op     PCP: Kerri Cruz APRN  Last PCP Visit: 5/8/25         History of Present Illness  The patient presents for evaluation of his ankle.    He reports no pain in his ankle but experiences discomfort in the foot. He has been residing in an apartment within a barn, necessitating walking from his vehicle through a kari environment. He has completed his course of antibiotics this morning. He is seeking advice on pain management strategies. He has previously tolerated ibuprofen well, although he does not currently use it. His medical history includes long-term use of meloxicam.    MEDICATIONS  Past: Meloxicam.      Allergies   Allergen Reactions    Lisinopril Cough       Current Outpatient Medications on File Prior to Visit   Medication Sig Dispense Refill    albuterol (ACCUNEB) 1.25 MG/3ML nebulizer solution Take 3 mL by nebulization Every 6 (Six) Hours As Needed for Wheezing or Shortness of Air. 100 each 2    albuterol sulfate  (90 Base) MCG/ACT inhaler INHALE 2 PUFFS BY MOUTH EVERY 6 HOURS AS NEEDED FOR SHORTNESS OF BREATH OR WHEEZING 18 g 2    allopurinol (ZYLOPRIM) 300 MG tablet Take 1 tablet by mouth Daily. 90 tablet 1    Budeson-Glycopyrrol-Formoterol (Breztri Aerosphere) 160-9-4.8 MCG/ACT aerosol inhaler Inhale 2 puffs 2 (Two) Times a Day.      carvedilol (COREG) 3.125 MG tablet TAKE 1 TABLET BY MOUTH DAILY 90 tablet 1    Cetirizine HCl (ZYRTEC PO) Take 10 mg by mouth Daily.      furosemide (LASIX) 20 MG tablet TAKE 1 TABLET BY MOUTH DAILY 90 tablet 1       Comments: Bilateral TMs with mucoid bulge and erythematous borders.     Nose: Congestion and rhinorrhea present.      Mouth/Throat:      Mouth: Mucous membranes are moist.      Pharynx: Oropharynx is clear. No oropharyngeal exudate or posterior oropharyngeal erythema.   Cardiovascular:      Rate and Rhythm: Normal rate.      Pulses: Normal pulses.   Pulmonary:      Effort: Respiratory distress present.      Breath sounds: No stridor. No wheezing, rhonchi or rales.   Chest:      Chest wall: No tenderness.   Musculoskeletal:         General: Normal range of motion.   Lymphadenopathy:      Cervical: Cervical adenopathy present.   Skin:     General: Skin is warm and dry.      Findings: No rash.   Neurological:      General: No focal deficit present.      Mental Status: She is alert and oriented to person, place, and time.   Psychiatric:         Mood and Affect: Mood normal.         Behavior: Behavior normal.       ASSESSMENT:    ICD-10-CM    1. Acute non-recurrent pansinusitis  J01.40 amoxicillin-clavulanate (AUGMENTIN) 875-125 MG tablet      2. Non-recurrent acute suppurative otitis media of both ears without spontaneous rupture of tympanic membranes  H66.003         PLAN:    Augmentin for sinusitis and double ear infection  Flonase (fluticasone) 2 sprays in each nostril daily until symptoms resolve, then continue 1 spray in each nostril for at least 5 more days.  Take Tylenol or an NSAID such as ibuprofen or naproxen as needed for pain.  May use netti pot with bottled or distilled water and saline packets to flush sinuses.  Afrin (oxymetazoline) nasal spray twice daily for 3 days. Stop after 3 days.  Mucinex (guiafenesin) thins mucus and may help it to loosen more quickly  Saline drops or nasal sprays may loosen mucus.  Sit in the bathroom with the door closed and hot shower running to loosen mucus.  Contact primary care clinic if you do not have any relief from your symptoms after 10 days.  Present to emergency  "HYDROcodone-acetaminophen (NORCO) 7.5-325 MG per tablet Take 1 tablet by mouth Every 6 (Six) Hours As Needed for Moderate Pain (Pain). 28 tablet 0    losartan (COZAAR) 50 MG tablet Take 1 tablet by mouth Daily. 90 tablet 1    magnesium oxide (MAG-OX) 400 MG tablet Take 1 tablet by mouth Daily. 30 tablet 0    pantoprazole (PROTONIX) 40 MG EC tablet 90 tablets.      potassium chloride 10 MEQ CR tablet Take 1 tablet by mouth Daily. 90 tablet 1    traZODone (DESYREL) 50 MG tablet       vitamin E 200 UNIT capsule Take 1 capsule by mouth Daily.       No current facility-administered medications on file prior to visit.       Objective   Resp 20   Ht 188 cm (74\")   Wt (!) 142 kg (314 lb)   BMI 40.32 kg/m²     Foot/Ankle Exam  Physical Exam  General:   Appearance: appears stated age and healthy and obesity    Orientation: AAOx3    Affect: appropriate    Gait: antalgic       VASCULAR       Right Foot Vascularity   Normal vascular exam    Dorsalis pedis:  2+  Posterior tibial:  2+  Skin Temperature: warm    Edema Gradin+  CFT:  < 3 seconds  Pedal Hair Growth:  Absent  Varicosities: moderate varicosities        Left Foot Vascularity   Normal vascular exam    Dorsalis pedis:  2+  Posterior tibial:  2+  Skin Temperature: warm    Edema Gradin+  CFT:  < 3 seconds  Pedal Hair Growth:  Absent  Varicosities: moderate varicosities        NEUROLOGIC      Right Foot Neurologic   Light touch sensation:  Normal  Hot/Cold sensation: normal    Achilles reflex:  2+      Left Foot Neurologic   Light touch sensation:  Normal  Hot/cold sensation: normal    Achilles reflex:  2+      MUSCULOSKELETAL       Right Foot Musculoskeletal   Ecchymosis:  None  Tenderness: great toe metatarsophalangeal joint, posterior heel, arch and dorsal foot    Hallux limitus: Yes        Left Foot Musculoskeletal   Ecchymosis:  None  Tenderness: posterior heel, arch and dorsal foot        MUSCLE STRENGTH      Right Foot Muscle Strength   Normal strength  "   Foot dorsiflexion:  5  Foot plantar flexion:  5  Foot inversion:  5  Foot eversion:  5      Left Foot Muscle Strength   Normal strength    Foot dorsiflexion:  5  Foot plantar flexion:  5  Foot inversion:  5  Foot eversion:  5      DERMATOLOGIC      Right Foot Dermatologic   Skin: skin intact        Left Foot Dermatologic   Skin: skin intact        TESTS      Right Foot Tests   Anterior drawer: negative    Varus tilt: negative        Left Foot Tests   Anterior drawer: negative    Varus tilt: negative        Right Foot Additional Comments Prominent soft tissue and osseous rigidity involving the feet.  Moderate equinus contracture with knee extended and flexed.  Point discomfort involving the posterior aspect of both heels as well as the right first metatarsophalangeal joint with significant loss of range of motion and large bony prominence.     6/9/25: Splint attached to the right lower extremity.  Moderate swelling involving the ankle.  Range of motion and muscle strength intact to the digits.  No gross deformity present.  No signs of infection.    6/26/25: Incision site is dry and stable with intact staples.  No evidence of dehiscence or infection.  Continued swelling involving the right lower extremity secondary to venous stasis.  Discomfort with palpation involving the right forefoot.  Soft tissue rigidity present to the foot.      Results      Assessment & Plan   Diagnoses and all orders for this visit:    1. Right foot pain (Primary)  -     XR Foot 3+ View Right    2. Closed displaced fracture of lateral malleolus of right fibula with routine healing, subsequent encounter    3. Hallux rigidus, right foot      Assessment & Plan    The ankle is demonstrating satisfactory healing progress. The presence of a plate with screws was noted, which is maintaining the alignment of the fracture. It was explained that experiencing burning, numbness, tingling, cramping, or spasms in the foot and lower extremity is not  room for significantly increasing pain, persistent high fever >102F, swelling/redness around your eyes, changes in your vision or ability to move your eyes, altered mental status or a severe headache.    Follow up with primary care provider with any problems, questions or concerns or if symptoms worsen or fail to improve. Patient agreed to plan and verbalized understanding.    Keiry Caruso, CARMEN-BC  [facility]   uncommon, particularly due to the boot and decreased activity. He was advised to continue wearing the boot during any walking or physical activity. He was also instructed to wear a compression stocking daily. He was cautioned against using pain as a guide for activity levels, as this could potentially lead to further damage. The staples will be removed today, and he is permitted to shower and bathe. For pain management, he was recommended to take ibuprofen 800 mg up to twice daily.    He does complain of discomfort involving the right foot.  I anticipate this is due to compensation and lack of motion in the boot.  Imaging was obtained showing no obvious fractures or dislocations but he does have significant degenerative changes involving the first metatarsal phalangeal joint region.  We will discuss this issue further once his ankle heals.    Follow-up  The patient will follow up in 2 weeks, at which time another x-ray of the ankle will be performed.               Patient or patient representative verbalized consent for the use of Ambient Listening during the visit with  FREDERICK Ford DPM for chart documentation. 6/27/2025  08:38 EDT    FREDERICK Ford DPM

## 2025-07-09 ENCOUNTER — OFFICE VISIT (OUTPATIENT)
Age: 70
End: 2025-07-09
Payer: MEDICARE

## 2025-07-09 VITALS — HEIGHT: 74 IN | OXYGEN SATURATION: 96 % | BODY MASS INDEX: 40.3 KG/M2 | HEART RATE: 61 BPM | WEIGHT: 314 LBS

## 2025-07-09 DIAGNOSIS — I87.301 CHRONIC VENOUS HYPERTENSION, RIGHT: ICD-10-CM

## 2025-07-09 DIAGNOSIS — M20.21 HALLUX RIGIDUS, RIGHT FOOT: ICD-10-CM

## 2025-07-09 DIAGNOSIS — S82.61XD CLOSED DISPLACED FRACTURE OF LATERAL MALLEOLUS OF RIGHT FIBULA WITH ROUTINE HEALING, SUBSEQUENT ENCOUNTER: ICD-10-CM

## 2025-07-09 DIAGNOSIS — T81.31XA DISRUPTION OF EXTERNAL SURGICAL WOUND, INITIAL ENCOUNTER: Primary | ICD-10-CM

## 2025-07-09 DIAGNOSIS — E66.01 MORBID OBESITY WITH BMI OF 40.0-44.9, ADULT: ICD-10-CM

## 2025-07-09 PROCEDURE — 99024 POSTOP FOLLOW-UP VISIT: CPT | Performed by: PODIATRIST

## 2025-07-10 RX ORDER — DOXYCYCLINE 100 MG/1
100 CAPSULE ORAL 2 TIMES DAILY
Qty: 20 CAPSULE | Refills: 0 | Status: SHIPPED | OUTPATIENT
Start: 2025-07-10

## 2025-07-10 NOTE — PROGRESS NOTES
07/09/2025  Foot and Ankle Surgery - Established Patient/Follow-up  Provider: Dr. Mane Ford DPM  Location: [facility]    Subjective:  Leonid Diehl is a 70 y.o. male.     Chief Complaint   Patient presents with    Right Ankle - Post-op     6/13/25 EZEQUIEL  Open reduction internal fixation of lateral malleolus fracture, right ankle    Intraoperative syndesmotic stress testing, right         History of Present Illness  The patient presents for evaluation of ankle pain.    He reports persistent pain in his ankle, which he has been managing with a boot. Despite this, he continues to experience swelling and redness in the area. He expresses concern about a potential infection, given his history of cellulitis. He has been applying ice to the affected area nightly, which he finds beneficial for sleep.      Allergies   Allergen Reactions    Lisinopril Cough       Current Outpatient Medications on File Prior to Visit   Medication Sig Dispense Refill    albuterol (ACCUNEB) 1.25 MG/3ML nebulizer solution Take 3 mL by nebulization Every 6 (Six) Hours As Needed for Wheezing or Shortness of Air. 100 each 2    albuterol sulfate  (90 Base) MCG/ACT inhaler INHALE 2 PUFFS BY MOUTH EVERY 6 HOURS AS NEEDED FOR SHORTNESS OF BREATH OR WHEEZING 18 g 2    allopurinol (ZYLOPRIM) 300 MG tablet Take 1 tablet by mouth Daily. 90 tablet 1    Budeson-Glycopyrrol-Formoterol (Breztri Aerosphere) 160-9-4.8 MCG/ACT aerosol inhaler Inhale 2 puffs 2 (Two) Times a Day.      carvedilol (COREG) 3.125 MG tablet TAKE 1 TABLET BY MOUTH DAILY 90 tablet 1    Cetirizine HCl (ZYRTEC PO) Take 10 mg by mouth Daily.      furosemide (LASIX) 20 MG tablet TAKE 1 TABLET BY MOUTH DAILY 90 tablet 1    HYDROcodone-acetaminophen (NORCO) 7.5-325 MG per tablet Take 1 tablet by mouth Every 6 (Six) Hours As Needed for Moderate Pain (Pain). 28 tablet 0    losartan (COZAAR) 50 MG tablet Take 1 tablet by mouth Daily. 90 tablet 1    magnesium oxide (MAG-OX)  "400 MG tablet Take 1 tablet by mouth Daily. 30 tablet 0    pantoprazole (PROTONIX) 40 MG EC tablet 90 tablets.      potassium chloride 10 MEQ CR tablet Take 1 tablet by mouth Daily. 90 tablet 1    traZODone (DESYREL) 50 MG tablet       vitamin E 200 UNIT capsule Take 1 capsule by mouth Daily.       No current facility-administered medications on file prior to visit.       Objective   Pulse 61   Ht 188 cm (74\")   Wt (!) 142 kg (314 lb)   SpO2 96%   BMI 40.32 kg/m²     Foot/Ankle Exam  Physical Exam  General:   Appearance: appears stated age and healthy and obesity    Orientation: AAOx3    Affect: appropriate    Gait: antalgic       VASCULAR       Right Foot Vascularity   Normal vascular exam    Dorsalis pedis:  2+  Posterior tibial:  2+  Skin Temperature: warm    Edema Gradin+  CFT:  < 3 seconds  Pedal Hair Growth:  Absent  Varicosities: moderate varicosities        Left Foot Vascularity   Normal vascular exam    Dorsalis pedis:  2+  Posterior tibial:  2+  Skin Temperature: warm    Edema Gradin+  CFT:  < 3 seconds  Pedal Hair Growth:  Absent  Varicosities: moderate varicosities        NEUROLOGIC      Right Foot Neurologic   Light touch sensation:  Normal  Hot/Cold sensation: normal    Achilles reflex:  2+      Left Foot Neurologic   Light touch sensation:  Normal  Hot/cold sensation: normal    Achilles reflex:  2+      MUSCULOSKELETAL       Right Foot Musculoskeletal   Ecchymosis:  None  Tenderness: great toe metatarsophalangeal joint, posterior heel, arch and dorsal foot    Hallux limitus: Yes        Left Foot Musculoskeletal   Ecchymosis:  None  Tenderness: posterior heel, arch and dorsal foot        MUSCLE STRENGTH      Right Foot Muscle Strength   Normal strength    Foot dorsiflexion:  5  Foot plantar flexion:  5  Foot inversion:  5  Foot eversion:  5      Left Foot Muscle Strength   Normal strength    Foot dorsiflexion:  5  Foot plantar flexion:  5  Foot inversion:  5  Foot eversion:  5      " DERMATOLOGIC      Right Foot Dermatologic   Skin: skin intact        Left Foot Dermatologic   Skin: skin intact        TESTS      Right Foot Tests   Anterior drawer: negative    Varus tilt: negative        Left Foot Tests   Anterior drawer: negative    Varus tilt: negative        Right Foot Additional Comments Prominent soft tissue and osseous rigidity involving the feet.  Moderate equinus contracture with knee extended and flexed.  Point discomfort involving the posterior aspect of both heels as well as the right first metatarsophalangeal joint with significant loss of range of motion and large bony prominence.     6/9/25: Splint attached to the right lower extremity.  Moderate swelling involving the ankle.  Range of motion and muscle strength intact to the digits.  No gross deformity present.  No signs of infection.     6/26/25: Incision site is dry and stable with intact staples.  No evidence of dehiscence or infection.  Continued swelling involving the right lower extremity secondary to venous stasis.  Discomfort with palpation involving the right forefoot.  Soft tissue rigidity present to the foot.    7/9/25: Mild erythema involving the lateral ankle region.  Wound is mostly intact with mild maceration to areas of the incision secondary to prominent venous stasis involving the right lower leg.  No significant pallor or concerns of mami cellulitis.  Findings are secondary to increased activity and weightbearing.  No significant discomfort with palpation.  No progressive deformity or instability      Results      Assessment & Plan   Diagnoses and all orders for this visit:    1. Disruption of external surgical wound, initial encounter (Primary)    2. Closed displaced fracture of lateral malleolus of right fibula with routine healing, subsequent encounter  -     XR Ankle 3+ View Right    3. Hallux rigidus, right foot    4. Chronic venous hypertension, right    5. Morbid obesity with BMI of 40.0-44.9,  adult    Other orders  -     doxycycline (VIBRAMYCIN) 100 MG capsule; Take 1 capsule by mouth 2 (Two) Times a Day.  Dispense: 20 capsule; Refill: 0      Assessment & Plan    The ankle pain is likely due to inflammation from increased activity levels, rather than an infection. The incision site does not appear to be a cause for concern. He was advised to reduce his activity level and elevate the affected area to alleviate swelling and redness. A prescription for an antibiotic will be provided as a precautionary measure. He should continue wearing the boot when mobile and apply a Band-Aid to the incision site. If there are any changes in his condition, he should contact the office immediately.    Follow-up  A follow-up appointment is scheduled for 4 weeks with repeat imaging             Patient or patient representative verbalized consent for the use of Ambient Listening during the visit with  FREDERICK Ford DPM for chart documentation. 7/10/2025  07:30 EDT    FREDERICK Ford DPM

## 2025-07-17 RX ORDER — LOSARTAN POTASSIUM 50 MG/1
50 TABLET ORAL
Qty: 90 TABLET | Refills: 1 | Status: SHIPPED | OUTPATIENT
Start: 2025-07-17

## 2025-07-17 RX ORDER — POTASSIUM CHLORIDE 750 MG/1
10 TABLET, EXTENDED RELEASE ORAL DAILY
Qty: 90 TABLET | Refills: 1 | Status: SHIPPED | OUTPATIENT
Start: 2025-07-17

## 2025-07-18 DIAGNOSIS — Z13.220 SCREENING FOR HYPERLIPIDEMIA: ICD-10-CM

## 2025-07-18 DIAGNOSIS — Z12.5 SCREENING PSA (PROSTATE SPECIFIC ANTIGEN): ICD-10-CM

## 2025-07-18 DIAGNOSIS — I10 ESSENTIAL HYPERTENSION: Primary | ICD-10-CM

## 2025-07-22 ENCOUNTER — LAB (OUTPATIENT)
Dept: FAMILY MEDICINE CLINIC | Facility: CLINIC | Age: 70
End: 2025-07-22
Payer: MEDICARE

## 2025-07-22 PROCEDURE — 84443 ASSAY THYROID STIM HORMONE: CPT | Performed by: NURSE PRACTITIONER

## 2025-07-22 PROCEDURE — 80061 LIPID PANEL: CPT | Performed by: NURSE PRACTITIONER

## 2025-07-22 PROCEDURE — 80053 COMPREHEN METABOLIC PANEL: CPT | Performed by: NURSE PRACTITIONER

## 2025-07-22 PROCEDURE — 36415 COLL VENOUS BLD VENIPUNCTURE: CPT | Performed by: NURSE PRACTITIONER

## 2025-07-22 PROCEDURE — 85027 COMPLETE CBC AUTOMATED: CPT | Performed by: NURSE PRACTITIONER

## 2025-07-22 PROCEDURE — G0103 PSA SCREENING: HCPCS | Performed by: NURSE PRACTITIONER

## 2025-07-23 LAB
ALBUMIN SERPL-MCNC: 3.6 G/DL (ref 3.5–5.2)
ALBUMIN/GLOB SERPL: 1.1 G/DL
ALP SERPL-CCNC: 87 U/L (ref 39–117)
ALT SERPL W P-5'-P-CCNC: 12 U/L (ref 1–41)
ANION GAP SERPL CALCULATED.3IONS-SCNC: 11 MMOL/L (ref 5–15)
AST SERPL-CCNC: 23 U/L (ref 1–40)
BILIRUB SERPL-MCNC: 0.7 MG/DL (ref 0–1.2)
BUN SERPL-MCNC: 11 MG/DL (ref 8–23)
BUN/CREAT SERPL: 11.3 (ref 7–25)
CALCIUM SPEC-SCNC: 8.8 MG/DL (ref 8.6–10.5)
CHLORIDE SERPL-SCNC: 108 MMOL/L (ref 98–107)
CHOLEST SERPL-MCNC: 143 MG/DL (ref 0–200)
CO2 SERPL-SCNC: 21 MMOL/L (ref 22–29)
CREAT SERPL-MCNC: 0.97 MG/DL (ref 0.76–1.27)
DEPRECATED RDW RBC AUTO: 43.2 FL (ref 37–54)
EGFRCR SERPLBLD CKD-EPI 2021: 84.5 ML/MIN/1.73
ERYTHROCYTE [DISTWIDTH] IN BLOOD BY AUTOMATED COUNT: 12.7 % (ref 12.3–15.4)
GLOBULIN UR ELPH-MCNC: 3.4 GM/DL
GLUCOSE SERPL-MCNC: 101 MG/DL (ref 65–99)
HCT VFR BLD AUTO: 39.7 % (ref 37.5–51)
HDLC SERPL-MCNC: 54 MG/DL (ref 40–60)
HGB BLD-MCNC: 13.6 G/DL (ref 13–17.7)
LDLC SERPL CALC-MCNC: 74 MG/DL (ref 0–100)
LDLC/HDLC SERPL: 1.36 {RATIO}
MCH RBC QN AUTO: 32.5 PG (ref 26.6–33)
MCHC RBC AUTO-ENTMCNC: 34.3 G/DL (ref 31.5–35.7)
MCV RBC AUTO: 94.7 FL (ref 79–97)
PLATELET # BLD AUTO: 101 10*3/MM3 (ref 140–450)
PMV BLD AUTO: 11.6 FL (ref 6–12)
POTASSIUM SERPL-SCNC: 4.1 MMOL/L (ref 3.5–5.2)
PROT SERPL-MCNC: 7 G/DL (ref 6–8.5)
PSA SERPL-MCNC: 0.14 NG/ML (ref 0–4)
RBC # BLD AUTO: 4.19 10*6/MM3 (ref 4.14–5.8)
SODIUM SERPL-SCNC: 140 MMOL/L (ref 136–145)
TRIGL SERPL-MCNC: 78 MG/DL (ref 0–150)
TSH SERPL DL<=0.05 MIU/L-ACNC: 3.02 UIU/ML (ref 0.27–4.2)
VLDLC SERPL-MCNC: 15 MG/DL (ref 5–40)
WBC NRBC COR # BLD AUTO: 5.65 10*3/MM3 (ref 3.4–10.8)

## 2025-07-26 DIAGNOSIS — J44.9 CHRONIC OBSTRUCTIVE PULMONARY DISEASE, UNSPECIFIED COPD TYPE: ICD-10-CM

## 2025-07-28 RX ORDER — ALBUTEROL SULFATE 90 UG/1
INHALANT RESPIRATORY (INHALATION)
Qty: 18 G | Refills: 2 | Status: SHIPPED | OUTPATIENT
Start: 2025-07-28

## 2025-07-30 ENCOUNTER — OFFICE VISIT (OUTPATIENT)
Dept: FAMILY MEDICINE CLINIC | Facility: CLINIC | Age: 70
End: 2025-07-30
Payer: MEDICARE

## 2025-07-30 VITALS
HEIGHT: 74 IN | DIASTOLIC BLOOD PRESSURE: 81 MMHG | SYSTOLIC BLOOD PRESSURE: 134 MMHG | RESPIRATION RATE: 18 BRPM | OXYGEN SATURATION: 96 % | WEIGHT: 315 LBS | BODY MASS INDEX: 40.43 KG/M2 | HEART RATE: 58 BPM

## 2025-07-30 DIAGNOSIS — G47.09 OTHER INSOMNIA: ICD-10-CM

## 2025-07-30 DIAGNOSIS — D69.6 THROMBOCYTOPENIA: ICD-10-CM

## 2025-07-30 DIAGNOSIS — L03.115 CELLULITIS OF RIGHT LOWER EXTREMITY: ICD-10-CM

## 2025-07-30 DIAGNOSIS — I10 ESSENTIAL HYPERTENSION: ICD-10-CM

## 2025-07-30 DIAGNOSIS — Z00.00 MEDICARE ANNUAL WELLNESS VISIT, SUBSEQUENT: Primary | ICD-10-CM

## 2025-07-30 DIAGNOSIS — J44.9 CHRONIC OBSTRUCTIVE PULMONARY DISEASE, UNSPECIFIED COPD TYPE: ICD-10-CM

## 2025-07-30 DIAGNOSIS — M17.0 BILATERAL PRIMARY OSTEOARTHRITIS OF KNEE: ICD-10-CM

## 2025-07-30 DIAGNOSIS — Z78.9 EPISODE OF BINGE CONSUMPTION OF ALCOHOL: ICD-10-CM

## 2025-07-30 RX ORDER — TRAZODONE HYDROCHLORIDE 50 MG/1
50 TABLET ORAL NIGHTLY
Qty: 90 TABLET | Refills: 1 | Status: SHIPPED | OUTPATIENT
Start: 2025-07-30

## 2025-07-30 RX ORDER — DOXYCYCLINE 100 MG/1
100 CAPSULE ORAL 2 TIMES DAILY
Qty: 20 CAPSULE | Refills: 0 | Status: SHIPPED | OUTPATIENT
Start: 2025-07-30

## 2025-07-30 RX ORDER — CARVEDILOL 3.12 MG/1
3.12 TABLET ORAL DAILY
Qty: 90 TABLET | Refills: 1 | Status: SHIPPED | OUTPATIENT
Start: 2025-07-30
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
## 2025-07-31 ENCOUNTER — OFFICE (AMBULATORY)
Dept: URBAN - METROPOLITAN AREA CLINIC 64 | Facility: CLINIC | Age: 70
End: 2025-07-31
Payer: MEDICARE

## 2025-07-31 DIAGNOSIS — K70.30 ALCOHOLIC CIRRHOSIS OF LIVER WITHOUT ASCITES: ICD-10-CM

## 2025-07-31 PROCEDURE — 99426 PRIN CARE MGMT STAFF 1ST 30: CPT | Performed by: INTERNAL MEDICINE

## 2025-08-06 ENCOUNTER — OFFICE VISIT (OUTPATIENT)
Age: 70
End: 2025-08-06
Payer: MEDICARE

## 2025-08-06 VITALS
BODY MASS INDEX: 40.43 KG/M2 | OXYGEN SATURATION: 98 % | HEART RATE: 60 BPM | WEIGHT: 315 LBS | HEIGHT: 74 IN | RESPIRATION RATE: 20 BRPM

## 2025-08-06 DIAGNOSIS — E66.01 MORBID OBESITY WITH BMI OF 40.0-44.9, ADULT: ICD-10-CM

## 2025-08-06 DIAGNOSIS — I87.301 CHRONIC VENOUS HYPERTENSION, RIGHT: ICD-10-CM

## 2025-08-06 DIAGNOSIS — S82.61XD CLOSED DISPLACED FRACTURE OF LATERAL MALLEOLUS OF RIGHT FIBULA WITH ROUTINE HEALING, SUBSEQUENT ENCOUNTER: Primary | ICD-10-CM

## 2025-08-06 DIAGNOSIS — M20.21 HALLUX RIGIDUS, RIGHT FOOT: ICD-10-CM

## 2025-08-28 RX ORDER — BUDESONIDE, GLYCOPYRROLATE, AND FORMOTEROL FUMARATE 160; 9; 4.8 UG/1; UG/1; UG/1
2 AEROSOL, METERED RESPIRATORY (INHALATION) 2 TIMES DAILY
Qty: 2 EACH | Refills: 0 | COMMUNITY
Start: 2025-08-28

## (undated) DEVICE — ZIPPERED TOGA, 2X LARGE: Brand: FLYTE

## (undated) DEVICE — SUT VIC 0 CT1 CR8 18IN J840D

## (undated) DEVICE — SKIN AFFIX SURG ADHESIVE 72/CS 0.55ML: Brand: MEDLINE

## (undated) DEVICE — UNDERGLV SURG BIOGEL INDICAT PI SZ8 BLU

## (undated) DEVICE — INTENDED FOR TISSUE SEPARATION, AND OTHER PROCEDURES THAT REQUIRE A SHARP SURGICAL BLADE TO PUNCTURE OR CUT.: Brand: BARD-PARKER ® CARBON RIB-BACK BLADES

## (undated) DEVICE — PK TOTL SHLDR 50

## (undated) DEVICE — DRAPE,C-SECTION,CLR SCREEN,WIRE: Brand: MEDLINE

## (undated) DEVICE — SOL IRR H2O BO 1000ML STRL

## (undated) DEVICE — BNDG ELAS ELITE V/CLOSE 6IN 5YD LF STRL

## (undated) DEVICE — IRRIGATOR BULB ASEPTO 60CC STRL

## (undated) DEVICE — SOL IRRIG NACL 1000ML

## (undated) DEVICE — DUAL CUT SAGITTAL BLADE

## (undated) DEVICE — KT SURG TURNOVER 050

## (undated) DEVICE — IMMOB SHLDR PAD2 UNIV MD

## (undated) DEVICE — PAD UNDERCAST WYTEX 4IN 4YD LF STRL

## (undated) DEVICE — UNDRGLV SURG BIOGEL PIMICROINDICATOR SYNTH SZ8 LF STRL

## (undated) DEVICE — THE STERILE CAMERA HANDLE COVER IS FOR USE WITH THE STERIS SURGICAL LIGHTING AND VISUALIZATION SYSTEMS.

## (undated) DEVICE — GLV SURG SENSICARE PI MIC PF SZ7.5 LF STRL

## (undated) DEVICE — PK TOTL KN 50

## (undated) DEVICE — WRAP SHOULDER COLD THERAPY

## (undated) DEVICE — DRP ROBOTIC ROSA BX/20

## (undated) DEVICE — ADHS SKIN PREMIERPRO EXOFIN TOPICAL HI/VISC .5ML

## (undated) DEVICE — SOLUTION,WATER,IRRIGATION,1000ML,STERILE: Brand: MEDLINE

## (undated) DEVICE — SOL IRR NACL 0.9PCT ARTHROMATIC 3000ML

## (undated) DEVICE — UNDERGLV SURG BIOGEL/PI PF SYNTH SURG SZ8.5 BLU 50/BX

## (undated) DEVICE — CEMENT MIXING SYSTEM WITH FEMORAL BREAKWAY NOZZLE: Brand: REVOLUTION

## (undated) DEVICE — SUT VIC 2/0 CT2 27IN J269H

## (undated) DEVICE — ANTIBACTERIAL UNDYED BRAIDED (POLYGLACTIN 910), SYNTHETIC ABSORBABLE SUTURE: Brand: COATED VICRYL

## (undated) DEVICE — SYS COLD/THRP POLAR/CARE/CUBE

## (undated) DEVICE — SOL IRRIG H2O 1000ML STRL

## (undated) DEVICE — STERILE PATIENT PROTECTIVE PAD FOR IMP® KNEE POSITIONERS & COHESIVE WRAP (10 / CASE): Brand: DE MAYO KNEE POSITIONER®

## (undated) DEVICE — DRSNG TELFA PAD NONADH STR 1S 3X8IN

## (undated) DEVICE — PICO 7 10CM X 30CM: Brand: PICO™ 7

## (undated) DEVICE — GLV SURG TRIUMPH ORTHO W/ALOE PF LTX 8 STRL

## (undated) DEVICE — NEEDLE, QUINCKE, 18GX3.5": Brand: MEDLINE

## (undated) DEVICE — BNDG ESMARK 6INX9FT STRL

## (undated) DEVICE — CUFF TOURNI 1BLADDER 1PRT 34IN STRL

## (undated) DEVICE — DECANTER: Brand: UNBRANDED

## (undated) DEVICE — WRAP KNEE COLD THERAPY

## (undated) DEVICE — GLV SURG TRIUMPH LT PF LTX 8.5 STRL

## (undated) DEVICE — PROXIMATE RH ROTATING HEAD SKIN STAPLERS (35 WIDE) CONTAINS 35 STAINLESS STEEL STAPLES: Brand: PROXIMATE

## (undated) DEVICE — CUFF TOURNI 1BLADDER 1PRT 30IN STRL

## (undated) DEVICE — THE STERILE LIGHT HANDLE COVER IS USED WITH STERIS SURGICAL LIGHTING AND VISUALIZATION SYSTEMS.

## (undated) DEVICE — DRAPE,U/ SHT,SPLIT,PLAS,STERIL: Brand: MEDLINE

## (undated) DEVICE — GLV SURG TRIUMPH GREEN W/ALOE PF LTX 8.5 STRL

## (undated) DEVICE — GLV SURG TRIUMPH GREEN W/ALOE PF LTX 8 STRL

## (undated) DEVICE — NDL HYPO PRECISIONGLIDE REG 22G 1 1/2

## (undated) DEVICE — SPNG CVR STR 2S 4X4

## (undated) DEVICE — INTEGUSEAL MICROBIAL SEALANT: Brand: AVANOS

## (undated) DEVICE — C-ARM: Brand: UNBRANDED

## (undated) DEVICE — PAD UNDERCAST WYTEX 6IN 4YD LF STRL

## (undated) DEVICE — SUT ETHLN 3/0 FS1 30IN 669H

## (undated) DEVICE — INTENT TO BE USED WITH SUTURE MATERIAL FOR TISSUE CLOSURE: Brand: RICHARD-ALLAN® NEEDLE 1/2 CIRCLE TAPER

## (undated) DEVICE — GLV SURG SIGNATURE ESSENTIAL PF LTX SZ8.5

## (undated) DEVICE — PAD COLD/THRP SHLDR POLAR/CARE WRAP/ON UNIV XL

## (undated) DEVICE — SOL IRRIG NACL 9PCT 1000ML BTL

## (undated) DEVICE — APPL CHLORAPREP 26ML CLR

## (undated) DEVICE — NDL HYPO PRECISIONGLIDE/REG 18G 1IN PNK

## (undated) DEVICE — MARKR SKIN W/RULR

## (undated) DEVICE — GLV SURG SENSICARE PI ORTHO SZ8 LF STRL

## (undated) DEVICE — INTENDED TO SUPPORT AND MAINTAIN THE POSITION OF AN ANESTHETIZED PATIENT DURING SURGERY: Brand: ERIN BEACH CHAIR FACE MASK

## (undated) DEVICE — T-MAX DISPOSABLE FACE MASK 8 PER BOX

## (undated) DEVICE — GLV SURG BIOGEL M LTX PF 7 1/2

## (undated) DEVICE — TOWEL,OR,DSP,ST,WHITE,DLX,4/PK,20PK/CS: Brand: MEDLINE

## (undated) DEVICE — SPNG LAP PREWSH SFTPK 18X18IN STRL PK/5

## (undated) DEVICE — CELLERATERX SURGICAL HYDROLYZED COLLAGEN 1G TYPE I BOVINE COLLAGEN FOR CHRONIC AND ACUTE WOUNDS, PARTIAL AND FULL THICKNESS WOUNDS, PRESSURE INJURIES I-IV, VENOUS STASIS ULCERS, ARTERIAL ULCERS, DIABETIC ULCERS, TRAUMATIC WOUNDS, FIRST AND SECOND DEGREE BURNS, SUPERFICIAL WOUNDS AND SURGICAL WOUNDS. STERILE UNTIL OPENED.  STORE AT ROOM TEMPERATURE. FOR USE ON MODERATELY TO HEAVILY EXUDATIVE WOUNDS. CLEANSE THE WOUND PER FACILITY PROTOCOL.  APPLY CELLERATERX POWDER OVER THE ENTIRE WOUND BED AND THE EDGES OF THE WOUND.  COVER WITH AN APPROPRIATE DRESSING.  REAPPLY 2-3 TIMES A WEEK, OR WITH EACH DRESSING CHANGE, TAKING CARE NOT TO DISRUPT WOUND SITE. HTTPS://SANARAMEDTECH.COM/SURGICAL/CELLERATERX-SURGICAL/: Brand: CELLERATERX SURGICAL

## (undated) DEVICE — DRSNG SURESITE WNDW 4X4.5

## (undated) DEVICE — DRILL BIT, SOLID CORE, 2.8MM: Brand: MEDLINE

## (undated) DEVICE — GLV SURG SENSICARE PI LF PF 8 GRN STRL

## (undated) DEVICE — DRSNG GZ CURAD XEROFORM PETROLTM OVERWRP 1X8IN STRL

## (undated) DEVICE — ADHS LIQ MASTISOL 2/3ML

## (undated) DEVICE — SUT MONOCRYL PLS ANTIB UND 3/0  PS1 27IN

## (undated) DEVICE — BANDAGE,GAUZE,BULKEE II,4.5"X4.1YD,STRL: Brand: MEDLINE

## (undated) DEVICE — CUFF SCD HEMOFORCE SEQ CALF STD MD

## (undated) DEVICE — GOWN,SIRUS,POLYRNF,BRTHSLV,XL,30/CS: Brand: MEDLINE

## (undated) DEVICE — SOL HYDROGEN PEROX 3PCT 8OZ

## (undated) DEVICE — GLV SURG SENSICARE PI MIC PF SZ7 LF STRL

## (undated) DEVICE — GLV SURG SENSICARE PI ORTHO SZ7.5 LF STRL

## (undated) DEVICE — SYR LUERLOK 30CC

## (undated) DEVICE — SOL IRRIG SOD CHL 0.9PCT 3000ML

## (undated) DEVICE — UNDYED BRAIDED (POLYGLACTIN 910), SYNTHETIC ABSORBABLE SUTURE: Brand: COATED VICRYL

## (undated) DEVICE — PK PROC TURNOVER

## (undated) DEVICE — SYS IRR SURGIPHOR A/MIC RTU BO PVPI 450ML STRL

## (undated) DEVICE — DRSNG BARR INSUL SHLDR POLAR/CARE S/ADHS XL

## (undated) DEVICE — SOL IRR NACL 0.9PCT BO 1000ML

## (undated) DEVICE — PK EXTREM 50

## (undated) DEVICE — SLV SCD CALF HEMOFORCE DVT THERP REPROC MD

## (undated) DEVICE — TOWEL,OR,DSP,ST,NATURAL,DLX,4/PK,20PK/CS: Brand: MEDLINE

## (undated) DEVICE — SUT MNCRYL 3/0 Y936H